# Patient Record
Sex: MALE | Race: WHITE | NOT HISPANIC OR LATINO | Employment: OTHER | URBAN - METROPOLITAN AREA
[De-identification: names, ages, dates, MRNs, and addresses within clinical notes are randomized per-mention and may not be internally consistent; named-entity substitution may affect disease eponyms.]

---

## 2017-01-09 DIAGNOSIS — I10 ESSENTIAL HYPERTENSION: ICD-10-CM

## 2017-01-09 RX ORDER — CARVEDILOL 25 MG/1
TABLET ORAL
Refills: 10 | Status: CANCELLED | OUTPATIENT
Start: 2017-01-09

## 2017-01-10 DIAGNOSIS — I10 ESSENTIAL HYPERTENSION: ICD-10-CM

## 2017-01-10 RX ORDER — CARVEDILOL 25 MG/1
25 TABLET ORAL 2 TIMES DAILY WITH MEALS
Qty: 60 TAB | Refills: 11 | Status: SHIPPED | OUTPATIENT
Start: 2017-01-10 | End: 2017-11-13 | Stop reason: SDUPTHER

## 2017-01-19 ENCOUNTER — OFFICE VISIT (OUTPATIENT)
Dept: INTERNAL MEDICINE | Facility: MEDICAL CENTER | Age: 72
End: 2017-01-19
Payer: MEDICARE

## 2017-01-19 VITALS
DIASTOLIC BLOOD PRESSURE: 81 MMHG | HEIGHT: 75 IN | BODY MASS INDEX: 32.65 KG/M2 | WEIGHT: 262.6 LBS | SYSTOLIC BLOOD PRESSURE: 146 MMHG | HEART RATE: 67 BPM | OXYGEN SATURATION: 91 % | TEMPERATURE: 97 F

## 2017-01-19 DIAGNOSIS — F33.8 SEASONAL AFFECTIVE DISORDER (HCC): ICD-10-CM

## 2017-01-19 DIAGNOSIS — Z23 NEED FOR PNEUMOCOCCAL VACCINATION: ICD-10-CM

## 2017-01-19 DIAGNOSIS — I26.99 OTHER PULMONARY EMBOLISM WITHOUT ACUTE COR PULMONALE, UNSPECIFIED CHRONICITY (HCC): ICD-10-CM

## 2017-01-19 DIAGNOSIS — I10 ESSENTIAL HYPERTENSION: ICD-10-CM

## 2017-01-19 DIAGNOSIS — D50.9 IRON DEFICIENCY ANEMIA, UNSPECIFIED IRON DEFICIENCY ANEMIA TYPE: ICD-10-CM

## 2017-01-19 DIAGNOSIS — G47.33 OSA (OBSTRUCTIVE SLEEP APNEA): ICD-10-CM

## 2017-01-19 DIAGNOSIS — E78.5 DYSLIPIDEMIA: ICD-10-CM

## 2017-01-19 PROCEDURE — 90670 PCV13 VACCINE IM: CPT | Performed by: INTERNAL MEDICINE

## 2017-01-19 PROCEDURE — G0009 ADMIN PNEUMOCOCCAL VACCINE: HCPCS | Performed by: INTERNAL MEDICINE

## 2017-01-19 PROCEDURE — 99214 OFFICE O/P EST MOD 30 MIN: CPT | Mod: 25 | Performed by: INTERNAL MEDICINE

## 2017-01-19 ASSESSMENT — PATIENT HEALTH QUESTIONNAIRE - PHQ9: CLINICAL INTERPRETATION OF PHQ2 SCORE: 0

## 2017-01-19 NOTE — PROGRESS NOTES
Established Patient    Anthony Cloud is a 71 y.o. male who presents today with the following:    CC: Follow-up for chronic medical problems.     HPI:  1. SHYAM (obstructive sleep apnea)  He's doing well with BiPAP. He did recently have oxygen added to his system however. He is very consistent about using this, and even takes it with him when his camping. He has a prescription for Provigil however he reports rare use of this.    2. Essential hypertension  Blood pressure is mildly elevated here in the office of 146/81 today. At home he reports blood pressures are more consistently lower approximately 140/80. He is currently on carvedilol 25 mg twice per day, losartan 50 mg daily, and hydrochlorothiazide 25 mg daily.    3. Seasonal affective disorder (CMS-HCC)  This is treated very effectively with Lexapro 10 mg daily. He stays on it year-round.    4. Dyslipidemia  Anthony is tolerating medication well.  No intolerable side-effects noted.  No new symptoms of muscle aches or weakness.  Patient reports good adherence to medication regimen.  No change in medication since the last visit. Anthony is trying to follow a low-cholesterol diet.  Exercise is adequate. He is currently on Lipitor 40 mg daily    5. Iron deficiency anemia, unspecified iron deficiency anemia type  He remains on iron 325 mg 3 times per day with meals. No symptoms of anemia at this time.    6. Other pulmonary embolism without acute cor pulmonale, unspecified chronicity (CMS-HCC)  Long-standing history of pulmonary embolism from many years ago. He was on warfarin however in the last few months was transitioned over to Xarelto. He is followed at the [facility] anticoagulation clinic.    7. Need for pneumococcal vaccination    ROS:   Denies any new chest pain or shortness of breath.  No changes to urinary or bowel function.     Past Medical History   Diagnosis Date   • Mixed hyperlipidemia    • Unspecified sleep apnea    • Depressive disorder, not  "elsewhere classified          • Old myocardial infarction January 2003         • Pulmonary embolism (CMS-HCC)    • Personal history of venous thrombosis and embolism          • CAD (coronary artery disease)    • Chronic anticoagulation    • Hypertension    • Sleep apnea      V-PAP   • HTN (hypertension)    • Obesity    • Sleep apnea    • Depression    • Dyslipidemia    • Hypothyroidism    • Central sleep apnea       ICD-10 transition   • Peptic ulcer disease 8/4/2016   • Restrictive lung disease 8/4/2016       Social History   Substance Use Topics   • Smoking status: Never Smoker    • Smokeless tobacco: Never Used   • Alcohol Use: Yes      Comment: 4-5 drinks a week        Current Outpatient Prescriptions   Medication Sig Dispense Refill   • omeprazole (PRILOSEC) 20 MG delayed-release capsule TAKE ONE CAPSULE BY MOUTH TWICE DAILY  60 Cap 6   • carvedilol (COREG) 25 MG Tab Take 1 Tab by mouth 2 times a day, with meals. 60 Tab 11   • atorvastatin (LIPITOR) 40 MG Tab Take 1 Tab by mouth every day. 90 Tab 1   • rivaroxaban (XARELTO) 20 MG Tab tablet Take 1 Tab by mouth with dinner. 30 Tab 6   • ferrous sulfate 325 (65 FE) MG EC tablet Take 325 mg by mouth 3 times a day, with meals.     • losartan (COZAAR) 50 MG Tab Take 1 Tab by mouth 2 Times a Day. 180 Tab 2   • modafinil (PROVIGIL) 200 MG Tab Take 1/2 to 1 tablet by mouth daily 30 Tab 3   • hydrochlorothiazide (HYDRODIURIL) 25 MG Tab TAKE ONE (1) TABLET BY MOUTH EVERY DAY 30 Tab 11   • aspirin (ASA) 81 MG Chew Tab chewable tablet Take 1 Tab by mouth every day. 100 Tab 11   • escitalopram (LEXAPRO) 10 MG TABS Take 10 mg by mouth every day.       No current facility-administered medications for this visit.       /81 mmHg  Pulse 67  Temp(Src) 36.1 °C (97 °F)  Ht 1.905 m (6' 3\")  Wt 119.115 kg (262 lb 9.6 oz)  BMI 32.82 kg/m2  SpO2 91%    Physical Exam  General: Well developed, well nourished male, in no distress.  Eyes: Conjuntiva without any obvious " injection or erythema.   Cardiovascular: Heart is regular with no murmurs  Lungs: Clear to auscultation bilaterally. No wheezes, rhonci or crackles heard. Respiratory effort is normal.  Abd: Soft, non-tender  Ext: No edema    Assessment and Plan    1. SHYAM (obstructive sleep apnea)  Overall stable. He will continue to use the BiPAP and continue to follow with his sleep specialist.    2. Essential hypertension  Currently this is stable and well controlled.  The patient should continue current therapy with no changes at this time.       3. Seasonal affective disorder (CMS-HCC)  No changes today. He will continue on Lexapro    4. Dyslipidemia  Currently this is stable and well controlled.  The patient should continue current therapy with no changes at this time.       5. Iron deficiency anemia, unspecified iron deficiency anemia type   plan for repeat CBC and iron panel. For now he will continue on his iron supplementation.    6. Other pulmonary embolism without acute cor pulmonale, unspecified chronicity (CMS-HCC)  Overall doing well on the Xarelto. No signs of bleeding or other problems at this point. He'll continue follow-up with the [facility] anticoagulation clinic    7. Need for pneumococcal vaccination  Prevnar 13 given in the office today.    Health Maintenance  Influenza vaccine is done for this flu season.  Prevnar today, Pneumovax in one year.  Colonoscopy is up-to-date however he will need a repeat colonoscopy in January 2018 at a 5 year interval      Return in about 6 months (around 7/19/2017).      Signed by: Angel Cameron M.D.    This note was created using voice recognition software.  While every attempt is made to ensure accuracy of transcription, occasionally errors occur.

## 2017-01-19 NOTE — PATIENT INSTRUCTIONS
Get labs done for me when you get the labs done that the cardiologist ordered.    Remember, you'll need your next colonoscopy in January 2018.

## 2017-01-19 NOTE — MR AVS SNAPSHOT
"        Anthony Cloud   2017 8:40 AM   Office Visit   MRN: [id number]    Department:  [facility] Med - Internal Med   Dept Phone:  [phone]    Description:  Male : 1945   Provider:  Angel Cameron M.D.           Reason for Visit     Follow-Up dyslipidemia       Allergies as of 2017     Allergen Noted Reactions    Lisinopril 2014       cough      You were diagnosed with     SHYAM (obstructive sleep apnea)   [835570]       Essential hypertension   [6669435]       Seasonal affective disorder (CMS-HCC)   [627163]       Dyslipidemia   [609586]       Iron deficiency anemia, unspecified iron deficiency anemia type   [0572110]       Other pulmonary embolism without acute cor pulmonale, unspecified chronicity (CMS-HCC)   [2516914]       Need for pneumococcal vaccination   [464594]         Vital Signs     Blood Pressure Pulse Temperature Height Weight Body Mass Index    146/81 mmHg 67 36.1 °C (97 °F) 1.905 m (6' 3\") 119.115 kg (262 lb 9.6 oz) 32.82 kg/m2    Oxygen Saturation Smoking Status                91% Never Smoker           Basic Information     Date Of Birth Sex Race Ethnicity Preferred Language    1945 Male White Non- English      Your appointments     2017  8:20 AM   PACER CHECK ONLY with LUCY Diaz   [facility] (--)    [address]  [facility] [address] [phone]            2017  8:40 AM   Establ[facility] with [facility] - [facility] (--)    [address] [place] [address]  [address]  [facility] [address] [phone]           You will be receiving a confirmation call a few days before your appointment from our automated call confirmation system.              Problem List              ICD-10-CM Priority Class Noted - Resolved    Dyslipidemia E78.5 High  Unknown - Present    Seasonal affective disorder (CMS-HCC) F39   Unknown - Present    Old " myocardial infarction I25.2 High  Unknown - Present    Personal history of venous thrombosis and embolism Z86.718   Unknown - Present    Malleolar fracture S82.899A   8/29/2014 - Present    Chronic anticoagulation Z79.01   9/16/2015 - Present    Essential hypertension I10   12/7/2015 - Present    Coronary artery disease due to lipid rich plaque I25.10, I25.83   12/7/2015 - Present    SHYAM (obstructive sleep apnea) G47.33   6/14/2016 - Present    Anemia, iron deficiency D50.9   8/4/2016 - Present    Helicobacter pylori infection A04.8   8/4/2016 - Present    Peptic ulcer disease K27.9   8/4/2016 - Present    H/O: GI bleed Z87.19   8/4/2016 - Present    Impaired fasting glucose R73.01   8/4/2016 - Present    Restrictive lung disease J98.4   8/4/2016 - Present    Pulmonary embolism (CMS-HCC) I26.99   11/9/2016 - Present      Health Maintenance        Date Due Completion Dates    IMM DTaP/Tdap/Td Vaccine (1 - Tdap) 7/11/1966 ---    IMM ZOSTER VACCINE 7/11/2007 ---    IMM PNEUMOCOCCAL 65+ (ADULT) LOW/MEDIUM RISK SERIES (1 of 2 - PCV13) 7/11/2012 ---    COLONOSCOPY 1/24/2018 1/24/2013            Current Immunizations     13-VALENT PCV PREVNAR  Incomplete    Influenza Vaccine Adult HD 12/8/2016      Below and/or attached are the medications your provider expects you to take. Review all of your home medications and newly ordered medications with your provider and/or pharmacist. Follow medication instructions as directed by your provider and/or pharmacist. Please keep your medication list with you and share with your provider. Update the information when medications are discontinued, doses are changed, or new medications (including over-the-counter products) are added; and carry medication information at all times in the event of emergency situations     Allergies:  LISINOPRIL - (reactions not documented)               Medications  Valid as of: January 19, 2017 -  9:14 AM    Generic Name Brand Name Tablet Size Instructions  for use    Aspirin (Chew Tab) ASA 81 MG Take 1 Tab by mouth every day.        Atorvastatin Calcium (Tab) LIPITOR 40 MG Take 1 Tab by mouth every day.        Carvedilol (Tab) COREG 25 MG Take 1 Tab by mouth 2 times a day, with meals.        Escitalopram Oxalate (Tab) LEXAPRO 10 MG Take 10 mg by mouth every day.        Ferrous Sulfate (Tablet Delayed Response) ferrous sulfate 325 (65 FE) MG Take 325 mg by mouth 3 times a day, with meals.        HydroCHLOROthiazide (Tab) HYDRODIURIL 25 MG TAKE ONE (1) TABLET BY MOUTH EVERY DAY        Losartan Potassium (Tab) COZAAR 50 MG Take 1 Tab by mouth 2 Times a Day.        Modafinil (Tab) PROVIGIL 200 MG Take 1/2 to 1 tablet by mouth daily        Omeprazole (CAPSULE DELAYED RELEASE) PRILOSEC 20 MG TAKE ONE CAPSULE BY MOUTH TWICE DAILY         Rivaroxaban (Tab) XARELTO 20 MG Take 1 Tab by mouth with dinner.        .                 Medicines prescribed today were sent to:     [facility] - [place], [place] - [address]    [address] [place] [address]    Phone: [phone] Fax: [phone]    Open 24 Hours?: No      Medication refill instructions:       If your prescription bottle indicates you have medication refills left, it is not necessary to call your provider’s office. Please contact your pharmacy and they will refill your medication.    If your prescription bottle indicates you do not have any refills left, you may request refills at any time through one of the following ways: The online [employer] system (except Urgent Care), by calling your provider’s office, or by asking your pharmacy to contact your provider’s office with a refill request. Medication refills are processed only during regular business hours and may not be available until the next business day. Your provider may request additional information or to have a follow-up visit with you prior to refilling your medication.   *Please Note: Medication refills are assigned a new Rx number when refilled  electronically. Your pharmacy may indicate that no refills were authorized even though a new prescription for the same medication is available at the pharmacy. Please request the medicine by name with the pharmacy before contacting your provider for a refill.        Your To Do List     Future Labs/Procedures Complete By Expires    CBC WITH DIFFERENTIAL  As directed 1/20/2018    FERRITIN  As directed 1/20/2018    IRON/TOTAL IRON BIND  As directed 1/20/2018      Instructions    Get labs done for me when you get the labs done that the cardiologist ordered.    Remember, you'll need your next colonoscopy in January 2018.       Other Notes About Your Plan     DME:  [facility] / ph [phone] / fax [phone]             MyChart Status: Patient Declined

## 2017-02-21 NOTE — TELEPHONE ENCOUNTER
Last seen: 1/19/17 by Dr. Cameron  Next appt: 7/20/17 with Dr. Cameron    Was the patient seen in the last year in this department? Yes   Does patient have an active prescription for medications requested? No   Received Request Via: Pharmacy

## 2017-02-23 NOTE — TELEPHONE ENCOUNTER
At his last visit, the dosing was 10mg 1 tab daily.  Rx request is for 1.5 tabs daily.  Please verify with patient.

## 2017-02-24 RX ORDER — ESCITALOPRAM OXALATE 10 MG/1
10 TABLET ORAL DAILY
Qty: 90 TAB | Refills: 1 | Status: SHIPPED | OUTPATIENT
Start: 2017-02-24 | End: 2017-08-31 | Stop reason: SDUPTHER

## 2017-03-09 ENCOUNTER — OFFICE VISIT (OUTPATIENT)
Dept: URGENT CARE | Facility: CLINIC | Age: 72
End: 2017-03-09
Payer: MEDICARE

## 2017-03-09 ENCOUNTER — APPOINTMENT (OUTPATIENT)
Dept: RADIOLOGY | Facility: IMAGING CENTER | Age: 72
End: 2017-03-09
Attending: FAMILY MEDICINE
Payer: MEDICARE

## 2017-03-09 VITALS
TEMPERATURE: 97.7 F | RESPIRATION RATE: 16 BRPM | OXYGEN SATURATION: 93 % | BODY MASS INDEX: 32.75 KG/M2 | SYSTOLIC BLOOD PRESSURE: 122 MMHG | DIASTOLIC BLOOD PRESSURE: 84 MMHG | HEART RATE: 66 BPM | WEIGHT: 262 LBS

## 2017-03-09 DIAGNOSIS — R07.9 RIGHT-SIDED CHEST PAIN: ICD-10-CM

## 2017-03-09 PROCEDURE — G8598 ASA/ANTIPLAT THER USED: HCPCS | Performed by: FAMILY MEDICINE

## 2017-03-09 PROCEDURE — 3017F COLORECTAL CA SCREEN DOC REV: CPT | Performed by: FAMILY MEDICINE

## 2017-03-09 PROCEDURE — 4040F PNEUMOC VAC/ADMIN/RCVD: CPT | Performed by: FAMILY MEDICINE

## 2017-03-09 PROCEDURE — G8432 DEP SCR NOT DOC, RNG: HCPCS | Performed by: FAMILY MEDICINE

## 2017-03-09 PROCEDURE — 71101 X-RAY EXAM UNILAT RIBS/CHEST: CPT | Mod: TC,RT | Performed by: FAMILY MEDICINE

## 2017-03-09 PROCEDURE — G8419 CALC BMI OUT NRM PARAM NOF/U: HCPCS | Performed by: FAMILY MEDICINE

## 2017-03-09 PROCEDURE — G8482 FLU IMMUNIZE ORDER/ADMIN: HCPCS | Performed by: FAMILY MEDICINE

## 2017-03-09 PROCEDURE — 1036F TOBACCO NON-USER: CPT | Performed by: FAMILY MEDICINE

## 2017-03-09 PROCEDURE — 1101F PT FALLS ASSESS-DOCD LE1/YR: CPT | Performed by: FAMILY MEDICINE

## 2017-03-09 PROCEDURE — 99214 OFFICE O/P EST MOD 30 MIN: CPT | Performed by: FAMILY MEDICINE

## 2017-03-09 RX ORDER — HYDROCODONE BITARTRATE AND ACETAMINOPHEN 5; 325 MG/1; MG/1
1-2 TABLET ORAL EVERY 4 HOURS PRN
Qty: 20 TAB | Refills: 0 | Status: SHIPPED | OUTPATIENT
Start: 2017-03-09 | End: 2017-07-13

## 2017-03-09 RX ORDER — IBUPROFEN 200 MG
200 TABLET ORAL EVERY 6 HOURS PRN
COMMUNITY
End: 2017-09-28

## 2017-03-09 ASSESSMENT — ENCOUNTER SYMPTOMS
VOMITING: 0
ANOREXIA: 0
ABDOMINAL PAIN: 1
NAUSEA: 0
COUGH: 0
FEVER: 0
CHILLS: 0
FATIGUE: 0

## 2017-03-09 NOTE — MR AVS SNAPSHOT
Anthony Cloud   3/9/2017 5:00 PM   Office Visit   MRN: [id number]    Department:  [facility]   Dept Phone:  [phone]    Description:  Male : 1945   Provider:  Julian Gutierrez M.D.           Reason for Visit     Rib Injury x yesterday, fell and Landed on Rt. ribs, hard to take a deep breath      Allergies as of 3/9/2017     Allergen Noted Reactions    Lisinopril 2014       cough      You were diagnosed with     Right-sided chest pain   [9760377]         Vital Signs     Blood Pressure Pulse Temperature Respirations Weight Oxygen Saturation    122/84 mmHg 66 36.5 °C (97.7 °F) 16 118.842 kg (262 lb) 93%    Smoking Status                   Never Smoker            Basic Information     Date Of Birth Sex Race Ethnicity Preferred Language    1945 Male White Non- English      Your appointments     2017  8:40 AM   Established Patient with Angel Cameron M.D.   [facility] Med - Internal Medicine (--)    [address] [place] [id number] [phone]           You will be receiving a confirmation call a few days before your appointment from our automated call confirmation system.              Problem List              ICD-10-CM Priority Class Noted - Resolved    Dyslipidemia E78.5 High  Unknown - Present    Seasonal affective disorder (CMS-HCC) F39   Unknown - Present    Old myocardial infarction I25.2 High  Unknown - Present    Personal history of venous thrombosis and embolism Z86.718   Unknown - Present    Malleolar fracture S82.899A   2014 - Present    Chronic anticoagulation Z79.01   2015 - Present    Essential hypertension I10   2015 - Present    Coronary artery disease due to lipid rich plaque I25.10, I25.83   2015 - Present    SHYAM (obstructive sleep apnea) G47.33   2016 - Present    Anemia, iron deficiency D50.9   2016 - Present    Helicobacter pylori infection A04.8   2016 - Present    Peptic ulcer disease K27.9   8/4/2016 - Present    H/O: GI bleed Z87.19   8/4/2016 - Present    Impaired fasting glucose R73.01   8/4/2016 - Present    Restrictive lung disease J98.4   8/4/2016 - Present    Pulmonary embolism (CMS-HCC) I26.99   11/9/2016 - Present      Health Maintenance        Date Due Completion Dates    IMM DTaP/Tdap/Td Vaccine (1 - Tdap) 7/11/1966 ---    IMM ZOSTER VACCINE 7/11/2007 ---    IMM PNEUMOCOCCAL 65+ (ADULT) LOW/MEDIUM RISK SERIES (2 of 2 - PPSV23) 1/19/2018 1/19/2017    COLONOSCOPY 1/24/2018 1/24/2013            Current Immunizations     13-VALENT PCV PREVNAR 1/19/2017    Influenza Vaccine Adult HD 12/8/2016      Below and/or attached are the medications your provider expects you to take. Review all of your home medications and newly ordered medications with your provider and/or pharmacist. Follow medication instructions as directed by your provider and/or pharmacist. Please keep your medication list with you and share with your provider. Update the information when medications are discontinued, doses are changed, or new medications (including over-the-counter products) are added; and carry medication information at all times in the event of emergency situations     Allergies:  LISINOPRIL - (reactions not documented)               Medications  Valid as of: March 09, 2017 -  6:16 PM    Generic Name Brand Name Tablet Size Instructions for use    Aspirin (Chew Tab) ASA 81 MG Take 1 Tab by mouth every day.        Atorvastatin Calcium (Tab) LIPITOR 40 MG Take 1 Tab by mouth every day.        Carvedilol (Tab) COREG 25 MG Take 1 Tab by mouth 2 times a day, with meals.        Escitalopram Oxalate (Tab) LEXAPRO 10 MG Take 1 Tab by mouth every day.        Ferrous Sulfate (Tablet Delayed Response) ferrous sulfate 325 (65 FE) MG Take 325 mg by mouth 3 times a day, with meals.        HydroCHLOROthiazide (Tab) HYDRODIURIL 25 MG TAKE ONE (1) TABLET BY MOUTH EVERY DAY        Hydrocodone-Acetaminophen  (Tab) NORCO 5-325 MG Take 1-2 Tabs by mouth every four hours as needed.        Ibuprofen (Tab) MOTRIN 200 MG Take 200 mg by mouth every 6 hours as needed.        Losartan Potassium (Tab) COZAAR 50 MG Take 1 Tab by mouth 2 Times a Day.        Modafinil (Tab) PROVIGIL 200 MG Take 1/2 to 1 tablet by mouth daily        Omeprazole (CAPSULE DELAYED RELEASE) PRILOSEC 20 MG TAKE ONE CAPSULE BY MOUTH TWICE DAILY         Rivaroxaban (Tab) XARELTO 20 MG Take 1 Tab by mouth with dinner.        .                 Medicines prescribed today were sent to:     [facility] [address] - [place], [place] - [address]    [address] [place] [address]    Phone: [phone] Fax: [phone]    Open 24 Hours?: No      Medication refill instructions:       If your prescription bottle indicates you have medication refills left, it is not necessary to call your provider’s office. Please contact your pharmacy and they will refill your medication.    If your prescription bottle indicates you do not have any refills left, you may request refills at any time through one of the following ways: The online [employer] system (except Urgent Care), by calling your provider’s office, or by asking your pharmacy to contact your provider’s office with a refill request. Medication refills are processed only during regular business hours and may not be available until the next business day. Your provider may request additional information or to have a follow-up visit with you prior to refilling your medication.   *Please Note: Medication refills are assigned a new Rx number when refilled electronically. Your pharmacy may indicate that no refills were authorized even though a new prescription for the same medication is available at the pharmacy. Please request the medicine by name with the pharmacy before contacting your provider for a refill.        Your To Do List     Future Labs/Procedures Complete By Expires    [id number] (WITH 1-VIEW CXR) RIGHT  As  directed 3/9/2018      Other Notes About Your Plan     DME:  [facility] / ph [phone] / fax [phone]             Zakada Access Code: [email or web address]  Expires: 4/6/2017  8:56 AM    Zakada  A secure, online tool to manage your health information     [employer] Zakada® is a secure, online tool that connects you to your personalized health information from the privacy of your home -- day or night - making it very easy for you to manage your healthcare. Once the activation process is completed, you can even access your medical information using the Zakada ramses, which is available for free in the Apple Ramses store or Google Play store.     Zakada provides the following levels of access (as shown below):   My Chart Features   [facility] Doctor [facility]  Specialists [facility]  Urgent  Care Non-[facility]  Primary Care  Doctor   Email your healthcare team securely and privately 24/7 X X X    Manage appointments: schedule your next appointment; view details of past/upcoming appointments X      Request prescription refills. X      View recent personal medical records, including lab and immunizations X X X X   View health record, including health history, allergies, medications X X X X   Read reports about your outpatient visits, procedures, consult and ER notes X X X X   See your discharge summary, which is a recap of your hospital and/or ER visit that includes your diagnosis, lab results, and care plan. X X       How to register for Zakada:  1. Go to  [email or web address].  2. Click on the Sign Up Now box, which takes you to the New Member Sign Up page. You will need to provide the following information:  a. Enter your Zakada Access Code exactly as it appears at the top of this page. (You will not need to use this code after you’ve completed the sign-up process. If you do not sign up before the expiration date, you must request a new code.)   b. Enter your date of birth.   c. Enter your home email  address.   d. Click Submit, and follow the next screen’s instructions.  3. Create a [employer] ID. This will be your [employer] login ID and cannot be changed, so think of one that is secure and easy to remember.  4. Create a [employer] password. You can change your password at any time.  5. Enter your Password Reset Question and Answer. This can be used at a later time if you forget your password.   6. Enter your e-mail address. This allows you to receive e-mail notifications when new information is available in [employer].  7. Click Sign Up. You can now view your health information.    For assistance activating your [employer] account, call [phone]

## 2017-03-10 NOTE — PROGRESS NOTES
"Subjective:      Anthony Cloud is a 71 y.o. male who presents with Rib Injury            Rib Injury  This is a new problem. The current episode started yesterday. The problem occurs constantly. The problem has been unchanged. Associated symptoms include abdominal pain and chest pain. Pertinent negatives include no anorexia, chills, congestion, coughing, fatigue, fever, nausea or vomiting. Exacerbated by: deep breathing. He has tried NSAIDs for the symptoms. The treatment provided moderate relief.   Fell on Right side with adducted arm and landed on ribs  Pain with deep inspiration, sharp  No pop or snap with fall  Had 3 ribs on right cut off about 15 years ago and has been \"a sensitive area\"    Review of Systems   Constitutional: Negative for fever, chills and fatigue.   HENT: Negative for congestion.    Respiratory: Negative for cough.    Cardiovascular: Positive for chest pain.   Gastrointestinal: Positive for abdominal pain. Negative for nausea, vomiting and anorexia.     PMH:  has a past medical history of Mixed hyperlipidemia; Unspecified sleep apnea; Depressive disorder, not elsewhere classified; Old myocardial infarction (January 2003); Pulmonary embolism (CMS-[facility]); Personal history of venous thrombosis and embolism; CAD (coronary artery disease); Chronic anticoagulation; Hypertension; Sleep apnea; HTN (hypertension); Obesity; Sleep apnea; Depression; Dyslipidemia; Hypothyroidism; Central sleep apnea; Peptic ulcer disease (8/4/2016); and Restrictive lung disease (8/4/2016). He also has no past medical history of GERD (gastroesophageal reflux disease).  MEDS:   Current outpatient prescriptions:   •  ibuprofen (MOTRIN) 200 MG Tab, Take 200 mg by mouth every 6 hours as needed., Disp: , Rfl:   •  escitalopram (LEXAPRO) 10 MG Tab, Take 1 Tab by mouth every day., Disp: 90 Tab, Rfl: 1  •  omeprazole (PRILOSEC) 20 MG delayed-release capsule, TAKE ONE CAPSULE BY MOUTH TWICE DAILY , Disp: 60 Cap, Rfl: 6  •  " carvedilol (COREG) 25 MG Tab, Take 1 Tab by mouth 2 times a day, with meals., Disp: 60 Tab, Rfl: 11  •  atorvastatin (LIPITOR) 40 MG Tab, Take 1 Tab by mouth every day., Disp: 90 Tab, Rfl: 1  •  rivaroxaban (XARELTO) 20 MG Tab tablet, Take 1 Tab by mouth with dinner., Disp: 30 Tab, Rfl: 6  •  losartan (COZAAR) 50 MG Tab, Take 1 Tab by mouth 2 Times a Day., Disp: 180 Tab, Rfl: 2  •  hydrochlorothiazide (HYDRODIURIL) 25 MG Tab, TAKE ONE (1) TABLET BY MOUTH EVERY DAY, Disp: 30 Tab, Rfl: 11  •  aspirin (ASA) 81 MG Chew Tab chewable tablet, Take 1 Tab by mouth every day., Disp: 100 Tab, Rfl: 11  •  ferrous sulfate 325 (65 FE) MG EC tablet, Take 325 mg by mouth 3 times a day, with meals., Disp: , Rfl:   •  modafinil (PROVIGIL) 200 MG Tab, Take 1/2 to 1 tablet by mouth daily, Disp: 30 Tab, Rfl: 3  ALLERGIES:   Allergies   Allergen Reactions   • Lisinopril      cough     SURGHX:   Past Surgical History   Procedure Laterality Date   • Bowel resection     • Incision and drainage orthopedic  8/29/2014     Performed by Wolfgang Merrill M.D. at SURGERY [facility]   • Cardiac cath, right/left heart  2003 01/2003 4.0x23mm Zeta stent to proximal RCA,3.5x22mm distal to first stent, 3.0x15mm at the point of original occlusion.   • Pr anesth,lower leg arteries surg       SOCHX:  reports that he has never smoked. He has never used smokeless tobacco. He reports that he drinks alcohol. He reports that he does not use illicit drugs.  FH: Family history was reviewed, no pertinent findings to report       Objective:     /84 mmHg  Pulse 66  Temp(Src) 36.5 °C (97.7 °F)  Resp 16  Wt 118.842 kg (262 lb)  SpO2 93%     Physical Exam   Constitutional: He appears well-developed.   HENT:   Head: Normocephalic.   Right Ear: External ear normal.   Left Ear: External ear normal.   Mouth/Throat: Oropharyngeal exudate present.   Nasal congestion    Eyes: Right eye exhibits no discharge. Left eye exhibits no discharge.   Neck:  Normal range of motion. No tracheal deviation present. No thyromegaly present.   Cardiovascular: Normal rate.  Exam reveals no friction rub.    No murmur heard.  Pulmonary/Chest: Effort normal. No stridor. No respiratory distress. He has no wheezes.   Abdominal: Soft. He exhibits no distension and no mass. There is tenderness. There is no rebound and no guarding.   Right rib cage tenderness   Lymphadenopathy:     He has no cervical adenopathy.   Neurological: He is alert.   Skin: Skin is warm and dry. He is not diaphoretic.   Psychiatric: He has a normal mood and affect. His behavior is normal.               Assessment/Plan:     1. Right-sided chest pain  [id number] (WITH 1-VIEW CXR) RIGHT    hydrocodone-acetaminophen (NORCO) 5-325 MG Tab per tablet     History of old trauma with rib resections  No pnumothorax  Likely contusion  Virals stable  meds for pain   Follow-up if symptoms worsen or fail to improve

## 2017-03-14 LAB
BASOPHILS # BLD AUTO: 0 X10E3/UL (ref 0–0.2)
BASOPHILS NFR BLD AUTO: 0 %
EOSINOPHIL # BLD AUTO: 0.1 X10E3/UL (ref 0–0.4)
EOSINOPHIL NFR BLD AUTO: 3 %
ERYTHROCYTE [DISTWIDTH] IN BLOOD BY AUTOMATED COUNT: 14.3 % (ref 12.3–15.4)
FERRITIN SERPL-MCNC: 169 NG/ML (ref 30–400)
HCT VFR BLD AUTO: 38.7 % (ref 37.5–51)
HGB BLD-MCNC: 13 G/DL (ref 12.6–17.7)
IMM GRANULOCYTES # BLD: 0 X10E3/UL (ref 0–0.1)
IMM GRANULOCYTES NFR BLD: 0 %
IMMATURE CELLS  115398: ABNORMAL
IRON SATN MFR SERPL: 34 % (ref 15–55)
IRON SERPL-MCNC: 93 UG/DL (ref 38–169)
LYMPHOCYTES # BLD AUTO: 1.4 X10E3/UL (ref 0.7–3.1)
LYMPHOCYTES NFR BLD AUTO: 32 %
MCH RBC QN AUTO: 34 PG (ref 26.6–33)
MCHC RBC AUTO-ENTMCNC: 33.6 G/DL (ref 31.5–35.7)
MCV RBC AUTO: 101 FL (ref 79–97)
MONOCYTES # BLD AUTO: 0.5 X10E3/UL (ref 0.1–0.9)
MONOCYTES NFR BLD AUTO: 11 %
MORPHOLOGY BLD-IMP: ABNORMAL
NEUTROPHILS # BLD AUTO: 2.4 X10E3/UL (ref 1.4–7)
NEUTROPHILS NFR BLD AUTO: 54 %
NRBC BLD AUTO-RTO: ABNORMAL %
PLATELET # BLD AUTO: 214 X10E3/UL (ref 150–379)
RBC # BLD AUTO: 3.82 X10E6/UL (ref 4.14–5.8)
TIBC SERPL-MCNC: 274 UG/DL (ref 250–450)
UIBC SERPL-MCNC: 181 UG/DL (ref 111–343)
WBC # BLD AUTO: 4.5 X10E3/UL (ref 3.4–10.8)

## 2017-04-25 DIAGNOSIS — I10 ESSENTIAL HYPERTENSION: ICD-10-CM

## 2017-04-25 RX ORDER — LOSARTAN POTASSIUM 50 MG/1
50 TABLET ORAL 2 TIMES DAILY
Qty: 180 TAB | Refills: 1 | OUTPATIENT
Start: 2017-04-25 | End: 2017-11-21 | Stop reason: SDUPTHER

## 2017-04-25 RX ORDER — HYDROCHLOROTHIAZIDE 25 MG/1
TABLET ORAL
Qty: 90 TAB | Refills: 1 | OUTPATIENT
Start: 2017-04-25 | End: 2017-07-13

## 2017-06-13 ENCOUNTER — SLEEP CENTER VISIT (OUTPATIENT)
Dept: SLEEP MEDICINE | Facility: MEDICAL CENTER | Age: 72
End: 2017-06-13
Payer: MEDICARE

## 2017-06-13 VITALS
RESPIRATION RATE: 18 BRPM | SYSTOLIC BLOOD PRESSURE: 100 MMHG | BODY MASS INDEX: 32.7 KG/M2 | OXYGEN SATURATION: 91 % | TEMPERATURE: 98.1 F | HEART RATE: 60 BPM | DIASTOLIC BLOOD PRESSURE: 58 MMHG | HEIGHT: 75 IN | WEIGHT: 263 LBS

## 2017-06-13 DIAGNOSIS — E78.5 DYSLIPIDEMIA: ICD-10-CM

## 2017-06-13 DIAGNOSIS — R09.02 HYPOXEMIA: ICD-10-CM

## 2017-06-13 DIAGNOSIS — Z86.718 PERSONAL HISTORY OF VENOUS THROMBOSIS AND EMBOLISM: ICD-10-CM

## 2017-06-13 DIAGNOSIS — I10 ESSENTIAL HYPERTENSION: ICD-10-CM

## 2017-06-13 DIAGNOSIS — J98.4 RESTRICTIVE LUNG DISEASE: ICD-10-CM

## 2017-06-13 DIAGNOSIS — G47.33 OSA (OBSTRUCTIVE SLEEP APNEA): ICD-10-CM

## 2017-06-13 PROCEDURE — 99214 OFFICE O/P EST MOD 30 MIN: CPT | Mod: 25 | Performed by: INTERNAL MEDICINE

## 2017-06-13 PROCEDURE — 94761 N-INVAS EAR/PLS OXIMETRY MLT: CPT | Performed by: INTERNAL MEDICINE

## 2017-06-13 RX ORDER — LEVALBUTEROL TARTRATE 45 UG/1
2 AEROSOL, METERED ORAL EVERY 4 HOURS PRN
Qty: 1 INHALER | Refills: 1 | Status: SHIPPED | OUTPATIENT
Start: 2017-06-13 | End: 2018-05-01 | Stop reason: SDUPTHER

## 2017-06-13 RX ORDER — AZITHROMYCIN 250 MG/1
TABLET, FILM COATED ORAL
COMMUNITY
Start: 2017-04-14 | End: 2017-05-01

## 2017-06-13 NOTE — PROGRESS NOTES
CC: Has questions about saturations and inhalers.    HPI:  71 year old M has noticed that his sats are better at sea [place] than [place]. We did a multi-ox today and he was mostly 91-92% but did dip to 88% after 2 laps around the  sleep center. Has hx prior PE. Uses ASV with 3 lpm for CSA.   2012 PFT showed mixed obstruction-restriction. 2015 CT chest showed no PE and post surgical changes of right hemithorax. Feels much better at sea level.Able to exercise much more there.  Doesn't want daytime O2 and doesn't want to move to sea level. Remaining very active with horses, sheep, riding,hunting, working in yard.      Echo 2014: CONCLUSIONS  Normal left ventricular systolic function.  Moderate concentric left ventricular hypertrophy.  Left ventricular ejection fraction is 60% to 65%.  Grade II diastolic dysfunction is present.  Normal left atrial size.  Mildly thickened mitral valve leaflets with normal leaflet excursion.  Trace to mild mitral regurgitation.  Mitral annular calcification.  Mild aortic sclerosis without stenosis.  Trace to mild tricuspid regurgitation.  Right ventricular systolic pressure is estimated to be 33-38 mmHg   consistent with mild pulmonary hypertension.        The patient is a 71-year-old man who was last seen in August 2016:  His complex apnea has resolved. His current sleep study shows severe obstructive sleep apnea hypopnea with an AHI of 32.6 a low saturation of 80% and saturations less than 90% for 93.1% of the recording. All of the events were obstructive hypopneas. He has a history of complex sleep apnea and has been treated with ASV for years however. He had only one central apnea during his CPAP and bilevel titration. He does not meet the criteria for ASV any longer.    We will place him on bilevel of 18/14 where his apnea hypopnea index was 0.0 with low saturation of 85% and a mean saturation of 87.7%. Will need bleed-in O2 as well. Low sats likely 2/2 to MSEW and prior right chest  trauma and surgery with restriction.        Had OPO recently on 18/14 which showed sats < 90% for 43.5% of the recording with a low of 81%. Was on 2 lpm, will increase to 3 lpm.    Doing well in general but forgot to bring the chip.      Later, he brought in his CHP and we downloaded it. The card covers the time frame 7/17/2016 to 8/15/2016. He used his machine 100% of the time for an average 8 hours and 45 seconds. He used his machine greater than 4 hours 100% of the time. The residual AHI was 2.0.                    Patient Active Problem List    Diagnosis Date Noted   • Dyslipidemia      Priority: High   • Old myocardial infarction      Priority: High   • Pulmonary embolism (CMS-[facility]) 11/09/2016   • Anemia, iron deficiency 08/04/2016   • Helicobacter pylori infection 08/04/2016   • Peptic ulcer disease 08/04/2016   • H/O: GI bleed 08/04/2016   • Impaired fasting glucose 08/04/2016   • Restrictive lung disease 08/04/2016   • SHYAM (obstructive sleep apnea) 06/14/2016   • Essential hypertension 12/07/2015   • Coronary artery disease due to lipid rich plaque 12/07/2015   • Chronic anticoagulation 09/16/2015   • Malleolar fracture 08/29/2014   • Seasonal affective disorder (CMS-HCC)    • Personal history of venous thrombosis and embolism        Past Medical History   Diagnosis Date   • Mixed hyperlipidemia    • Unspecified sleep apnea    • Depressive disorder, not elsewhere classified          • Old myocardial infarction January 2003         • Pulmonary embolism (CMS-HCC)    • Personal history of venous thrombosis and embolism          • CAD (coronary artery disease)    • Chronic anticoagulation    • Hypertension    • Sleep apnea      V-PAP   • HTN (hypertension)    • Obesity    • Sleep apnea    • Depression    • Dyslipidemia    • Hypothyroidism    • Central sleep apnea       ICD-10 transition   • Peptic ulcer disease 8/4/2016   • Restrictive lung disease 8/4/2016        Past Surgical History   Procedure Laterality Date    • Bowel resection     • Incision and drainage orthopedic  8/29/2014     Performed by Wolfgang Merrill M.D. at SURGERY [facility]   • Cardiac cath, right/left heart  2003 01/2003 4.0x23mm Zeta stent to proximal RCA,3.5x22mm distal to first stent, 3.0x15mm at the point of original occlusion.   • Pr anesth,lower leg arteries surg         Family History   Problem Relation Age of Onset   • Other Mother      Passed at 98   • Heart Disease Father 60     CABG       Social History     Social History   • Marital Status:      Spouse Name: N/A   • Number of Children: N/A   • Years of Education: N/A     Occupational History   • Not on file.     Social History Main Topics   • Smoking status: Never Smoker    • Smokeless tobacco: Never Used   • Alcohol Use: Yes      Comment: 4-5 drinks a week    • Drug Use: No   • Sexual Activity: Not on file     Other Topics Concern   • Not on file     Social History Narrative       Current Outpatient Prescriptions   Medication Sig Dispense Refill   • losartan (COZAAR) 50 MG Tab Take 1 Tab by mouth 2 Times a Day. 180 Tab 1   • hydrochlorothiazide (HYDRODIURIL) 25 MG Tab TAKE ONE (1) TABLET BY MOUTH EVERY DAY 90 Tab 1   • ibuprofen (MOTRIN) 200 MG Tab Take 200 mg by mouth every 6 hours as needed.     • escitalopram (LEXAPRO) 10 MG Tab Take 1 Tab by mouth every day. 90 Tab 1   • omeprazole (PRILOSEC) 20 MG delayed-release capsule TAKE ONE CAPSULE BY MOUTH TWICE DAILY  60 Cap 6   • carvedilol (COREG) 25 MG Tab Take 1 Tab by mouth 2 times a day, with meals. 60 Tab 11   • atorvastatin (LIPITOR) 40 MG Tab Take 1 Tab by mouth every day. 90 Tab 1   • rivaroxaban (XARELTO) 20 MG Tab tablet Take 1 Tab by mouth with dinner. 30 Tab 6   • ferrous sulfate 325 (65 FE) MG EC tablet Take 325 mg by mouth 3 times a day, with meals.     • aspirin (ASA) 81 MG Chew Tab chewable tablet Take 1 Tab by mouth every day. 100 Tab 11   • hydrocodone-acetaminophen (NORCO) 5-325 MG Tab per tablet Take 1-2  "Tabs by mouth every four hours as needed. 20 Tab 0   • modafinil (PROVIGIL) 200 MG Tab Take 1/2 to 1 tablet by mouth daily (Patient not taking: Reported on 6/13/2017) 30 Tab 3     No current facility-administered medications for this visit.    \"CURRENT RX\"    ALLERGIES: Lisinopril    ROS  Per history of present illness otherwise negative      PHYSICAL EXAM  msew  /58 mmHg  Pulse 60  Temp(Src) 36.7 °C (98.1 °F)  Resp 18  Ht 1.905 m (6' 3\")  Wt 119.296 kg (263 lb)  BMI 32.87 kg/m2  SpO2 91%  Appearance: Well-nourished, well-developed, no acute distress  Eyes:  PERRLA, EOMI  Hearing:  Grossly intact  Nose:  Normal, no lesions or deformities, turbinates moist  Oropharynx:  Tongue normal, posterior pharynx without erythema or exudate  Mallampati classification:    Neck: Supple, trachea midline, no masses  Respiratory effort:  No intercostal retractions or use of accessory muscles  Lung auscultation:  No wheezes rhonchi rubs or rales  Cardiac auscultation:  No murmurs, rubs, or gallops, no regular rhythm, normal rate  Abdomen: Protuberant abd  Extremities:  No cyanosis, clubbing; trace edema  Gait and Station:  Normal  Digits and nails: No clubbing, cyanosis, petechiae, or nodes  Musculoskeletal:  Grossly normal  Skin:  No rashes  Orientation:  Oriented time, place, and person  Mood and affect:  No depression, anxiety, agitation  Judgment:  Intact    PROBLEMS:    1. SHYAM (obstructive sleep apnea)  2. Mixed Obstructive Restrictive lung disease  3. Essential hypertension  4. Dyslipidemia  5. Personal history of venous thrombosis and embolism  6. Obesity        PLAN:   Will schedule him to have repeat PFTs and empiricaclly place him him on Breo and Xopenex rescue. Also need to review his chip.    Has been advised to continue the current PAP therapy, clean equipment frequently, and get new mask and supplies as allowed by insurance and DME. Advised about potential problems including nasal obstruction/stuffiness, " mask leak or discomfort , frequent awakenings, chill or dryness of the upper airway, noise, inconvenience, and lack of benefit. Recommend an earlier appointment, if significant treatment barriers develop.

## 2017-06-13 NOTE — MR AVS SNAPSHOT
"        Anthony Cloud   2017 11:40 AM   Sleep Center Visit   MRN: [id number]    Department:  [facility]   Dept Phone:  [phone]    Description:  Male : 1945   Provider:  Austin Schreiber M.D.           Reason for Visit     Medication Management Patient wants to discuss inhaler medication    Fatigue Daytime O2 levels      Allergies as of 2017     Allergen Noted Reactions    Lisinopril 2014       cough      You were diagnosed with     SHYAM (obstructive sleep apnea)   [212807]       Restrictive lung disease   [171885]       Essential hypertension   [9690437]       Dyslipidemia   [642433]       Personal history of venous thrombosis and embolism   [V12.51.ICD-9-CM]       Hypoxemia   [799.02.ICD-9-CM]         Vital Signs     Blood Pressure Pulse Temperature Respirations Height Weight    100/58 mmHg 60 36.7 °C (98.1 °F) 18 1.905 m (6' 3\") 119.296 kg (263 lb)    Body Mass Index Oxygen Saturation Smoking Status             32.87 kg/m2 91% Never Smoker          Basic Information     Date Of Birth Sex Race Ethnicity Preferred Language    1945 Male White Non- English      Your appointments     2017  8:40 AM   Established Patient with Angel Cameron M.D.   [facility] - Internal Medicine (--)    [address] [place] [id number] [phone]           You will be receiving a confirmation call a few days before your appointment from our automated call confirmation system.              Problem List              ICD-10-CM Priority Class Noted - Resolved    Dyslipidemia E78.5 High  Unknown - Present    Seasonal affective disorder (CMS-HCC) F39   Unknown - Present    Old myocardial infarction I25.2 High  Unknown - Present    Personal history of venous thrombosis and embolism Z86.718   Unknown - Present    Malleolar fracture S82.899A   2014 - Present    Chronic anticoagulation Z79.01   2015 - Present    Essential hypertension " I10   12/7/2015 - Present    Coronary artery disease due to lipid rich plaque I25.10, I25.83   12/7/2015 - Present    SHYAM (obstructive sleep apnea) G47.33   6/14/2016 - Present    Anemia, iron deficiency D50.9   8/4/2016 - Present    Helicobacter pylori infection A04.8   8/4/2016 - Present    Peptic ulcer disease K27.9   8/4/2016 - Present    H/O: GI bleed Z87.19   8/4/2016 - Present    Impaired fasting glucose R73.01   8/4/2016 - Present    Restrictive lung disease J98.4   8/4/2016 - Present    Pulmonary embolism (CMS-HCC) I26.99   11/9/2016 - Present      Health Maintenance        Date Due Completion Dates    IMM DTaP/Tdap/Td Vaccine (1 - Tdap) 7/11/1966 ---    IMM ZOSTER VACCINE 7/11/2007 ---    IMM PNEUMOCOCCAL 65+ (ADULT) LOW/MEDIUM RISK SERIES (2 of 2 - PPSV23) 1/19/2018 1/19/2017    COLONOSCOPY 1/24/2018 1/24/2013            Current Immunizations     13-VALENT PCV PREVNAR 1/19/2017    Influenza Vaccine Adult HD 12/8/2016      Below and/or attached are the medications your provider expects you to take. Review all of your home medications and newly ordered medications with your provider and/or pharmacist. Follow medication instructions as directed by your provider and/or pharmacist. Please keep your medication list with you and share with your provider. Update the information when medications are discontinued, doses are changed, or new medications (including over-the-counter products) are added; and carry medication information at all times in the event of emergency situations     Allergies:  LISINOPRIL - (reactions not documented)               Medications  Valid as of: June 13, 2017 - 11:56 AM    Generic Name Brand Name Tablet Size Instructions for use    Aspirin (Chew Tab) ASA 81 MG Take 1 Tab by mouth every day.        Atorvastatin Calcium (Tab) LIPITOR 40 MG Take 1 Tab by mouth every day.        Carvedilol (Tab) COREG 25 MG Take 1 Tab by mouth 2 times a day, with meals.        Escitalopram Oxalate (Tab)  LEXAPRO 10 MG Take 1 Tab by mouth every day.        Ferrous Sulfate (Tablet Delayed Response) ferrous sulfate 325 (65 FE) MG Take 325 mg by mouth 3 times a day, with meals.        Fluticasone Furoate-Vilanterol (AEROSOL POWDER, BREATH ACTIVATED) Fluticasone Furoate-Vilanterol 200-25 MCG/INH Take 1 Inhalation by mouth every day. Rinse mouth after use.        HydroCHLOROthiazide (Tab) HYDRODIURIL 25 MG TAKE ONE (1) TABLET BY MOUTH EVERY DAY        Hydrocodone-Acetaminophen (Tab) NORCO 5-325 MG Take 1-2 Tabs by mouth every four hours as needed.        Ibuprofen (Tab) MOTRIN 200 MG Take 200 mg by mouth every 6 hours as needed.        Levalbuterol Tartrate (Aerosol) XOPENEX HFA 45 MCG/ACT Inhale 2 Puffs by mouth every four hours as needed for Shortness of Breath.        Losartan Potassium (Tab) COZAAR 50 MG Take 1 Tab by mouth 2 Times a Day.        Modafinil (Tab) PROVIGIL 200 MG Take 1/2 to 1 tablet by mouth daily        Omeprazole (CAPSULE DELAYED RELEASE) PRILOSEC 20 MG TAKE ONE CAPSULE BY MOUTH TWICE DAILY         Rivaroxaban (Tab) XARELTO 20 MG Take 1 Tab by mouth with dinner.        .                 Medicines prescribed today were sent to:     [id number] - [place] - [address]    [address] [place] [address]    Phone: [phone] Fax: [phone]    Open 24 Hours?: No      Medication refill instructions:       If your prescription bottle indicates you have medication refills left, it is not necessary to call your provider’s office. Please contact your pharmacy and they will refill your medication.    If your prescription bottle indicates you do not have any refills left, you may request refills at any time through one of the following ways: The online [employer] system (except Urgent Care), by calling your provider’s office, or by asking your pharmacy to contact your provider’s office with a refill request. Medication refills are processed only during regular business hours and may not be available  until the next business day. Your provider may request additional information or to have a follow-up visit with you prior to refilling your medication.   *Please Note: Medication refills are assigned a new Rx number when refilled electronically. Your pharmacy may indicate that no refills were authorized even though a new prescription for the same medication is available at the pharmacy. Please request the medicine by name with the pharmacy before contacting your provider for a refill.        Other Notes About Your Plan     DME:  [facility] / ph [phone] / fax [phone]             Anvato Access Code: [email or web address]  Expires: 7/13/2017 11:04 AM    Anvato  A secure, online tool to manage your health information     [employer]’s Anvato® is a secure, online tool that connects you to your personalized health information from the privacy of your home -- day or night - making it very easy for you to manage your healthcare. Once the activation process is completed, you can even access your medical information using the Anvato ramses, which is available for free in the Apple Ramses store or Google Play store.     Anvato provides the following levels of access (as shown below):   My Chart Features   [facility] Doctor [facility]  Urgent  Care Non-[facility]  Primary Care  Doctor   Email your healthcare team securely and privately 24/7 X X X    Manage appointments: schedule your next appointment; view details of past/upcoming appointments X      Request prescription refills. X      View recent personal medical records, including lab and immunizations X X X X   View health record, including health history, allergies, medications X X X X   Read reports about your outpatient visits, procedures, consult and ER notes X X X X   See your discharge summary, which is a recap of your hospital and/or ER visit that includes your diagnosis, lab results, and care plan. X X       How to register for  MyChart:  1. Go to  [email or web address].  2. Click on the Sign Up Now box, which takes you to the New Member Sign Up page. You will need to provide the following information:  a. Enter your [employer] Access Code exactly as it appears at the top of this page. (You will not need to use this code after you’ve completed the sign-up process. If you do not sign up before the expiration date, you must request a new code.)   b. Enter your date of birth.   c. Enter your home email address.   d. Click Submit, and follow the next screen’s instructions.  3. Create a Click[employer]t ID. This will be your Captricity login ID and cannot be changed, so think of one that is secure and easy to remember.  4. Create a [employer] password. You can change your password at any time.  5. Enter your Password Reset Question and Answer. This can be used at a later time if you forget your password.   6. Enter your e-mail address. This allows you to receive e-mail notifications when new information is available in [employer]ity.  7. Click Sign Up. You can now view your health information.    For assistance activating your [employer] account, call [phone]

## 2017-06-15 RX ORDER — ATORVASTATIN CALCIUM 40 MG/1
TABLET, FILM COATED ORAL
Qty: 90 TAB | Refills: 3 | Status: ON HOLD | OUTPATIENT
Start: 2017-06-15 | End: 2018-02-23

## 2017-07-13 ENCOUNTER — OFFICE VISIT (OUTPATIENT)
Dept: INTERNAL MEDICINE | Facility: MEDICAL CENTER | Age: 72
End: 2017-07-13
Payer: MEDICARE

## 2017-07-13 VITALS
TEMPERATURE: 97.7 F | HEART RATE: 85 BPM | WEIGHT: 262 LBS | HEIGHT: 75 IN | OXYGEN SATURATION: 94 % | BODY MASS INDEX: 32.58 KG/M2 | DIASTOLIC BLOOD PRESSURE: 66 MMHG | SYSTOLIC BLOOD PRESSURE: 108 MMHG

## 2017-07-13 DIAGNOSIS — D50.9 IRON DEFICIENCY ANEMIA, UNSPECIFIED IRON DEFICIENCY ANEMIA TYPE: ICD-10-CM

## 2017-07-13 DIAGNOSIS — E78.5 DYSLIPIDEMIA: ICD-10-CM

## 2017-07-13 DIAGNOSIS — Z79.01 CHRONIC ANTICOAGULATION: ICD-10-CM

## 2017-07-13 DIAGNOSIS — F33.8 SEASONAL AFFECTIVE DISORDER (HCC): ICD-10-CM

## 2017-07-13 DIAGNOSIS — G47.33 OSA (OBSTRUCTIVE SLEEP APNEA): ICD-10-CM

## 2017-07-13 DIAGNOSIS — I10 ESSENTIAL HYPERTENSION: ICD-10-CM

## 2017-07-13 PROCEDURE — 99214 OFFICE O/P EST MOD 30 MIN: CPT | Performed by: INTERNAL MEDICINE

## 2017-07-13 RX ORDER — HYDROCHLOROTHIAZIDE 25 MG/1
TABLET ORAL
Qty: 90 TAB | Refills: 1 | OUTPATIENT
Start: 2017-07-13 | End: 2017-10-31 | Stop reason: SDUPTHER

## 2017-07-13 ASSESSMENT — PATIENT HEALTH QUESTIONNAIRE - PHQ9: CLINICAL INTERPRETATION OF PHQ2 SCORE: 0

## 2017-07-13 ASSESSMENT — PAIN SCALES - GENERAL: PAINLEVEL: NO PAIN

## 2017-07-13 NOTE — PATIENT INSTRUCTIONS
Get a flu shot this fall.  Schedule colonoscopy for Jan 2018.  We'll do your next pneumonia vaccine in Jan 2018.

## 2017-07-13 NOTE — PROGRESS NOTES
Established Patient    Anthony Cloud is a 72 y.o. male who presents today with the following:    CC: Follow-up for chronic medical problems.     HPI:  1. Essential hypertension  Well controlled in the office today.  Home Blood Pressure reading are unchanged from previous and consistent with in-office readings. 120s/80s at home.  Patient reports good adherence to regimen with no changes.  No dizziness reported.  No intolerable side effects noted.     2. Dyslipidemia  Anthony is tolerating medication well.  No intolerable side-effects noted.  No new symptoms of muscle aches or weakness.  Patient reports good adherence to medication regimen.  No change in medication since the last visit. Antohny is trying to follow a low-cholesterol diet.  Exercise is adequate.     3. Chronic anticoagulation  AC for history of PE.  He transitioned from coumadin to Xarelto and is doing very will on this.  No abnormal bleeding.    4. Iron deficiency anemia, unspecified iron deficiency anemia type  Stable.  He ran out of Fe 2 months ago and did not get more, so he has been off of this.  Ferritin is 169.    5. SHYAM (obstructive sleep apnea)  He continues on BiPAP, and recently had some oxygen added at night.  Overall he is doing well with this.    6. Seasonal Affective Disorder  Initially started for seasonal issues, but he finds benefit year round, so he continues this.    ROS:     Denies any new chest pain or shortness of breath.  No changes to urinary or bowel function.  See HPI.    Past Medical History   Diagnosis Date   • Mixed hyperlipidemia    • Unspecified sleep apnea    • Depressive disorder, not elsewhere classified          • Old myocardial infarction January 2003         • Pulmonary embolism (CMS-HCC)    • Personal history of venous thrombosis and embolism          • CAD (coronary artery disease)    • Chronic anticoagulation    • Hypertension    • Sleep apnea      V-PAP   • HTN (hypertension)    • Obesity    • Sleep  "apnea    • Depression    • Dyslipidemia    • Hypothyroidism    • Central sleep apnea       ICD-10 transition   • Peptic ulcer disease 8/4/2016   • Restrictive lung disease 8/4/2016       Social History   Substance Use Topics   • Smoking status: Never Smoker    • Smokeless tobacco: Never Used   • Alcohol Use: Yes      Comment: 6 drinks a week      Current Outpatient Prescriptions on File Prior to Visit   Medication Sig Dispense Refill   • atorvastatin (LIPITOR) 40 MG Tab TAKE ONE (1) TABLET BY MOUTH EVERY DAY 90 Tab 3   • Fluticasone Furoate-Vilanterol (BREO ELLIPTA) 200-25 MCG/INH AEROSOL POWDER, BREATH ACTIVATED Take 1 Inhalation by mouth every day. Rinse mouth after use. 1 Each 3   • levalbuterol (XOPENEX HFA) 45 MCG/ACT inhaler Inhale 2 Puffs by mouth every four hours as needed for Shortness of Breath. 1 Inhaler 1   • losartan (COZAAR) 50 MG Tab Take 1 Tab by mouth 2 Times a Day. 180 Tab 1   • ibuprofen (MOTRIN) 200 MG Tab Take 200 mg by mouth every 6 hours as needed.     • escitalopram (LEXAPRO) 10 MG Tab Take 1 Tab by mouth every day. 90 Tab 1   • omeprazole (PRILOSEC) 20 MG delayed-release capsule TAKE ONE CAPSULE BY MOUTH TWICE DAILY  60 Cap 6   • carvedilol (COREG) 25 MG Tab Take 1 Tab by mouth 2 times a day, with meals. 60 Tab 11   • rivaroxaban (XARELTO) 20 MG Tab tablet Take 1 Tab by mouth with dinner. 30 Tab 6   • modafinil (PROVIGIL) 200 MG Tab Take 1/2 to 1 tablet by mouth daily 30 Tab 3   • aspirin (ASA) 81 MG Chew Tab chewable tablet Take 1 Tab by mouth every day. 100 Tab 11     No current facility-administered medications on file prior to visit.       Physical Exam:  /66 mmHg  Pulse 85  Temp(Src) 36.5 °C (97.7 °F)  Ht 1.905 m (6' 3\")  Wt 118.842 kg (262 lb)  BMI 32.75 kg/m2  SpO2 94%  General: Well developed, well nourished male, in no distress.  Eyes: Conjuntiva without any obvious injection or erythema.   Cardiovascular: Heart is regular with no murmurs  Lungs: Clear to auscultation " bilaterally. No wheezes, rhonci or crackles heard. Respiratory effort is normal.  Abd: Soft, non-tender  Ext: No edema    Assessment and Plan:  1. Essential hypertension  Currently this is stable and well controlled.  The patient should continue current therapy with no changes at this time.     - COMP METABOLIC PANEL; Future    2. Dyslipidemia  Currently this is stable and well controlled.  The patient should continue current therapy with no changes at this time.     - COMP METABOLIC PANEL; Future  - LIPID PROFILE; Future    3. Chronic anticoagulation  He is doing very well on Xarelto. No changes today. He should continue to monitor for any signs of bleeding. He has had history of significant GI bleed in the past, but that was many years ago.    4. Iron deficiency anemia, unspecified iron deficiency anemia type  At this point I think he can stay off of the iron supplementation. His most recent CBC is within normal limits, and his ferritin level at 169 shows adequate iron stores. This ferritin level also was following 2 months of being off of iron. I will plan to recheck his iron panel and CBC in 6 months to check the status of this.  - CBC WITH DIFFERENTIAL; Future  - FERRITIN; Future  - IRON/TOTAL IRON BIND; Future    5. SHYAM (obstructive sleep apnea)  Currently this is stable and well controlled.  The patient should continue current therapy with no changes at this time.  Continue to follow up with pulmonology as needed     6. Seasonal affective disorder (CMS-HCC)  He will continue to take Lexapro 10 mg daily. He finds benefit from this throughout the year, so he is comfortable staying on it.      Health Maintenance  Patient has not been screened with a PSA in the past.  We had a long discussion today regarding the risks and benefits of screening.  After this discussion, he is not interested in proceeding with testing at this time.  He can let me know any time if he changes is mind.  Due for colonoscopy in Jan  2018.  Due for PPSV23 in Jan 2018  Get a flu shot this fall.    Return in about 6 months (around 1/13/2018).     Patient Instructions   Get a flu shot this fall.  Schedule colonoscopy for Jan 2018.  We'll do your next pneumonia vaccine in Jan 2018.    Signed by: Angel Cameron M.D.    This note was created using voice recognition software.  While every attempt is made to ensure accuracy of transcription, occasionally errors occur.

## 2017-07-13 NOTE — MR AVS SNAPSHOT
"        Anthony Cloud   2017 8:40 AM   Office Visit   MRN: [id number]    Department:  [facility] - Internal Med   Dept Phone:  [phone]    Description:  Male : 1945   Provider:  Angel Cameron M.D.           Reason for Visit     Follow-Up 6 months     Follow-Up labs       Allergies as of 2017     Allergen Noted Reactions    Lisinopril 2014       cough      You were diagnosed with     Essential hypertension   [3895490]       Dyslipidemia   [726952]       Chronic anticoagulation   [074127]       Iron deficiency anemia, unspecified iron deficiency anemia type   [6613866]       SHYAM (obstructive sleep apnea)   [114913]       Seasonal affective disorder (CMS-HCC)   [177969]         Vital Signs     Blood Pressure Pulse Temperature Height Weight Body Mass Index    108/66 mmHg 85 36.5 °C (97.7 °F) 1.905 m (6' 3\") 118.842 kg (262 lb) 32.75 kg/m2    Oxygen Saturation Smoking Status                94% Never Smoker           Basic Information     Date Of Birth Sex Race Ethnicity Preferred Language    1945 Male White Non- English      Your appointments     2018 10:00 AM   Established Patient with Angel Cameron M.D.   [facility] - Internal Medicine (--)    [address] [place] [id number] [phone]           You will be receiving a confirmation call a few days before your appointment from our automated call confirmation system.              Problem List              ICD-10-CM Priority Class Noted - Resolved    Dyslipidemia E78.5 High  Unknown - Present    Seasonal affective disorder (CMS-HCC) F39   Unknown - Present    Old myocardial infarction I25.2 High  Unknown - Present    Personal history of venous thrombosis and embolism Z86.718   Unknown - Present    Malleolar fracture S82.899A   2014 - Present    Chronic anticoagulation Z79.01   2015 - Present    Essential hypertension I10   2015 - Present    Coronary artery " disease due to lipid rich plaque I25.10, I25.83   12/7/2015 - Present    SHYAM (obstructive sleep apnea) G47.33   6/14/2016 - Present    Anemia, iron deficiency D50.9   8/4/2016 - Present    Helicobacter pylori infection A04.8   8/4/2016 - Present    Peptic ulcer disease K27.9   8/4/2016 - Present    H/O: GI bleed Z87.19   8/4/2016 - Present    Impaired fasting glucose R73.01   8/4/2016 - Present    Restrictive lung disease J98.4   8/4/2016 - Present    Pulmonary embolism (CMS-HCC) I26.99   11/9/2016 - Present      Health Maintenance        Date Due Completion Dates    IMM DTaP/Tdap/Td Vaccine (1 - Tdap) 7/11/1966 ---    IMM ZOSTER VACCINE 7/11/2007 ---    IMM INFLUENZA (1) 9/1/2017 12/8/2016    IMM PNEUMOCOCCAL 65+ (ADULT) LOW/MEDIUM RISK SERIES (2 of 2 - PPSV23) 1/19/2018 1/19/2017    COLONOSCOPY 1/24/2018 1/24/2013            Current Immunizations     13-VALENT PCV PREVNAR 1/19/2017    Influenza Vaccine Adult HD 12/8/2016      Below and/or attached are the medications your provider expects you to take. Review all of your home medications and newly ordered medications with your provider and/or pharmacist. Follow medication instructions as directed by your provider and/or pharmacist. Please keep your medication list with you and share with your provider. Update the information when medications are discontinued, doses are changed, or new medications (including over-the-counter products) are added; and carry medication information at all times in the event of emergency situations     Allergies:  LISINOPRIL - (reactions not documented)               Medications  Valid as of: July 13, 2017 -  9:18 AM    Generic Name Brand Name Tablet Size Instructions for use    Aspirin (Chew Tab) ASA 81 MG Take 1 Tab by mouth every day.        Atorvastatin Calcium (Tab) LIPITOR 40 MG TAKE ONE (1) TABLET BY MOUTH EVERY DAY        Carvedilol (Tab) COREG 25 MG Take 1 Tab by mouth 2 times a day, with meals.        Escitalopram Oxalate (Tab)  LEXAPRO 10 MG Take 1 Tab by mouth every day.        Fluticasone Furoate-Vilanterol (AEROSOL POWDER, BREATH ACTIVATED) Fluticasone Furoate-Vilanterol 200-25 MCG/INH Take 1 Inhalation by mouth every day. Rinse mouth after use.        HydroCHLOROthiazide (Tab) HYDRODIURIL 25 MG TAKE ONE (1) TABLET BY MOUTH EVERY DAY        Ibuprofen (Tab) MOTRIN 200 MG Take 200 mg by mouth every 6 hours as needed.        Levalbuterol Tartrate (Aerosol) XOPENEX HFA 45 MCG/ACT Inhale 2 Puffs by mouth every four hours as needed for Shortness of Breath.        Losartan Potassium (Tab) COZAAR 50 MG Take 1 Tab by mouth 2 Times a Day.        Modafinil (Tab) PROVIGIL 200 MG Take 1/2 to 1 tablet by mouth daily        Omeprazole (CAPSULE DELAYED RELEASE) PRILOSEC 20 MG TAKE ONE CAPSULE BY MOUTH TWICE DAILY         Rivaroxaban (Tab) XARELTO 20 MG Take 1 Tab by mouth with dinner.        .                 Medicines prescribed today were sent to:     [id number] - [place], [place] - [address]    [address] [place] [address]    Phone: [phone] Fax: [phone]    Open 24 Hours?: No      Medication refill instructions:       If your prescription bottle indicates you have medication refills left, it is not necessary to call your provider’s office. Please contact your pharmacy and they will refill your medication.    If your prescription bottle indicates you do not have any refills left, you may request refills at any time through one of the following ways: The online [employer] system (except Urgent Care), by calling your provider’s office, or by asking your pharmacy to contact your provider’s office with a refill request. Medication refills are processed only during regular business hours and may not be available until the next business day. Your provider may request additional information or to have a follow-up visit with you prior to refilling your medication.   *Please Note: Medication refills are assigned a new Rx number when refilled  electronically. Your pharmacy may indicate that no refills were authorized even though a new prescription for the same medication is available at the pharmacy. Please request the medicine by name with the pharmacy before contacting your provider for a refill.        Your To Do List     Future Labs/Procedures Complete By Expires    CBC WITH DIFFERENTIAL  As directed 7/14/2018    COMP METABOLIC PANEL  As directed 7/14/2018    FERRITIN  As directed 7/14/2018    IRON/TOTAL IRON BIND  As directed 7/14/2018    LIPID PROFILE  As directed 7/14/2018      Instructions    Get a flu shot this fall.  Schedule colonoscopy for Jan 2018.  We'll do your next pneumonia vaccine in Jan 2018.       Other Notes About Your Plan     DME:  [facility] / ph [phone] / fax [phone]             Passado Access Code: [email or web address]  Expires: 7/13/2017 11:04 AM    Passado  A secure, online tool to manage your health information     [employer]’s Passado® is a secure, online tool that connects you to your personalized health information from the privacy of your home -- day or night - making it very easy for you to manage your healthcare. Once the activation process is completed, you can even access your medical information using the Passado ramses, which is available for free in the Apple Ramses store or Google Play store.     Passado provides the following levels of access (as shown below):   My Chart Features   [facility]  Specialists [facility]  Urgent  Care Non-[facility]  Primary Care  Doctor   Email your healthcare team securely and privately 24/7 X X X    Manage appointments: schedule your next appointment; view details of past/upcoming appointments X      Request prescription refills. X      View recent personal medical records, including lab and immunizations X X X X   View health record, including health history, allergies, medications X X X X   Read reports about your outpatient visits, procedures, consult and ER  notes X X X X   See your discharge summary, which is a recap of your hospital and/or ER visit that includes your diagnosis, lab results, and care plan. X X       How to register for [employer]:  1. Go to  [email or web address].  2. Click on the Sign Up Now box, which takes you to the New Member Sign Up page. You will need to provide the following information:  a. Enter your [employer] Access Code exactly as it appears at the top of this page. (You will not need to use this code after you’ve completed the sign-up process. If you do not sign up before the expiration date, you must request a new code.)   b. Enter your date of birth.   c. Enter your home email address.   d. Click Submit, and follow the next screen’s instructions.  3. Create a Directworkst ID. This will be your Directwork[employer] login ID and cannot be changed, so think of one that is secure and easy to remember.  4. Create a [employer] password. You can change your password at any time.  5. Enter your Password Reset Question and Answer. This can be used at a later time if you forget your password.   6. Enter your e-mail address. This allows you to receive e-mail notifications when new information is available in [employer].  7. Click Sign Up. You can now view your health information.    For assistance activating your [employer] account, call [phone]

## 2017-08-09 DIAGNOSIS — Z86.718 PERSONAL HISTORY OF VENOUS THROMBOSIS AND EMBOLISM: ICD-10-CM

## 2017-08-15 DIAGNOSIS — I26.99 OTHER PULMONARY EMBOLISM WITHOUT ACUTE COR PULMONALE, UNSPECIFIED CHRONICITY (HCC): ICD-10-CM

## 2017-08-31 RX ORDER — ESCITALOPRAM OXALATE 10 MG/1
TABLET ORAL
Qty: 90 TAB | Refills: 3 | Status: SHIPPED | OUTPATIENT
Start: 2017-08-31 | End: 2018-08-17 | Stop reason: SDUPTHER

## 2017-08-31 NOTE — TELEPHONE ENCOUNTER
Was the patient seen in the last year in this department? Yes  Last seen on 7/13/17 by Dr. Cameron Next Appt 1/16/18      Does patient have an active prescription for medications requested? No     Received Request Via: Pharmacy

## 2017-09-28 ENCOUNTER — ANTICOAGULATION VISIT (OUTPATIENT)
Dept: VASCULAR LAB | Facility: MEDICAL CENTER | Age: 72
End: 2017-09-28
Attending: INTERNAL MEDICINE
Payer: MEDICARE

## 2017-09-28 VITALS — DIASTOLIC BLOOD PRESSURE: 72 MMHG | HEART RATE: 60 BPM | SYSTOLIC BLOOD PRESSURE: 130 MMHG

## 2017-09-28 DIAGNOSIS — I48.91 ATRIAL FIBRILLATION, UNSPECIFIED TYPE (HCC): ICD-10-CM

## 2017-09-28 DIAGNOSIS — Z86.718 PERSONAL HISTORY OF VENOUS THROMBOSIS AND EMBOLISM: ICD-10-CM

## 2017-09-28 DIAGNOSIS — I26.01 CHRONIC SEPTIC PULMONARY EMBOLISM WITH ACUTE COR PULMONALE (HCC): ICD-10-CM

## 2017-09-28 DIAGNOSIS — I27.82 CHRONIC SEPTIC PULMONARY EMBOLISM WITH ACUTE COR PULMONALE (HCC): ICD-10-CM

## 2017-09-28 LAB
INR BLD: 1.2 (ref 0.9–1.2)
INR PPP: 1.2 (ref 2–3.5)

## 2017-09-28 PROCEDURE — 99211 OFF/OP EST MAY X REQ PHY/QHP: CPT

## 2017-09-28 PROCEDURE — 85610 PROTHROMBIN TIME: CPT

## 2017-09-28 NOTE — PROGRESS NOTES
Target end date:indefinite     Indication: PE/DVT     Drug: Xarelto         Health Status Since Last Assessment   Patient denies any new relevant medical problems, ED visits or hospitalizations   Patient denies any embolic events (stroke/tia/systemic embolism)    Adherence with DOAC Therapy   Pt has zero missed any doses in the average week    Bleeding Risk Assessment     none Epistaxis   Pt denies any excessive or unusual bleeding/hematomas.  Pt denies any GI bleeds or hematemesis.  Pt denies any concerning daily headache or sub dural hematoma symptoms.     Pt denies any hematuria or abnormal vaginal bleeding.   Latest Hemoglobin 13.0   ETOH overuse one drink twice weekly     Creatinine Clearance/Renal Function     Latest ClCr >60     Drug Interactions   ASA/other antiplatelets asa 81mg   NSAID none   Other drug interactions none     Examination   Blood Pressure 130/72   Symptomatic hypotension none   Significant gait impairment/imbalance/high risk for falls? none    Final Assessment and Recommendations:   Patient appears stable from the anticoagulation staindpoint.     Benefits of continued DOAC therapy outweigh risks for this patient   Recommend pt continue with current DOAC therapy      Other Actions:    Follow up:   Will follow up with patient in clinic in 6 months.      Alycia Fernández, PharmD

## 2017-09-28 NOTE — PROGRESS NOTES
Target end date:indefinite      Indication: ***     Drug: ***     CHADsVASC = ***    Health Status Since Last Assessment   Patient denies any new relevant medical problems, ED visits or hospitalizations   Patient denies any embolic events (stroke/tia/systemic embolism)    Adherence with DOAC Therapy   Pt has *** missed any doses in the average week    Bleeding Risk Assessment     *** Epistaxis   Pt denies any excessive or unusual bleeding/hematomas.  Pt denies any GI bleeds or hematemesis.  Pt denies any concerning daily headache or sub dural hematoma symptoms.     Pt denies any hematuria or abnormal vaginal bleeding.   Latest Hemoglobin ***   ETOH overuse ***     Creatinine Clearance/Renal Function     Latest ClCr ***     Drug Interactions   ASA/other antiplatelets ***   NSAID ***   Other drug interactions ***    Examination   Blood Pressure ***   Symptomatic hypotension ***   Significant gait impairment/imbalance/high risk for falls? ***    Final Assessment and Recommendations:   Patient appears stable from the anticoagulation staindpoint.     Benefits of continued DOAC therapy outweigh risks for this patient   Recommend pt continue with current DOAC therapy *** (with dose adjustment)     Other Actions:    Follow up:   Will follow up with patient via telephone in 6 months.  I have added this patient to my list for follow up.

## 2017-09-28 NOTE — PROGRESS NOTES
"VASCULAR ANTI-COAGULATION VISIT  Subjective:   Anthony Cloud is a 72 y.o. male who presents today 9/28/2017 for       HPI:***      DIET AND EXERCISE:  Weight Change:***  Diet: {DIET TYPES:72802}  Exercise: {EXERCISE PATTERN:21}     ROS   Objective:   There were no vitals filed for this visit.  There is no height or weight on file to calculate BMI.  Physical Exam      Lab Results   Component Value Date    INR 1.2 09/28/2017               Lab Results   Component Value Date    WBC 4.5 03/13/2017    RBC 3.82 (L) 03/13/2017    HEMOGLOBIN 13.0 03/13/2017    HEMATOCRIT 38.7 03/13/2017     (H) 03/13/2017    MCH 34.0 (H) 03/13/2017    MCHC 33.6 03/13/2017      Medical Decision Making:  Today's Assessment / Status / Plan:     1. 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 Personal history of venous thrombosis and embolism  POCT Protime     Indication for anticoagulation: ***    Anti-Platelet/Anti-Coagulant Tx: {YES/NO:63}    Anti-Coagulation Plan:***    Smoking:{TOBACCO QUIT METHODS:31974}     Physical Activity: {EXERCISE INTERVENTION:43596}    Weight Management and Nutrition:{BMI PLAN DOCUMENTED:38155915::\"exercise counseling\",\"nutrition counseling\"}    Instructed to follow-up with PCP for remainder of adult medical needs: {YES/NO:63}  We will partner with other provider in the management of established vascular disease and cardiometabolic risk factors    Studies to Be Obtained:   Labs to Be Obtained:     Follow up in: {FOLLOWUP:68410}    Alycia Fernández, PharmD    CC:   Angel Cameron M.D.  No ref. provider found      "

## 2017-10-31 DIAGNOSIS — I10 ESSENTIAL HYPERTENSION: ICD-10-CM

## 2017-10-31 RX ORDER — HYDROCHLOROTHIAZIDE 25 MG/1
25 TABLET ORAL DAILY
Qty: 90 TAB | Refills: 0 | Status: SHIPPED | OUTPATIENT
Start: 2017-10-31 | End: 2017-11-13 | Stop reason: SDUPTHER

## 2017-11-09 DIAGNOSIS — Z86.718 PERSONAL HISTORY OF VENOUS THROMBOSIS AND EMBOLISM: ICD-10-CM

## 2017-11-13 ENCOUNTER — OFFICE VISIT (OUTPATIENT)
Dept: CARDIOLOGY | Facility: MEDICAL CENTER | Age: 72
End: 2017-11-13
Payer: MEDICARE

## 2017-11-13 VITALS
SYSTOLIC BLOOD PRESSURE: 130 MMHG | WEIGHT: 260 LBS | BODY MASS INDEX: 31.66 KG/M2 | OXYGEN SATURATION: 96 % | HEART RATE: 64 BPM | DIASTOLIC BLOOD PRESSURE: 80 MMHG | HEIGHT: 76 IN

## 2017-11-13 DIAGNOSIS — I25.83 CORONARY ARTERY DISEASE DUE TO LIPID RICH PLAQUE: ICD-10-CM

## 2017-11-13 DIAGNOSIS — I26.01 CHRONIC SEPTIC PULMONARY EMBOLISM WITH ACUTE COR PULMONALE (HCC): ICD-10-CM

## 2017-11-13 DIAGNOSIS — I27.82 CHRONIC SEPTIC PULMONARY EMBOLISM WITH ACUTE COR PULMONALE (HCC): ICD-10-CM

## 2017-11-13 DIAGNOSIS — I25.10 CORONARY ARTERY DISEASE DUE TO LIPID RICH PLAQUE: ICD-10-CM

## 2017-11-13 DIAGNOSIS — I10 ESSENTIAL HYPERTENSION: ICD-10-CM

## 2017-11-13 DIAGNOSIS — Z79.01 CHRONIC ANTICOAGULATION: ICD-10-CM

## 2017-11-13 DIAGNOSIS — G47.33 OSA (OBSTRUCTIVE SLEEP APNEA): ICD-10-CM

## 2017-11-13 DIAGNOSIS — E78.5 DYSLIPIDEMIA: ICD-10-CM

## 2017-11-13 PROCEDURE — 99214 OFFICE O/P EST MOD 30 MIN: CPT | Performed by: NURSE PRACTITIONER

## 2017-11-13 RX ORDER — CARVEDILOL 25 MG/1
25 TABLET ORAL 2 TIMES DAILY WITH MEALS
Qty: 180 TAB | Refills: 3 | Status: SHIPPED | OUTPATIENT
Start: 2017-11-13 | End: 2018-12-03 | Stop reason: SDUPTHER

## 2017-11-13 RX ORDER — HYDROCHLOROTHIAZIDE 25 MG/1
25 TABLET ORAL DAILY
Qty: 90 TAB | Refills: 3 | Status: SHIPPED | OUTPATIENT
Start: 2017-11-13 | End: 2018-07-17

## 2017-11-13 ASSESSMENT — ENCOUNTER SYMPTOMS
LOSS OF CONSCIOUSNESS: 0
SHORTNESS OF BREATH: 0
ORTHOPNEA: 0
PALPITATIONS: 0
FEVER: 0
DIZZINESS: 0
MYALGIAS: 0
HEADACHES: 0
CHILLS: 0
BRUISES/BLEEDS EASILY: 0
PND: 0
ABDOMINAL PAIN: 0

## 2017-11-13 NOTE — PROGRESS NOTES
Subjective:   Anthony Cloud is a 72 y.o. male who presents today for annual follow-up of CAD, hypertension, hyperlipidemia, history of PE on Xarelto and sleep apnea.    Anthony is a 72 year old male with history of CAD, status post remote PCI/BMS x 3 to the RCA (2003), hypertension, hyperlipidemia, history of PE on Xarelto and sleep apnea on BiPAP and oxygen, normally followed by Dr. Calhoun.    Since the last visit a year ago, he has been doing well. He denies any cardiac symptoms at all. No chest pain, pressure or discomfort; no palpitations; no shortness of breath, orthopnea or PND; no dizziness, lightheadedness or syncope; no edema. BP has been stable. He will be getting labwork done soon.    Past Medical History:   Diagnosis Date   • CAD (coronary artery disease) 2003    PCI/BMS x 3 to the RCA (Zeta 4.0 x 23mm, 3.5 x 23mm, 3.0 x 18mm).   • Central sleep apnea      ICD-10 transition   • Chronic anticoagulation    • Depression    • Depressive disorder, not elsewhere classified         • Dyslipidemia    • Hypertension    • Hypothyroidism    • Mixed hyperlipidemia    • Obesity    • Old myocardial infarction January 2003        • Peptic ulcer disease 8/4/2016   • Personal history of venous thrombosis and embolism     On Xarelto   • Pulmonary embolism (CMS-HCC)    • Sleep apnea     BiPAP with oxygen     Past Surgical History:   Procedure Laterality Date   • INCISION AND DRAINAGE ORTHOPEDIC  8/29/2014    Performed by Wolfgang Merrill M.D. at SURGERY [facility]S   • CARDIAC CATH, RIGHT/LEFT HEART  2003 01/2003 4.0x23mm Zeta stent to proximal RCA,3.5x22mm distal to first stent, 3.0x15mm at the point of original occlusion.   • BOWEL RESECTION     • PB ANESTH,LOWER LEG ARTERIES SURG       Family History   Problem Relation Age of Onset   • Other Mother      Passed at 98   • Heart Disease Father 60     CABG     History   Smoking Status   • Never Smoker   Smokeless Tobacco   • Never Used     Allergies    Allergen Reactions   • Lisinopril      cough     Outpatient Encounter Prescriptions as of 11/13/2017   Medication Sig Dispense Refill   • carvedilol (COREG) 25 MG Tab Take 1 Tab by mouth 2 times a day, with meals. 180 Tab 3   • hydrochlorothiazide (HYDRODIURIL) 25 MG Tab Take 1 Tab by mouth every day. 90 Tab 3   • XARELTO 20 MG Tab tablet TAKE 1 TABLET BY MOUTH EVERY DAY WITH DINNER 30 Tab 6   • escitalopram (LEXAPRO) 10 MG Tab TAKE ONE (1) TABLET BY MOUTH EVERY DAY 90 Tab 3   • atorvastatin (LIPITOR) 40 MG Tab TAKE ONE (1) TABLET BY MOUTH EVERY DAY 90 Tab 3   • Fluticasone Furoate-Vilanterol (BREO ELLIPTA) 200-25 MCG/INH AEROSOL POWDER, BREATH ACTIVATED Take 1 Inhalation by mouth every day. Rinse mouth after use. 1 Each 3   • levalbuterol (XOPENEX HFA) 45 MCG/ACT inhaler Inhale 2 Puffs by mouth every four hours as needed for Shortness of Breath. 1 Inhaler 1   • losartan (COZAAR) 50 MG Tab Take 1 Tab by mouth 2 Times a Day. 180 Tab 1   • aspirin (ASA) 81 MG Chew Tab chewable tablet Take 1 Tab by mouth every day. 100 Tab 11   • [DISCONTINUED] hydrochlorothiazide (HYDRODIURIL) 25 MG Tab Take 1 Tab by mouth every day. Needs to be seen for further refills. Thank you 90 Tab 0   • [DISCONTINUED] carvedilol (COREG) 25 MG Tab Take 1 Tab by mouth 2 times a day, with meals. 60 Tab 11   • [DISCONTINUED] modafinil (PROVIGIL) 200 MG Tab Take 1/2 to 1 tablet by mouth daily 30 Tab 3     No facility-administered encounter medications on file as of 11/13/2017.      Review of Systems   Constitutional: Negative for chills and fever.   Respiratory: Negative for shortness of breath.    Cardiovascular: Negative for chest pain, palpitations, orthopnea, leg swelling and PND.   Gastrointestinal: Negative for abdominal pain.   Musculoskeletal: Negative for myalgias.   Neurological: Negative for dizziness, loss of consciousness and headaches.   Endo/Heme/Allergies: Does not bruise/bleed easily.        Objective:   /80   Pulse 64    "Ht 1.93 m (6' 4\")   Wt 117.9 kg (260 lb)   SpO2 96%   BMI 31.65 kg/m²     Physical Exam   Constitutional: He is oriented to person, place, and time. He appears well-developed and well-nourished. No distress.   Overweight (BMI 31.65)   HENT:   Head: Normocephalic.   Eyes: Conjunctivae and EOM are normal.   Neck: Normal range of motion. Neck supple. No JVD present.   Cardiovascular: Normal rate, regular rhythm, normal heart sounds and intact distal pulses.  Exam reveals no gallop and no friction rub.    No murmur heard.  Pulmonary/Chest: Effort normal and breath sounds normal.   Abdominal: Soft. Bowel sounds are normal.   Musculoskeletal: Normal range of motion. He exhibits no edema.   Neurological: He is alert and oriented to person, place, and time.   Skin: Skin is warm and dry.   Psychiatric: He has a normal mood and affect. His behavior is normal.       Assessment:     1. Coronary artery disease due to lipid rich plaque     2. Essential hypertension  carvedilol (COREG) 25 MG Tab    hydrochlorothiazide (HYDRODIURIL) 25 MG Tab   3. Dyslipidemia     4. 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 Chronic anticoagulation     6. SHYAM (obstructive sleep apnea)         Medical Decision Making:  Today's Assessment / Status / Plan:     1. CAD, status post remote PCI/BMS x 3 to the RCA in 2003, stable. He has not had any angina. Continue ASA, BB, statin and diuretic.    2. Hypertension, treated with Coreg and HCTZ, stable. BP is good today.    3. Hyperlipidemia, treated with statin. To repeat CMP and lipid panel soon.    4. History of PE, anticoagulated with Xarelto.    5. Sleep apnea, on BiPAP with oxygen, stable.    He is doing well. Same medications for now FU in 12 months with Dr. Calhoun; FU sooner if clinical condition changes.    Collaborating MD: Lj  "

## 2017-11-13 NOTE — LETTER
[facility]   [address]   [address]  [place] [address]  Phone: [phone]  Fax: [phone]              Anthony Cloud  1945    Encounter Date: 11/13/2017    LUCY Carr          PROGRESS NOTE:  No notes on file      No Recipients

## 2017-11-19 DIAGNOSIS — J98.4 RESTRICTIVE LUNG DISEASE: ICD-10-CM

## 2017-11-20 NOTE — TELEPHONE ENCOUNTER
Have we ever prescribed this med? Yes.  If yes, what date? 6/13/17    Last OV: 6/13/17    Next OV: No pending apt scheduled    DX: restless leg    Medications: Fluticasone Furoate-Vilanterol (BREO ELLIPTA) 200-25 MCG/INH AEROSOL POWDER, BREATH ACTIVATED    PLAN:   Will schedule him to have repeat PFTs and empiricaclly place him him on Breo and Xopenex rescue. Also need to review his chip.     Has been advised to continue the current PAP therapy, clean equipment frequently, and get new mask and supplies as allowed by insurance and DME. Advised about potential problems including nasal obstruction/stuffiness, mask leak or discomfort , frequent awakenings, chill or dryness of the upper airway, noise, inconvenience, and lack of benefit. Recommend an earlier appointment, if significant treatment barriers develop.

## 2017-11-21 DIAGNOSIS — I10 ESSENTIAL HYPERTENSION: ICD-10-CM

## 2017-11-21 RX ORDER — LOSARTAN POTASSIUM 50 MG/1
50 TABLET ORAL 2 TIMES DAILY
Qty: 180 TAB | Refills: 3 | Status: SHIPPED | OUTPATIENT
Start: 2017-11-21 | End: 2018-12-03 | Stop reason: SDUPTHER

## 2017-11-27 NOTE — TELEPHONE ENCOUNTER
I spoke to the patient advised is due for OV. Patient unavailable to make appt at this time informed him to call [phone]

## 2018-01-16 ENCOUNTER — OFFICE VISIT (OUTPATIENT)
Dept: INTERNAL MEDICINE | Facility: MEDICAL CENTER | Age: 73
End: 2018-01-16
Payer: MEDICARE

## 2018-01-16 VITALS
SYSTOLIC BLOOD PRESSURE: 125 MMHG | OXYGEN SATURATION: 94 % | DIASTOLIC BLOOD PRESSURE: 74 MMHG | TEMPERATURE: 96.9 F | BODY MASS INDEX: 32.73 KG/M2 | HEIGHT: 76 IN | HEART RATE: 58 BPM | WEIGHT: 268.8 LBS

## 2018-01-16 DIAGNOSIS — R73.01 IMPAIRED FASTING GLUCOSE: ICD-10-CM

## 2018-01-16 DIAGNOSIS — Z12.11 SCREENING FOR COLON CANCER: ICD-10-CM

## 2018-01-16 DIAGNOSIS — I10 ESSENTIAL HYPERTENSION: ICD-10-CM

## 2018-01-16 DIAGNOSIS — K27.9 PEPTIC ULCER DISEASE: ICD-10-CM

## 2018-01-16 DIAGNOSIS — Z23 NEED FOR TDAP VACCINATION: ICD-10-CM

## 2018-01-16 DIAGNOSIS — Z79.01 CHRONIC ANTICOAGULATION: ICD-10-CM

## 2018-01-16 DIAGNOSIS — F33.8 SEASONAL AFFECTIVE DISORDER (HCC): ICD-10-CM

## 2018-01-16 DIAGNOSIS — Z23 NEED FOR INFLUENZA VACCINATION: ICD-10-CM

## 2018-01-16 DIAGNOSIS — E66.9 OBESITY (BMI 30-39.9): ICD-10-CM

## 2018-01-16 DIAGNOSIS — E78.5 DYSLIPIDEMIA: ICD-10-CM

## 2018-01-16 PROCEDURE — G0008 ADMIN INFLUENZA VIRUS VAC: HCPCS | Performed by: INTERNAL MEDICINE

## 2018-01-16 PROCEDURE — 90662 IIV NO PRSV INCREASED AG IM: CPT | Performed by: INTERNAL MEDICINE

## 2018-01-16 PROCEDURE — 99214 OFFICE O/P EST MOD 30 MIN: CPT | Mod: 25 | Performed by: INTERNAL MEDICINE

## 2018-01-16 RX ORDER — OMEPRAZOLE 20 MG/1
1 CAPSULE, DELAYED RELEASE ORAL 2 TIMES DAILY
COMMUNITY
Start: 2018-01-14 | End: 2018-01-16

## 2018-01-16 RX ORDER — OMEPRAZOLE 20 MG/1
20 CAPSULE, DELAYED RELEASE ORAL DAILY
COMMUNITY
Start: 2018-01-16 | End: 2018-09-24 | Stop reason: SDUPTHER

## 2018-01-16 ASSESSMENT — PAIN SCALES - GENERAL: PAINLEVEL: NO PAIN

## 2018-01-16 NOTE — PROGRESS NOTES
"Anthony Cloud is a 72 y.o. male here for a non-provider visit for:   FLU    Reason for immunization: Annual Flu Vaccine  Immunization records indicate need for vaccine: Yes, confirmed with Epic  Minimum interval has been met for this vaccine: Yes  ABN completed: Not Indicated    Order and dose verified by: Prerna ALDANA Dated  8/7/15 was given to patient: Yes  All IAC Questionnaire questions were answered \"No.\"    Patient tolerated injection and no adverse effects were observed or reported: Yes    Pt scheduled for next dose in series: Not Indicated  "

## 2018-01-16 NOTE — PROGRESS NOTES
Established Patient    Patient Care Team:  Angel Cameron M.D. as PCP - General  LUCY Carr as Mid Level Provider (Cardiology)  Padmini Calhoun M.D. as Consulting Physician (Cardiology)  Austin Schreiber M.D. as Consulting Physician (Pulmonary Medicine)    Anthony Cloud is a 72 y.o. male who presents today with the following:    CC: Follow-up for chronic medical problems including dyslipidemia, hypertension, chronic anticoagulation.    HPI:  1. Dyslipidemia  Anthony is tolerating medication well.  No intolerable side-effects noted.  No new symptoms of muscle aches or weakness.  Patient reports good adherence to medication regimen.  No change in medication since the last visit. Anthony is trying to follow a low-cholesterol diet.  Exercise is adequate. Is doing well on atorvastatin 40 mg daily.    2. Essential hypertension  Well controlled in the office today.  Home Blood Pressure reading are unchanged from previous and consistent with in-office readings.  Patient reports good adherence to regimen with no changes.  No dizziness reported.  No intolerable side effects noted. He's doing well and described as a 25 mg daily, and losartan 50 mg daily, as well as carvedilol 25 mg.    3. Chronic anticoagulation  He remains on Xarelto and is tolerating this well. He's had no recent abnormal bleeding episodes. He does have history of a GI bleed about 2 years ago.    4. Seasonal affective disorder (CMS-HCC)  Stable on Lexapro 10 mg daily. No recent changes in his symptoms.    5. Obesity (BMI 30-39.9)  Weight has increased a pound since his last visit in November 2017, and since his last visit with me in July 2017 he has increased 4 pounds. He recognizes this as a problem and is actively working on solutions for this.    6. Peptic ulcer disease  History of GI bleed approximately 2 years ago, with significant anemia associated with this requiring blood transfusions. Since then, he has been on omeprazole 20 mg  twice daily during he has no symptoms at this time, and is interested in possibly reducing these omeprazole dose of possible.    7. Impaired fasting glucose  Weight issues as above. He is trying to increase his exercise overall.    8. Screening for colon cancer  Is due for a screening colonoscopy this month. His last was 5 years ago, and five-year follow-up was recommended.    9. Need for influenza vaccination  10. Need for Tdap vaccination      ROS:     Denies any new chest pain or shortness of breath.  No changes to urinary or bowel function.  See HPI.    Past Medical History:   Diagnosis Date   • CAD (coronary artery disease) 2003    PCI/BMS x 3 to the RCA (Zeta 4.0 x 23mm, 3.5 x 23mm, 3.0 x 18mm).   • Central sleep apnea      ICD-10 transition   • Chronic anticoagulation    • Depression    • Depressive disorder, not elsewhere classified         • Dyslipidemia    • Hypertension    • Hypothyroidism    • Mixed hyperlipidemia    • Obesity    • Old myocardial infarction January 2003        • Peptic ulcer disease 8/4/2016   • Personal history of venous thrombosis and embolism     On Xarelto   • Pulmonary embolism (CMS-HCC)    • Sleep apnea     BiPAP with oxygen       Social History   Substance Use Topics   • Smoking status: Never Smoker   • Smokeless tobacco: Never Used   • Alcohol use Yes      Comment: 6 drinks a week        Current Outpatient Prescriptions   Medication Sig Dispense Refill   • omeprazole (PRILOSEC) 20 MG delayed-release capsule Take 1 Cap by mouth every day.     • tetanus-dipth-acell pertussis (ADACEL) 5-2-15.5 LF-MCG/0.5 Suspension 0.5 mL by Intramuscular route Once PRN for up to 1 dose. 0.5 mL 0   • losartan (COZAAR) 50 MG Tab Take 1 Tab by mouth 2 Times a Day. 180 Tab 3   • BREO ELLIPTA 200-25 MCG/INH AEROSOL POWDER, BREATH ACTIVATED INHALE ONE (1) PUFF BY MOUTH EVERY DAY. RINSE MOUTH AFTER USE 1 Each 1   • carvedilol (COREG) 25 MG Tab Take 1 Tab by mouth 2 times a day, with meals. 180 Tab 3   •  "hydrochlorothiazide (HYDRODIURIL) 25 MG Tab Take 1 Tab by mouth every day. 90 Tab 3   • XARELTO 20 MG Tab tablet TAKE 1 TABLET BY MOUTH EVERY DAY WITH DINNER 30 Tab 6   • escitalopram (LEXAPRO) 10 MG Tab TAKE ONE (1) TABLET BY MOUTH EVERY DAY 90 Tab 3   • atorvastatin (LIPITOR) 40 MG Tab TAKE ONE (1) TABLET BY MOUTH EVERY DAY 90 Tab 3   • levalbuterol (XOPENEX HFA) 45 MCG/ACT inhaler Inhale 2 Puffs by mouth every four hours as needed for Shortness of Breath. 1 Inhaler 1   • aspirin (ASA) 81 MG Chew Tab chewable tablet Take 1 Tab by mouth every day. 100 Tab 11     No current facility-administered medications for this visit.        Physical Exam:  /74   Pulse (!) 58   Temp 36.1 °C (96.9 °F)   Ht 1.93 m (6' 4\")   Wt 121.9 kg (268 lb 12.8 oz)   SpO2 94%   BMI 32.72 kg/m²   General: Well developed, well nourished male, in no distress.  Eyes: Conjuntiva without any obvious injection or erythema.   Cardiovascular: Heart is regular with no murmurs  Lungs: Clear to auscultation bilaterally. No wheezes, rhonci or crackles heard. Respiratory effort is normal.  Abd: Soft, non-tender  Ext: No edema    Assessment and Plan:     1. Dyslipidemia  Overall doing well on current medications. No changes today.  - COMP METABOLIC PANEL; Future  - LIPID PROFILE; Future    2. Essential hypertension  Currently this is stable and well controlled.  The patient should continue current therapy with no changes at this time.     - COMP METABOLIC PANEL; Future    3. Chronic anticoagulation  Doing well on Xarelto. No changes today. He should continue to monitor for abnormal bleeding.    4. Seasonal affective disorder (CMS-HCC)  Stable on Lexapro 10 mg daily. Continue this.    5. Obesity (BMI 30-39.9)  When a long discussion today regarding diet and exercise and he has a plan in place to increase his exercise and control his diet and significant weight reduction.  - Patient identified as having weight management issue.  Appropriate orders " and counseling given.    6. Peptic ulcer disease  While he has had his increased bleeding risk because of the Xarelto, his complete lack of symptoms when indicated that he likely could decrease his dose of omeprazole down to once daily. I recommend he try this, and continue to monitor his symptoms for any changes.    7. Impaired fasting glucose  Will plan for a repeat hemoglobin A1c in the future. I'm hopeful that continuing to work on his diet and exercise regimen will help with his impaired fasting glucose.  - HEMOGLOBIN A1C; Future    8. Screening for colon cancer  - REFERRAL TO GI FOR COLONOSCOPY    9. Need for influenza vaccination  Done in the office today.  - INFLUENZA VACCINE, HIGH DOSE (65+ ONLY)    10. Need for Tdap vaccination  Prescription given today to have this done at the pharmacy.      Return in about 6 months (around 7/16/2018).    Patient Instructions   Get going on an exercise regimen, and watching your food intake.  We need to get your weight down.    You are due for a colonoscopy, so I have ordered this.    Decrease the omeprazole to once per day.  Monitor for any new symptoms of acid reflux or upset stomach.        Angel Cameron M.D.   of Internal Medicine  [facility]    This note was created using voice recognition software.  While every attempt is made to ensure accuracy of transcription, occasionally errors occur.

## 2018-01-16 NOTE — PATIENT INSTRUCTIONS
Get going on an exercise regimen, and watching your food intake.  We need to get your weight down.    You are due for a colonoscopy, so I have ordered this.    Decrease the omeprazole to once per day.  Monitor for any new symptoms of acid reflux or upset stomach.

## 2018-02-05 DIAGNOSIS — J98.4 RESTRICTIVE LUNG DISEASE: ICD-10-CM

## 2018-02-05 NOTE — TELEPHONE ENCOUNTER
Have we ever prescribed this med? Yes.  If yes, what date? 11/20/17    Last OV: 6/13/17    Next OV: 3/15/18    DX: : Restrictive lung disease (J98.4)    Medications: BREO ELLIPTA 200-25 MCG/INH AEROSOL POWDER, BREATH ACTIVATED

## 2018-02-23 ENCOUNTER — APPOINTMENT (OUTPATIENT)
Dept: RADIOLOGY | Facility: MEDICAL CENTER | Age: 73
End: 2018-02-23
Attending: ORTHOPAEDIC SURGERY
Payer: MEDICARE

## 2018-02-23 ENCOUNTER — HOSPITAL ENCOUNTER (OUTPATIENT)
Facility: MEDICAL CENTER | Age: 73
End: 2018-02-23
Attending: ORTHOPAEDIC SURGERY | Admitting: ORTHOPAEDIC SURGERY
Payer: MEDICARE

## 2018-02-23 VITALS
RESPIRATION RATE: 16 BRPM | HEIGHT: 76 IN | DIASTOLIC BLOOD PRESSURE: 83 MMHG | SYSTOLIC BLOOD PRESSURE: 152 MMHG | TEMPERATURE: 96.9 F | OXYGEN SATURATION: 90 % | HEART RATE: 75 BPM | WEIGHT: 267.86 LBS | BODY MASS INDEX: 32.62 KG/M2

## 2018-02-23 DIAGNOSIS — J98.4 RESTRICTIVE LUNG DISEASE: ICD-10-CM

## 2018-02-23 DIAGNOSIS — R09.02 HYPOXIA: ICD-10-CM

## 2018-02-23 DIAGNOSIS — G89.18 POSTOPERATIVE PAIN: ICD-10-CM

## 2018-02-23 LAB
ANION GAP SERPL CALC-SCNC: 8 MMOL/L (ref 0–11.9)
BUN SERPL-MCNC: 27 MG/DL (ref 8–22)
CALCIUM SERPL-MCNC: 9.1 MG/DL (ref 8.5–10.5)
CHLORIDE SERPL-SCNC: 101 MMOL/L (ref 96–112)
CO2 SERPL-SCNC: 28 MMOL/L (ref 20–33)
CREAT SERPL-MCNC: 1.32 MG/DL (ref 0.5–1.4)
EKG IMPRESSION: NORMAL
GLUCOSE SERPL-MCNC: 95 MG/DL (ref 65–99)
POTASSIUM SERPL-SCNC: 4.3 MMOL/L (ref 3.6–5.5)
SODIUM SERPL-SCNC: 137 MMOL/L (ref 135–145)

## 2018-02-23 PROCEDURE — 160046 HCHG PACU - 1ST 60 MINS PHASE II: Performed by: ORTHOPAEDIC SURGERY

## 2018-02-23 PROCEDURE — 93010 ELECTROCARDIOGRAM REPORT: CPT | Performed by: INTERNAL MEDICINE

## 2018-02-23 PROCEDURE — A9270 NON-COVERED ITEM OR SERVICE: HCPCS

## 2018-02-23 PROCEDURE — 160036 HCHG PACU - EA ADDL 30 MINS PHASE I: Performed by: ORTHOPAEDIC SURGERY

## 2018-02-23 PROCEDURE — 160028 HCHG SURGERY MINUTES - 1ST 30 MINS LEVEL 3: Performed by: ORTHOPAEDIC SURGERY

## 2018-02-23 PROCEDURE — 160009 HCHG ANES TIME/MIN: Performed by: ORTHOPAEDIC SURGERY

## 2018-02-23 PROCEDURE — 700111 HCHG RX REV CODE 636 W/ 250 OVERRIDE (IP)

## 2018-02-23 PROCEDURE — 160039 HCHG SURGERY MINUTES - EA ADDL 1 MIN LEVEL 3: Performed by: ORTHOPAEDIC SURGERY

## 2018-02-23 PROCEDURE — 160025 RECOVERY II MINUTES (STATS): Performed by: ORTHOPAEDIC SURGERY

## 2018-02-23 PROCEDURE — 500881 HCHG PACK, EXTREMITY: Performed by: ORTHOPAEDIC SURGERY

## 2018-02-23 PROCEDURE — C1713 ANCHOR/SCREW BN/BN,TIS/BN: HCPCS | Performed by: ORTHOPAEDIC SURGERY

## 2018-02-23 PROCEDURE — 73100 X-RAY EXAM OF WRIST: CPT | Mod: LT

## 2018-02-23 PROCEDURE — 160048 HCHG OR STATISTICAL LEVEL 1-5: Performed by: ORTHOPAEDIC SURGERY

## 2018-02-23 PROCEDURE — 80048 BASIC METABOLIC PNL TOTAL CA: CPT

## 2018-02-23 PROCEDURE — 93005 ELECTROCARDIOGRAM TRACING: CPT | Performed by: ORTHOPAEDIC SURGERY

## 2018-02-23 PROCEDURE — 501838 HCHG SUTURE GENERAL: Performed by: ORTHOPAEDIC SURGERY

## 2018-02-23 PROCEDURE — 700102 HCHG RX REV CODE 250 W/ 637 OVERRIDE(OP)

## 2018-02-23 PROCEDURE — 160002 HCHG RECOVERY MINUTES (STAT): Performed by: ORTHOPAEDIC SURGERY

## 2018-02-23 PROCEDURE — 160047 HCHG PACU  - EA ADDL 30 MINS PHASE II: Performed by: ORTHOPAEDIC SURGERY

## 2018-02-23 PROCEDURE — 160035 HCHG PACU - 1ST 60 MINS PHASE I: Performed by: ORTHOPAEDIC SURGERY

## 2018-02-23 DEVICE — SCREW 3.5 MM LOCKING TI X 18MM LONG (6TX8+2TX5=58): Type: IMPLANTABLE DEVICE | Status: FUNCTIONAL

## 2018-02-23 DEVICE — SCREW 2.5 MM LOCKING TI X 22MM LONG (6TX8=48): Type: IMPLANTABLE DEVICE | Status: FUNCTIONAL

## 2018-02-23 DEVICE — SCREW 2.5 MM LOCKING TI X 16MM LONG (6TX8=48): Type: IMPLANTABLE DEVICE | Status: FUNCTIONAL

## 2018-02-23 DEVICE — SCREW 2.5 MM NON-LOCKING TI X 22MM LONG (6TX8=48): Type: IMPLANTABLE DEVICE | Status: FUNCTIONAL

## 2018-02-23 DEVICE — SCREW 2.5 MM LOCKING TI X 18MM LONG (6TX8=48): Type: IMPLANTABLE DEVICE | Status: FUNCTIONAL

## 2018-02-23 DEVICE — SCREW 3.5 MM NON-LOCKING TI X 18MM LONG (6TX8+2TX5=58): Type: IMPLANTABLE DEVICE | Status: FUNCTIONAL

## 2018-02-23 RX ORDER — KETOROLAC TROMETHAMINE 30 MG/ML
INJECTION, SOLUTION INTRAMUSCULAR; INTRAVENOUS
Status: COMPLETED
Start: 2018-02-23 | End: 2018-02-23

## 2018-02-23 RX ORDER — OXYCODONE HYDROCHLORIDE 5 MG/1
5 TABLET ORAL EVERY 4 HOURS PRN
Qty: 30 TAB | Refills: 0 | Status: SHIPPED | OUTPATIENT
Start: 2018-02-23 | End: 2018-02-28

## 2018-02-23 RX ORDER — SODIUM CHLORIDE, SODIUM LACTATE, POTASSIUM CHLORIDE, CALCIUM CHLORIDE 600; 310; 30; 20 MG/100ML; MG/100ML; MG/100ML; MG/100ML
1000 INJECTION, SOLUTION INTRAVENOUS
Status: COMPLETED | OUTPATIENT
Start: 2018-02-23 | End: 2018-02-23

## 2018-02-23 RX ORDER — KETOROLAC TROMETHAMINE 30 MG/ML
30 INJECTION, SOLUTION INTRAMUSCULAR; INTRAVENOUS ONCE
Status: COMPLETED | OUTPATIENT
Start: 2018-02-23 | End: 2018-02-23

## 2018-02-23 RX ORDER — OXYCODONE HCL 5 MG/5 ML
SOLUTION, ORAL ORAL
Status: COMPLETED
Start: 2018-02-23 | End: 2018-02-23

## 2018-02-23 RX ORDER — ATORVASTATIN CALCIUM 40 MG/1
40 TABLET, FILM COATED ORAL NIGHTLY
COMMUNITY
End: 2018-06-27 | Stop reason: SDUPTHER

## 2018-02-23 RX ADMIN — HYDROMORPHONE HYDROCHLORIDE 0.2 MG: 10 INJECTION, SOLUTION INTRAMUSCULAR; INTRAVENOUS; SUBCUTANEOUS at 11:35

## 2018-02-23 RX ADMIN — HYDROMORPHONE HYDROCHLORIDE 0.5 MG: 10 INJECTION, SOLUTION INTRAMUSCULAR; INTRAVENOUS; SUBCUTANEOUS at 12:52

## 2018-02-23 RX ADMIN — FENTANYL CITRATE 50 MCG: 50 INJECTION, SOLUTION INTRAMUSCULAR; INTRAVENOUS at 12:21

## 2018-02-23 RX ADMIN — FENTANYL CITRATE 75 MCG: 50 INJECTION, SOLUTION INTRAMUSCULAR; INTRAVENOUS at 10:52

## 2018-02-23 RX ADMIN — HYDROMORPHONE HYDROCHLORIDE 0.5 MG: 10 INJECTION, SOLUTION INTRAMUSCULAR; INTRAVENOUS; SUBCUTANEOUS at 12:10

## 2018-02-23 RX ADMIN — SODIUM CHLORIDE, SODIUM LACTATE, POTASSIUM CHLORIDE, CALCIUM CHLORIDE 1000 ML: 600; 310; 30; 20 INJECTION, SOLUTION INTRAVENOUS at 09:21

## 2018-02-23 RX ADMIN — HYDROMORPHONE HYDROCHLORIDE 0.3 MG: 10 INJECTION, SOLUTION INTRAMUSCULAR; INTRAVENOUS; SUBCUTANEOUS at 11:51

## 2018-02-23 RX ADMIN — HYDROMORPHONE HYDROCHLORIDE 0.5 MG: 10 INJECTION, SOLUTION INTRAMUSCULAR; INTRAVENOUS; SUBCUTANEOUS at 12:22

## 2018-02-23 RX ADMIN — KETOROLAC TROMETHAMINE 30 MG: 30 INJECTION, SOLUTION INTRAMUSCULAR at 11:45

## 2018-02-23 RX ADMIN — OXYCODONE HYDROCHLORIDE 10 MG: 5 SOLUTION ORAL at 11:04

## 2018-02-23 RX ADMIN — KETOROLAC TROMETHAMINE 30 MG: 30 INJECTION, SOLUTION INTRAMUSCULAR; INTRAVENOUS at 11:45

## 2018-02-23 RX ADMIN — HYDROMORPHONE HYDROCHLORIDE 0.5 MG: 10 INJECTION, SOLUTION INTRAMUSCULAR; INTRAVENOUS; SUBCUTANEOUS at 11:15

## 2018-02-23 RX ADMIN — FENTANYL CITRATE 25 MCG: 50 INJECTION, SOLUTION INTRAMUSCULAR; INTRAVENOUS at 11:28

## 2018-02-23 RX ADMIN — FENTANYL CITRATE 50 MCG: 50 INJECTION, SOLUTION INTRAMUSCULAR; INTRAVENOUS at 12:10

## 2018-02-23 RX ADMIN — FENTANYL CITRATE 25 MCG: 50 INJECTION, SOLUTION INTRAMUSCULAR; INTRAVENOUS at 11:47

## 2018-02-23 RX ADMIN — FENTANYL CITRATE 25 MCG: 50 INJECTION, SOLUTION INTRAMUSCULAR; INTRAVENOUS at 11:10

## 2018-02-23 RX ADMIN — FENTANYL CITRATE 50 MCG: 50 INJECTION, SOLUTION INTRAMUSCULAR; INTRAVENOUS at 11:26

## 2018-02-23 RX ADMIN — FENTANYL CITRATE 50 MCG: 50 INJECTION, SOLUTION INTRAMUSCULAR; INTRAVENOUS at 12:53

## 2018-02-23 ASSESSMENT — PAIN SCALES - GENERAL
PAINLEVEL_OUTOF10: 7
PAINLEVEL_OUTOF10: 3
PAINLEVEL_OUTOF10: 4
PAINLEVEL_OUTOF10: 3
PAINLEVEL_OUTOF10: 7
PAINLEVEL_OUTOF10: 7
PAINLEVEL_OUTOF10: 3
PAINLEVEL_OUTOF10: 2
PAINLEVEL_OUTOF10: 3
PAINLEVEL_OUTOF10: 3
PAINLEVEL_OUTOF10: 9
PAINLEVEL_OUTOF10: 7
PAINLEVEL_OUTOF10: 4
PAINLEVEL_OUTOF10: 6
PAINLEVEL_OUTOF10: 9
PAINLEVEL_OUTOF10: 8
PAINLEVEL_OUTOF10: 5
PAINLEVEL_OUTOF10: 4
PAINLEVEL_OUTOF10: 3

## 2018-02-23 NOTE — DISCHARGE INSTRUCTIONS
ACTIVITY: Rest and take it easy for the first 24 hours.  A responsible adult is recommended to remain with you during that time.  It is normal to feel sleepy.  We encourage you to not do anything that requires balance, judgment or coordination.    MILD FLU-LIKE SYMPTOMS ARE NORMAL. YOU MAY EXPERIENCE GENERALIZED MUSCLE ACHES, THROAT IRRITATION, HEADACHE AND/OR SOME NAUSEA.    FOR 24 HOURS DO NOT:  Drive, operate machinery or run household appliances.  Drink beer or alcoholic beverages.   Make important decisions or sign legal documents.    SPECIAL INSTRUCTIONS: DISCHARGE INSTRUCTIONS:    ACTIVITY:  The patient should remain non weightbearing with the use of gait aids (crutches, walker, cane, etc).    PAIN CONTROL:  The patient has been prescribed a narcotic pain medication.  Side effects of narcotics pain medications include sedation and constipation.  To prevent constipation, it is recommended that the patient take an over the counter stool softener (such as Colace or Senna) and use laxatives as needed to prevent constipation.  Take these over the counter medications as directed by the packaging.  The patient may not drive while taking narcotic pain medication.  The patient should wean off their prescription pain medication by taking over the counter analgesics (such as Tylenol, Advil, Ibuprofen, etc).  Use caution when taking acetaminophen (Tylenol), as some narcotic medications contain acetaminophen.  The total dose of acetaminophen should not exceed 3000 mg daily.    WOUND CARE:  The patient has a surgical wound which was closed with staples or sutures.  These need to be removed 10-14 days after surgery.  If the patient is not in a splint or cast, the operative dressing should be removed 2 days after surgery, and dry gauze dressing changes should be performed daily after that.  You may shower when the operative dressing is removed.    SPLINT/CAST CARE:  If present, you should keep your splint or cast clean,  dry, and intact.  You should elevate your operative extremity to minimize swelling in your fingers or toes.  If the sensation in your fingers or toes of your operative extremity changes, or the fingers/toes change colors, or should your pain become too difficult to control, you should immediately elevate your operative extremity.  If these symptoms do not resolve after 30 minutes to 1 hour, you should seek medical care immediately.    CALL YOUR DOCTOR IF:  You have fever over 101.5 degrees F, you have increased or concerning wound drainage, you notice a great deal of redness around your incision, your pain can no longer be controlled, the sensation in your fingers or toes changes and does not respond to elevation, your cast or splint becomes saturated (wet) or other concerns.  If you suffer a medical emergency, seek immediate medical care at your nearest Emergency Room.    Oxygen Use at Home  Oxygen can be prescribed for home use. The prescription will show the flow rate. This is how much oxygen is to be used per minute. This will be listed in liters per minute (LPM or L/M). A liter is a metric measurement of volume.  You will use oxygen therapy as directed. It can be used while exercising, sleeping, or at rest. You may need oxygen continuously. Your health care provider may order a blood oxygen test (arterial blood gas or pulse oximetry test) that will show what your oxygen level is. Your health care provider will use these measurements to learn about your needs and follow your progress.  Home oxygen therapy is commonly used on patients with various lung (pulmonary) related conditions. Some of these conditions include:  · Asthma.  · Lung cancer.  · Pneumonia.  · Emphysema.  · Chronic bronchitis.  · Cystic fibrosis.  · Other lung diseases.  · Pulmonary fibrosis.  · Occupational lung disease.  · Heart failure.  · Chronic obstructive pulmonary disease (COPD).  3 COMMON WAYS OF PROVIDING OXYGEN THERAPY  · Gas: The gas  form of oxygen is put into variously sized cylinders or tanks. The cylinders or oxygen tanks contain compressed oxygen. The cylinder is equipped with a regulator that controls the flow rate. Because the flow of oxygen out of the cylinder is constant, an oxygen conserving device may be attached to the system to avoid waste. This device releases the gas only when you inhale and cuts it off when you exhale. Oxygen can be provided in a small cylinder that can be carried with you. Large tanks are heavy and are only for stationary use. After use, empty tanks must be exchanged for full tanks.  · Liquid: The liquid form of oxygen is put into a container similar to a thermos. When released, the liquid converts to a gas and you breathe it in just like the compressed gas. This storage method takes up less space than the compressed gas cylinder, and you can transfer the liquid to a small, portable vessel at home. Liquid oxygen is more expensive than the compressed gas, and the vessel vents when not in use. An oxygen conserving device may be built into the vessel to conserve the oxygen. Liquid oxygen is very cold, around 297° below zero.  · Oxygen concentrator: This medical device filters oxygen from room air and gives almost 100% oxygen to the patient. Oxygen concentrators are powered by electricity. Benefits of this system are:  ¨ It does not need to be resupplied.  ¨ It is not as costly as liquid oxygen.  ¨ Extra tubing permits the user to move around easier.  There are several types of small, portable oxygen systems available which can help you remain active and mobile. You must have a cylinder of oxygen as a backup in the event of a power failure. Advise your electric power company that you are on oxygen therapy in order to get priority service when there is a power failure.  OXYGEN DELIVERY DEVICES  There are 3 common ways to deliver oxygen to your body.  · Nasal cannula. This is a 2-pronged device inserted in the nostrils  "that is connected to tubing carrying the oxygen. The tubing can rest on the ears or be attached to the frame of eyeglasses.  · Mask. People who need a high flow of oxygen generally use a mask.  · Transtracheal catheter. Transtracheal oxygen therapy requires the insertion of a small, flexible tube (catheter) in the windpipe (trachea). This catheter is held in place by a necklace. Since transtracheal oxygen bypasses the mouth, nose, and throat, a humidifier is absolutely required at flow rates of 1 LPM or greater.  OXYGEN USE SAFETY TIPS  · Never smoke while using oxygen. Oxygen does not burn or explode, but flammable materials will burn faster in the presence of oxygen.  · Keep a fire extinguisher close by. Let your fire department know that you have oxygen in your home.  · Warn visitors not to smoke near you when you are using oxygen. Put up \"no smoking\" signs in your home where you most often use the oxygen.  · When you go to a restaurant with your portable oxygen source, ask to be seated in the nonsmoking section.  · Stay at least 5 feet away from gas stoves, candles, lighted fireplaces, or other heat sources.  · Do not use materials that burn easily (flammable) while using your oxygen.  · If you use an oxygen cylinder, make sure it is secured to some fixed object or in a stand. If you use liquid oxygen, make sure the vessel is kept upright to keep the oxygen from pouring out. Liquid oxygen is so cold it can hurt your skin.  · If you use an oxygen concentrator, call your electric company so you will be given priority service if your power goes out. Avoid using extension cords, if possible.  · Regularly test your smoke detectors at home to make sure they work. If you receive care in your home from a nurse or other health care provider, he or she may also check to make sure your smoke detectors work.  GUIDELINES FOR CLEANING YOUR EQUIPMENT  · Wash the nasal prongs with a liquid soap. Thoroughly rinse them once or " twice a week.  · Replace the prongs every 2 to 4 weeks. If you have an infection (cold, pneumonia) change them when you are well.  · Your health care provider will give you instructions on how to clean your transtracheal catheter.  · The humidifier bottle should be washed with soap and warm water and rinsed thoroughly between each refill. Air-dry the bottle before filling it with sterile or distilled water. The bottle and its top should be disinfected after they are cleaned.  · If you use an oxygen concentrator, unplug the unit. Then wipe down the cabinet with a damp cloth and dry it daily. The air filter should be cleaned at least twice a week.  · Follow your home medical equipment and service company's directions for cleaning the compressor filter.  HOME CARE INSTRUCTIONS   · Do not change the flow of oxygen unless directed by your health care provider.  · Do not use alcohol or other sedating drugs unless instructed. They slow your breathing rate.  · Do not use materials that burn easily (flammable) while using your oxygen.  · Always keep a spare tank of oxygen. Plan ahead for holidays when you may not be able to get a prescription filled.  · Use water-based lubricants on your lips or nostrils. Do not use an oil-based product like petroleum jelly.  · To prevent your cheeks or the skin behind your ears from becoming irritated, tuck some gauze under the tubing.  · If you have persistent redness under your nose, call your health care provider.  · When you no longer need oxygen, your doctor will have the oxygen discontinued. Oxygen is not addicting or habit forming.  · Use the oxygen as instructed. Too much oxygen can be harmful and too little will not give you the benefit you need.  · Shortness of breath is not always from a lack of oxygen. If your oxygen level is not the cause of your shortness of breath, taking oxygen will not help.  SEEK MEDICAL CARE IF:   · You have frequent headaches.  · You have shortness of  breath or a lasting cough.  · You have anxiety.  · You are confused.  · You are drowsy or sleepy all the time.  · You develop an illness which aggravates your breathing.  · You cannot exercise.  · You are restless.  · You have blue lips or fingernails.  · You have difficult or irregular breathing and it is getting worse.  · You have a fever.     This information is not intended to replace advice given to you by your health care provider. Make sure you discuss any questions you have with your health care provider.     DIET: To avoid nausea, slowly advance diet as tolerated, avoiding spicy or greasy foods for the first day.  Add more substantial food to your diet according to your physician's instructions.  INCREASE FLUIDS AND FIBER TO AVOID CONSTIPATION.    SURGICAL DRESSING/BATHING: see above    FOLLOW-UP APPOINTMENT:  A follow-up appointment should be arranged with your doctor; call to schedule.    You should CALL YOUR PHYSICIAN if you develop:  Fever greater than 101 degrees F.  Pain not relieved by medication, or persistent nausea or vomiting.  Excessive bleeding (blood soaking through dressing) or unexpected drainage from the wound.  Extreme redness or swelling around the incision site, drainage of pus or foul smelling drainage.  Inability to urinate or empty your bladder within 8 hours.  Problems with breathing or chest pain.    You should call 911 if you develop problems with breathing or chest pain.  If you are unable to contact your doctor or surgical center, you should go to the nearest emergency room or urgent care center.  Physician's telephone #: Dr. Hammond [phone]    If any questions arise, call your doctor.  If your doctor is not available, please feel free to call the Surgical Center at [phone].  The Center is open Monday through Friday from 7AM to 7PM.  You can also call the [facility] HOTLINE open 24 hours/day, 7 days/week and speak to a nurse at [phone], or toll free at [phone]  [phone].    A registered nurse may call you a few days after your surgery to see how you are doing after your procedure.    MEDICATIONS: Resume taking daily medication.  Take prescribed pain medication with food.  If no medication is prescribed, you may take non-aspirin pain medication if needed.  PAIN MEDICATION CAN BE VERY CONSTIPATING.  Take a stool softener or laxative such as senokot, pericolace, or milk of magnesia if needed.    Prescription given for Roxicodone.  Last pain medication given at 11:04 AM. ( Oxycodone ).    If your physician has prescribed pain medication that includes Acetaminophen (Tylenol), do not take additional Acetaminophen (Tylenol) while taking the prescribed medication.    Depression / Suicide Risk    As you are discharged from this [facility] facility, it is important to learn how to keep safe from harming yourself.    Recognize the warning signs:  · Abrupt changes in personality, positive or negative- including increase in energy   · Giving away possessions  · Change in eating patterns- significant weight changes-  positive or negative  · Change in sleeping patterns- unable to sleep or sleeping all the time   · Unwillingness or inability to communicate  · Depression  · Unusual sadness, discouragement and loneliness  · Talk of wanting to die  · Neglect of personal appearance   · Rebelliousness- reckless behavior  · Withdrawal from people/activities they love  · Confusion- inability to concentrate     If you or a loved one observes any of these behaviors or has concerns about self-harm, here's what you can do:  · Talk about it- your feelings and reasons for harming yourself  · Remove any means that you might use to hurt yourself (examples: pills, rope, extension cords, firearm)  · Get professional help from the community (Mental Health, Substance Abuse, psychological counseling)  · Do not be alone:Call your Safe Contact- someone whom you trust who will be there for you.  · Call your  local CRISIS HOTLINE 742-9324 or 489-000-0783  · Call your local Children's Mobile Crisis Response Team [place] (413) 143-7257 or www.[employer]  · Call the toll free National Suicide Prevention Hotlines   · National Suicide Prevention Lifeline 525-322-HRGB (1026)  · National [employer] Line Network 800-SUICIDE (876-6386)

## 2018-02-23 NOTE — OR NURSING
Pt has uncontrolled pain. Spoke with Manish regarding pt pain. He suggested a potential block from Anesthesia. Awaiting bedside assessment from Anesthesia to determine plan. Continued update to patient regarding plan.

## 2018-02-23 NOTE — OP REPORT
DATE OF SERVICE:  02/23/2018    PREOPERATIVE DIAGNOSIS:  Comminuted intra-articular left distal radius   fracture.    POSTOPERATIVE DIAGNOSIS:  Comminuted intra-articular left distal radius   fracture.    PROCEDURE:  Open reduction and internal fixation of left comminuted   intra-articular distal radius fracture.    SURGEON:  Jasper Quiroz MD    ASSISTANT:  None.    ESTIMATED BLOOD LOSS:  Minimal.    TOURNIQUET TIME:  23 minutes.    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  After closed reduction, risks and   benefits of open reduction and internal fixation were discussed, which include   but not limited to bleeding, infection, neurovascular damage, pain,   stiffness, malunion, nonunion, DVT, PE, MI, stroke, and death.  He understands   all these risks and wished to proceed.    DESCRIPTION OF PROCEDURE:  Patient was sedated with LMA anesthesia and   administered preoperative antibiotics.  Left upper extremity was prepped and   draped in the usual fashion.  Standard FCR approach to the distal radius was   performed with care taken to avoid all neurovascular structures.  The fracture   was reduced and held with a K-wire and then [facility] volar distal radius locking   plate was applied and held with a combination of locking and nonlocking   fixation.  All screws were checked and found of appropriate length and joint.    Wounds were irrigated, closed with 3-0 Vicryl suture and 3-0 nylon suture.    Sterile dressing was applied.  Volar splint was applied.  Patient tolerated   the procedure well.    POSTOPERATIVE PLAN:  The patient will be discharged home, sling for comfort   and follow up in 2 weeks' time.       ____________________________________     JASPER QUIROZ MD    AN[facility] / [facility]    DD:  02/23/2018 10:50:02  DT:  02/23/2018 14:58:22    D#:  [id number]  Job#:   [id number]

## 2018-02-23 NOTE — H&P
2/23/2018    Anthony Cloud is a 72 y.o. male who presents with a left distal radius fracture and is here for operative management. Patient denies numbness, parasthesias, loss of concousness or other trauma    Past Medical History:   Diagnosis Date   • CAD (coronary artery disease) 2003    PCI/BMS x 3 to the RCA (Zeta 4.0 x 23mm, 3.5 x 23mm, 3.0 x 18mm).   • Central sleep apnea      ICD-10 transition   • Chronic anticoagulation    • Depression    • Depressive disorder, not elsewhere classified         • Dyslipidemia    • Hypertension    • Hypothyroidism    • Mixed hyperlipidemia    • Obesity    • Old myocardial infarction January 2003        • Peptic ulcer disease 8/4/2016   • Personal history of venous thrombosis and embolism     On Xarelto   • Pulmonary embolism (CMS-HCC)    • Sleep apnea     BiPAP with oxygen       Past Surgical History:   Procedure Laterality Date   • INCISION AND DRAINAGE ORTHOPEDIC  8/29/2014    Performed by Wolfgang Merrill M.D. at SURGERY [facility]   • CARDIAC CATH, RIGHT/LEFT HEART  2003 01/2003 4.0x23mm Zeta stent to proximal RCA,3.5x22mm distal to first stent, 3.0x15mm at the point of original occlusion.   • BOWEL RESECTION     • PB ANESTH,LOWER LEG ARTERIES SURG         Medications      Allergies  Lisinopril    ROS  Left distal radius pain. All other systems were reviewed and found to be negative    Family History   Problem Relation Age of Onset   • Other Mother      Passed at 98   • Heart Disease Father 60     CABG       Social History     Social History   • Marital status:      Spouse name: N/A   • Number of children: N/A   • Years of education: N/A     Social History Main Topics   • Smoking status: Never Smoker   • Smokeless tobacco: Never Used   • Alcohol use Yes      Comment: 6 drinks a week    • Drug use: No   • Sexual activity: Not on file     Other Topics Concern   • Not on file     Social History Narrative   • No narrative on file       Physical  "Exam  Vitals  Blood pressure 152/83, pulse 62, temperature 36.6 °C (97.8 °F), resp. rate 16, height 1.93 m (6' 4\"), weight 121.5 kg (267 lb 13.7 oz), SpO2 91 %.  General: Well Developed, Well Nourished, no acute distress  HEENT: Normocephalic, atraumatic  Eyes: Anicteric, PERRLA, EOMI  Neck: Supple, nontender, no masses  Lungs: CTA, no wheezes or crackles  Heart: RRR, no murmurs, rubs or gallops  Abdomen: Soft, NT, ND  Pelvis: Stable to AP and Lateral Compression  Skin: Intact, no open wounds  Extremities: Left wrist pain and deformity  Neuro: NVI  Vascular: 2+DP/PT, Capillary refill <2 seconds    Radiographs:  DX-PORTABLE FLUOROSCOPY < 1 HOUR    (Results Pending)   DX-WRIST-LIMITED 2- LEFT    (Results Pending)       Laboratory Values      No results for input(s): SODIUM, POTASSIUM, CHLORIDE, CO2, GLUCOSE, BUN, CPKTOTAL in the last 72 hours.          Impression: Left distal radius fracture    Plan:Operative intervention. Risks and benefits of surgery were discussed which include but are not limited to bleeding, infection, neurovascular damage, malunion, nonunion, instability, limb length discrepancy, DVT, PE, MI, Stroke and death. They understand these risks and wish to proceed.    "

## 2018-02-23 NOTE — OR NURSING
Anesthesia at bedside new orders for increased pain medications received. Pt medicated per orders.

## 2018-02-23 NOTE — OR NURSING
Discussed with pt plan to allow him time to sleep and let the medicine kick in. Pt agreeable and will attempt to sleep.

## 2018-02-24 NOTE — OR NURSING
RN contacted [facility], and they stated they cannot accommodate patient since he is transferring from another oxygen company.     Patient again trialed on room air.  The lowest the oxygen saturation dropped during trial was 88%, and he bounced between 88% and 92% on room air for 15 minutes.  Patient does have oxygen at home, he just does not have portable oxygen at his home in [place]. Patient and daughter at bedside state it will take them 10-15 minutes to get home, and when he gets home he will be able to be on his oxygen again.  They do have a pulse ox with which to monitor his oxygen.    Patient educated on incentive spirometer use again, and this time demonstrated effective use, up to 2000.  Oxygen saturations did increase to 95% after IS use.    Situation discussed with Dr. Hammond. He states that it is okay for the patient to be driven home without oxygen and then use the home oxygen he already has once he arrives at home.    Patient discharged to home with all questions answered, accompanied by his daughter.

## 2018-02-24 NOTE — OR NURSING
Home oxygen order received for patient due to oxygen saturation 77% on room air.  Face to face and oxygen order filled out by Dr. Hammond.   working on order, referral sent to [facility].  Patient updated, and notified that it could take some time for the oxygen to be delivered. Patient and family verbalize understanding.

## 2018-02-24 NOTE — DISCHARGE PLANNING
NAVYA Cee regarding denial of O2. Medicare will not pay for O2 with a post op surgery diagnosis. The diagnosis needs to be chronic. BATOOL Ramirez notified.

## 2018-02-24 NOTE — FACE TO FACE
Face to Face Supporting Documentation - Home Health    The encounter with this patient was in whole or in part the primary reason for home health admission.    Date of encounter:   Patient:                    MRN:                       YOB: 2016  Anthony Cloud  [id number]  1945     Home health to see patient for:  Skilled Nursing care for assessment, interventions & education and Comment: home O2    Skilled need for:  Surgical Aftercare surgical wrist fracture repair    Skilled nursing interventions to include:  Comment: home O2    Homebound status evidenced by:  Need the aid of supportive devices such as crutches, canes, wheelchairs or walkers. Leaving home requires a considerable and taxing effort. There is a normal inability to leave the home.    Community Physician to provide follow up care: Angel Cameron M.D.     Optional Interventions? No      I certify the face to face encounter for this home health care referral meets the CMS requirements and the encounter/clinical assessment with the patient was, in whole, or in part, for the medical condition(s) listed above, which is the primary reason for home health care. Based on my clinical findings: the service(s) are medically necessary, support the need for home health care, and the homebound criteria are met.  I certify that this patient has had a face to face encounter by myself.  Zheng Gill P.A.-C. - NPI: [id number]

## 2018-02-24 NOTE — DISCHARGE PLANNING
"Received a call from Rafael  PACU II RN that pt needs oxygen. Dr. Hammond has written paper oxygen orders and face to face. PA has oxygen orders in computer with a home health F2F. Rafael CRISTINA stated home oxygen is needed and not home health.  Met with pt at bedside and he signed choice form. He has currently CPAP and more. He is on nocturnal oxygen with bipap per pt. He has a portable battery operated concentrator [place].  Bethany at [facility] stated they no longer fill oxygens and couldn't provide a portable tank to go home and recommended [facility]. Pt is \"2yrs into 5yr cap.\"  I spoke to pt about this and he is ok with going through [facility] and choice signed again. Spoke to Jayesh at [facility] and faxed oxygen orders, F2F, facesheet to him at [phone] and he will review. He did state it will require an override from management. Rafael CRISTINA is updated.   "

## 2018-03-02 ENCOUNTER — OFFICE VISIT (OUTPATIENT)
Dept: URGENT CARE | Facility: CLINIC | Age: 73
End: 2018-03-02
Payer: MEDICARE

## 2018-03-02 VITALS
OXYGEN SATURATION: 95 % | HEART RATE: 84 BPM | HEIGHT: 76 IN | WEIGHT: 259.26 LBS | TEMPERATURE: 98.1 F | BODY MASS INDEX: 31.57 KG/M2 | RESPIRATION RATE: 18 BRPM | DIASTOLIC BLOOD PRESSURE: 78 MMHG | SYSTOLIC BLOOD PRESSURE: 118 MMHG

## 2018-03-02 DIAGNOSIS — J01.10 ACUTE FRONTAL SINUSITIS, RECURRENCE NOT SPECIFIED: ICD-10-CM

## 2018-03-02 PROCEDURE — 99214 OFFICE O/P EST MOD 30 MIN: CPT | Performed by: FAMILY MEDICINE

## 2018-03-02 RX ORDER — AMOXICILLIN AND CLAVULANATE POTASSIUM 875; 125 MG/1; MG/1
1 TABLET, FILM COATED ORAL 2 TIMES DAILY
Qty: 20 TAB | Refills: 0 | Status: SHIPPED | OUTPATIENT
Start: 2018-03-02 | End: 2018-03-12

## 2018-03-02 RX ORDER — FLUTICASONE PROPIONATE 50 MCG
1 SPRAY, SUSPENSION (ML) NASAL 2 TIMES DAILY
Qty: 1 BOTTLE | Refills: 0 | Status: SHIPPED
Start: 2018-03-02 | End: 2020-02-11

## 2018-03-03 NOTE — PROGRESS NOTES
CC:  Sinus congestion, sob, nasal congestion      Sinusitis  This is a new problem. The current episode started in the past 7 days. The problem has been gradually worsening since onset. There has been no fever. Associated symptoms include congestion, mild dyspnea,  headaches and sinus pressure, but denies cough. Pertinent negatives include no sneezing or sore throat. Past treatments include nothing.     Social History   Substance Use Topics   • Smoking status: Never Smoker   • Smokeless tobacco: Never Used   • Alcohol use Yes      Comment: 6 drinks a week      Current Outpatient Prescriptions on File Prior to Visit   Medication Sig Dispense Refill   • atorvastatin (LIPITOR) 40 MG Tab Take 40 mg by mouth every evening.     • BREO ELLIPTA 200-25 MCG/INH AEROSOL POWDER, BREATH ACTIVATED INHALE ONE (1) PUFF BY MOUTH EVERY DAY. RINSE MOUTH AFTER USE.  NEEDS OFFICE VISIT FOR FURTHER REFILLS   1 Each 5   • omeprazole (PRILOSEC) 20 MG delayed-release capsule Take 1 Cap by mouth every day.     • losartan (COZAAR) 50 MG Tab Take 1 Tab by mouth 2 Times a Day. 180 Tab 3   • carvedilol (COREG) 25 MG Tab Take 1 Tab by mouth 2 times a day, with meals. 180 Tab 3   • hydrochlorothiazide (HYDRODIURIL) 25 MG Tab Take 1 Tab by mouth every day. 90 Tab 3   • XARELTO 20 MG Tab tablet TAKE 1 TABLET BY MOUTH EVERY DAY WITH DINNER 30 Tab 6   • escitalopram (LEXAPRO) 10 MG Tab TAKE ONE (1) TABLET BY MOUTH EVERY DAY 90 Tab 3   • levalbuterol (XOPENEX HFA) 45 MCG/ACT inhaler Inhale 2 Puffs by mouth every four hours as needed for Shortness of Breath. 1 Inhaler 1   • aspirin (ASA) 81 MG Chew Tab chewable tablet Take 81 mg by mouth every evening. 100 Tab 11   • tetanus-dipth-acell pertussis (ADACEL) 5-2-15.5 LF-MCG/0.5 Suspension 0.5 mL by Intramuscular route Once PRN for up to 1 dose. 0.5 mL 0     No current facility-administered medications on file prior to visit.      Family history was reviewed and not pertinent       Past Surgical History:  "  Procedure Laterality Date   • WRIST ORIF Left 2/23/2018    Procedure: WRIST ORIF;  Surgeon: Jasper Hammond M.D.;  Location: SURGERY [facility];  Service: Orthopedics   • INCISION AND DRAINAGE ORTHOPEDIC  8/29/2014    Performed by Wolfgang Merrill M.D. at SURGERY [facility]   • CARDIAC CATH, RIGHT/LEFT HEART  2003 01/2003 4.0x23mm Zeta stent to proximal RCA,3.5x22mm distal to first stent, 3.0x15mm at the point of original occlusion.   • BOWEL RESECTION     • PB ANESTH,LOWER LEG ARTERIES SURG           Review of Systems:  Review of Systems   Constitutional: Negative for fever.   HENT: no neck pain,  or dizziness  Eyes: denies vision changes, d/c  Respiratory: no  Cough, SOB  Cardiovascular: denies palpations, chest pain   Gastrointestinal: denies diarrhea, abdominal pain or constipation.  No blood in stool.  Musculoskeletal: denies back pain or joint pain    Skin: no itching or rash  Neurological: No numbness or tingling.   10 point ROS otherwise negative, except per HPI           Objective:     Blood pressure 118/78, pulse 84, temperature 36.7 °C (98.1 °F), resp. rate 18, height 1.93 m (6' 4\"), weight 117.6 kg (259 lb 4.2 oz), SpO2 96 %.    Physical Exam   Constitutional: patient is oriented to person, place, and time. Patient appears well-developed and well-nourished.   HENT:   Head: Normocephalic and atraumatic.   Right Ear: Hearing, tympanic membrane and external ear normal.   Left Ear: Hearing, tympanic membrane and external ear normal.   Nose: Mucosal edema and rhinorrhea present. No sinus tenderness. No epistaxis. Right sinus exhibits fontal sinus tenderness. Right sinus exhibits no maxillary sinus tenderness. Left sinus exhibits frontal sinus tenderness. Left sinus exhibits no maxillary sinus tenderness.   Mouth/Throat: Uvula is midline and mucous membranes are normal. Oropharyngeal exudate present. No posterior oropharyngeal edema or posterior oropharyngeal erythema.   Eyes: " Conjunctivae and EOM are normal. Pupils are equal, round, and reactive to light. Left eye exhibits no discharge. No scleral icterus.   Neck: Normal range of motion. Neck supple. No JVD present. No tracheal deviation present. No thyromegaly present.   Cardiovascular: Normal rate, regular rhythm, normal heart sounds and intact distal pulses.    No murmur heard.  Pulmonary/Chest: Breath sounds normal. No respiratory distress.   no wheezes, rales.      Musculoskeletal: Normal range of motion.   no edema.   Lymphadenopathy:     There is positive  cervical adenopathy.   Neurological:   alert and oriented to person, place, and time.   Skin: Skin is warm and dry. No erythema.   Psychiatric:   normal mood and affect.  behavior is normal.   Nursing note and vitals reviewed.          Assessment/Plan:     1. Acute frontal sinusitis, recurrence not specified     - amoxicillin-clavulanate (AUGMENTIN) 875-125 MG Tab; Take 1 Tab by mouth 2 times a day for 10 days.  Dispense: 20 Tab; Refill: 0  - fluticasone (FLONASE) 50 MCG/ACT nasal spray; Spray 1 Spray in nose 2 times a day.  Dispense: 1 Bottle; Refill: 0    Follow up in one week if no improvement, sooner if symptoms worsen.

## 2018-03-15 ENCOUNTER — SLEEP CENTER VISIT (OUTPATIENT)
Dept: SLEEP MEDICINE | Facility: MEDICAL CENTER | Age: 73
End: 2018-03-15
Payer: MEDICARE

## 2018-03-15 VITALS
HEIGHT: 76 IN | WEIGHT: 260 LBS | HEART RATE: 66 BPM | OXYGEN SATURATION: 93 % | BODY MASS INDEX: 31.66 KG/M2 | DIASTOLIC BLOOD PRESSURE: 84 MMHG | RESPIRATION RATE: 16 BRPM | SYSTOLIC BLOOD PRESSURE: 120 MMHG

## 2018-03-15 DIAGNOSIS — I51.89 DIASTOLIC DYSFUNCTION: ICD-10-CM

## 2018-03-15 DIAGNOSIS — I26.01 CHRONIC SEPTIC PULMONARY EMBOLISM WITH ACUTE COR PULMONALE (HCC): ICD-10-CM

## 2018-03-15 DIAGNOSIS — I27.20 PULMONARY HYPERTENSION (HCC): ICD-10-CM

## 2018-03-15 DIAGNOSIS — J98.4 RESTRICTIVE LUNG DISEASE: ICD-10-CM

## 2018-03-15 DIAGNOSIS — I10 ESSENTIAL HYPERTENSION: ICD-10-CM

## 2018-03-15 DIAGNOSIS — E78.5 DYSLIPIDEMIA: ICD-10-CM

## 2018-03-15 DIAGNOSIS — E66.9 OBESITY (BMI 30-39.9): ICD-10-CM

## 2018-03-15 DIAGNOSIS — G47.33 OSA (OBSTRUCTIVE SLEEP APNEA): ICD-10-CM

## 2018-03-15 DIAGNOSIS — Z86.718 PERSONAL HISTORY OF VENOUS THROMBOSIS AND EMBOLISM: ICD-10-CM

## 2018-03-15 DIAGNOSIS — I27.82 CHRONIC SEPTIC PULMONARY EMBOLISM WITH ACUTE COR PULMONALE (HCC): ICD-10-CM

## 2018-03-15 PROCEDURE — 99214 OFFICE O/P EST MOD 30 MIN: CPT | Performed by: INTERNAL MEDICINE

## 2018-03-15 NOTE — PROGRESS NOTES
CC: 6 month f/u SHYAM    HPI:  Forgot to bring his card in for download. Recently, broke his left wrist and had surgery by Dr. John RAMIREZ..    Last seen 6/13/17:   71 year old M has noticed that his sats are better at [place] than [place]. We did a multi-ox today and he was mostly 91-92% but did dip to 88% after 2 laps around the  sleep center. Has hx prior PE. Uses bilevel 18/14 with 3 lpm bleed-in.  2012 PFTs showed mixed obstruction-restriction; however, have only the interpretation not the tracings to review. 2015 CT chest showed no PE and post surgical changes of right hemithorax. Feels much better at [place] level. Able to exercise much more there.  Doesn't want daytime O2 and doesn't want to move to sea level. Remaining very active with horses, sheep, riding,hunting, working in yard.        Patient Active Problem List    Diagnosis Date Noted   • Pulmonary embolism (CMS-HCC) 11/09/2016     Priority: High   • Essential hypertension 12/07/2015     Priority: High   • Coronary artery disease due to lipid rich plaque 12/07/2015     Priority: High   • Chronic anticoagulation 09/16/2015     Priority: High   • Dyslipidemia      Priority: High   • Old myocardial infarction      Priority: High   • Personal history of venous thrombosis and embolism      Priority: High   • Impaired fasting glucose 08/04/2016     Priority: Medium   • Restrictive lung disease 08/04/2016     Priority: Medium   • SHYAM (obstructive sleep apnea) 06/14/2016     Priority: Medium   • Peptic ulcer disease 08/04/2016     Priority: Low   • H/O: GI bleed 08/04/2016     Priority: Low   • Seasonal affective disorder (CMS-HCC)      Priority: Low   • Diastolic dysfunction 03/15/2018   • Pulmonary hypertension 03/15/2018   • Hypoxia 02/23/2018   • Obesity (BMI 30-39.9) 01/16/2018     Past Medical History:   Diagnosis Date   • CAD (coronary artery disease) 2003    PCI/BMS x 3 to the RCA (Zeta 4.0 x 23mm, 3.5 x 23mm, 3.0 x 18mm).   • Central sleep apnea      ICD-10  "transition   • Chronic anticoagulation    • Depression    • Depressive disorder, not elsewhere classified         • Dyslipidemia    • Hypertension    • Hypothyroidism    • Mixed hyperlipidemia    • Obesity    • Old myocardial infarction January 2003        • Peptic ulcer disease 8/4/2016   • Personal history of venous thrombosis and embolism     On Xarelto   • Pulmonary embolism (CMS-HCC)    • Sleep apnea     BiPAP with oxygen        Past Surgical History:   Procedure Laterality Date   • WRIST ORIF Left 2/23/2018    Procedure: WRIST ORIF;  Surgeon: Jasper Hammond M.D.;  Location: SURGERY [facility];  Service: Orthopedics   • INCISION AND DRAINAGE ORTHOPEDIC  8/29/2014    Performed by Wolfgang Merrill M.D. at SURGERY [facility]   • CARDIAC CATH, RIGHT/LEFT HEART  2003 01/2003 4.0x23mm Zeta stent to proximal RCA,3.5x22mm distal to first stent, 3.0x15mm at the point of original occlusion.   • BOWEL RESECTION     • PB ANESTH,LOWER LEG ARTERIES SURG         Family History   Problem Relation Age of Onset   • Other Mother      Passed at 98   • Heart Disease Father 60     CABG       Social History     Social History   • Marital status:      Spouse name: N/A   • Number of children: N/A   • Years of education: N/A     Occupational History   • Not on file.     Social History Main Topics   • Smoking status: Never Smoker   • Smokeless tobacco: Never Used   • Alcohol use Yes      Comment: 6 drinks a week    • Drug use: No   • Sexual activity: Not on file     Other Topics Concern   • Not on file     Social History Narrative   • No narrative on file        \"CURRENT RX\"    ALLERGIES: Lisinopril    ROS  Constitutional: denies fever chills sweats fatigue and weight loss  Eyes: denies vision loss, pain, drainage, double vision, glasses  Ears, eyes, nose, mouth, throat: denies earache, difficulty hearing, ringing, buzzing in the ear, rhinitis and nasal congestion, injury, recurrence or throat, persistent " "hoarseness, decay teeth, toothaches  Cardiovascular: denies chest pain, tightness, palpitations, swelling of legs and feet, fainting, difficulty breathing  Respiratory: sob with exertion  Sleep:see HPI  GI: denies heartburn, difficulty swallowing, nausea, abdominal pain, diarrhea, constipation  : denies issues  Musculoskeletal: denies painful joints, sore muscles, back pain  Integumentary: denies rashes, lumps, color change  Neuro: denies frequent headaches, dizziness, weakness    PHYSICAL EXAM  A bit heavy    /84   Pulse 66   Resp 16   Ht 1.93 m (6' 4\")   Wt 117.9 kg (260 lb)   SpO2 93%   BMI 31.65 kg/m²   Appearance: Well-nourished, well-developed, no acute distress  Eyes:  PERRLA, EOMI  Hearing:  Grossly intact  Nose:  Normal, no lesions or deformities, turbinates moist  Oropharynx:  Tongue normal, posterior pharynx without erythema or exudate  Mallampati classification:  4  Neck: Supple, trachea midline, no masses  Respiratory effort:  No intercostal retractions or use of accessory muscles  Lung auscultation:  No wheezes rhonchi rubs or rales  Cardiac auscultation:  No murmurs, rubs, or gallops, no regular rhythm, normal rate  Abdomen:  No tenderness, no organomegaly; protuberant  Extremities: ; Left wrist in camo cast for wrist fracture.  Gait and Station:  Normal  Digits and nails: No clubbing, cyanosis, petechiae, or nodes  Musculoskeletal:  Grossly normal  Skin:  No rashes  Orientation:  Oriented time, place, and person  Mood and affect:  No depression, anxiety, agitation  Judgment:  Intact    PROBLEMS:  1. SHYAM (obstructive sleep apnea)    - DME O2 NEW SET UP    2. Obesity (BMI 30-39.9)    - OBESITY COUNSELING (No Charge): Patient identified as having weight management issue.  Appropriate orders and counseling given.    3. Dyslipidemia      4. Essential hypertension      5. Diastolic dysfunction      6. Pulmonary hypertension      7. Personal history of venous thrombosis and embolism      8. 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 Restrictive lung disease            PLAN:   Has been advised to continue the current BiPAP 18/14; water pressure, clean equipment frequently, and get new mask and supplies as allowed by insurance and DME. Advised about potential problems including nasal obstruction/stuffiness, mask leak or discomfort , frequent awakenings, chill or dryness of the upper airway, noise, inconvenience, and lack of benefit. Recommend an earlier appointment, if significant treatment barriers develop.      Return in about 6 months (around 9/15/2018).      Orders written for a new oxygen concentrator to use 3 L a minute with his bilevel. His current provider will be no longer be furnishing him oxygen.    Continue Breo and rescue.

## 2018-03-19 ENCOUNTER — TELEPHONE (OUTPATIENT)
Dept: PULMONOLOGY | Facility: HOSPICE | Age: 73
End: 2018-03-19

## 2018-03-29 NOTE — TELEPHONE ENCOUNTER
Order for o2, current notes, Split night from 2016 and demos faxed to [facility] [place] for o2   Patient will continue to use CPAP & MORE for supplies

## 2018-04-05 ENCOUNTER — ANTICOAGULATION VISIT (OUTPATIENT)
Dept: VASCULAR LAB | Facility: MEDICAL CENTER | Age: 73
End: 2018-04-05
Attending: INTERNAL MEDICINE
Payer: MEDICARE

## 2018-04-05 VITALS — SYSTOLIC BLOOD PRESSURE: 108 MMHG | HEART RATE: 65 BPM | DIASTOLIC BLOOD PRESSURE: 59 MMHG

## 2018-04-05 DIAGNOSIS — Z86.718 PERSONAL HISTORY OF VENOUS THROMBOSIS AND EMBOLISM: ICD-10-CM

## 2018-04-05 DIAGNOSIS — I26.99 OTHER PULMONARY EMBOLISM WITHOUT ACUTE COR PULMONALE, UNSPECIFIED CHRONICITY (HCC): ICD-10-CM

## 2018-04-05 PROCEDURE — 99212 OFFICE O/P EST SF 10 MIN: CPT

## 2018-04-05 NOTE — PROGRESS NOTES
Target end date:Indefinite     Indication: PE/DVT     Drug: Xarelto 20 mg daily.      CHADsVASC = n/a    Health Status Since Last Assessment   Patient denies any new relevant medical problems, ED visits or hospitalizations   Patient denies any embolic events (stroke/tia/systemic embolism)    Adherence with DOAC Therapy   Pt has Not  missed any doses in the average week    Bleeding Risk Assessment     Denies Epistaxis   Pt denies any excessive or unusual bleeding/hematomas.  Pt denies any GI bleeds or hematemesis.  Pt denies any concerning daily headache or sub dural hematoma symptoms.     Pt denies any hematuria or abnormal vaginal bleeding.   Latest Hemoglobin 13   ETOH overuse Denies, but occasional use     Creatinine Clearance/Renal Function     Latest ClCr >50 mL/min.      Drug Interactions   Platelets: none   ASA/other antiplatelets 81 mg ASA for hx of MI.   NSAID none   Other drug interactions none   Verified no anticonvulsant or azole therapy, education provided for future use.     Examination   Blood Pressure See vitals.    Symptomatic hypotension none   Significant gait impairment/imbalance/high risk for falls? He did fall once, but doesn't seem to be an ongoing issue.     Final Assessment and Recommendations:   Patient appears stable from the anticoagulation staindpoint.     Benefits of continued DOAC therapy outweigh risks for this patient   Recommend pt continue with current DOAC therapy.     Other Actions: cmp/ cbc hemogram ordered prior to next visit    Follow up:   Will follow up with patient via clinic in 6 months.      Ramakrishna Kilgore, EloinaD

## 2018-05-01 DIAGNOSIS — J98.4 RESTRICTIVE LUNG DISEASE: ICD-10-CM

## 2018-05-01 RX ORDER — LEVALBUTEROL TARTRATE 45 UG/1
AEROSOL, METERED ORAL
Qty: 1 INHALER | Refills: 1 | Status: SHIPPED | OUTPATIENT
Start: 2018-05-01 | End: 2018-09-24 | Stop reason: SDUPTHER

## 2018-05-01 NOTE — TELEPHONE ENCOUNTER
Have we ever prescribed this med? Yes.  If yes, what date? 6/13/17    Last OV: 3/15/18- Dr. Schreiber    Next OV: 9/17/18- C Rotation     DX: Restrictive lung disease     Medications: levalbuterol (XOPENEX HFA) 45 MCG/ACT

## 2018-06-11 ENCOUNTER — TELEPHONE (OUTPATIENT)
Dept: VASCULAR LAB | Facility: MEDICAL CENTER | Age: 73
End: 2018-06-11

## 2018-06-11 ENCOUNTER — ANTICOAGULATION MONITORING (OUTPATIENT)
Dept: VASCULAR LAB | Facility: MEDICAL CENTER | Age: 73
End: 2018-06-11

## 2018-06-11 ENCOUNTER — TELEPHONE (OUTPATIENT)
Dept: MEDICAL GROUP | Facility: MEDICAL CENTER | Age: 73
End: 2018-06-11

## 2018-06-11 DIAGNOSIS — Z86.718 PERSONAL HISTORY OF VENOUS THROMBOSIS AND EMBOLISM: ICD-10-CM

## 2018-06-11 NOTE — PROGRESS NOTES
6/11/2018  Attempted to call patient regarding [facility] request for him to be off his Xarelto for a procedure. Patient did not answer, was unable to leave a message.     Kristen Del Valle, Pharmacy Intern        6/12/2018  Attempted to call patient regarding [facility] request for him to be off his Xarelto for a procedure. Patient did not answer, was unable to leave a message.     Per Yesica, was going to ask patient about the last time he had VTE.    Per Yesica, was going to tell patient that he will be off Xarelto for 2 days prior, 1 day prior, and day of procedure. If his provider doing the procedure is alright, patient is ok to restart therapy 1 day after procedure.    Kristen Del Valle, Pharmacy Intern

## 2018-06-11 NOTE — PROGRESS NOTES
Patient will be requiring temporary interruption in anticoagulation therapy for colonoscopy. [facility] is requesting pt off Xarelto x 2 days prior    Pt is on anticoagulation for hx of PE and DVT   CrCl: 64 ml/min  DOAC t1/2 :9 hr  Bleed risk of procedure: low     Pt is to stop therapy 2 days before  Ok to restart therapy one days after if ok with provider doing procedure    Yesica Quick, Pharm D

## 2018-06-11 NOTE — TELEPHONE ENCOUNTER
Clearance form faxed to [facility].  Ok to hold Xarelto 2 days prior to procedure.    Hannah SLAUGHTER  [facility]

## 2018-06-26 NOTE — TELEPHONE ENCOUNTER
Last seen: 01/16/18 by Dr. Cameron  Next appt: 07/19/18 with Dr. Cameron    Was the patient seen in the last year in this department? Yes   Does patient have an active prescription for medications requested? No   Received Request Via: Pharmacy

## 2018-06-27 RX ORDER — ATORVASTATIN CALCIUM 40 MG/1
40 TABLET, FILM COATED ORAL DAILY
Qty: 90 TAB | Refills: 3 | Status: SHIPPED | OUTPATIENT
Start: 2018-06-27 | End: 2018-12-03 | Stop reason: SDUPTHER

## 2018-07-17 ENCOUNTER — OFFICE VISIT (OUTPATIENT)
Dept: INTERNAL MEDICINE | Facility: MEDICAL CENTER | Age: 73
End: 2018-07-17
Payer: MEDICARE

## 2018-07-17 VITALS
HEART RATE: 64 BPM | SYSTOLIC BLOOD PRESSURE: 89 MMHG | WEIGHT: 264.2 LBS | TEMPERATURE: 96.9 F | BODY MASS INDEX: 32.17 KG/M2 | HEIGHT: 76 IN | DIASTOLIC BLOOD PRESSURE: 64 MMHG | OXYGEN SATURATION: 95 %

## 2018-07-17 DIAGNOSIS — Z23 NEED FOR STREPTOCOCCUS PNEUMONIAE VACCINATION: ICD-10-CM

## 2018-07-17 DIAGNOSIS — I10 ESSENTIAL HYPERTENSION: ICD-10-CM

## 2018-07-17 DIAGNOSIS — R73.01 IMPAIRED FASTING GLUCOSE: ICD-10-CM

## 2018-07-17 DIAGNOSIS — Z79.01 CHRONIC ANTICOAGULATION: ICD-10-CM

## 2018-07-17 DIAGNOSIS — E78.5 DYSLIPIDEMIA: ICD-10-CM

## 2018-07-17 DIAGNOSIS — F33.8 SEASONAL AFFECTIVE DISORDER (HCC): ICD-10-CM

## 2018-07-17 PROBLEM — R09.02 HYPOXIA: Status: RESOLVED | Noted: 2018-02-23 | Resolved: 2018-07-17

## 2018-07-17 PROCEDURE — 99214 OFFICE O/P EST MOD 30 MIN: CPT | Mod: 25 | Performed by: INTERNAL MEDICINE

## 2018-07-17 PROCEDURE — G0009 ADMIN PNEUMOCOCCAL VACCINE: HCPCS | Performed by: INTERNAL MEDICINE

## 2018-07-17 PROCEDURE — 90732 PPSV23 VACC 2 YRS+ SUBQ/IM: CPT | Performed by: INTERNAL MEDICINE

## 2018-07-17 ASSESSMENT — PATIENT HEALTH QUESTIONNAIRE - PHQ9: CLINICAL INTERPRETATION OF PHQ2 SCORE: 0

## 2018-07-17 ASSESSMENT — PAIN SCALES - GENERAL: PAINLEVEL: NO PAIN

## 2018-07-17 NOTE — PATIENT INSTRUCTIONS
· Make an appointment with your cardiologist for routine follow-up  · Check your blood pressure every day and call me in early August with the results. I may need to adjust your blood pressure medications.  · Continue to monitor for any new bleeding that can occur with the Xarelto.

## 2018-07-17 NOTE — PROGRESS NOTES
"Anthony Cloud is a 73 y.o. male here for a non-provider visit for:   PNEUMOVAX (PPSV23)    Reason for immunization: Overdue/Provider Recommended  Immunization records indicate need for vaccine: Yes, confirmed with Epic  Minimum interval has been met for this vaccine: Yes  ABN completed: Not Indicated    Order and dose verified by: leisa ALDANA Dated  2/24/15 was given to patient: Yes  All IAC Questionnaire questions were answered \"No.\"    Patient tolerated injection and no adverse effects were observed or reported: Yes    Pt scheduled for next dose in series: Not Indicated  "

## 2018-07-17 NOTE — PROGRESS NOTES
"    Established Patient    Patient Care Team:  Angel Cameron M.D. as PCP - General  FREDO Carr. as Mid Level Provider (Cardiology)  Padmini Calhoun M.D. as Consulting Physician (Cardiology)  cpap and more as Respiratory  Austin Schreiber M.D. as Consulting Physician (Pulmonary Medicine)    Anthony Cloud is a 73 y.o. male who presents today with the following:    CC: Follow-up for chronic medical problems including seasonal affective disorder, hypertension, dyslipidemia.    HPI:  1. Seasonal affective disorder (CMS-HCC)  Of late, he has noticed that his seasonal affective disorder, which is typically most active in the winter for him, and has been a bit worse.  It is unusual for him to have symptoms in the summer.  He is noting that he is sleeping well however when he wakes up in the morning he describes a \"black cloud\" coming over him.  Typically over the first couple of hours this improves, he is back to normal.  He denies any significant increase stress in his life of late.  Denies any suicidal or homicidal ideation.  He is currently on Lexapro 10 mg daily, and has been tolerating this well for many years.    2. Essential hypertension  Blood pressures  is low in the office today 89/64, which was confirmed when repeated manually by me.  He has actually discontinued use of home blood pressure monitoring because he has been feeling well and his blood pressure been well controlled.  The last time I saw him his blood pressure was 125/74, and review of previous records here in the electronic medical records show blood pressure that has been 118/78 and 108/59.  He denies any lightheadedness or dizziness, or orthostatic symptoms associated with this mildly reduced blood pressure.  He is currently taking carvedilol 25 mg twice daily and losartan 50 mg twice daily.  My medication list included hydrochlorothiazide 25 mg daily however his medication list does not include that so he is not sure that he is " "actually taking that now.    3. Dyslipidemia  Anthony is tolerating medication well.  No intolerable side-effects noted.  No new symptoms of muscle aches or weakness.  Patient reports good adherence to medication regimen.  No change in medication since the last visit. Anthony is trying to follow a low-cholesterol diet.  Exercise is adequate.  He continues on atorvastatin 40 mg daily.    4. Chronic anticoagulation  He seems to be doing reasonably well on Xarelto 20 mg daily.  He does note lately he has been bruising a little bit more, and anticipates discussing this with his vascular team in the not too distant future.  No other significant bleeding noted.    5. Impaired fasting glucose  No major changes in his weight, he reports that he eats a good diet however the quantity of food that he eats is too much which leads him to not really losing weight.    6. Need for Streptococcus pneumoniae vaccination    ROS:     Denies any new chest pain or shortness of breath.  No changes to urinary or bowel function.  See HPI.    Past Medical History:   Diagnosis Date   • CAD (coronary artery disease) 2003    PCI/BMS x 3 to the RCA (Zeta 4.0 x 23mm, 3.5 x 23mm, 3.0 x 18mm).   • Central sleep apnea      ICD-10 transition   • Chronic anticoagulation    • Depression    • Depressive disorder, not elsewhere classified         • Dyslipidemia    • Hypertension    • Hypothyroidism    • Mixed hyperlipidemia    • Obesity    • Old myocardial infarction January 2003        • Peptic ulcer disease 8/4/2016   • Personal history of venous thrombosis and embolism     On Xarelto   • Pulmonary embolism (HCC)    • Sleep apnea     BiPAP with oxygen       Social History   Substance Use Topics   • Smoking status: Never Smoker   • Smokeless tobacco: Never Used   • Alcohol use Yes      Comment: 6 drinks a week        Physical Exam:  BP (!) 89/64   Pulse 64   Temp 36.1 °C (96.9 °F)   Ht 1.93 m (6' 4\")   Wt 119.8 kg (264 lb 3.2 oz)   SpO2 95%   BMI " 32.16 kg/m²   General: Well developed, well nourished male, in no distress.  Eyes: Conjuntiva without any obvious injection or erythema.   Cardiovascular: Heart is regular with no murmur  Lungs: Clear to auscultation bilaterally. No wheezes, rhonci or crackles heard. Respiratory effort is normal.  Abd: Soft, non-tender  Ext: No edema    Assessment and Plan:     1. Seasonal affective disorder (CMS-HCC)  At this point do not want to make a medication change just yet.  I am concerned that the increase in his depressive symptoms could possibly be related to his blood pressure being low.  I had like him to have his blood pressure managed prior to increasing the Lexapro.  If his blood pressure is well controlled at home, and there is no indication that persistent hypotension is leading to generalized anhedonia, then I might consider increasing the Lexapro to 20 mg daily.    2. Essential hypertension  As above, blood pressure has been low.  Is unclear if this is a variant for her today, or represents more consistent treatment for him.  I asked him to check his blood pressure consistently at home for the next couple of weeks, and be in touch with me with the results in early August or sooner if blood pressures are consistently low.    3. Dyslipidemia  Currently this is stable and well controlled.  The patient should continue current therapy with no changes at this time.       4. Chronic anticoagulation  He is going to discuss his current anticoagulation needs with the anticoagulation clinic in the near future.  He is concerned that he could be taking too much anticoagulation      5. Impaired fasting glucose  No recent blood work for comparison.  I continue to encourage him to eat healthy diet, and I repeated his hemoglobin A1c.    6. Need for Streptococcus pneumoniae vaccination  Second and final pneumonia vaccine given in the office today.  - PNEUMOCOCCAL POLYSACCHARIDE VACCINE 23-VALENT =>3YO SQ/IM      Return in about 6  months (around 1/17/2019).    Patient Instructions   · Make an appointment with your cardiologist for routine follow-up  · Check your blood pressure every day and call me in early August with the results. I may need to adjust your blood pressure medications.  · Continue to monitor for any new bleeding that can occur with the Xarelto.      Angel Cameron M.D.   of Internal Medicine  [facility]    This note was created using voice recognition software.  While every attempt is made to ensure accuracy of transcription, occasionally errors occur.

## 2018-08-06 ENCOUNTER — TELEPHONE (OUTPATIENT)
Dept: INTERNAL MEDICINE | Facility: MEDICAL CENTER | Age: 73
End: 2018-08-06

## 2018-08-06 NOTE — TELEPHONE ENCOUNTER
1. Caller Name: patient                      Call Back Number: [phone] (home)       2. Message: patient called with blood pressure readings:  140/80, 114/68, 118/67, 112/65. Generally runs about 130's/70's.    3. Patient approves office to leave a detailed voicemail/MyChart message: N\A

## 2018-08-17 NOTE — TELEPHONE ENCOUNTER
Last seen: 07/17/18 by Dr. Cameron  Next appt: 01/17/18 with Dr. Cameorn    Was the patient seen in the last year in this department? Yes   Does patient have an active prescription for medications requested? No   Received Request Via: Pharmacy

## 2018-08-20 RX ORDER — ESCITALOPRAM OXALATE 10 MG/1
TABLET ORAL
Qty: 90 TAB | Refills: 3 | Status: SHIPPED | OUTPATIENT
Start: 2018-08-20 | End: 2021-09-01

## 2018-08-22 DIAGNOSIS — J98.4 RESTRICTIVE LUNG DISEASE: ICD-10-CM

## 2018-08-22 NOTE — TELEPHONE ENCOUNTER
Have we ever prescribed this med? Yes.  If yes, what date? 2/5/18    Last OV: 3/15/18- Dr. Schreiber    Next OV: 9/17/18    DX: Restrictive lung disease     Medications: Breo Ellipta 200-25 MCG

## 2018-09-24 DIAGNOSIS — J98.4 RESTRICTIVE LUNG DISEASE: ICD-10-CM

## 2018-09-24 RX ORDER — OMEPRAZOLE 20 MG/1
20 CAPSULE, DELAYED RELEASE ORAL DAILY
Qty: 90 CAP | Refills: 3 | Status: ON HOLD
Start: 2018-09-24 | End: 2020-07-17

## 2018-09-24 RX ORDER — LEVALBUTEROL TARTRATE 45 UG/1
AEROSOL, METERED ORAL
Qty: 1 INHALER | Refills: 6 | Status: SHIPPED | OUTPATIENT
Start: 2018-09-24 | End: 2019-04-18 | Stop reason: SDUPTHER

## 2018-09-24 NOTE — TELEPHONE ENCOUNTER
Have we ever prescribed this med? Yes.  If yes, what date? 08/24/2018    Last OV: 03/15/2018 - Dr. Hoyos    Next OV: 01/03/2019 - C rotation     DX: JUSTINA    Medications: Breo

## 2018-09-24 NOTE — TELEPHONE ENCOUNTER
Last seen: 07/17/18 by Dr. Cameron  Next appt: 01/17/19 with Dr. Cameron    Was the patient seen in the last year in this department? Yes   Does patient have an active prescription for medications requested? No   Received Request Via: Pharmacy

## 2018-10-02 ENCOUNTER — TELEPHONE (OUTPATIENT)
Dept: PULMONOLOGY | Facility: HOSPICE | Age: 73
End: 2018-10-02

## 2018-10-02 NOTE — TELEPHONE ENCOUNTER
MEDICATION PRIOR AUTHORIZATION NEEDED:    1. Name of Medication: Levalbuterol HFA 45 mcg    2. Requested By (Name of Pharmacy): [facility]     3. Is insurance on file current? yes    4. What is the name & phone number of the 3rd party payor? [employer] [phone]

## 2018-10-03 NOTE — TELEPHONE ENCOUNTER
[employer] called asking for clinical wquestions to be answered. I answered all questions; provided information from his chart and they stated they will let us know by fax the outcome

## 2018-10-09 NOTE — TELEPHONE ENCOUNTER
DOCUMENTATION OF PRIOR AUTH STATUS    1. Medication name and dose: Levalbuterol HFA 45 mcg    2. Name and Phone # of Prescription coverage company: [employer] [phone]    3. Date Prior Auth was submitted: 10/1/2018    4. What information was given to obtain insurance decision: Clinical notes    5. Prior Auth letter Approved or Denied: Denied    6. Pharmacy notified: No    7. Patient notified: No        Levalbuterol HFA 45 mcg was denied due to pt's dx of Restrictive Lung Disease.  I will start an appeal.

## 2018-10-17 ENCOUNTER — APPOINTMENT (OUTPATIENT)
Dept: VASCULAR LAB | Facility: MEDICAL CENTER | Age: 73
End: 2018-10-17
Payer: MEDICARE

## 2018-10-22 ENCOUNTER — ANTICOAGULATION VISIT (OUTPATIENT)
Dept: VASCULAR LAB | Facility: MEDICAL CENTER | Age: 73
End: 2018-10-22
Attending: INTERNAL MEDICINE
Payer: MEDICARE

## 2018-10-22 VITALS — HEART RATE: 60 BPM | SYSTOLIC BLOOD PRESSURE: 105 MMHG | DIASTOLIC BLOOD PRESSURE: 58 MMHG

## 2018-10-22 DIAGNOSIS — Z86.718 PERSONAL HISTORY OF VENOUS THROMBOSIS AND EMBOLISM: ICD-10-CM

## 2018-10-22 DIAGNOSIS — I26.99 OTHER PULMONARY EMBOLISM WITHOUT ACUTE COR PULMONALE, UNSPECIFIED CHRONICITY (HCC): ICD-10-CM

## 2018-10-22 LAB
INR BLD: 1.2 (ref 0.9–1.2)
INR PPP: 1.2 (ref 2–3.5)

## 2018-10-22 PROCEDURE — 99212 OFFICE O/P EST SF 10 MIN: CPT | Performed by: NURSE PRACTITIONER

## 2018-10-22 PROCEDURE — 85610 PROTHROMBIN TIME: CPT

## 2018-10-22 NOTE — PROGRESS NOTES
Indication: PE/DVT, Indefinite                                      Drug: Xarelto 20 mg daily.                                       CHADsVASC = n/a      Health Status Since Last Assessment  No changes    Adherence with DOAC Therapy  None  BLEEDING RISK ASSESSMENT NB:    Bleeding Risk Assessment  None of the following:  Severe epistaxis   Hemoptysis    Excessive or unusual bruising / hematomas   GIB / melena / BRBPR / hematemesis    Hematuria   Concerning daily headache or subdural hematoma symptoms    Decreasing hemoglobin or new anemia    Latest hemoglobin:     Lab Results   Component Value Date/Time    WBC 4.5 03/13/2017 01:46 PM    WBC 3.9 (L) 08/29/2014 01:00 PM    RBC 3.82 (L) 03/13/2017 01:46 PM    RBC 3.73 (L) 08/29/2014 01:00 PM    HEMOGLOBIN 13.0 03/13/2017 01:46 PM    HEMOGLOBIN 12.4 (L) 08/29/2014 01:00 PM    HEMATOCRIT 38.7 03/13/2017 01:46 PM    HEMATOCRIT 36.1 (L) 08/29/2014 01:00 PM     (H) 03/13/2017 01:46 PM    MCV 96.8 08/29/2014 01:00 PM    MCH 34.0 (H) 03/13/2017 01:46 PM    MCH 33.2 (H) 08/29/2014 01:00 PM    MCHC 33.6 03/13/2017 01:46 PM    MCHC 34.3 08/29/2014 01:00 PM    MPV 8.8 (L) 08/29/2014 01:00 PM    NEUTSPOLYS 54 03/13/2017 01:46 PM    NEUTSPOLYS 48.8 08/29/2014 01:00 PM    LYMPHOCYTES 32 03/13/2017 01:46 PM    LYMPHOCYTES 33.3 08/29/2014 01:00 PM    MONOCYTES 11 03/13/2017 01:46 PM    MONOCYTES 12.4 08/29/2014 01:00 PM    EOSINOPHILS 3 03/13/2017 01:46 PM    EOSINOPHILS 3.9 08/29/2014 01:00 PM    BASOPHILS 0 03/13/2017 01:46 PM    BASOPHILS 0.8 08/29/2014 01:00 PM        EtOH overuse  - denies  Falls, presyncope, syncope, or seizures  - denies  Uncontrolled hypertension - no  CREATININE CLEARANCE /    Creatinine Clearance/Renal Function  Latest eGFR / creatinine:  Lab Results   Component Value Date/Time    SODIUM 137 02/23/2018 09:15 AM    POTASSIUM 4.3 02/23/2018 09:15 AM    CHLORIDE 101 02/23/2018 09:15 AM    CO2 28 02/23/2018 09:15 AM    GLUCOSE 95 02/23/2018 09:15 AM    BUN  27 (H) 02/23/2018 09:15 AM    CREATININE 1.32 02/23/2018 09:15 AM    CREATININE 1.06 01/04/2013 08:01 AM    BUNCREATRAT 21 04/13/2015 08:17 AM    BUNCREATRAT 25 (H) 01/04/2013 08:01 AM      • Is eGFR less than 50ml/min  - NEEDS labs  If YES, calculate CrCl (see back)  Any recent dehydrating illness or medications added/changed? i.e. diuretics    Drug Interactions  ASA / other antiplatelets - baby ASA for MI hx  NSAID - avoids  Other drug interactions   (Review med list / OTCs;)  Current Outpatient Prescriptions on File Prior to Visit   Medication Sig Dispense Refill   • omeprazole (PRILOSEC) 20 MG delayed-release capsule Take 1 Cap by mouth every day. 90 Cap 3   • Fluticasone Furoate-Vilanterol (BREO ELLIPTA) 200-25 MCG/INH AEROSOL POWDER, BREATH ACTIVATED Inhale 1 Puff by mouth every day. 1 Each 6   • levalbuterol (XOPENEX HFA) 45 MCG/ACT inhaler INHALE TWO PUFFS BY MOUTH EVERY 4 HOURS AS NEEDED FOR SHORTNESS OF BREATH 1 Inhaler 6   • escitalopram (LEXAPRO) 10 MG Tab TAKE ONE (1) TABLET BY MOUTH EVERY DAY 90 Tab 3   • atorvastatin (LIPITOR) 40 MG Tab Take 1 Tab by mouth every day. 90 Tab 3   • rivaroxaban (XARELTO) 20 MG Tab tablet Take 1 Tab by mouth with dinner. 90 Tab 1   • fluticasone (FLONASE) 50 MCG/ACT nasal spray Spray 1 Spray in nose 2 times a day. 1 Bottle 0   • losartan (COZAAR) 50 MG Tab Take 1 Tab by mouth 2 Times a Day. 180 Tab 3   • carvedilol (COREG) 25 MG Tab Take 1 Tab by mouth 2 times a day, with meals. 180 Tab 3   • aspirin (ASA) 81 MG Chew Tab chewable tablet Take 81 mg by mouth every evening. 100 Tab 11     No current facility-administered medications on file prior to visit.        Examination  Blood pressure:  105/58  • Elevated BP  - no (sBP greater than 160 mmHg)  • Symptomatic hypotension  - no  Significant gait impairment / imbalance / high risk for falls  - no obvious risks    Final Assessment and Recommendations:  Patient appears stable from the anticoagulation standpoint  Benefits of  continued DOAC therapy outweigh risks for this patient  Recommend continue current DOAC at same dose     Continue Xarelto 20 mg daily with food - get labs done this week and again in 6 months.    Other Actions:   The rationale for continued DOAC therapy  The potential for minor, major or life-threatening bleeding  Dosing instructions, adherence, risks of non-adherence, handling missed doses  Avoiding OTC ASA & NSAIDs & minimizing EtOH to reduce bleeding risks  Dosing around upcoming procedure / surgery if applicable (see back)    Follow up:  Will follow up with patient in 6 months    Next Appointment: Monday, April 15, 2018 at 10:00 am.     Shannan SLAUGHTER

## 2018-10-23 DIAGNOSIS — I26.99 OTHER PULMONARY EMBOLISM WITHOUT ACUTE COR PULMONALE, UNSPECIFIED CHRONICITY (HCC): ICD-10-CM

## 2018-11-07 ENCOUNTER — TELEPHONE (OUTPATIENT)
Dept: VASCULAR LAB | Facility: MEDICAL CENTER | Age: 73
End: 2018-11-07

## 2018-11-16 DIAGNOSIS — I10 ESSENTIAL HYPERTENSION: ICD-10-CM

## 2018-11-16 DIAGNOSIS — I25.10 CORONARY ARTERY DISEASE DUE TO LIPID RICH PLAQUE: ICD-10-CM

## 2018-11-16 DIAGNOSIS — Z79.01 CHRONIC ANTICOAGULATION: ICD-10-CM

## 2018-11-16 DIAGNOSIS — E03.9 ACQUIRED HYPOTHYROIDISM: ICD-10-CM

## 2018-11-16 DIAGNOSIS — E78.5 DYSLIPIDEMIA: ICD-10-CM

## 2018-11-16 DIAGNOSIS — Z79.899 HIGH RISK MEDICATION USE: ICD-10-CM

## 2018-11-16 DIAGNOSIS — E66.9 OBESITY (BMI 30-39.9): ICD-10-CM

## 2018-11-16 DIAGNOSIS — I25.83 CORONARY ARTERY DISEASE DUE TO LIPID RICH PLAQUE: ICD-10-CM

## 2018-11-16 RX ORDER — HYDROCHLOROTHIAZIDE 25 MG/1
25 TABLET ORAL DAILY
Qty: 90 TAB | Refills: 0 | OUTPATIENT
Start: 2018-11-16

## 2018-11-21 DIAGNOSIS — Z86.718 HISTORY OF DVT (DEEP VEIN THROMBOSIS): ICD-10-CM

## 2018-12-03 ENCOUNTER — OFFICE VISIT (OUTPATIENT)
Dept: CARDIOLOGY | Facility: MEDICAL CENTER | Age: 73
End: 2018-12-03
Payer: MEDICARE

## 2018-12-03 VITALS
HEIGHT: 76 IN | OXYGEN SATURATION: 91 % | SYSTOLIC BLOOD PRESSURE: 130 MMHG | BODY MASS INDEX: 32.27 KG/M2 | HEART RATE: 60 BPM | WEIGHT: 265 LBS | DIASTOLIC BLOOD PRESSURE: 70 MMHG

## 2018-12-03 DIAGNOSIS — E78.2 MIXED HYPERLIPIDEMIA: ICD-10-CM

## 2018-12-03 DIAGNOSIS — I25.10 CORONARY ARTERY DISEASE DUE TO LIPID RICH PLAQUE: ICD-10-CM

## 2018-12-03 DIAGNOSIS — I27.82 CHRONIC SEPTIC PULMONARY EMBOLISM WITH ACUTE COR PULMONALE (HCC): ICD-10-CM

## 2018-12-03 DIAGNOSIS — Z79.01 CHRONIC ANTICOAGULATION: ICD-10-CM

## 2018-12-03 DIAGNOSIS — I10 ESSENTIAL HYPERTENSION: ICD-10-CM

## 2018-12-03 DIAGNOSIS — I10 HTN (HYPERTENSION), MALIGNANT: ICD-10-CM

## 2018-12-03 DIAGNOSIS — I25.83 CORONARY ARTERY DISEASE DUE TO LIPID RICH PLAQUE: ICD-10-CM

## 2018-12-03 DIAGNOSIS — Z95.5 STENTED CORONARY ARTERY: ICD-10-CM

## 2018-12-03 DIAGNOSIS — I26.99 OTHER PULMONARY EMBOLISM WITHOUT ACUTE COR PULMONALE, UNSPECIFIED CHRONICITY (HCC): ICD-10-CM

## 2018-12-03 DIAGNOSIS — I26.01 CHRONIC SEPTIC PULMONARY EMBOLISM WITH ACUTE COR PULMONALE (HCC): ICD-10-CM

## 2018-12-03 DIAGNOSIS — Z79.899 HIGH RISK MEDICATION USE: ICD-10-CM

## 2018-12-03 PROCEDURE — 99214 OFFICE O/P EST MOD 30 MIN: CPT | Performed by: INTERNAL MEDICINE

## 2018-12-03 RX ORDER — ATORVASTATIN CALCIUM 40 MG/1
40 TABLET, FILM COATED ORAL DAILY
Qty: 90 TAB | Refills: 3 | Status: SHIPPED | OUTPATIENT
Start: 2018-12-03 | End: 2019-07-02 | Stop reason: SDUPTHER

## 2018-12-03 RX ORDER — LOSARTAN POTASSIUM 50 MG/1
50 TABLET ORAL 2 TIMES DAILY
Qty: 180 TAB | Refills: 3 | Status: SHIPPED | OUTPATIENT
Start: 2018-12-03 | End: 2019-01-14 | Stop reason: SDUPTHER

## 2018-12-03 RX ORDER — CARVEDILOL 25 MG/1
25 TABLET ORAL 2 TIMES DAILY WITH MEALS
Qty: 180 TAB | Refills: 3 | Status: SHIPPED | OUTPATIENT
Start: 2018-12-03 | End: 2019-01-08 | Stop reason: SDUPTHER

## 2018-12-03 ASSESSMENT — ENCOUNTER SYMPTOMS
BRUISES/BLEEDS EASILY: 0
HALLUCINATIONS: 0
SENSORY CHANGE: 0
FEVER: 0
ABDOMINAL PAIN: 0
MYALGIAS: 0
DIZZINESS: 0
NAUSEA: 0
LOSS OF CONSCIOUSNESS: 0
WEIGHT LOSS: 0
ORTHOPNEA: 0
DOUBLE VISION: 0
COUGH: 0
VOMITING: 0
CLAUDICATION: 0
FALLS: 0
EYE DISCHARGE: 0
BLURRED VISION: 0
EYE PAIN: 0
SHORTNESS OF BREATH: 0
SPEECH CHANGE: 0
BLOOD IN STOOL: 0
DEPRESSION: 0
CHILLS: 0
HEADACHES: 0
PALPITATIONS: 0
PND: 0

## 2018-12-03 NOTE — PROGRESS NOTES
Chief Complaint   Patient presents with   • HTN (Controlled)       Subjective:   Anthony Cloud is a 73 y.o. male who presents today for cardiac care and management due to CAD s/o PCI to RCA with 3 stents in 2003, HTN, HLP along with prior PE.    In the interim, patient has been doing well without having any symptoms. Patient denies having chest pain, dyspnea, palpitation, presyncope, syncope episodes. Able to climb up at least 2 flights of stairs.      Past Medical History:   Diagnosis Date   • CAD (coronary artery disease) 2003    PCI/BMS x 3 to the RCA (Zeta 4.0 x 23mm, 3.5 x 23mm, 3.0 x 18mm).   • Central sleep apnea      ICD-10 transition   • Chronic anticoagulation    • Depression    • Depressive disorder, not elsewhere classified         • Dyslipidemia    • Hypertension    • Hypothyroidism    • Mixed hyperlipidemia    • Obesity    • Old myocardial infarction January 2003        • Peptic ulcer disease 8/4/2016   • Personal history of venous thrombosis and embolism     On Xarelto   • Pulmonary embolism (HCC)    • Sleep apnea     BiPAP with oxygen     Past Surgical History:   Procedure Laterality Date   • WRIST ORIF Left 2/23/2018    Procedure: WRIST ORIF;  Surgeon: Jasper Hammond M.D.;  Location: SURGERY [facility];  Service: Orthopedics   • INCISION AND DRAINAGE ORTHOPEDIC  8/29/2014    Performed by Wolfgang Merrill M.D. at SURGERY [facility]   • CARDIAC CATH, RIGHT/LEFT HEART  2003 01/2003 4.0x23mm Zeta stent to proximal RCA,3.5x22mm distal to first stent, 3.0x15mm at the point of original occlusion.   • BOWEL RESECTION     • PB ANESTH,LOWER LEG ARTERIES SURG       Family History   Problem Relation Age of Onset   • Other Mother         Passed at 98   • Heart Disease Father 60        CABG     Social History     Social History   • Marital status:      Spouse name: N/A   • Number of children: N/A   • Years of education: N/A     Occupational History   • Not on file.      Social History Main Topics   • Smoking status: Never Smoker   • Smokeless tobacco: Never Used   • Alcohol use Yes      Comment: 6 drinks a week    • Drug use: No   • Sexual activity: Not on file     Other Topics Concern   • Not on file     Social History Narrative   • No narrative on file     Allergies   Allergen Reactions   • Lisinopril      cough     Outpatient Encounter Prescriptions as of 12/3/2018   Medication Sig Dispense Refill   • carvedilol (COREG) 25 MG Tab Take 1 Tab by mouth 2 times a day, with meals. 180 Tab 3   • atorvastatin (LIPITOR) 40 MG Tab Take 1 Tab by mouth every day. 90 Tab 3   • losartan (COZAAR) 50 MG Tab Take 1 Tab by mouth 2 Times a Day. 180 Tab 3   • rivaroxaban (XARELTO) 20 MG Tab tablet Take 1 Tab by mouth with dinner. 90 Tab 1   • omeprazole (PRILOSEC) 20 MG delayed-release capsule Take 1 Cap by mouth every day. 90 Cap 3   • Fluticasone Furoate-Vilanterol (BREO ELLIPTA) 200-25 MCG/INH AEROSOL POWDER, BREATH ACTIVATED Inhale 1 Puff by mouth every day. 1 Each 6   • levalbuterol (XOPENEX HFA) 45 MCG/ACT inhaler INHALE TWO PUFFS BY MOUTH EVERY 4 HOURS AS NEEDED FOR SHORTNESS OF BREATH 1 Inhaler 6   • escitalopram (LEXAPRO) 10 MG Tab TAKE ONE (1) TABLET BY MOUTH EVERY DAY 90 Tab 3   • fluticasone (FLONASE) 50 MCG/ACT nasal spray Spray 1 Spray in nose 2 times a day. 1 Bottle 0   • aspirin (ASA) 81 MG Chew Tab chewable tablet Take 81 mg by mouth every evening. 100 Tab 11   • [DISCONTINUED] rivaroxaban (XARELTO) 20 MG Tab tablet Take 1 Tab by mouth with dinner. 90 Tab 1   • [DISCONTINUED] atorvastatin (LIPITOR) 40 MG Tab Take 1 Tab by mouth every day. 90 Tab 3   • [DISCONTINUED] losartan (COZAAR) 50 MG Tab Take 1 Tab by mouth 2 Times a Day. 180 Tab 3   • [DISCONTINUED] carvedilol (COREG) 25 MG Tab Take 1 Tab by mouth 2 times a day, with meals. 180 Tab 3     No facility-administered encounter medications on file as of 12/3/2018.      Review of Systems   Constitutional: Negative for chills,  "fever, malaise/fatigue and weight loss.   HENT: Negative for ear discharge, ear pain, hearing loss and nosebleeds.    Eyes: Negative for blurred vision, double vision, pain and discharge.   Respiratory: Negative for cough and shortness of breath.    Cardiovascular: Negative for chest pain, palpitations, orthopnea, claudication, leg swelling and PND.   Gastrointestinal: Negative for abdominal pain, blood in stool, melena, nausea and vomiting.   Genitourinary: Negative for dysuria and hematuria.   Musculoskeletal: Negative for falls, joint pain and myalgias.   Skin: Negative for itching and rash.   Neurological: Negative for dizziness, sensory change, speech change, loss of consciousness and headaches.   Endo/Heme/Allergies: Negative for environmental allergies. Does not bruise/bleed easily.   Psychiatric/Behavioral: Negative for depression, hallucinations and suicidal ideas.        Objective:   /70 (BP Location: Right arm, Patient Position: Sitting)   Pulse 60   Ht 1.93 m (6' 4\")   Wt 120.2 kg (265 lb)   SpO2 91%   BMI 32.26 kg/m²     Physical Exam   Constitutional: He is oriented to person, place, and time. No distress.   HENT:   Head: Normocephalic and atraumatic.   Right Ear: External ear normal.   Left Ear: External ear normal.   Eyes: Right eye exhibits no discharge. Left eye exhibits no discharge.   Neck: No JVD present. No thyromegaly present.   Cardiovascular: Normal rate, regular rhythm, normal heart sounds and intact distal pulses.  Exam reveals no gallop and no friction rub.    No murmur heard.  Pulmonary/Chest: Breath sounds normal. No respiratory distress.   Abdominal: Bowel sounds are normal. He exhibits no distension. There is no tenderness.   Musculoskeletal: He exhibits no edema or tenderness.   Neurological: He is alert and oriented to person, place, and time. No cranial nerve deficit.   Skin: Skin is warm and dry. He is not diaphoretic.   Psychiatric: He has a normal mood and affect. His " behavior is normal.   Nursing note and vitals reviewed.      Assessment:     1. Coronary artery disease due to lipid rich plaque     2. Chronic anticoagulation     3. High risk medication use     4. 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 Stented coronary artery     6. HTN (hypertension), malignant     7. Mixed hyperlipidemia     8. Essential hypertension  carvedilol (COREG) 25 MG Tab    losartan (COZAAR) 50 MG Tab   9. Other pulmonary embolism without acute cor pulmonale, unspecified chronicity (HCC)  rivaroxaban (XARELTO) 20 MG Tab tablet       Medical Decision Making:  Today's Assessment / Status / Plan:   Will get blood test. No recent results.  Cont current medications at current dose.     I will see patient back in clinic with lab tests and studies results in 6 months.    I thank you Dr. Cameron for referring patient to our Cardiology Clinic today.

## 2018-12-03 NOTE — LETTER
[facility]   [address]   [address]   [place] [address]  Phone: [phone]  Fax: [phone]              Anthony Cloud  1945    Encounter Date: 12/3/2018    Epifanio Felix M.D.          PROGRESS NOTE:  Chief Complaint   Patient presents with   • HTN (Controlled)       Subjective:   Anthony Cloud is a 73 y.o. male who presents today for cardiac care and management due to CAD s/o PCI to RCA with 3 stents in 2003, HTN, HLP along with prior PE.    In the interim, patient has been doing well without having any symptoms. Patient denies having chest pain, dyspnea, palpitation, presyncope, syncope episodes. Able to climb up at least 2 flights of stairs.      Past Medical History:   Diagnosis Date   • CAD (coronary artery disease) 2003    PCI/BMS x 3 to the RCA (Zeta 4.0 x 23mm, 3.5 x 23mm, 3.0 x 18mm).   • Central sleep apnea      ICD-10 transition   • Chronic anticoagulation    • Depression    • Depressive disorder, not elsewhere classified         • Dyslipidemia    • Hypertension    • Hypothyroidism    • Mixed hyperlipidemia    • Obesity    • Old myocardial infarction January 2003        • Peptic ulcer disease 8/4/2016   • Personal history of venous thrombosis and embolism     On Xarelto   • Pulmonary embolism (HCC)    • Sleep apnea     BiPAP with oxygen     Past Surgical History:   Procedure Laterality Date   • WRIST ORIF Left 2/23/2018    Procedure: WRIST ORIF;  Surgeon: Jasper Hammond M.D.;  Location: SURGERY [facility];  Service: Orthopedics   • INCISION AND DRAINAGE ORTHOPEDIC  8/29/2014    Performed by Wolfgang Merrill M.D. at SURGERY [facility]   • CARDIAC CATH, RIGHT/LEFT HEART  2003 01/2003 4.0x23mm Zeta stent to proximal RCA,3.5x22mm distal to first stent, 3.0x15mm at the point of original occlusion.   • BOWEL RESECTION     • PB ANESTH,LOWER LEG ARTERIES SURG       Family History      Problem Relation Age of Onset   • Other Mother         Passed at 98   • Heart Disease Father 60        CABG     Social History     Social History   • Marital status:      Spouse name: N/A   • Number of children: N/A   • Years of education: N/A     Occupational History   • Not on file.     Social History Main Topics   • Smoking status: Never Smoker   • Smokeless tobacco: Never Used   • Alcohol use Yes      Comment: 6 drinks a week    • Drug use: No   • Sexual activity: Not on file     Other Topics Concern   • Not on file     Social History Narrative   • No narrative on file     Allergies   Allergen Reactions   • Lisinopril      cough     Outpatient Encounter Prescriptions as of 12/3/2018   Medication Sig Dispense Refill   • carvedilol (COREG) 25 MG Tab Take 1 Tab by mouth 2 times a day, with meals. 180 Tab 3   • atorvastatin (LIPITOR) 40 MG Tab Take 1 Tab by mouth every day. 90 Tab 3   • losartan (COZAAR) 50 MG Tab Take 1 Tab by mouth 2 Times a Day. 180 Tab 3   • rivaroxaban (XARELTO) 20 MG Tab tablet Take 1 Tab by mouth with dinner. 90 Tab 1   • omeprazole (PRILOSEC) 20 MG delayed-release capsule Take 1 Cap by mouth every day. 90 Cap 3   • Fluticasone Furoate-Vilanterol (BREO ELLIPTA) 200-25 MCG/INH AEROSOL POWDER, BREATH ACTIVATED Inhale 1 Puff by mouth every day. 1 Each 6   • levalbuterol (XOPENEX HFA) 45 MCG/ACT inhaler INHALE TWO PUFFS BY MOUTH EVERY 4 HOURS AS NEEDED FOR SHORTNESS OF BREATH 1 Inhaler 6   • escitalopram (LEXAPRO) 10 MG Tab TAKE ONE (1) TABLET BY MOUTH EVERY DAY 90 Tab 3   • fluticasone (FLONASE) 50 MCG/ACT nasal spray Spray 1 Spray in nose 2 times a day. 1 Bottle 0   • aspirin (ASA) 81 MG Chew Tab chewable tablet Take 81 mg by mouth every evening. 100 Tab 11   • [DISCONTINUED] rivaroxaban (XARELTO) 20 MG Tab tablet Take 1 Tab by mouth with dinner. 90 Tab 1   • [DISCONTINUED] atorvastatin (LIPITOR) 40 MG Tab Take 1 Tab by mouth every day. 90 Tab 3   • [DISCONTINUED] losartan (COZAAR) 50  "MG Tab Take 1 Tab by mouth 2 Times a Day. 180 Tab 3   • [DISCONTINUED] carvedilol (COREG) 25 MG Tab Take 1 Tab by mouth 2 times a day, with meals. 180 Tab 3     No facility-administered encounter medications on file as of 12/3/2018.      Review of Systems   Constitutional: Negative for chills, fever, malaise/fatigue and weight loss.   HENT: Negative for ear discharge, ear pain, hearing loss and nosebleeds.    Eyes: Negative for blurred vision, double vision, pain and discharge.   Respiratory: Negative for cough and shortness of breath.    Cardiovascular: Negative for chest pain, palpitations, orthopnea, claudication, leg swelling and PND.   Gastrointestinal: Negative for abdominal pain, blood in stool, melena, nausea and vomiting.   Genitourinary: Negative for dysuria and hematuria.   Musculoskeletal: Negative for falls, joint pain and myalgias.   Skin: Negative for itching and rash.   Neurological: Negative for dizziness, sensory change, speech change, loss of consciousness and headaches.   Endo/Heme/Allergies: Negative for environmental allergies. Does not bruise/bleed easily.   Psychiatric/Behavioral: Negative for depression, hallucinations and suicidal ideas.        Objective:   /70 (BP Location: Right arm, Patient Position: Sitting)   Pulse 60   Ht 1.93 m (6' 4\")   Wt 120.2 kg (265 lb)   SpO2 91%   BMI 32.26 kg/m²      Physical Exam   Constitutional: He is oriented to person, place, and time. No distress.   HENT:   Head: Normocephalic and atraumatic.   Right Ear: External ear normal.   Left Ear: External ear normal.   Eyes: Right eye exhibits no discharge. Left eye exhibits no discharge.   Neck: No JVD present. No thyromegaly present.   Cardiovascular: Normal rate, regular rhythm, normal heart sounds and intact distal pulses.  Exam reveals no gallop and no friction rub.    No murmur heard.  Pulmonary/Chest: Breath sounds normal. No respiratory distress.   Abdominal: Bowel sounds are normal. He " exhibits no distension. There is no tenderness.   Musculoskeletal: He exhibits no edema or tenderness.   Neurological: He is alert and oriented to person, place, and time. No cranial nerve deficit.   Skin: Skin is warm and dry. He is not diaphoretic.   Psychiatric: He has a normal mood and affect. His behavior is normal.   Nursing note and vitals reviewed.      Assessment:     1. Coronary artery disease due to lipid rich plaque     2. Chronic anticoagulation     3. High risk medication use     4. 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 Stented coronary artery     6. HTN (hypertension), malignant     7. Mixed hyperlipidemia     8. Essential hypertension  carvedilol (COREG) 25 MG Tab    losartan (COZAAR) 50 MG Tab   9. Other pulmonary embolism without acute cor pulmonale, unspecified chronicity (HCC)  rivaroxaban (XARELTO) 20 MG Tab tablet       Medical Decision Making:  Today's Assessment / Status / Plan:   Will get blood test. No recent results.  Cont current medications at current dose.     I will see patient back in clinic with lab tests and studies results in 6 months.    I thank you Dr. Cameron for referring patient to our Cardiology Clinic today.         Angel Cameron M.D.  [address] [address]  VIA In Basket

## 2018-12-06 ENCOUNTER — DOCUMENTATION (OUTPATIENT)
Dept: VASCULAR LAB | Facility: MEDICAL CENTER | Age: 73
End: 2018-12-06

## 2018-12-13 ENCOUNTER — SLEEP CENTER VISIT (OUTPATIENT)
Dept: SLEEP MEDICINE | Facility: MEDICAL CENTER | Age: 73
End: 2018-12-13
Payer: MEDICARE

## 2018-12-13 VITALS
HEIGHT: 76 IN | DIASTOLIC BLOOD PRESSURE: 66 MMHG | RESPIRATION RATE: 16 BRPM | SYSTOLIC BLOOD PRESSURE: 122 MMHG | WEIGHT: 266 LBS | OXYGEN SATURATION: 92 % | BODY MASS INDEX: 32.39 KG/M2 | HEART RATE: 58 BPM

## 2018-12-13 DIAGNOSIS — Z79.01 CHRONIC ANTICOAGULATION: ICD-10-CM

## 2018-12-13 DIAGNOSIS — I10 ESSENTIAL HYPERTENSION: ICD-10-CM

## 2018-12-13 DIAGNOSIS — G47.33 OSA (OBSTRUCTIVE SLEEP APNEA): ICD-10-CM

## 2018-12-13 DIAGNOSIS — I26.99 OTHER PULMONARY EMBOLISM WITHOUT ACUTE COR PULMONALE, UNSPECIFIED CHRONICITY (HCC): ICD-10-CM

## 2018-12-13 DIAGNOSIS — Z92.29 UP TO DATE WITH INFLUENZA VACCINATION: ICD-10-CM

## 2018-12-13 DIAGNOSIS — E66.9 OBESITY (BMI 30-39.9): ICD-10-CM

## 2018-12-13 DIAGNOSIS — Z78.9 NON-SMOKER: ICD-10-CM

## 2018-12-13 DIAGNOSIS — E78.5 DYSLIPIDEMIA: ICD-10-CM

## 2018-12-13 PROCEDURE — 99214 OFFICE O/P EST MOD 30 MIN: CPT | Performed by: INTERNAL MEDICINE

## 2018-12-13 RX ORDER — INFLUENZA VACCINE, ADJUVANTED 15; 15; 15 UG/.5ML; UG/.5ML; UG/.5ML
INJECTION, SUSPENSION INTRAMUSCULAR
Refills: 0 | COMMUNITY
Start: 2018-11-20 | End: 2018-12-04

## 2018-12-13 NOTE — PROGRESS NOTES
CC: Follow up for SHYAM on CPAP and oxygen bleed in    HPI:  Mr. Anthony Cloud is a 73-year-old male who was last seen 6/13/2017 for SHYAM on bilevel with O2 bleed in.      He forgot to bring his compliance card in today but is doing very well.  He continues to have significant benefit from the use of his unit.  Is not having any significant issues with mask fit, mask leak, pressure tolerance, or claustrophobia.  He is in need of new mask and supplies and this will be provided today.    Significant comorbidities and modifying factors include history of pulmonary embolism, coronary artery disease, status post stenting x3, essential hypertension, chronic anticoagulation, dyslipidemia, restrictive lung disease, non-smoker, and obesity.          Patient Active Problem List    Diagnosis Date Noted   • Pulmonary embolism (HCC) 11/09/2016     Priority: High   • Essential hypertension 12/07/2015     Priority: High   • Coronary artery disease due to lipid rich plaque 12/07/2015     Priority: High   • Chronic anticoagulation 09/16/2015     Priority: High   • Dyslipidemia      Priority: High   • Old myocardial infarction      Priority: High   • Personal history of venous thrombosis and embolism      Priority: High   • Impaired fasting glucose 08/04/2016     Priority: Medium   • Restrictive lung disease 08/04/2016     Priority: Medium   • SHYAM (obstructive sleep apnea) 06/14/2016     Priority: Medium   • Peptic ulcer disease 08/04/2016     Priority: Low   • H/O: GI bleed 08/04/2016     Priority: Low   • Seasonal affective disorder (CMS-HCC)      Priority: Low   • Non-smoker 12/13/2018   • Acquired hypothyroidism 11/16/2018   • High risk medication use 11/16/2018   • Diastolic dysfunction 03/15/2018   • Pulmonary hypertension (HCC) 03/15/2018   • Obesity (BMI 30-39.9) 01/16/2018       Past Medical History:   Diagnosis Date   • CAD (coronary artery disease) 2003    PCI/BMS x 3 to the RCA (Zeta 4.0 x 23mm, 3.5 x 23mm, 3.0 x  18mm).   • Central sleep apnea      ICD-10 transition   • Chronic anticoagulation    • Depression    • Depressive disorder, not elsewhere classified         • Dyslipidemia    • Hypertension    • Hypothyroidism    • Mixed hyperlipidemia    • Obesity    • Old myocardial infarction January 2003        • Peptic ulcer disease 8/4/2016   • Personal history of venous thrombosis and embolism     On Xarelto   • Pulmonary embolism (HCC)    • Sleep apnea     BiPAP with oxygen        Past Surgical History:   Procedure Laterality Date   • WRIST ORIF Left 2/23/2018    Procedure: WRIST ORIF;  Surgeon: Jasper Hammond M.D.;  Location: SURGERY [facility];  Service: Orthopedics   • INCISION AND DRAINAGE ORTHOPEDIC  8/29/2014    Performed by Wolfgang Merrill M.D. at SURGERY [facility]   • CARDIAC CATH, RIGHT/LEFT HEART  2003 01/2003 4.0x23mm Zeta stent to proximal RCA,3.5x22mm distal to first stent, 3.0x15mm at the point of original occlusion.   • BOWEL RESECTION     • PB ANESTH,LOWER LEG ARTERIES SURG         Family History   Problem Relation Age of Onset   • Other Mother         Passed at 98   • Heart Disease Father 60        CABG       Social History     Social History   • Marital status:      Spouse name: N/A   • Number of children: N/A   • Years of education: N/A     Occupational History   • Not on file.     Social History Main Topics   • Smoking status: Never Smoker   • Smokeless tobacco: Never Used   • Alcohol use Yes      Comment: 6 drinks a week    • Drug use: No   • Sexual activity: Not on file     Other Topics Concern   • Not on file     Social History Narrative   • No narrative on file       Current Outpatient Prescriptions   Medication Sig Dispense Refill   • carvedilol (COREG) 25 MG Tab Take 1 Tab by mouth 2 times a day, with meals. 180 Tab 3   • atorvastatin (LIPITOR) 40 MG Tab Take 1 Tab by mouth every day. 90 Tab 3   • losartan (COZAAR) 50 MG Tab Take 1 Tab by mouth 2 Times a Day. 180 Tab 3  "  • rivaroxaban (XARELTO) 20 MG Tab tablet Take 1 Tab by mouth with dinner. 90 Tab 1   • omeprazole (PRILOSEC) 20 MG delayed-release capsule Take 1 Cap by mouth every day. 90 Cap 3   • Fluticasone Furoate-Vilanterol (BREO ELLIPTA) 200-25 MCG/INH AEROSOL POWDER, BREATH ACTIVATED Inhale 1 Puff by mouth every day. 1 Each 6   • levalbuterol (XOPENEX HFA) 45 MCG/ACT inhaler INHALE TWO PUFFS BY MOUTH EVERY 4 HOURS AS NEEDED FOR SHORTNESS OF BREATH 1 Inhaler 6   • escitalopram (LEXAPRO) 10 MG Tab TAKE ONE (1) TABLET BY MOUTH EVERY DAY 90 Tab 3   • aspirin (ASA) 81 MG Chew Tab chewable tablet Take 81 mg by mouth every evening. 100 Tab 11   • fluticasone (FLONASE) 50 MCG/ACT nasal spray Spray 1 Spray in nose 2 times a day. 1 Bottle 0     No current facility-administered medications for this visit.     \"CURRENT RX\"    ALLERGIES: Lisinopril    ROS    Constitutional: Denies fever, chills, sweats,  weight loss, fatigue  Cardiovascular: Denies chest pain, tightness, palpitations, swelling in legs/feet, fainting, difficulty breathing when lying down but gets better when sitting up.   Respiratory: Denies shortness of breath, cough, sputum, wheezing, painful breathing, coughing up blood.   Sleep: per HPI  Gastrointestinal: Denies  difficulty swallowing, nausea, abdominal pain, diarrhea, constipation, heartburn..   Musculoskeletal: Denies painful joints, sore muscles, back pain.        PHYSICAL EXAM  Obese    /66 (BP Location: Left arm, Patient Position: Sitting, BP Cuff Size: Large adult)   Pulse (!) 58   Resp 16   Ht 1.93 m (6' 4\")   Wt 120.7 kg (266 lb)   SpO2 92%   BMI 32.38 kg/m²   Appearance: Well-nourished, well-developed, no acute distress  Eyes:  PERRLA, EOMI  ENMT: without lesions, deformities;hearing grossly intact; tongue normal, posterior pharynx without erythema or exudate; Mallampati classification:   Neck: Supple, trachea midline, no masses  Respiratory effort:  No intercostal retractions or use of " accessory muscles  Lung auscultation:  No wheezes rhonchi rubs or rales  Cardiac: No murmurs, rubs, or gallops; regular rhythm, normal rate; no edema  Abdomen:  No tenderness, no organomegaly.  Obese  Musculoskeletal:  Grossly normal; gait and station normal; digits and nails normal  Skin:  No rashes, petechiae, cyanosis  Neurologic: without focal signs; oriented to person, time, place, and purpose; judgement intact  Psychiatric:  No depression, anxiety, agitation        PROBLEMS:  1. SHYAM (obstructive sleep apnea)    - DME Mask and Supplies    2. Other pulmonary embolism without acute cor pulmonale, unspecified chronicity (HCC)      3. Essential hypertension      4. Dyslipidemia      5. Obesity (BMI 30-39.9)    - OBESITY COUNSELING (No Charge): Patient identified as having weight management issue.  Appropriate orders and counseling given.    6. Non-smoker      7. Up to date with influenza vaccination      8. Chronic anticoagulation          PLAN:     Return in about 1 year (around 12/13/2019).    Has been advised to continue the current Pap with O2 bleeding, clean equipment frequently, and get new mask and supplies as allowed by insurance and DME. Advised about potential problems including nasal obstruction/stuffiness, mask leak or discomfort , frequent awakenings, chill or dryness of the upper airway, noise, inconvenience, and lack of benefit. Recommend an earlier appointment, if significant treatment barriers develop.

## 2018-12-15 LAB
ALBUMIN SERPL-MCNC: 4.3 G/DL (ref 3.5–4.8)
ALBUMIN/GLOB SERPL: 1.4 {RATIO} (ref 1.2–2.2)
ALP SERPL-CCNC: 84 IU/L (ref 39–117)
ALT SERPL-CCNC: 22 IU/L (ref 0–44)
AST SERPL-CCNC: 20 IU/L (ref 0–40)
BASOPHILS # BLD AUTO: 0 X10E3/UL (ref 0–0.2)
BASOPHILS NFR BLD AUTO: 0 %
BILIRUB SERPL-MCNC: 0.4 MG/DL (ref 0–1.2)
BUN SERPL-MCNC: 26 MG/DL (ref 8–27)
BUN/CREAT SERPL: 22 (ref 10–24)
CALCIUM SERPL-MCNC: 9 MG/DL (ref 8.6–10.2)
CHLORIDE SERPL-SCNC: 103 MMOL/L (ref 96–106)
CHOLEST SERPL-MCNC: 124 MG/DL (ref 100–199)
CO2 SERPL-SCNC: 21 MMOL/L (ref 20–29)
CREAT SERPL-MCNC: 1.2 MG/DL (ref 0.76–1.27)
EOSINOPHIL # BLD AUTO: 0.1 X10E3/UL (ref 0–0.4)
EOSINOPHIL NFR BLD AUTO: 1 %
ERYTHROCYTE [DISTWIDTH] IN BLOOD BY AUTOMATED COUNT: 13.9 % (ref 12.3–15.4)
GLOBULIN SER CALC-MCNC: 3.1 G/DL (ref 1.5–4.5)
GLUCOSE SERPL-MCNC: 131 MG/DL (ref 65–99)
HBA1C MFR BLD: 6.1 % (ref 4.8–5.6)
HCT VFR BLD AUTO: 41 % (ref 37.5–51)
HDLC SERPL-MCNC: 44 MG/DL
HGB BLD-MCNC: 13.9 G/DL (ref 13–17.7)
IF AFRICAN AMERICAN  100797: 70 ML/MIN/1.73
IF NON AFRICAN AMER 100791: 60 ML/MIN/1.73
IMM GRANULOCYTES # BLD: 0 X10E3/UL (ref 0–0.1)
IMM GRANULOCYTES NFR BLD: 0 %
IMMATURE CELLS  115398: ABNORMAL
LABORATORY COMMENT REPORT: NORMAL
LDLC SERPL CALC-MCNC: 64 MG/DL (ref 0–99)
LYMPHOCYTES # BLD AUTO: 1.1 X10E3/UL (ref 0.7–3.1)
LYMPHOCYTES NFR BLD AUTO: 21 %
MCH RBC QN AUTO: 33.6 PG (ref 26.6–33)
MCHC RBC AUTO-ENTMCNC: 33.9 G/DL (ref 31.5–35.7)
MCV RBC AUTO: 99 FL (ref 79–97)
MONOCYTES # BLD AUTO: 0.7 X10E3/UL (ref 0.1–0.9)
MONOCYTES NFR BLD AUTO: 13 %
MORPHOLOGY BLD-IMP: ABNORMAL
NEUTROPHILS # BLD AUTO: 3.2 X10E3/UL (ref 1.4–7)
NEUTROPHILS NFR BLD AUTO: 65 %
NRBC BLD AUTO-RTO: ABNORMAL %
PLATELET # BLD AUTO: 190 X10E3/UL (ref 150–379)
POTASSIUM SERPL-SCNC: 4.6 MMOL/L (ref 3.5–5.2)
PROT SERPL-MCNC: 7.4 G/DL (ref 6–8.5)
RBC # BLD AUTO: 4.14 X10E6/UL (ref 4.14–5.8)
SODIUM SERPL-SCNC: 138 MMOL/L (ref 134–144)
TRIGL SERPL-MCNC: 82 MG/DL (ref 0–149)
TSH SERPL DL<=0.005 MIU/L-ACNC: 2.24 UIU/ML (ref 0.45–4.5)
VLDLC SERPL CALC-MCNC: 16 MG/DL (ref 5–40)
WBC # BLD AUTO: 5.1 X10E3/UL (ref 3.4–10.8)

## 2019-01-02 ENCOUNTER — DOCUMENTATION (OUTPATIENT)
Dept: VASCULAR LAB | Facility: MEDICAL CENTER | Age: 74
End: 2019-01-02

## 2019-01-02 NOTE — PROGRESS NOTES
Reviewed pt's labs. H/h, plt count stable. Actual cr cl 92 ml/min. Pt to cont Xarelto 20 mg QD. Will f/u with pt in April 2019.    Shannan SLAUGHTER

## 2019-01-08 DIAGNOSIS — I10 ESSENTIAL HYPERTENSION: ICD-10-CM

## 2019-01-08 RX ORDER — CARVEDILOL 25 MG/1
25 TABLET ORAL 2 TIMES DAILY WITH MEALS
Qty: 180 TAB | Refills: 3 | Status: SHIPPED | OUTPATIENT
Start: 2019-01-08 | End: 2019-07-02 | Stop reason: SDUPTHER

## 2019-01-14 DIAGNOSIS — I10 ESSENTIAL HYPERTENSION: ICD-10-CM

## 2019-01-14 RX ORDER — LOSARTAN POTASSIUM 50 MG/1
50 TABLET ORAL 2 TIMES DAILY
Qty: 180 TAB | Refills: 3 | Status: SHIPPED | OUTPATIENT
Start: 2019-01-14 | End: 2019-07-02 | Stop reason: SDUPTHER

## 2019-01-17 ENCOUNTER — OFFICE VISIT (OUTPATIENT)
Dept: INTERNAL MEDICINE | Facility: MEDICAL CENTER | Age: 74
End: 2019-01-17
Payer: MEDICARE

## 2019-01-17 VITALS
SYSTOLIC BLOOD PRESSURE: 121 MMHG | HEART RATE: 59 BPM | WEIGHT: 257.6 LBS | HEIGHT: 76 IN | BODY MASS INDEX: 31.37 KG/M2 | DIASTOLIC BLOOD PRESSURE: 76 MMHG | OXYGEN SATURATION: 92 % | TEMPERATURE: 97.5 F

## 2019-01-17 DIAGNOSIS — K85.90 ACUTE PANCREATITIS, UNSPECIFIED COMPLICATION STATUS, UNSPECIFIED PANCREATITIS TYPE: ICD-10-CM

## 2019-01-17 DIAGNOSIS — E78.5 DYSLIPIDEMIA: ICD-10-CM

## 2019-01-17 DIAGNOSIS — Z79.01 CHRONIC ANTICOAGULATION: ICD-10-CM

## 2019-01-17 DIAGNOSIS — I10 ESSENTIAL HYPERTENSION: ICD-10-CM

## 2019-01-17 DIAGNOSIS — Z09 HOSPITAL DISCHARGE FOLLOW-UP: ICD-10-CM

## 2019-01-17 DIAGNOSIS — R73.01 IMPAIRED FASTING GLUCOSE: ICD-10-CM

## 2019-01-17 PROCEDURE — 99214 OFFICE O/P EST MOD 30 MIN: CPT | Performed by: INTERNAL MEDICINE

## 2019-01-17 ASSESSMENT — PATIENT HEALTH QUESTIONNAIRE - PHQ9: CLINICAL INTERPRETATION OF PHQ2 SCORE: 0

## 2019-01-17 NOTE — PROGRESS NOTES
Established Patient    Patient Care Team:  Angel Cameron M.D. as PCP - General  FREDO Carr. as Mid Level Provider (Cardiology)  Padmini Calhoun M.D. as Consulting Physician (Cardiology)  cpap and more as Respiratory  Austin Schreiber M.D. as Consulting Physician (Pulmonary Medicine)    Anthony Cloud is a 73 y.o. male who presents today with the following Chief Complaint(s): Follow up for follow-up from recent hospitalization as well as chronic medical problems.    HPI:  1. Hospital discharge follow-up  Anthony was recently hospitalized, for 2 days stay at [facility].  He started with some right upper quadrant pain, and was initially evaluated in the emergency department with no significant abnormalities noted.  He subsequently had worsening of his pain went back to the hospital and was admitted for pancreatitis.    2. Acute pancreatitis, unspecified complication status, unspecified pancreatitis type  Regarding pancreatitis, he reports that he had a CT scan done that showed some inflammatory changes around the pancreas.  He had a lipase level of 348 that subsequently came down to 172 after hydration and bowel rest.  He was ultimately discharged home, and has had no recurrence of symptoms.  He did have a follow-up with gastroenterology who felt that his symptoms could be explained by a very small bile stone that passed.  As of now, he has returned to his usual state of health, and has initiated a lower fat diet.    3. Dyslipidemia  Anthony is tolerating medication well.  No intolerable side-effects noted.  No new symptoms of muscle aches or weakness.  Patient reports good adherence to medication regimen.  No change in medication since the last visit. Anthony is trying to follow a low-cholesterol diet.  Exercise is adequate.     4. Impaired fasting glucose  He has lost about 12 pounds since his hospitalization.  He does not do any dedicated exercise time, but overall is quite active.  He has been  working on his diet as well.    5. Essential hypertension  Blood pressure is controlled in the office today.  Anthony did not bring blood pressure logs to the office today. Home Blood Pressure readings are unchanged from previous and consistent with in-office readings. Anthony reports good adherence to medication regimen with no changes.  No dizziness reported.  No intolerable side effects noted.     6. Chronic anticoagulation  Doing well on Xarelto.  He uses this for history of PE/DVT.  No abnormalities noted with regard to bleeding.    ROS:     Denies any new chest pain or shortness of breath.  No changes to urinary or bowel function.  See HPI.    Past Medical History:   Diagnosis Date   • CAD (coronary artery disease) 2003    PCI/BMS x 3 to the RCA (Zeta 4.0 x 23mm, 3.5 x 23mm, 3.0 x 18mm).   • Central sleep apnea      ICD-10 transition   • Chronic anticoagulation    • Depression    • Depressive disorder, not elsewhere classified         • Dyslipidemia    • Hypertension    • Hypothyroidism    • Mixed hyperlipidemia    • Obesity    • Old myocardial infarction January 2003        • Peptic ulcer disease 8/4/2016   • Personal history of venous thrombosis and embolism     On Xarelto   • Pulmonary embolism (HCC)    • Sleep apnea     BiPAP with oxygen     Social History   Substance Use Topics   • Smoking status: Never Smoker   • Smokeless tobacco: Never Used   • Alcohol use Yes      Comment: 6 drinks a week      Current Outpatient Prescriptions   Medication Sig Dispense Refill   • Zoster Vac Recomb Adjuvanted (SHINGRIX) 50 MCG Recon Susp 0.5 mL by Intramuscular route Once for 1 dose. Repeat vaccine x 1 in 2-6 months. 0.5 mL 1   • losartan (COZAAR) 50 MG Tab Take 1 Tab by mouth 2 Times a Day. 180 Tab 3   • carvedilol (COREG) 25 MG Tab Take 1 Tab by mouth 2 times a day, with meals. 180 Tab 3   • atorvastatin (LIPITOR) 40 MG Tab Take 1 Tab by mouth every day. 90 Tab 3   • rivaroxaban (XARELTO) 20 MG Tab tablet Take 1 Tab by  "mouth with dinner. 90 Tab 1   • omeprazole (PRILOSEC) 20 MG delayed-release capsule Take 1 Cap by mouth every day. 90 Cap 3   • Fluticasone Furoate-Vilanterol (BREO ELLIPTA) 200-25 MCG/INH AEROSOL POWDER, BREATH ACTIVATED Inhale 1 Puff by mouth every day. 1 Each 6   • levalbuterol (XOPENEX HFA) 45 MCG/ACT inhaler INHALE TWO PUFFS BY MOUTH EVERY 4 HOURS AS NEEDED FOR SHORTNESS OF BREATH 1 Inhaler 6   • escitalopram (LEXAPRO) 10 MG Tab TAKE ONE (1) TABLET BY MOUTH EVERY DAY 90 Tab 3   • fluticasone (FLONASE) 50 MCG/ACT nasal spray Spray 1 Spray in nose 2 times a day. 1 Bottle 0   • aspirin (ASA) 81 MG Chew Tab chewable tablet Take 81 mg by mouth every evening. 100 Tab 11     No current facility-administered medications for this visit.      Physical Exam:  /76 (BP Location: Left arm, Patient Position: Sitting)   Pulse (!) 59   Temp 36.4 °C (97.5 °F) (Temporal)   Ht 1.93 m (6' 4\")   Wt 116.8 kg (257 lb 9.6 oz)   SpO2 92%   BMI 31.36 kg/m²   General: Well developed, well nourished male, in no distress.  Eyes: Conjuntiva without any obvious injection or erythema.   Cardiovascular: Heart is regular with no murmur  Lungs: Clear to auscultation bilaterally. No wheezes, rhonci or crackles heard. Respiratory effort is normal.  Abd: Soft, non-tender  Ext: No edema    Assessment and Plan:   1. Hospital discharge follow-up  Overall doing well status post hospital discharge.  No need for home health or other posthospitalization issues.    2. Acute pancreatitis, unspecified complication status, unspecified pancreatitis type  He is now recovered seemingly completely from his acute pancreatitis.  We talked about the implications of pancreatitis, and the need to be evaluated if he has recurrence of his pain.    3. Dyslipidemia  Currently this is stable and well controlled.  The patient should continue current therapy with no changes at this time.     - COMP METABOLIC PANEL; Future    4. Impaired fasting glucose  Most " recent hemoglobin A1c is 6.1%.  His fasting blood sugar was elevated as well.  We will continue to monitor this, and recheck labs in 6 months.  Continue to recommend healthy diet, and some dedicated exercise time.  - COMP METABOLIC PANEL; Future  - HEMOGLOBIN A1C; Future    5. Essential hypertension  Currently this is stable and well controlled.  The patient should continue current therapy with no changes at this time.     - COMP METABOLIC PANEL; Future    6. Chronic anticoagulation  Currently this is stable and well controlled.  The patient should continue current therapy with no changes at this time.       Health Maintenance  I recommened getting the Shingrix shingles vaccine.  I informed the patient that this is a two vaccine series, the second vaccine is done approximately 2-6 months after the first and that this would be done through their local pharmacy.      Return in about 6 months (around 7/17/2019).  Patient Instructions   · Consider getting the Shingrix shingles vaccine.  This is a two vaccine series, the second vaccine is done approximately 2-6 months after the first.  This is done through your local pharmacy.  · Continue to work on your diet and get some exercise.  · Continue your current medications.      Angel Cameron M.D.   of Internal Medicine  [facility]    This note was created using voice recognition software.  While every attempt is made to ensure accuracy of transcription, occasionally errors occur.

## 2019-01-17 NOTE — PATIENT INSTRUCTIONS
· Consider getting the Shingrix shingles vaccine.  This is a two vaccine series, the second vaccine is done approximately 2-6 months after the first.  This is done through your local pharmacy.  · Continue to work on your diet and get some exercise.  · Continue your current medications.

## 2019-04-15 ENCOUNTER — ANTICOAGULATION VISIT (OUTPATIENT)
Dept: VASCULAR LAB | Facility: MEDICAL CENTER | Age: 74
End: 2019-04-15
Attending: INTERNAL MEDICINE
Payer: MEDICARE

## 2019-04-15 VITALS — SYSTOLIC BLOOD PRESSURE: 134 MMHG | HEART RATE: 54 BPM | DIASTOLIC BLOOD PRESSURE: 81 MMHG

## 2019-04-15 DIAGNOSIS — Z86.718 PERSONAL HISTORY OF VENOUS THROMBOSIS AND EMBOLISM: ICD-10-CM

## 2019-04-15 DIAGNOSIS — I26.99 OTHER PULMONARY EMBOLISM WITHOUT ACUTE COR PULMONALE, UNSPECIFIED CHRONICITY (HCC): ICD-10-CM

## 2019-04-15 LAB
INR BLD: 1.2 (ref 0.9–1.2)
INR PPP: 1.2 (ref 2–3.5)

## 2019-04-15 PROCEDURE — 99212 OFFICE O/P EST SF 10 MIN: CPT | Performed by: NURSE PRACTITIONER

## 2019-04-15 PROCEDURE — 85610 PROTHROMBIN TIME: CPT

## 2019-04-15 NOTE — Clinical Note
Hi -  Just to double check, I can change people to Xarelto 10 mg for VTE prophlaxis as long as they have completed 6 months of standard therapy or aren't having any current VTE symptoms, right? I still get a little confused.  This aleyda was having epistaxis so I changed him to 10 mg.  Thanks for your help

## 2019-04-15 NOTE — PROGRESS NOTES
Indication: PE/DVT, Indefinite  Drug: Xarelto 20 mg daily.   CHADsVASC = n/a  HAS-BLED = 1 (age)    Health Status Since Last Assessment  Pt noticed he is having more nose bleeds lately. Has a hard time stopping them. Last about 10 minutes. Had one this AM. Wants to know what his options are.     Otherwise tolerating Xarelto. No s/sx of blood clots.    Copay expensive but he can afford.    Adherence with DOAC Therapy  No missed doses  BLEEDING RISK ASSESSMENT NB:    Bleeding Risk Assessment  None of the following other than epistaxis   Hemoptysis    Excessive or unusual bruising / hematomas   GIB / melena / BRBPR / hematemesis    Hematuria  Concerning daily headache or subdural hematoma symptoms    Decreasing hemoglobin or new anemia    Latest hemoglobin:     Lab Results   Component Value Date/Time    WBC 5.1 12/14/2018 07:20 AM    WBC 3.9 (L) 08/29/2014 01:00 PM    RBC 4.14 12/14/2018 07:20 AM    RBC 3.73 (L) 08/29/2014 01:00 PM    HEMOGLOBIN 13.9 12/14/2018 07:20 AM    HEMOGLOBIN 12.4 (L) 08/29/2014 01:00 PM    HEMATOCRIT 41.0 12/14/2018 07:20 AM    HEMATOCRIT 36.1 (L) 08/29/2014 01:00 PM    MCV 99 (H) 12/14/2018 07:20 AM    MCV 96.8 08/29/2014 01:00 PM    MCH 33.6 (H) 12/14/2018 07:20 AM    MCH 33.2 (H) 08/29/2014 01:00 PM    MCHC 33.9 12/14/2018 07:20 AM    MCHC 34.3 08/29/2014 01:00 PM    MPV 8.8 (L) 08/29/2014 01:00 PM    NEUTSPOLYS 65 12/14/2018 07:20 AM    NEUTSPOLYS 48.8 08/29/2014 01:00 PM    LYMPHOCYTES 21 12/14/2018 07:20 AM    LYMPHOCYTES 33.3 08/29/2014 01:00 PM    MONOCYTES 13 12/14/2018 07:20 AM    MONOCYTES 12.4 08/29/2014 01:00 PM    EOSINOPHILS 1 12/14/2018 07:20 AM    EOSINOPHILS 3.9 08/29/2014 01:00 PM    BASOPHILS 0 12/14/2018 07:20 AM    BASOPHILS 0.8 08/29/2014 01:00 PM        EtOH overuse  - denies  Falls, presyncope, syncope, or seizures  - no  Uncontrolled hypertension - no  CREATININE CLEARANCE /    Creatinine Clearance/Renal Function  Latest eGFR / creatinine:  Lab Results   Component  Value Date/Time    SODIUM 138 12/14/2018 07:20 AM    SODIUM 137 02/23/2018 09:15 AM    POTASSIUM 4.6 12/14/2018 07:20 AM    POTASSIUM 4.3 02/23/2018 09:15 AM    CHLORIDE 103 12/14/2018 07:20 AM    CHLORIDE 101 02/23/2018 09:15 AM    CO2 21 12/14/2018 07:20 AM    CO2 28 02/23/2018 09:15 AM    GLUCOSE 131 (H) 12/14/2018 07:20 AM    GLUCOSE 95 02/23/2018 09:15 AM    BUN 26 12/14/2018 07:20 AM    BUN 27 (H) 02/23/2018 09:15 AM    CREATININE 1.20 12/14/2018 07:20 AM    CREATININE 1.32 02/23/2018 09:15 AM    CREATININE 1.06 01/04/2013 08:01 AM    BUNCREATRAT 22 12/14/2018 07:20 AM    BUNCREATRAT 25 (H) 01/04/2013 08:01 AM      • Is eGFR less than 50ml/min  - no but needs recent labs  If YES, calculate CrCl (see back)  Any recent dehydrating illness or medications added/changed? i.e. diuretics    Drug Interactions  ASA / other antiplatelets - baby ASA per cards  NSAID- avoids  Other drug interactions   (Review med list / OTCs;)  Current Outpatient Prescriptions on File Prior to Visit   Medication Sig Dispense Refill   • losartan (COZAAR) 50 MG Tab Take 1 Tab by mouth 2 Times a Day. 180 Tab 3   • carvedilol (COREG) 25 MG Tab Take 1 Tab by mouth 2 times a day, with meals. 180 Tab 3   • atorvastatin (LIPITOR) 40 MG Tab Take 1 Tab by mouth every day. 90 Tab 3   • omeprazole (PRILOSEC) 20 MG delayed-release capsule Take 1 Cap by mouth every day. 90 Cap 3   • Fluticasone Furoate-Vilanterol (BREO ELLIPTA) 200-25 MCG/INH AEROSOL POWDER, BREATH ACTIVATED Inhale 1 Puff by mouth every day. 1 Each 6   • levalbuterol (XOPENEX HFA) 45 MCG/ACT inhaler INHALE TWO PUFFS BY MOUTH EVERY 4 HOURS AS NEEDED FOR SHORTNESS OF BREATH 1 Inhaler 6   • escitalopram (LEXAPRO) 10 MG Tab TAKE ONE (1) TABLET BY MOUTH EVERY DAY 90 Tab 3   • fluticasone (FLONASE) 50 MCG/ACT nasal spray Spray 1 Spray in nose 2 times a day. 1 Bottle 0   • aspirin (ASA) 81 MG Chew Tab chewable tablet Take 81 mg by mouth every evening. 100 Tab 11     No current  facility-administered medications on file prior to visit.        Examination  Blood pressure:  134/81  • Elevated BP  - no (sBP greater than 160 mmHg)  • Symptomatic hypotension  - no  Significant gait impairment / imbalance / high risk for falls  - age is a risk    Final Assessment and Recommendations:  Patient appears stable from the anticoagulation standpoint  Benefits of continued DOAC therapy outweigh risks for this patient    Discussed case with Dr. Bloch. Pt has hx of recurrent VTEs (history of DVT/PE in August of 2003 had PE again in April 2004), but given his epistaxis and the fact that he hasn't had any clot  recurrence since, we can decrease to low dose DOAC. Pt in agreement with this plan.    Stop Xarelto 20 mg and start Xarelto 10 mg daily  Seek medical attention for any signs of blood clots or prolonged bleeding  Let all your providers know you take Xarelto  Follow up sooner than 6 months if you have any problems with Xarelto    Other Actions:   The rationale for continued DOAC therapy  The potential for minor, major or life-threatening bleeding  Dosing instructions, adherence, risks of non-adherence, handling missed doses  Avoiding OTC ASA & NSAIDs & minimizing EtOH to reduce bleeding risks  Dosing around upcoming procedure / surgery if applicable (see back)    Follow up:  Will follow up with patient in 6 months with labs per pt's request    Next Appointment: Monday, October 14, 2019 at 10;00 am.     Shannan SLAUGHTER

## 2019-04-18 DIAGNOSIS — J98.4 RESTRICTIVE LUNG DISEASE: ICD-10-CM

## 2019-04-18 NOTE — TELEPHONE ENCOUNTER
Have we ever prescribed this med? Yes.  If yes, what date? 09/24/2018    Last OV: 12/13/2018    Next OV: 12/13/2019    DX: Restrictive lung disease (J98.4)    Medications: XOPENEX HFA 45 MCG/ACT inhaler [[id number]]

## 2019-04-22 RX ORDER — LEVALBUTEROL TARTRATE 45 UG/1
AEROSOL, METERED ORAL
Qty: 1 INHALER | Refills: 6 | Status: SHIPPED
Start: 2019-04-22 | End: 2020-02-11

## 2019-05-12 DIAGNOSIS — J98.4 RESTRICTIVE LUNG DISEASE: ICD-10-CM

## 2019-05-13 NOTE — TELEPHONE ENCOUNTER
Have we ever prescribed this med? Yes.  If yes, what date? 09/24/2018    Last OV: 12/13/2018    Next OV: 12/13/2019    DX:   Restrictive lung disease (J98.4)  Medications: Fluticasone Furoate-Vilanterol (BREO ELLIPTA) 200-25 MCG/INH AEROSOL POWDER, BREATH ACTIVATED [[id number]]

## 2019-05-16 ENCOUNTER — TELEPHONE (OUTPATIENT)
Dept: VASCULAR LAB | Facility: MEDICAL CENTER | Age: 74
End: 2019-05-16

## 2019-06-10 ENCOUNTER — TELEPHONE (OUTPATIENT)
Dept: VASCULAR LAB | Facility: MEDICAL CENTER | Age: 74
End: 2019-06-10

## 2019-07-02 ENCOUNTER — OFFICE VISIT (OUTPATIENT)
Dept: CARDIOLOGY | Facility: MEDICAL CENTER | Age: 74
End: 2019-07-02
Payer: MEDICARE

## 2019-07-02 VITALS
SYSTOLIC BLOOD PRESSURE: 132 MMHG | HEART RATE: 70 BPM | WEIGHT: 262 LBS | HEIGHT: 75 IN | OXYGEN SATURATION: 93 % | BODY MASS INDEX: 32.58 KG/M2 | DIASTOLIC BLOOD PRESSURE: 80 MMHG

## 2019-07-02 DIAGNOSIS — E78.2 MIXED HYPERLIPIDEMIA: ICD-10-CM

## 2019-07-02 DIAGNOSIS — I10 HTN (HYPERTENSION), MALIGNANT: ICD-10-CM

## 2019-07-02 DIAGNOSIS — I25.10 CORONARY ARTERY DISEASE DUE TO LIPID RICH PLAQUE: ICD-10-CM

## 2019-07-02 DIAGNOSIS — I27.82 CHRONIC SEPTIC PULMONARY EMBOLISM WITH ACUTE COR PULMONALE (HCC): ICD-10-CM

## 2019-07-02 DIAGNOSIS — Z79.01 CHRONIC ANTICOAGULATION: ICD-10-CM

## 2019-07-02 DIAGNOSIS — I26.01 CHRONIC SEPTIC PULMONARY EMBOLISM WITH ACUTE COR PULMONALE (HCC): ICD-10-CM

## 2019-07-02 DIAGNOSIS — Z95.5 STENTED CORONARY ARTERY: ICD-10-CM

## 2019-07-02 DIAGNOSIS — I25.83 CORONARY ARTERY DISEASE DUE TO LIPID RICH PLAQUE: ICD-10-CM

## 2019-07-02 DIAGNOSIS — Z79.899 HIGH RISK MEDICATION USE: ICD-10-CM

## 2019-07-02 PROCEDURE — 99214 OFFICE O/P EST MOD 30 MIN: CPT | Performed by: INTERNAL MEDICINE

## 2019-07-02 RX ORDER — LOSARTAN POTASSIUM 50 MG/1
50 TABLET ORAL 2 TIMES DAILY
Qty: 180 TAB | Refills: 3 | Status: SHIPPED | OUTPATIENT
Start: 2019-07-02 | End: 2020-09-10 | Stop reason: SDUPTHER

## 2019-07-02 RX ORDER — ATORVASTATIN CALCIUM 40 MG/1
40 TABLET, FILM COATED ORAL DAILY
Qty: 90 TAB | Refills: 3 | Status: SHIPPED | OUTPATIENT
Start: 2019-07-02 | End: 2020-08-31 | Stop reason: SDUPTHER

## 2019-07-02 RX ORDER — CARVEDILOL 25 MG/1
25 TABLET ORAL 2 TIMES DAILY WITH MEALS
Qty: 180 TAB | Refills: 3 | Status: ON HOLD
Start: 2019-07-02 | End: 2020-07-17

## 2019-07-02 ASSESSMENT — ENCOUNTER SYMPTOMS
CHILLS: 0
DEPRESSION: 0
EYE PAIN: 0
PND: 0
ORTHOPNEA: 0
SHORTNESS OF BREATH: 0
EYE DISCHARGE: 0
VOMITING: 0
FALLS: 0
LOSS OF CONSCIOUSNESS: 0
DIZZINESS: 0
DOUBLE VISION: 0
NAUSEA: 0
SENSORY CHANGE: 0
ABDOMINAL PAIN: 0
MYALGIAS: 0
CLAUDICATION: 0
PALPITATIONS: 0
BRUISES/BLEEDS EASILY: 0
FEVER: 0
HEADACHES: 0
COUGH: 0
BLURRED VISION: 0
BLOOD IN STOOL: 0
HALLUCINATIONS: 0
WEIGHT LOSS: 0
SPEECH CHANGE: 0

## 2019-07-02 NOTE — LETTER
[facility]   [address]   [address]  [place] [address]  Phone: [phone]  Fax: [phone]              Anthony Cloud  1945    Encounter Date: 7/2/2019    Epifanio Felix M.D.          PROGRESS NOTE:  Chief Complaint   Patient presents with   • HTN (Controlled)   • Coronary Artery Disease   • Pulmonary Hypertension       Subjective:   Anthony Cloud is a 73 y.o. male who presents today for cardiac care and management due to CAD s/o PCI to RCA with 3 stents in 2003, HTN, HLP along with prior PE.     In the interim, patient has been doing well without having any symptoms. Patient denies having chest pain, dyspnea, palpitation, presyncope, syncope episodes. Able to climb up at least 2 flights of stairs.    Past Medical History:   Diagnosis Date   • CAD (coronary artery disease) 2003    PCI/BMS x 3 to the RCA (Zeta 4.0 x 23mm, 3.5 x 23mm, 3.0 x 18mm).   • Central sleep apnea      ICD-10 transition   • Chronic anticoagulation    • Depression    • Depressive disorder, not elsewhere classified         • Dyslipidemia    • Hypertension    • Hypothyroidism    • Mixed hyperlipidemia    • Obesity    • Old myocardial infarction January 2003        • Peptic ulcer disease 8/4/2016   • Personal history of venous thrombosis and embolism     On Xarelto   • Pulmonary embolism (HCC)    • Sleep apnea     BiPAP with oxygen     Past Surgical History:   Procedure Laterality Date   • WRIST ORIF Left 2/23/2018    Procedure: WRIST ORIF;  Surgeon: Jasper Hammond M.D.;  Location: SURGERY [facility];  Service: Orthopedics   • INCISION AND DRAINAGE ORTHOPEDIC  8/29/2014    Performed by Wolfgang Merrill M.D. at SURGERY [facility]   • CARDIAC CATH, RIGHT/LEFT HEART  2003 01/2003 4.0x23mm Zeta stent to proximal RCA,3.5x22mm distal to first stent, 3.0x15mm at the point of original occlusion.   • BOWEL RESECTION     • PB ANESTH,LOWER  LEG ARTERIES SURG       Family History   Problem Relation Age of Onset   • Other Mother         Passed at 98   • Heart Disease Father 60        CABG     Social History     Social History   • Marital status:      Spouse name: N/A   • Number of children: N/A   • Years of education: N/A     Occupational History   • Not on file.     Social History Main Topics   • Smoking status: Never Smoker   • Smokeless tobacco: Never Used   • Alcohol use Yes      Comment: 6 drinks a week    • Drug use: No   • Sexual activity: Not on file     Other Topics Concern   • Not on file     Social History Narrative   • No narrative on file     Allergies   Allergen Reactions   • Lisinopril      cough     Outpatient Encounter Prescriptions as of 7/2/2019   Medication Sig Dispense Refill   • atorvastatin (LIPITOR) 40 MG Tab Take 1 Tab by mouth every day. 90 Tab 3   • carvedilol (COREG) 25 MG Tab Take 1 Tab by mouth 2 times a day, with meals. 180 Tab 3   • losartan (COZAAR) 50 MG Tab Take 1 Tab by mouth 2 Times a Day. 180 Tab 3   • BREO ELLIPTA 200-25 MCG/INH AEROSOL POWDER, BREATH ACTIVATED INHALE ONE PUFF BY MOUTH EVERY DAY 1 Each 6   • levalbuterol (XOPENEX HFA) 45 MCG/ACT inhaler INHALE TWO PUFFS BY MOUTH EVERY 4 HOURS AS NEEDED FOR SHORTNESS OF BREATH 1 Inhaler 6   • rivaroxaban (XARELTO) 10 MG Tab tablet Take 1 Tab by mouth with dinner. 90 Tab 1   • omeprazole (PRILOSEC) 20 MG delayed-release capsule Take 1 Cap by mouth every day. 90 Cap 3   • escitalopram (LEXAPRO) 10 MG Tab TAKE ONE (1) TABLET BY MOUTH EVERY DAY 90 Tab 3   • fluticasone (FLONASE) 50 MCG/ACT nasal spray Spray 1 Spray in nose 2 times a day. 1 Bottle 0   • aspirin (ASA) 81 MG Chew Tab chewable tablet Take 81 mg by mouth every evening. 100 Tab 11   • [DISCONTINUED] losartan (COZAAR) 50 MG Tab Take 1 Tab by mouth 2 Times a Day. 180 Tab 3   • [DISCONTINUED] carvedilol (COREG) 25 MG Tab Take 1 Tab by mouth 2 times a day, with meals. 180 Tab 3   • [DISCONTINUED]  "atorvastatin (LIPITOR) 40 MG Tab Take 1 Tab by mouth every day. 90 Tab 3     No facility-administered encounter medications on file as of 7/2/2019.      Review of Systems   Constitutional: Negative for chills, fever, malaise/fatigue and weight loss.   HENT: Negative for ear discharge, ear pain, hearing loss and nosebleeds.    Eyes: Negative for blurred vision, double vision, pain and discharge.   Respiratory: Negative for cough and shortness of breath.    Cardiovascular: Negative for chest pain, palpitations, orthopnea, claudication, leg swelling and PND.   Gastrointestinal: Negative for abdominal pain, blood in stool, melena, nausea and vomiting.   Genitourinary: Negative for dysuria and hematuria.   Musculoskeletal: Negative for falls, joint pain and myalgias.   Skin: Negative for itching and rash.   Neurological: Negative for dizziness, sensory change, speech change, loss of consciousness and headaches.   Endo/Heme/Allergies: Negative for environmental allergies. Does not bruise/bleed easily.   Psychiatric/Behavioral: Negative for depression, hallucinations and suicidal ideas.        Objective:   /80 (BP Location: Left arm, Patient Position: Sitting, BP Cuff Size: Adult)   Pulse 70   Ht 1.905 m (6' 3\")   Wt 118.8 kg (262 lb)   SpO2 93%   BMI 32.75 kg/m²      Physical Exam   Constitutional: He is oriented to person, place, and time. No distress.   HENT:   Head: Normocephalic and atraumatic.   Right Ear: External ear normal.   Left Ear: External ear normal.   Eyes: Right eye exhibits no discharge. Left eye exhibits no discharge.   Neck: No JVD present. No thyromegaly present.   Cardiovascular: Normal rate, regular rhythm, normal heart sounds and intact distal pulses.  Exam reveals no gallop and no friction rub.    No murmur heard.  Pulmonary/Chest: Breath sounds normal. No respiratory distress.   Abdominal: Bowel sounds are normal. He exhibits no distension. There is no tenderness.   Musculoskeletal: He " exhibits no edema or tenderness.   Neurological: He is alert and oriented to person, place, and time. No cranial nerve deficit.   Skin: Skin is warm and dry. He is not diaphoretic.   Psychiatric: He has a normal mood and affect. His behavior is normal.   Nursing note and vitals reviewed.      Assessment:     1. Coronary artery disease due to lipid rich plaque  atorvastatin (LIPITOR) 40 MG Tab    carvedilol (COREG) 25 MG Tab    losartan (COZAAR) 50 MG Tab    Comp Metabolic Panel    LIPID PANEL   2. 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 Chronic anticoagulation     4. Stented coronary artery  atorvastatin (LIPITOR) 40 MG Tab   5. Mixed hyperlipidemia  atorvastatin (LIPITOR) 40 MG Tab   6. HTN (hypertension), malignant  carvedilol (COREG) 25 MG Tab    losartan (COZAAR) 50 MG Tab   7. High risk medication use  Comp Metabolic Panel       Medical Decision Making:  Today's Assessment / Status / Plan:   Coronary arterial disease s/p PCI to RCA with 3 stents in 2003:  Betablocker as Carvedilol 25 mg po 2x daily.  ASA 81 mg po daily.  Statin as Atorvastatin 40 mg po daily  ARB as Losartan 50 mg po 2x daily.     Hypertension:  Optimize control with BB, ARB as permitted above in conjunction with CAD treatment.     Hyperlipidemia:  Optimize statin as within guidelines of CAD treatment as above.    History of PE:  Continue Xarelto 10 mg po daily.    Overall, based on prior history of obstructive coronary arterial disease, patient is at at high risk for recurrent events and will require consistent ongoing guidelines directed medical therapy to reduce his cardiovascular mortality and associated complications.    I will see patient back in clinic with lab tests and studies results in 6 months.    I thank you for referring patient to our Cardiology Clinic today.      Epifanio Felix MD.   [facility].  [phone]  [place], [place].            Angel Cameron,  M.D.  [address] [facility] [address]  [facility] [address]  VIA In Basket

## 2019-07-15 ENCOUNTER — TELEPHONE (OUTPATIENT)
Dept: VASCULAR LAB | Facility: MEDICAL CENTER | Age: 74
End: 2019-07-15

## 2019-07-16 NOTE — TELEPHONE ENCOUNTER
Spoke with pt by telephone. Reminded him to have Xarelto labs done. He states he will go next week as he also has labs for cardiology.    Will await results.    Shannan SLAUGHTER

## 2019-10-14 ENCOUNTER — ANTICOAGULATION VISIT (OUTPATIENT)
Dept: VASCULAR LAB | Facility: MEDICAL CENTER | Age: 74
End: 2019-10-14
Attending: INTERNAL MEDICINE
Payer: MEDICARE

## 2019-10-14 VITALS — DIASTOLIC BLOOD PRESSURE: 85 MMHG | SYSTOLIC BLOOD PRESSURE: 145 MMHG | HEART RATE: 60 BPM

## 2019-10-14 DIAGNOSIS — I26.99 OTHER PULMONARY EMBOLISM WITHOUT ACUTE COR PULMONALE, UNSPECIFIED CHRONICITY (HCC): ICD-10-CM

## 2019-10-14 DIAGNOSIS — Z86.718 PERSONAL HISTORY OF VENOUS THROMBOSIS AND EMBOLISM: ICD-10-CM

## 2019-10-14 PROCEDURE — 99212 OFFICE O/P EST SF 10 MIN: CPT | Performed by: NURSE PRACTITIONER

## 2019-10-14 NOTE — PROGRESS NOTES
Indication: PE/DVT, Indefinite  Drug: Xarelto 10 mg daily.   CHADsVASC = n/a  HAS-BLED = 1 (age)    Health Status Since Last Assessment  Doing well on Xarelto. Taking 10 mg daily. No problems. He can afford.    STILL needs labs done. Last labs were in Dec 2018. States he will go this week.    Adherence with DOAC Therapy  0 missed dose(s)  BLEEDING RISK ASSESSMENT NB:    Bleeding Risk Assessment  Severe epistaxis - no   Hemoptysis - no  Excessive or unusual bruising / hematomas - no  GIB/melena/BRBPR/hematemesis - no  Hematuria - no   Abnormal vaginal bleeding - n/a  Concerning daily headache or subdural hematoma symptoms - no  Decreasing hemoglobin or new anemia - no  Falls, presyncope, syncope, or seizures - no  Platelets: NEEDS RECENT LABS  Latest hemoglobin:     Lab Results   Component Value Date/Time    WBC 5.1 12/14/2018 07:20 AM    WBC 3.9 (L) 08/29/2014 01:00 PM    RBC 4.14 12/14/2018 07:20 AM    RBC 3.73 (L) 08/29/2014 01:00 PM    HEMOGLOBIN 13.9 12/14/2018 07:20 AM    HEMOGLOBIN 12.4 (L) 08/29/2014 01:00 PM    HEMATOCRIT 41.0 12/14/2018 07:20 AM    HEMATOCRIT 36.1 (L) 08/29/2014 01:00 PM    MCV 99 (H) 12/14/2018 07:20 AM    MCV 96.8 08/29/2014 01:00 PM    MCH 33.6 (H) 12/14/2018 07:20 AM    MCH 33.2 (H) 08/29/2014 01:00 PM    MCHC 33.9 12/14/2018 07:20 AM    MCHC 34.3 08/29/2014 01:00 PM    MPV 8.8 (L) 08/29/2014 01:00 PM    NEUTSPOLYS 65 12/14/2018 07:20 AM    NEUTSPOLYS 48.8 08/29/2014 01:00 PM    LYMPHOCYTES 21 12/14/2018 07:20 AM    LYMPHOCYTES 33.3 08/29/2014 01:00 PM    MONOCYTES 13 12/14/2018 07:20 AM    MONOCYTES 12.4 08/29/2014 01:00 PM    EOSINOPHILS 1 12/14/2018 07:20 AM    EOSINOPHILS 3.9 08/29/2014 01:00 PM    BASOPHILS 0 12/14/2018 07:20 AM    BASOPHILS 0.8 08/29/2014 01:00 PM        HAS-BLED:  Hypertension (uncontrolled, >160 mmHg systolic) - no  Renal disease (dialysis, transplant, Cr >2.26 mg/dL or >200 µmol/L) - no  Liver disease (cirrhosis or bilirubin >2x normal with AST/ALT/AP >3x  normal) - no  Stroke history - no  Prior major bleeding or predisposition to bleeding - no  Labile INR Unstable/high INRs, time in therapeutic range <60% - n/a  Age >65 -yes  Medication usage predisposing to bleeding (aspirin, clopidogrel, NSAIDs) - no  Alcohol use  ?8 drinks/week - no      Creatinine Clearance/Renal Function  Latest eGFR / creatinine:  Lab Results   Component Value Date/Time    SODIUM 138 12/14/2018 07:20 AM    SODIUM 137 02/23/2018 09:15 AM    POTASSIUM 4.6 12/14/2018 07:20 AM    POTASSIUM 4.3 02/23/2018 09:15 AM    CHLORIDE 103 12/14/2018 07:20 AM    CHLORIDE 101 02/23/2018 09:15 AM    CO2 21 12/14/2018 07:20 AM    CO2 28 02/23/2018 09:15 AM    GLUCOSE 131 (H) 12/14/2018 07:20 AM    GLUCOSE 95 02/23/2018 09:15 AM    BUN 26 12/14/2018 07:20 AM    BUN 27 (H) 02/23/2018 09:15 AM    CREATININE 1.20 12/14/2018 07:20 AM    CREATININE 1.32 02/23/2018 09:15 AM    CREATININE 1.06 01/04/2013 08:01 AM    BUNCREATRAT 22 12/14/2018 07:20 AM    BUNCREATRAT 25 (H) 01/04/2013 08:01 AM      • Is eGFR less than 50ml/min  - NEEDS RECENT LABS    If YES, calculate CrCl (see back)  Any recent dehydrating illness or medications added/changed? i.e. Diuretics      Drug Interactions  ASA/antiplatelets - avoids  NSAID - avoids  Other drug interactions -  (Review med list / OTCs;)  Current Outpatient Medications on File Prior to Visit   Medication Sig Dispense Refill   • atorvastatin (LIPITOR) 40 MG Tab Take 1 Tab by mouth every day. 90 Tab 3   • carvedilol (COREG) 25 MG Tab Take 1 Tab by mouth 2 times a day, with meals. 180 Tab 3   • losartan (COZAAR) 50 MG Tab Take 1 Tab by mouth 2 Times a Day. 180 Tab 3   • BREO ELLIPTA 200-25 MCG/INH AEROSOL POWDER, BREATH ACTIVATED INHALE ONE PUFF BY MOUTH EVERY DAY 1 Each 6   • levalbuterol (XOPENEX HFA) 45 MCG/ACT inhaler INHALE TWO PUFFS BY MOUTH EVERY 4 HOURS AS NEEDED FOR SHORTNESS OF BREATH 1 Inhaler 6   • rivaroxaban (XARELTO) 10 MG Tab tablet Take 1 Tab by mouth with dinner. 90  Tab 1   • omeprazole (PRILOSEC) 20 MG delayed-release capsule Take 1 Cap by mouth every day. 90 Cap 3   • escitalopram (LEXAPRO) 10 MG Tab TAKE ONE (1) TABLET BY MOUTH EVERY DAY 90 Tab 3   • fluticasone (FLONASE) 50 MCG/ACT nasal spray Spray 1 Spray in nose 2 times a day. 1 Bottle 0   • aspirin (ASA) 81 MG Chew Tab chewable tablet Take 81 mg by mouth every evening. 100 Tab 11     No current facility-administered medications on file prior to visit.      Verified no anticonvulsant or azole therapy, education provided for future use.      Examination  Blood pressure:  145/85  • Elevated BP - no (sBP greater than 160 mmHg)  • Symptomatic hypotension - no  Significant gait impairment / imbalance / high risk for falls - age is a risk    Final Assessment and Recommendations:  Patient appears stable from the anticoagulation standpoint  Benefits of continued DOAC therapy outweigh risks for this patient  Recommend continue current DOAC at same dose    - continue taking Xarelto 10 mg daily   - PLEASE GET LABS DONE THIS WEEK AND AGAIN IN 6 MONTHS  - go to the ER for shortness of breath, chest pain, pain with deep inhalation, worsening leg swelling and/or pain in calf or leg   - avoid sedentary periods  - let all your providers know you take this medication  - don't stop this medication without permission from your doctor or our clinic  -  refills before you run out  - continue complete avoidance of tobacco products  - to avoid Aspirin and anti-inflammatories (eg. Advil, ibuprofen, Aleve, naproxen, etc) while anticoagulated   - Avoid skiing or other dangerous activities to reduce risk of head injury and brain bleeds  - elevate legs as much as possible, use compression stockings/socks if directed by your provider  - if any bleeding lasting 30min without stopping, please seek care with your PCP, urgent care, or ED  - if having any invasive procedure, please make sure the doctor knows of your history of blood clots and  current anticoagulation status  - let us know of any medication changes    Other Actions:   The rationale for continued DOAC therapy  The potential for minor, major or life-threatening bleeding  Dosing instructions, adherence, risks of non-adherence, handling missed doses  Avoiding OTC ASA & NSAIDs & minimizing EtOH to reduce bleeding risks  Dosing around upcoming procedure / surgery if applicable (see back)    Follow up:  Will follow up with patient in 6 months    Next appointment: Monday, April 13, 2020 at 10:00 am.       Shannan SLAUGHTER

## 2019-10-28 ENCOUNTER — TELEPHONE (OUTPATIENT)
Dept: VASCULAR LAB | Facility: MEDICAL CENTER | Age: 74
End: 2019-10-28

## 2019-11-07 ENCOUNTER — HOSPITAL ENCOUNTER (OUTPATIENT)
Dept: LAB | Facility: MEDICAL CENTER | Age: 74
End: 2019-11-07
Attending: INTERNAL MEDICINE
Payer: MEDICARE

## 2019-11-07 DIAGNOSIS — Z79.899 HIGH RISK MEDICATION USE: ICD-10-CM

## 2019-11-07 DIAGNOSIS — I25.83 CORONARY ARTERY DISEASE DUE TO LIPID RICH PLAQUE: ICD-10-CM

## 2019-11-07 DIAGNOSIS — I25.10 CORONARY ARTERY DISEASE DUE TO LIPID RICH PLAQUE: ICD-10-CM

## 2019-11-07 LAB
ALBUMIN SERPL BCP-MCNC: 4.3 G/DL (ref 3.2–4.9)
ALBUMIN/GLOB SERPL: 1.6 G/DL
ALP SERPL-CCNC: 82 U/L (ref 30–99)
ALT SERPL-CCNC: 73 U/L (ref 2–50)
ANION GAP SERPL CALC-SCNC: 6 MMOL/L (ref 0–11.9)
AST SERPL-CCNC: 43 U/L (ref 12–45)
BILIRUB SERPL-MCNC: 0.7 MG/DL (ref 0.1–1.5)
BUN SERPL-MCNC: 29 MG/DL (ref 8–22)
CALCIUM SERPL-MCNC: 8.9 MG/DL (ref 8.5–10.5)
CHLORIDE SERPL-SCNC: 108 MMOL/L (ref 96–112)
CHOLEST SERPL-MCNC: 101 MG/DL (ref 100–199)
CO2 SERPL-SCNC: 27 MMOL/L (ref 20–33)
CREAT SERPL-MCNC: 1.33 MG/DL (ref 0.5–1.4)
FASTING STATUS PATIENT QL REPORTED: NORMAL
GLOBULIN SER CALC-MCNC: 2.7 G/DL (ref 1.9–3.5)
GLUCOSE SERPL-MCNC: 116 MG/DL (ref 65–99)
HDLC SERPL-MCNC: 40 MG/DL
LDLC SERPL CALC-MCNC: 48 MG/DL
POTASSIUM SERPL-SCNC: 4.8 MMOL/L (ref 3.6–5.5)
PROT SERPL-MCNC: 7 G/DL (ref 6–8.2)
SODIUM SERPL-SCNC: 141 MMOL/L (ref 135–145)
TRIGL SERPL-MCNC: 64 MG/DL (ref 0–149)

## 2019-11-07 PROCEDURE — 36415 COLL VENOUS BLD VENIPUNCTURE: CPT

## 2019-11-07 PROCEDURE — 80061 LIPID PANEL: CPT

## 2019-11-07 PROCEDURE — 80053 COMPREHEN METABOLIC PANEL: CPT

## 2019-12-02 ENCOUNTER — DOCUMENTATION (OUTPATIENT)
Dept: VASCULAR LAB | Facility: MEDICAL CENTER | Age: 74
End: 2019-12-02

## 2019-12-02 DIAGNOSIS — Z79.01 CHRONIC ANTICOAGULATION: ICD-10-CM

## 2019-12-02 NOTE — PROGRESS NOTES
Reviewed labs for Xarelto. Actual cr cl 84.4 ml/min. For some reason, CBC now drawn. Pt prefers to hold off on CBC and get in a couple months with next BMP. Will f/u with pt in April 2020.    Shannan SLAUGHTER

## 2019-12-26 DIAGNOSIS — J98.4 RESTRICTIVE LUNG DISEASE: ICD-10-CM

## 2019-12-26 NOTE — TELEPHONE ENCOUNTER
Have we ever prescribed this med? Yes.  If yes, what date? 5/13/19    Last OV: 12/13/2018 Abdoul     Next OV: 3/12/20 Abdoul     DX: Restrictive lung disease (J98.4)    Medications:BREO ELLIPTA 200-25 MCG/INH AEROSOL POWDER, BREATH ACTIVATED

## 2020-01-20 ENCOUNTER — APPOINTMENT (OUTPATIENT)
Dept: CARDIOLOGY | Facility: MEDICAL CENTER | Age: 75
End: 2020-01-20
Payer: MEDICARE

## 2020-02-11 ENCOUNTER — OFFICE VISIT (OUTPATIENT)
Dept: URGENT CARE | Facility: CLINIC | Age: 75
End: 2020-02-11
Payer: MEDICARE

## 2020-02-11 ENCOUNTER — HOSPITAL ENCOUNTER (INPATIENT)
Facility: MEDICAL CENTER | Age: 75
LOS: 2 days | DRG: 193 | End: 2020-02-13
Attending: EMERGENCY MEDICINE | Admitting: HOSPITALIST
Payer: MEDICARE

## 2020-02-11 ENCOUNTER — APPOINTMENT (OUTPATIENT)
Dept: RADIOLOGY | Facility: MEDICAL CENTER | Age: 75
DRG: 193 | End: 2020-02-11
Attending: EMERGENCY MEDICINE
Payer: MEDICARE

## 2020-02-11 ENCOUNTER — APPOINTMENT (OUTPATIENT)
Dept: CARDIOLOGY | Facility: MEDICAL CENTER | Age: 75
DRG: 193 | End: 2020-02-11
Attending: STUDENT IN AN ORGANIZED HEALTH CARE EDUCATION/TRAINING PROGRAM
Payer: MEDICARE

## 2020-02-11 VITALS
OXYGEN SATURATION: 93 % | HEART RATE: 102 BPM | WEIGHT: 263 LBS | DIASTOLIC BLOOD PRESSURE: 80 MMHG | HEIGHT: 76 IN | BODY MASS INDEX: 32.03 KG/M2 | TEMPERATURE: 99.1 F | SYSTOLIC BLOOD PRESSURE: 150 MMHG | RESPIRATION RATE: 20 BRPM

## 2020-02-11 DIAGNOSIS — I48.91 ATRIAL FIBRILLATION, UNSPECIFIED TYPE (HCC): ICD-10-CM

## 2020-02-11 DIAGNOSIS — J10.1 INFLUENZA A: ICD-10-CM

## 2020-02-11 DIAGNOSIS — R94.31 ABNORMAL Q WAVES ON ELECTROCARDIOGRAM: ICD-10-CM

## 2020-02-11 DIAGNOSIS — J96.01 ACUTE HYPOXEMIC RESPIRATORY FAILURE (HCC): ICD-10-CM

## 2020-02-11 DIAGNOSIS — I48.91 ATRIAL FIBRILLATION, NEW ONSET (HCC): ICD-10-CM

## 2020-02-11 LAB
ALBUMIN SERPL BCP-MCNC: 4.1 G/DL (ref 3.2–4.9)
ALBUMIN/GLOB SERPL: 1.2 G/DL
ALP SERPL-CCNC: 79 U/L (ref 30–99)
ALT SERPL-CCNC: 43 U/L (ref 2–50)
ANION GAP SERPL CALC-SCNC: 7 MMOL/L (ref 0–11.9)
APPEARANCE UR: CLEAR
AST SERPL-CCNC: 37 U/L (ref 12–45)
BACTERIA #/AREA URNS HPF: NEGATIVE /HPF
BASOPHILS # BLD AUTO: 0.5 % (ref 0–1.8)
BASOPHILS # BLD: 0.02 K/UL (ref 0–0.12)
BILIRUB SERPL-MCNC: 0.9 MG/DL (ref 0.1–1.5)
BILIRUB UR QL STRIP.AUTO: NEGATIVE
BUN SERPL-MCNC: 24 MG/DL (ref 8–22)
CALCIUM SERPL-MCNC: 8.6 MG/DL (ref 8.5–10.5)
CHLORIDE SERPL-SCNC: 104 MMOL/L (ref 96–112)
CO2 SERPL-SCNC: 28 MMOL/L (ref 20–33)
COLOR UR: YELLOW
CREAT SERPL-MCNC: 1.11 MG/DL (ref 0.5–1.4)
EOSINOPHIL # BLD AUTO: 0.05 K/UL (ref 0–0.51)
EOSINOPHIL NFR BLD: 1.3 % (ref 0–6.9)
EPI CELLS #/AREA URNS HPF: NEGATIVE /HPF
ERYTHROCYTE [DISTWIDTH] IN BLOOD BY AUTOMATED COUNT: 59.9 FL (ref 35.9–50)
FLUAV+FLUBV AG SPEC QL IA: NORMAL
GLOBULIN SER CALC-MCNC: 3.3 G/DL (ref 1.9–3.5)
GLUCOSE SERPL-MCNC: 89 MG/DL (ref 65–99)
GLUCOSE UR STRIP.AUTO-MCNC: NEGATIVE MG/DL
HCT VFR BLD AUTO: 36 % (ref 42–52)
HGB BLD-MCNC: 11.8 G/DL (ref 14–18)
HYALINE CASTS #/AREA URNS LPF: ABNORMAL /LPF
IMM GRANULOCYTES # BLD AUTO: 0.01 K/UL (ref 0–0.11)
IMM GRANULOCYTES NFR BLD AUTO: 0.3 % (ref 0–0.9)
INT CON NEG: NORMAL
INT CON POS: NORMAL
KETONES UR STRIP.AUTO-MCNC: NEGATIVE MG/DL
LACTATE BLD-SCNC: 1.2 MMOL/L (ref 0.5–2)
LEUKOCYTE ESTERASE UR QL STRIP.AUTO: NEGATIVE
LYMPHOCYTES # BLD AUTO: 0.34 K/UL (ref 1–4.8)
LYMPHOCYTES NFR BLD: 8.6 % (ref 22–41)
MCH RBC QN AUTO: 34.5 PG (ref 27–33)
MCHC RBC AUTO-ENTMCNC: 32.8 G/DL (ref 33.7–35.3)
MCV RBC AUTO: 105.3 FL (ref 81.4–97.8)
MICRO URNS: ABNORMAL
MONOCYTES # BLD AUTO: 0.46 K/UL (ref 0–0.85)
MONOCYTES NFR BLD AUTO: 11.6 % (ref 0–13.4)
NEUTROPHILS # BLD AUTO: 3.07 K/UL (ref 1.82–7.42)
NEUTROPHILS NFR BLD: 77.7 % (ref 44–72)
NITRITE UR QL STRIP.AUTO: NEGATIVE
NRBC # BLD AUTO: 0 K/UL
NRBC BLD-RTO: 0 /100 WBC
NT-PROBNP SERPL IA-MCNC: 1984 PG/ML (ref 0–125)
PH UR STRIP.AUTO: 6.5 [PH] (ref 5–8)
PLATELET # BLD AUTO: 122 K/UL (ref 164–446)
PMV BLD AUTO: 8.9 FL (ref 9–12.9)
POTASSIUM SERPL-SCNC: 4.1 MMOL/L (ref 3.6–5.5)
PROT SERPL-MCNC: 7.4 G/DL (ref 6–8.2)
PROT UR QL STRIP: 100 MG/DL
RBC # BLD AUTO: 3.42 M/UL (ref 4.7–6.1)
RBC # URNS HPF: ABNORMAL /HPF
RBC UR QL AUTO: NEGATIVE
SODIUM SERPL-SCNC: 139 MMOL/L (ref 135–145)
SP GR UR STRIP.AUTO: 1.02
TROPONIN T SERPL-MCNC: 9 NG/L (ref 6–19)
UROBILINOGEN UR STRIP.AUTO-MCNC: 1 MG/DL
WBC # BLD AUTO: 4 K/UL (ref 4.8–10.8)
WBC #/AREA URNS HPF: ABNORMAL /HPF

## 2020-02-11 PROCEDURE — 99285 EMERGENCY DEPT VISIT HI MDM: CPT

## 2020-02-11 PROCEDURE — A9270 NON-COVERED ITEM OR SERVICE: HCPCS | Performed by: EMERGENCY MEDICINE

## 2020-02-11 PROCEDURE — 700101 HCHG RX REV CODE 250: Performed by: STUDENT IN AN ORGANIZED HEALTH CARE EDUCATION/TRAINING PROGRAM

## 2020-02-11 PROCEDURE — 96374 THER/PROPH/DIAG INJ IV PUSH: CPT

## 2020-02-11 PROCEDURE — 36415 COLL VENOUS BLD VENIPUNCTURE: CPT

## 2020-02-11 PROCEDURE — 700105 HCHG RX REV CODE 258: Performed by: STUDENT IN AN ORGANIZED HEALTH CARE EDUCATION/TRAINING PROGRAM

## 2020-02-11 PROCEDURE — 85025 COMPLETE CBC W/AUTO DIFF WBC: CPT

## 2020-02-11 PROCEDURE — 84484 ASSAY OF TROPONIN QUANT: CPT

## 2020-02-11 PROCEDURE — 700102 HCHG RX REV CODE 250 W/ 637 OVERRIDE(OP): Performed by: EMERGENCY MEDICINE

## 2020-02-11 PROCEDURE — 81001 URINALYSIS AUTO W/SCOPE: CPT

## 2020-02-11 PROCEDURE — 94760 N-INVAS EAR/PLS OXIMETRY 1: CPT

## 2020-02-11 PROCEDURE — 93000 ELECTROCARDIOGRAM COMPLETE: CPT | Performed by: PHYSICIAN ASSISTANT

## 2020-02-11 PROCEDURE — 304561 HCHG STAT O2

## 2020-02-11 PROCEDURE — 700102 HCHG RX REV CODE 250 W/ 637 OVERRIDE(OP): Performed by: STUDENT IN AN ORGANIZED HEALTH CARE EDUCATION/TRAINING PROGRAM

## 2020-02-11 PROCEDURE — 99214 OFFICE O/P EST MOD 30 MIN: CPT | Performed by: PHYSICIAN ASSISTANT

## 2020-02-11 PROCEDURE — A9270 NON-COVERED ITEM OR SERVICE: HCPCS | Performed by: STUDENT IN AN ORGANIZED HEALTH CARE EDUCATION/TRAINING PROGRAM

## 2020-02-11 PROCEDURE — 71045 X-RAY EXAM CHEST 1 VIEW: CPT

## 2020-02-11 PROCEDURE — 83605 ASSAY OF LACTIC ACID: CPT

## 2020-02-11 PROCEDURE — 80053 COMPREHEN METABOLIC PANEL: CPT

## 2020-02-11 PROCEDURE — 83880 ASSAY OF NATRIURETIC PEPTIDE: CPT

## 2020-02-11 PROCEDURE — 87804 INFLUENZA ASSAY W/OPTIC: CPT | Performed by: PHYSICIAN ASSISTANT

## 2020-02-11 PROCEDURE — 770020 HCHG ROOM/CARE - TELE (206)

## 2020-02-11 RX ORDER — POLYETHYLENE GLYCOL 3350 17 G/17G
1 POWDER, FOR SOLUTION ORAL
Status: DISCONTINUED | OUTPATIENT
Start: 2020-02-11 | End: 2020-02-13 | Stop reason: HOSPADM

## 2020-02-11 RX ORDER — BISACODYL 10 MG
10 SUPPOSITORY, RECTAL RECTAL
Status: DISCONTINUED | OUTPATIENT
Start: 2020-02-11 | End: 2020-02-13 | Stop reason: HOSPADM

## 2020-02-11 RX ORDER — GUAIFENESIN/DEXTROMETHORPHAN 100-10MG/5
10 SYRUP ORAL EVERY 6 HOURS PRN
Status: DISCONTINUED | OUTPATIENT
Start: 2020-02-11 | End: 2020-02-13 | Stop reason: HOSPADM

## 2020-02-11 RX ORDER — OSELTAMIVIR PHOSPHATE 75 MG/1
75 CAPSULE ORAL 2 TIMES DAILY
Qty: 10 CAP | Refills: 0 | Status: SHIPPED
Start: 2020-02-11 | End: 2020-02-11

## 2020-02-11 RX ORDER — OSELTAMIVIR PHOSPHATE 75 MG/1
75 CAPSULE ORAL ONCE
Status: COMPLETED | OUTPATIENT
Start: 2020-02-11 | End: 2020-02-11

## 2020-02-11 RX ORDER — SODIUM CHLORIDE 9 MG/ML
INJECTION, SOLUTION INTRAVENOUS CONTINUOUS
Status: DISCONTINUED | OUTPATIENT
Start: 2020-02-11 | End: 2020-02-13

## 2020-02-11 RX ORDER — LABETALOL HYDROCHLORIDE 5 MG/ML
10 INJECTION, SOLUTION INTRAVENOUS EVERY 4 HOURS PRN
Status: DISCONTINUED | OUTPATIENT
Start: 2020-02-11 | End: 2020-02-13 | Stop reason: HOSPADM

## 2020-02-11 RX ORDER — OSELTAMIVIR PHOSPHATE 75 MG/1
75 CAPSULE ORAL 2 TIMES DAILY
Status: DISCONTINUED | OUTPATIENT
Start: 2020-02-12 | End: 2020-02-13 | Stop reason: HOSPADM

## 2020-02-11 RX ORDER — AMOXICILLIN 250 MG
2 CAPSULE ORAL 2 TIMES DAILY
Status: DISCONTINUED | OUTPATIENT
Start: 2020-02-12 | End: 2020-02-13 | Stop reason: HOSPADM

## 2020-02-11 RX ORDER — METOPROLOL TARTRATE 1 MG/ML
2.5 INJECTION, SOLUTION INTRAVENOUS ONCE
Status: COMPLETED | OUTPATIENT
Start: 2020-02-11 | End: 2020-02-11

## 2020-02-11 RX ORDER — CARVEDILOL 3.12 MG/1
3.12 TABLET ORAL 2 TIMES DAILY WITH MEALS
Status: DISCONTINUED | OUTPATIENT
Start: 2020-02-12 | End: 2020-02-12

## 2020-02-11 RX ORDER — ACETAMINOPHEN 325 MG/1
650 TABLET ORAL EVERY 6 HOURS PRN
Status: DISCONTINUED | OUTPATIENT
Start: 2020-02-11 | End: 2020-02-13 | Stop reason: HOSPADM

## 2020-02-11 RX ADMIN — METOPROLOL TARTRATE 2.5 MG: 5 INJECTION, SOLUTION INTRAVENOUS at 22:00

## 2020-02-11 RX ADMIN — GUAIFENESIN AND DEXTROMETHORPHAN 10 ML: 100; 10 SYRUP ORAL at 22:17

## 2020-02-11 RX ADMIN — ACETAMINOPHEN 650 MG: 325 TABLET, FILM COATED ORAL at 22:17

## 2020-02-11 RX ADMIN — SODIUM CHLORIDE: 9 INJECTION, SOLUTION INTRAVENOUS at 22:19

## 2020-02-11 RX ADMIN — OSELTAMIVIR PHOSPHATE 75 MG: 75 CAPSULE ORAL at 18:27

## 2020-02-11 ASSESSMENT — LIFESTYLE VARIABLES
DOES PATIENT WANT TO STOP DRINKING: NO
EVER FELT BAD OR GUILTY ABOUT YOUR DRINKING: NO
HOW MANY TIMES IN THE PAST YEAR HAVE YOU HAD 5 OR MORE DRINKS IN A DAY: 30
EVER HAD A DRINK FIRST THING IN THE MORNING TO STEADY YOUR NERVES TO GET RID OF A HANGOVER: NO
CONSUMPTION TOTAL: POSITIVE
HAVE PEOPLE ANNOYED YOU BY CRITICIZING YOUR DRINKING: NO
TOTAL SCORE: 0
TOTAL SCORE: 0
DO YOU DRINK ALCOHOL: YES
TOTAL SCORE: 0
ON A TYPICAL DAY WHEN YOU DRINK ALCOHOL HOW MANY DRINKS DO YOU HAVE: 3
AVERAGE NUMBER OF DAYS PER WEEK YOU HAVE A DRINK CONTAINING ALCOHOL: 2
HAVE YOU EVER FELT YOU SHOULD CUT DOWN ON YOUR DRINKING: NO

## 2020-02-11 ASSESSMENT — ENCOUNTER SYMPTOMS
VOMITING: 0
SORE THROAT: 0
NAUSEA: 0
COUGH: 1
DIZZINESS: 0
EYES NEGATIVE: 1
FEVER: 0
SHORTNESS OF BREATH: 0
CHILLS: 1
WHEEZING: 0
ABDOMINAL PAIN: 0
HEMOPTYSIS: 0
HEADACHES: 1
RHINORRHEA: 1
DIARRHEA: 0

## 2020-02-12 ENCOUNTER — APPOINTMENT (OUTPATIENT)
Dept: CARDIOLOGY | Facility: MEDICAL CENTER | Age: 75
DRG: 193 | End: 2020-02-12
Attending: STUDENT IN AN ORGANIZED HEALTH CARE EDUCATION/TRAINING PROGRAM
Payer: MEDICARE

## 2020-02-12 PROBLEM — D61.818 PANCYTOPENIA (HCC): Status: ACTIVE | Noted: 2020-02-12

## 2020-02-12 PROBLEM — F10.20 UNCOMPLICATED ALCOHOL DEPENDENCE (HCC): Status: ACTIVE | Noted: 2020-02-12

## 2020-02-12 PROBLEM — J11.1 INFLUENZA: Status: ACTIVE | Noted: 2020-02-12

## 2020-02-12 PROBLEM — D64.9 ANEMIA: Status: ACTIVE | Noted: 2020-02-12

## 2020-02-12 PROBLEM — I48.91 AF (ATRIAL FIBRILLATION) (HCC): Status: ACTIVE | Noted: 2020-02-12

## 2020-02-12 PROBLEM — I51.7 CARDIOMEGALY: Status: ACTIVE | Noted: 2020-02-12

## 2020-02-12 LAB
ALBUMIN SERPL BCP-MCNC: 3.9 G/DL (ref 3.2–4.9)
ALBUMIN SERPL BCP-MCNC: 4 G/DL (ref 3.2–4.9)
ALBUMIN/GLOB SERPL: 1.3 G/DL
ALBUMIN/GLOB SERPL: 1.4 G/DL
ALP SERPL-CCNC: 70 U/L (ref 30–99)
ALP SERPL-CCNC: 74 U/L (ref 30–99)
ALT SERPL-CCNC: 45 U/L (ref 2–50)
ALT SERPL-CCNC: 48 U/L (ref 2–50)
ANION GAP SERPL CALC-SCNC: 5 MMOL/L (ref 0–11.9)
ANION GAP SERPL CALC-SCNC: 6 MMOL/L (ref 0–11.9)
AST SERPL-CCNC: 38 U/L (ref 12–45)
AST SERPL-CCNC: 45 U/L (ref 12–45)
BASOPHILS # BLD AUTO: 0.9 % (ref 0–1.8)
BASOPHILS # BLD: 0.03 K/UL (ref 0–0.12)
BILIRUB SERPL-MCNC: 0.6 MG/DL (ref 0.1–1.5)
BILIRUB SERPL-MCNC: 0.8 MG/DL (ref 0.1–1.5)
BUN SERPL-MCNC: 19 MG/DL (ref 8–22)
BUN SERPL-MCNC: 21 MG/DL (ref 8–22)
CALCIUM SERPL-MCNC: 8 MG/DL (ref 8.5–10.5)
CALCIUM SERPL-MCNC: 8.3 MG/DL (ref 8.5–10.5)
CHLORIDE SERPL-SCNC: 105 MMOL/L (ref 96–112)
CHLORIDE SERPL-SCNC: 107 MMOL/L (ref 96–112)
CO2 SERPL-SCNC: 25 MMOL/L (ref 20–33)
CO2 SERPL-SCNC: 27 MMOL/L (ref 20–33)
CREAT SERPL-MCNC: 1.08 MG/DL (ref 0.5–1.4)
CREAT SERPL-MCNC: 1.35 MG/DL (ref 0.5–1.4)
EOSINOPHIL # BLD AUTO: 0.05 K/UL (ref 0–0.51)
EOSINOPHIL NFR BLD: 1.5 % (ref 0–6.9)
ERYTHROCYTE [DISTWIDTH] IN BLOOD BY AUTOMATED COUNT: 62.1 FL (ref 35.9–50)
FERRITIN SERPL-MCNC: 101.8 NG/ML (ref 22–322)
FOLATE SERPL-MCNC: 17 NG/ML
GLOBULIN SER CALC-MCNC: 2.8 G/DL (ref 1.9–3.5)
GLOBULIN SER CALC-MCNC: 3 G/DL (ref 1.9–3.5)
GLUCOSE SERPL-MCNC: 120 MG/DL (ref 65–99)
GLUCOSE SERPL-MCNC: 97 MG/DL (ref 65–99)
HCT VFR BLD AUTO: 33.5 % (ref 42–52)
HGB BLD-MCNC: 10.9 G/DL (ref 14–18)
IMM GRANULOCYTES # BLD AUTO: 0.01 K/UL (ref 0–0.11)
IMM GRANULOCYTES NFR BLD AUTO: 0.3 % (ref 0–0.9)
IRON SERPL-MCNC: 18 UG/DL (ref 50–180)
LV EJECT FRACT  99904: 50
LV EJECT FRACT MOD 2C 99903: 43.9
LV EJECT FRACT MOD 4C 99902: 52.24
LV EJECT FRACT MOD BP 99901: 47.71
LYMPHOCYTES # BLD AUTO: 0.51 K/UL (ref 1–4.8)
LYMPHOCYTES NFR BLD: 15.7 % (ref 22–41)
MCH RBC QN AUTO: 34.5 PG (ref 27–33)
MCHC RBC AUTO-ENTMCNC: 32.5 G/DL (ref 33.7–35.3)
MCV RBC AUTO: 106 FL (ref 81.4–97.8)
MONOCYTES # BLD AUTO: 0.5 K/UL (ref 0–0.85)
MONOCYTES NFR BLD AUTO: 15.4 % (ref 0–13.4)
NEUTROPHILS # BLD AUTO: 2.15 K/UL (ref 1.82–7.42)
NEUTROPHILS NFR BLD: 66.2 % (ref 44–72)
NRBC # BLD AUTO: 0 K/UL
NRBC BLD-RTO: 0 /100 WBC
PLATELET # BLD AUTO: 132 K/UL (ref 164–446)
PMV BLD AUTO: 9.5 FL (ref 9–12.9)
POTASSIUM SERPL-SCNC: 4.1 MMOL/L (ref 3.6–5.5)
POTASSIUM SERPL-SCNC: 4.3 MMOL/L (ref 3.6–5.5)
PROT SERPL-MCNC: 6.8 G/DL (ref 6–8.2)
PROT SERPL-MCNC: 6.9 G/DL (ref 6–8.2)
RBC # BLD AUTO: 3.16 M/UL (ref 4.7–6.1)
SODIUM SERPL-SCNC: 137 MMOL/L (ref 135–145)
SODIUM SERPL-SCNC: 138 MMOL/L (ref 135–145)
T4 FREE SERPL-MCNC: 0.81 NG/DL (ref 0.53–1.43)
TSH SERPL DL<=0.005 MIU/L-ACNC: 0.84 UIU/ML (ref 0.38–5.33)
VIT B12 SERPL-MCNC: 347 PG/ML (ref 211–911)
WBC # BLD AUTO: 3.3 K/UL (ref 4.8–10.8)

## 2020-02-12 PROCEDURE — 700105 HCHG RX REV CODE 258: Performed by: STUDENT IN AN ORGANIZED HEALTH CARE EDUCATION/TRAINING PROGRAM

## 2020-02-12 PROCEDURE — 93005 ELECTROCARDIOGRAM TRACING: CPT | Performed by: STUDENT IN AN ORGANIZED HEALTH CARE EDUCATION/TRAINING PROGRAM

## 2020-02-12 PROCEDURE — 83540 ASSAY OF IRON: CPT

## 2020-02-12 PROCEDURE — 770020 HCHG ROOM/CARE - TELE (206)

## 2020-02-12 PROCEDURE — 84443 ASSAY THYROID STIM HORMONE: CPT

## 2020-02-12 PROCEDURE — 80053 COMPREHEN METABOLIC PANEL: CPT

## 2020-02-12 PROCEDURE — 36415 COLL VENOUS BLD VENIPUNCTURE: CPT

## 2020-02-12 PROCEDURE — 85025 COMPLETE CBC W/AUTO DIFF WBC: CPT

## 2020-02-12 PROCEDURE — 99223 1ST HOSP IP/OBS HIGH 75: CPT | Mod: GC | Performed by: HOSPITALIST

## 2020-02-12 PROCEDURE — 700102 HCHG RX REV CODE 250 W/ 637 OVERRIDE(OP): Performed by: STUDENT IN AN ORGANIZED HEALTH CARE EDUCATION/TRAINING PROGRAM

## 2020-02-12 PROCEDURE — 93306 TTE W/DOPPLER COMPLETE: CPT | Mod: 26 | Performed by: INTERNAL MEDICINE

## 2020-02-12 PROCEDURE — A9270 NON-COVERED ITEM OR SERVICE: HCPCS | Performed by: STUDENT IN AN ORGANIZED HEALTH CARE EDUCATION/TRAINING PROGRAM

## 2020-02-12 PROCEDURE — 82746 ASSAY OF FOLIC ACID SERUM: CPT

## 2020-02-12 PROCEDURE — 93306 TTE W/DOPPLER COMPLETE: CPT

## 2020-02-12 PROCEDURE — 84439 ASSAY OF FREE THYROXINE: CPT

## 2020-02-12 PROCEDURE — 82607 VITAMIN B-12: CPT

## 2020-02-12 PROCEDURE — 82728 ASSAY OF FERRITIN: CPT

## 2020-02-12 RX ORDER — OMEPRAZOLE 20 MG/1
20 CAPSULE, DELAYED RELEASE ORAL DAILY
Status: DISCONTINUED | OUTPATIENT
Start: 2020-02-12 | End: 2020-02-13 | Stop reason: HOSPADM

## 2020-02-12 RX ORDER — CARVEDILOL 25 MG/1
25 TABLET ORAL 2 TIMES DAILY WITH MEALS
Status: DISCONTINUED | OUTPATIENT
Start: 2020-02-12 | End: 2020-02-13 | Stop reason: HOSPADM

## 2020-02-12 RX ORDER — BUDESONIDE AND FORMOTEROL FUMARATE DIHYDRATE 160; 4.5 UG/1; UG/1
2 AEROSOL RESPIRATORY (INHALATION)
Status: DISCONTINUED | OUTPATIENT
Start: 2020-02-12 | End: 2020-02-13 | Stop reason: HOSPADM

## 2020-02-12 RX ORDER — ATORVASTATIN CALCIUM 40 MG/1
40 TABLET, FILM COATED ORAL DAILY
Status: DISCONTINUED | OUTPATIENT
Start: 2020-02-12 | End: 2020-02-13 | Stop reason: HOSPADM

## 2020-02-12 RX ORDER — LOSARTAN POTASSIUM 50 MG/1
50 TABLET ORAL 2 TIMES DAILY
Status: DISCONTINUED | OUTPATIENT
Start: 2020-02-12 | End: 2020-02-13 | Stop reason: HOSPADM

## 2020-02-12 RX ORDER — ESCITALOPRAM OXALATE 10 MG/1
10 TABLET ORAL DAILY
Status: DISCONTINUED | OUTPATIENT
Start: 2020-02-12 | End: 2020-02-13 | Stop reason: HOSPADM

## 2020-02-12 RX ORDER — ASPIRIN 81 MG/1
81 TABLET, CHEWABLE ORAL EVERY EVENING
Status: DISCONTINUED | OUTPATIENT
Start: 2020-02-12 | End: 2020-02-13 | Stop reason: HOSPADM

## 2020-02-12 RX ADMIN — ESCITALOPRAM OXALATE 10 MG: 10 TABLET ORAL at 10:15

## 2020-02-12 RX ADMIN — ASPIRIN 81 MG 81 MG: 81 TABLET ORAL at 17:35

## 2020-02-12 RX ADMIN — CARVEDILOL 25 MG: 25 TABLET, FILM COATED ORAL at 17:35

## 2020-02-12 RX ADMIN — LOSARTAN POTASSIUM 50 MG: 50 TABLET, FILM COATED ORAL at 17:35

## 2020-02-12 RX ADMIN — RIVAROXABAN 20 MG: 20 TABLET, FILM COATED ORAL at 17:35

## 2020-02-12 RX ADMIN — ATORVASTATIN CALCIUM 40 MG: 40 TABLET, FILM COATED ORAL at 10:16

## 2020-02-12 RX ADMIN — ACETAMINOPHEN 650 MG: 325 TABLET, FILM COATED ORAL at 17:41

## 2020-02-12 RX ADMIN — SODIUM CHLORIDE: 9 INJECTION, SOLUTION INTRAVENOUS at 17:36

## 2020-02-12 RX ADMIN — LOSARTAN POTASSIUM 50 MG: 50 TABLET, FILM COATED ORAL at 10:15

## 2020-02-12 RX ADMIN — OSELTAMIVIR PHOSPHATE 75 MG: 75 CAPSULE ORAL at 17:36

## 2020-02-12 RX ADMIN — CARVEDILOL 25 MG: 25 TABLET, FILM COATED ORAL at 10:16

## 2020-02-12 RX ADMIN — OSELTAMIVIR PHOSPHATE 75 MG: 75 CAPSULE ORAL at 06:57

## 2020-02-12 ASSESSMENT — ENCOUNTER SYMPTOMS
HEADACHES: 1
WEIGHT LOSS: 0
DOUBLE VISION: 0
CONSTIPATION: 0
WEAKNESS: 0
POLYDIPSIA: 0
DIZZINESS: 0
FEVER: 0
HEMOPTYSIS: 0
BRUISES/BLEEDS EASILY: 0
ORTHOPNEA: 0
MYALGIAS: 1
TREMORS: 0
TINGLING: 0
PALPITATIONS: 0
SINUS PAIN: 0
DIAPHORESIS: 0
SHORTNESS OF BREATH: 1
BACK PAIN: 0
HEADACHES: 0
SHORTNESS OF BREATH: 0
DIARRHEA: 0
PHOTOPHOBIA: 0
ABDOMINAL PAIN: 0
DEPRESSION: 0
PSYCHIATRIC NEGATIVE: 1
CHILLS: 1
VOMITING: 0
WHEEZING: 0
NECK PAIN: 0
SORE THROAT: 0
SPUTUM PRODUCTION: 1
BLOOD IN STOOL: 0
NAUSEA: 0
COUGH: 1
SPUTUM PRODUCTION: 0
BLURRED VISION: 0

## 2020-02-12 ASSESSMENT — COGNITIVE AND FUNCTIONAL STATUS - GENERAL
SUGGESTED CMS G CODE MODIFIER MOBILITY: CJ
MOBILITY SCORE: 22
MOVING TO AND FROM BED TO CHAIR: A LITTLE
DAILY ACTIVITIY SCORE: 24
SUGGESTED CMS G CODE MODIFIER DAILY ACTIVITY: CH
TURNING FROM BACK TO SIDE WHILE IN FLAT BAD: A LITTLE

## 2020-02-12 ASSESSMENT — PATIENT HEALTH QUESTIONNAIRE - PHQ9
1. LITTLE INTEREST OR PLEASURE IN DOING THINGS: NOT AT ALL
2. FEELING DOWN, DEPRESSED, IRRITABLE, OR HOPELESS: NOT AT ALL
SUM OF ALL RESPONSES TO PHQ9 QUESTIONS 1 AND 2: 0

## 2020-02-12 ASSESSMENT — LIFESTYLE VARIABLES
EVER_SMOKED: NEVER
EVER_SMOKED: NEVER
SUBSTANCE_ABUSE: 0

## 2020-02-12 NOTE — ED NOTES
Pt given morning Tamiflu and is resting in bed in no apparent distress. Pt's breaths are even and unlabored at this time. Report given to IBETH Sears.

## 2020-02-12 NOTE — ED NOTES
Patient ambulated to the restroom    Returned to room, states he did not sleep at all last night, really wants to sleep now but uses BIPAP at night     RN with notify admitting team     Hourly rounding completed  Patient is up to date on current plan of care  Patient is resting comfortably in bed   Patient denies any need for use of the restroom, denies need for repositioning, denies need for any comfort care at this time

## 2020-02-12 NOTE — ASSESSMENT & PLAN NOTE
"- Drinks 3-4 cocktails \"3-4\" nights per week.   - Counseled extensively on abstaining from alcohol use.   "

## 2020-02-12 NOTE — ED NOTES
Breakfast tray bedside    Hourly rounding completed  Patient is up to date on current plan of care  Patient is resting comfortably in bed   Patient denies any need for use of the restroom, denies need for repositioning, denies need for any comfort care at this time

## 2020-02-12 NOTE — ED NOTES
Received report from IBETH Monet. Pt is currently being transported from [place] [address] to [place] [address]. Will monitor pt upon arrival to [place] [address].

## 2020-02-12 NOTE — NON-PROVIDER
Daily Progress Note:     Date of Service: 2/12/2020  Primary Team:    Attending: Vlad Lima M.D.   Senior Resident: Dr. Ochoa   Intern: Dr. Avalos  Contact:  [phone]    Reason for visit: Atrial fibrillation and Influenza     HPI:  Pt is a 73 YO M with a PMH of CAD s/p RCA 3 stents, MI 2003, PE 2003 2004, HTN, dyslipidemia, SHYAM 3L O2 presenting with a 1 day hx of subjective fevers, chills, congestion, and fatigue. Yesterday pt went to Urgent Care where he got a nasal swab that tested positive for influenza A. An EKG was also done which showed new onset asymptomatic Atrial Fibrillation, prompting him to go to the ED and get admitted at [facility]. He denied any chest pain, dizziness, and weakness. He has a family hx of MI and colon cancer in his father. Pt also reported that he drinks 9 drinks/week.    Pt denies any acute events overnight. He primarily complains of SOB upon exertion and cough productive of white-yellow sputum. He was on 2L O2 NC, which was d/c this AM and had normal O2 sats. He also has congestion, rhinorrhea, headache, and fatigue.  Pt explains that he has chest pain when he coughs. It does not change occur during inspiration. He denies any dizziness, weakness and palpitations.     Review of Systems:    Review of Systems   Constitutional: Positive for chills and malaise/fatigue. Negative for fever.   HENT: Positive for congestion. Negative for hearing loss, sinus pain, sore throat and tinnitus.    Eyes: Negative for blurred vision and double vision.   Respiratory: Positive for cough, sputum production and shortness of breath. Negative for hemoptysis and wheezing.         Yellow-white sputum production   Cardiovascular: Positive for chest pain. Negative for palpitations and leg swelling.        Chest pain only during cough. No pleuritic pain.   Gastrointestinal: Negative for abdominal pain, blood in stool, constipation, diarrhea, nausea and vomiting.   Genitourinary: Negative.     Musculoskeletal: Positive for myalgias.   Skin: Negative for itching and rash.   Neurological: Positive for headaches. Negative for dizziness, tingling, tremors and weakness.   Endo/Heme/Allergies: Does not bruise/bleed easily.   Psychiatric/Behavioral: Negative.        Objective Data:   Physical Exam:   Vitals:   Temp:  [37.5 °C (99.5 °F)] 37.5 °C (99.5 °F)  Pulse:  [] 81  Resp:  [16-20] 20  BP: (125-178)/() 145/95  SpO2:  [88 %-96 %] 94 %     Physical Exam  Constitutional:       General: He is not in acute distress.     Appearance: Normal appearance. He is obese. He is ill-appearing. He is not toxic-appearing.   HENT:      Head: Normocephalic and atraumatic.      Nose: Congestion and rhinorrhea present.      Mouth/Throat:      Mouth: Mucous membranes are moist.      Pharynx: Oropharynx is clear. No oropharyngeal exudate.   Eyes:      Extraocular Movements: Extraocular movements intact.      Pupils: Pupils are equal, round, and reactive to light.   Neck:      Musculoskeletal: Neck supple.   Cardiovascular:      Rate and Rhythm: Normal rate. Rhythm irregular.      Pulses: Normal pulses.      Heart sounds: No murmur. No friction rub. No gallop.    Pulmonary:      Effort: Pulmonary effort is normal. No respiratory distress.      Breath sounds: Normal breath sounds. No wheezing, rhonchi or rales.   Abdominal:      General: Bowel sounds are normal. There is no distension.      Palpations: Abdomen is soft. There is no mass.      Tenderness: There is no abdominal tenderness. There is no guarding or rebound.      Hernia: No hernia is present.   Genitourinary:     Rectum: Guaiac result negative.   Musculoskeletal:         General: No swelling.   Lymphadenopathy:      Cervical: No cervical adenopathy.   Skin:     General: Skin is warm.      Capillary Refill: Capillary refill takes less than 2 seconds.   Neurological:      General: No focal deficit present.      Mental Status: He is alert and oriented to person,  place, and time.   Psychiatric:         Mood and Affect: Mood normal.         Behavior: Behavior normal.       Labs:   Recent Labs     02/11/20  1832 02/12/20  0600   WBC 4.0* 3.3*   RBC 3.42* 3.16*   HEMOGLOBIN 11.8* 10.9*   HEMATOCRIT 36.0* 33.5*   .3* 106.0*   MCH 34.5* 34.5*   RDW 59.9* 62.1*   PLATELETCT 122* 132*   MPV 8.9* 9.5   NEUTSPOLYS 77.70* 66.20   LYMPHOCYTES 8.60* 15.70*   MONOCYTES 11.60 15.40*   EOSINOPHILS 1.30 1.50   BASOPHILS 0.50 0.90     Recent Labs     02/11/20 1832 02/12/20  0600   SODIUM 139 138   POTASSIUM 4.1 4.1   CHLORIDE 104 105   CO2 28 27   GLUCOSE 89 120*   BUN 24* 21     Recent Labs     02/11/20 1832 02/12/20  0600   ALBUMIN 4.1 3.9   TBILIRUBIN 0.9 0.6   ALKPHOSPHAT 79 74   TOTPROTEIN 7.4 6.9   ALTSGPT 43 48   ASTSGOT 37 45   CREATININE 1.11 1.08     Abnormal ED Labs   PROBRAIN NATRIURETIC PEPTIDE, NT - Abnormal; Notable for the following components:    NT-proBNP 1984 (*)     All other components within normal limits   URINALYSIS,CULTURE IF INDICATED - Abnormal; Notable for the following components:    Protein 100 (*)     All other components within normal limits   URINE MICROSCOPIC (W/UA) - Abnormal; Notable for the following components:    WBC 0-2 (*)     RBC 0-2 (*)     All other components within normal limits   COMP METABOLIC PANEL - Abnormal; Notable for the following components:    Glucose 120 (*)     Calcium 8.3 (*)     All other components within normal limits   IRON - Abnormal; Notable for the following components:    Iron 18 (*)     All other components within normal limits   B12 347   Folate 17  Ferritin 101.8    Imaging:   CXR 2/11   IMPRESSION:  1.  Postoperative changes as described.  2.  Prominent RIGHT hilar structures again seen, likely vascular although adenopathy is not excluded.  3.  Stable cardiomegaly.    Pt is a 73 YO M with a PMH of CAD, MI, SHYAM, HTN, dyslipidemia presenting with atrial fibrillation with atrial flutter changes and influenza A.  Vitals upon ED admission, pt satisfied 1/4 SIRS (). CXR showed R hilar abnormalities and cardiomegaly. However, CHF exacerbation is unlikely as PE was negative for crackles, no peripheral edema, trop and BNP were not concerning for CHF exacerbation.  Echo is pending.     Influenza A  1. Tamiflu 75 mg 2 times daily  2. Guaifenesin 10 mL  3. Acetaminophen 650 mg  4. Albuterol 2.5mg/.5ml nebulizer solution   5. RT per protocol    Atrial Fibrillation  Afib is new onset with undetermined cause. It is possible that he will return to regular rhythm after influenza resolution. Cause may also be cardiac, which can be further evaluated once Echo results return.   1. Aspirin 81 mg  2. Atorvastatin 40 mg  3. Carvedilol 25 mg  4. Rivaroxaban 10 mg  5. EKG  6. Outpatient management with cardiology in March    Macrocytic anemia and Leukopenia  Pt had an  with normal B12 and folate levels. Anemia is consistent with pt's hx of alcohol use. Iron is 18; however, MCV does not suggest microcytic anemia. F/o may be necessary to r/o for GI bleed. Guaiac is negative. 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  1. Alcohol use counseling   2. Outpatient f/o for colonoscopy 2020  3. TSH + free T4 r/o anemia secondary to hypothyroidism     Hypertension  1. Losartan 50 mg twice daily    Dyslipidemia  1. Atorvastatin 40 mg daily    Depression  1. escitalopram 10 mg daily     Prophylaxis  1. Bowel regimen   2. SCDs

## 2020-02-12 NOTE — ED NOTES
Blood redrawn and sent to lab. Pt placed on 2L O2 via NC as he wears O2 at home when he is sleeping. Pt dozing in bed with even and unlabored breaths, no signs of distress noted at this time. Will continue to monitor pt while awaiting room assignment in hospital.

## 2020-02-12 NOTE — ED NOTES
BSSR to Tele RN   Patient chart and current status reviewed with oncoming RN and all questions answered  This RN's primary care of patient terminated at this time     Monitored transport to the floor at this time

## 2020-02-12 NOTE — PROGRESS NOTES
"Subjective:      SIN Cloud is a 74 y.o. male who presents with Cough (x last night, some productive cough, shortness of breath, fever, chill and bodyaches)        Cough   This is a new problem. The current episode started yesterday. The problem has been gradually worsening. The problem occurs constantly. The cough is non-productive. Associated symptoms include chills, headaches and rhinorrhea. Pertinent negatives include no chest pain, fever, hemoptysis, nasal congestion, sore throat, shortness of breath or wheezing. Nothing aggravates the symptoms. Treatments tried: Cold and flu meds, mucinex. The treatment provided moderate relief. There is no history of asthma.   Onset of symptoms yesterday with chills.     First time in years he did not get a flu shot.   Uses oxygen at night with BiPap.     He denies any chest pain, dizziness, lightheadedness, difficulty breathing, wheezing.    Patient has significant history of old myocardial infarction, coronary artery disease due to lipid rich plaque, pulmonary embolism, dyslipidemia.    Patient denies history of any irregular heartbeat or atrial fibrillation.    Review of Systems   Constitutional: Positive for chills. Negative for fever.   HENT: Positive for rhinorrhea. Negative for sore throat.    Eyes: Negative.    Respiratory: Positive for cough. Negative for hemoptysis, shortness of breath and wheezing.    Cardiovascular: Negative for chest pain.   Gastrointestinal: Negative for abdominal pain, diarrhea, nausea and vomiting.   Genitourinary: Negative.    Skin: Negative.    Neurological: Positive for headaches. Negative for dizziness.          Objective:     /80 (BP Location: Left arm, Patient Position: Sitting, BP Cuff Size: Large adult)   Pulse (!) 102   Temp 37.3 °C (99.1 °F) (Temporal)   Resp 20   Ht 1.93 m (6' 4\")   Wt 119.3 kg (263 lb)   SpO2 93%   BMI 32.01 kg/m²      Physical Exam  Vitals signs reviewed.   Constitutional:       Appearance: " Normal appearance.   HENT:      Nose: Rhinorrhea present.      Mouth/Throat:      Mouth: Mucous membranes are moist.      Pharynx: Oropharynx is clear. No oropharyngeal exudate or posterior oropharyngeal erythema.   Eyes:      Conjunctiva/sclera: Conjunctivae normal.   Cardiovascular:      Rate and Rhythm: Tachycardia present. Rhythm irregular.      Heart sounds: No murmur. No friction rub. No gallop.    Pulmonary:      Effort: Pulmonary effort is normal. No respiratory distress.      Breath sounds: Normal breath sounds. No wheezing, rhonchi or rales.   Skin:     General: Skin is warm and dry.   Neurological:      General: No focal deficit present.      Mental Status: He is alert and oriented to person, place, and time.   Psychiatric:         Mood and Affect: Mood normal.         Behavior: Behavior normal.       Past Medical History:   Diagnosis Date   • CAD (coronary artery disease) 2003    PCI/BMS x 3 to the RCA (Zeta 4.0 x 23mm, 3.5 x 23mm, 3.0 x 18mm).   • Central sleep apnea      ICD-10 transition   • Chronic anticoagulation    • Depression    • Depressive disorder, not elsewhere classified         • Dyslipidemia    • Hypertension    • Hypothyroidism    • Mixed hyperlipidemia    • Obesity    • Old myocardial infarction January 2003        • Peptic ulcer disease 8/4/2016   • Personal history of venous thrombosis and embolism     On Xarelto   • Pulmonary embolism (HCC)    • Sleep apnea     BiPAP with oxygen      Past Surgical History:   Procedure Laterality Date   • WRIST ORIF Left 2/23/2018    Procedure: WRIST ORIF;  Surgeon: Jasper Hammond M.D.;  Location: [facility];  Service: Orthopedics   • INCISION AND DRAINAGE ORTHOPEDIC  8/29/2014    Performed by Wolfgang Merrill M.D. at SUR[facility]   • CARDIAC CATH, RIGHT/LEFT HEART  2003 01/2003 4.0x23mm Zeta stent to proximal RCA,3.5x22mm distal to first stent, 3.0x15mm at the point of original occlusion.   • BOWEL RESECTION     • PB  ANESTH,LOWER LEG ARTERIES SURG        Social History     Socioeconomic History   • Marital status:      Spouse name: Not on file   • Number of children: Not on file   • Years of education: Not on file   • Highest education level: Not on file   Occupational History   • Not on file   Social Needs   • Financial resource strain: Not on file   • Food insecurity:     Worry: Not on file     Inability: Not on file   • Transportation needs:     Medical: Not on file     Non-medical: Not on file   Tobacco Use   • Smoking status: Never Smoker   • Smokeless tobacco: Never Used   Substance and Sexual Activity   • Alcohol use: Yes     Comment: 6 drinks a week    • Drug use: No   • Sexual activity: Not on file   Lifestyle   • Physical activity:     Days per week: Not on file     Minutes per session: Not on file   • Stress: Not on file   Relationships   • Social connections:     Talks on phone: Not on file     Gets together: Not on file     Attends [employer] service: Not on file     Active member of club or organization: Not on file     Attends meetings of clubs or organizations: Not on file     Relationship status: Not on file   • Intimate partner violence:     Fear of current or ex partner: Not on file     Emotionally abused: Not on file     Physically abused: Not on file     Forced sexual activity: Not on file   Other Topics Concern   • Not on file   Social History Narrative   • Not on file    Lisinopril       Assessment/Plan:     1. Influenza A    - POCT Influenza A/B - Positive A  - oseltamivir (TAMIFLU) 75 MG Cap; Take 1 Cap by mouth 2 times a day for 5 days.  Dispense: 10 Cap; Refill: 0  - EKG - Clinic Performed    2. Atrial fibrillation, new onset (HCC)    - EKG - Clinic Performed    3. Abnormal Q waves on electrocardiogram    Patient has significant history of old myocardial infarction, coronary artery disease due to lipid rich plaque, pulmonary embolism, dyslipidemia.    Patient denies history of any irregular  heartbeat or atrial fibrillation.    Discussed with patient signs and symptoms are consistent with positive influenza A.     On examination, it was noticed that he had an irregular heartbeat.  Per patient, he is never had irregular heartbeat or atrial fibrillation before.  EKG showed atrial fibrillation and abnormal Q waves. Last cardiology visit showed no A-fib or irregular heart rhythm on examination.     Due to findings here in the clinic, significant history of old MI, coronary artery disease, and PE, agreed to call EMS for transfer over to emergency department for further evaluation and treatment.  Patient understood and agreed to plan of care.     Overall, the patient is stable for transfer.  Report given to EMS.  Summary of visit, vital signs and copy of EKG given to EMS.

## 2020-02-12 NOTE — ASSESSMENT & PLAN NOTE
WBC 3.3, Hgb 10.9, platelets 132.  .    Given patient's history of chronic alcohol use, pancytopenia likely secondary to that.  Folate, vitamin B12 appropriate.  Guaiac negative.   Counseled patient extensively on abstaining from alcohol use.

## 2020-02-12 NOTE — ED NOTES
Pt BIB EMS from  for positive flu and new onset A-fib. Patient denies any pain. Placed in gown and VSS in place.

## 2020-02-12 NOTE — ED NOTES
Repositioned in bed  Hourly rounding completed  Patient is up to date on current plan of care  Patient is resting comfortably in bed   Patient denies any need for use of the restroom, denies need for repositioning, denies need for any comfort care at this time

## 2020-02-12 NOTE — CARE PLAN
Problem: Respiratory:  Goal: Respiratory status will improve  Outcome: PROGRESSING AS EXPECTED     Problem: Safety  Goal: Will remain free from injury  Outcome: PROGRESSING AS EXPECTED  Goal: Will remain free from falls  Outcome: PROGRESSING AS EXPECTED

## 2020-02-12 NOTE — PROGRESS NOTES
Received report from IBETH Sutton. Assumed care of patient at 1500. Patient A&Ox 4, speaking in full sentences, follows commands and responds appropriately to questions. On 2 L NC, no signs of respiratory distress. Respirations are even and unlabored. Patient states 0/10 pain at this time. POC discussed and agreed upon with patient. Call light and belongings within reach. Bed in lowest locked position. Upper side rails raised. Bed alarm on. Fall risk precautions in place. Hourly rounding. Will continue to monitor.

## 2020-02-12 NOTE — ED NOTES
Echo at bedside, patient still remains on the cardiac monitor, a-fib, no acute distress     Hourly rounding completed  Patient is up to date on current plan of care  Patient is resting comfortably in bed   Patient denies any need for use of the restroom, denies need for repositioning, denies need for any comfort care at this time

## 2020-02-12 NOTE — ED PROVIDER NOTES
ED Provider Note    CHIEF COMPLAINT  Chief Complaint   Patient presents with   • Sent by MD     sent from  for positive flu and new onset A-fib       HPI  Anthony Cloud is a 74 y.o. male sent from urgent care out of concern for new onset atrial fibrillation in the setting of influenza A.  Patient has been feeling chills and has had a cough with unmeasured fevers at home since yesterday afternoon.  He notes more fatigue than usual and does feel short of breath.  He notes that he uses oxygen at night and a concentrator form with his CPAP machine.    REVIEW OF SYSTEMS  Constitutional: Subjective fevers, chills, fatigue noted.  Skin: No rashes  HEENT: Mild sore throat with runny nose.  Neck: No neck pain, stiffness, or masses.  Chest: No pain or rashes  Pulm: Shortness of breath with cough.  No wheezing or stridor noted.  No pain with inspiration or expiration.  Gastrointestinal: No nausea, vomiting, diarrhea, constipation, bloating, melena, hematochezia or pain.  Genitourinary: No dysuria or hematuria  Musculoskeletal: No recent trauma, pain, swelling, weakness  Neurologic: No sensory or motor changes. No confusion or disorientation.  Heme: Bruises and bleeds easily due to Xarelto use.  History of pulmonary embolism  Immuno: No hx of recurrent infections      PAST MEDICAL HISTORY   has a past medical history of CAD (coronary artery disease) (2003), Central sleep apnea, Chronic anticoagulation, Depression, Depressive disorder, not elsewhere classified, Dyslipidemia, Hypertension, Hypothyroidism, Mixed hyperlipidemia, Obesity, Old myocardial infarction (January 2003), Peptic ulcer disease (8/4/2016), Personal history of venous thrombosis and embolism, Pulmonary embolism (HCC), and Sleep apnea.    SOCIAL HISTORY  Social History     Tobacco Use   • Smoking status: Never Smoker   • Smokeless tobacco: Never Used   Substance and Sexual Activity   • Alcohol use: Yes     Comment: 6 drinks a week    • Drug use: No  "  • Sexual activity: Not on file       SURGICAL HISTORY   has a past surgical history that includes bowel resection; incision and drainage orthopedic (8/29/2014); cardiac cath, right/left heart (2003); anesth,lower leg arteries surg; and wrist orif (Left, 2/23/2018).    CURRENT MEDICATIONS  Home Medications     Reviewed by Kyung Parkinson (Pharmacy Tech) on 02/11/20 at 2048  Med List Status: Complete   Medication Last Dose Status   aspirin (ASA) 81 MG Chew Tab chewable tablet 2/10/2020 Active   atorvastatin (LIPITOR) 40 MG Tab 2/10/2020 Active   BREO ELLIPTA 200-25 MCG/INH AEROSOL POWDER, BREATH ACTIVATED 2/11/2020 Active   carvedilol (COREG) 25 MG Tab 2/11/2020 Active   escitalopram (LEXAPRO) 10 MG Tab 2/10/2020 Active   losartan (COZAAR) 50 MG Tab 2/11/2020 Active   omeprazole (PRILOSEC) 20 MG delayed-release capsule 2/11/2020 Active   rivaroxaban (XARELTO) 10 MG Tab tablet 2/10/2020 Active                ALLERGIES  Allergies   Allergen Reactions   • Lisinopril      cough       PHYSICAL EXAM  VITAL SIGNS: /88   Pulse (!) 104   Temp 37.5 °C (99.5 °F) (Oral)   Resp 20   Ht 1.93 m (6' 4\")   Wt 113.4 kg (250 lb)   SpO2 95%   BMI 30.43 kg/m²    Gen: Appears tired, otherwise alert  HEENT: No signs of trauma, Bilateral external ears normal, Nose normal. Conjunctiva normal, Non-icteric.   Neck:  No tenderness, Supple, No masses  Lymphatic: No cervical lymphadenopathy noted.   Cardiovascular: Irregularly irregular mild tachycardia.  Capillary refill less than 3 seconds to all extremities, 2+ distal pulses.  Thorax & Lungs: No tachypnea.  No retractions.  No wheezing or rales noted.  Abdomen: Bowel sounds normal, Soft, No tenderness, No masses, No pulsatile masses. No Guarding or rebound  Skin: Warm, Dry, No erythema, No rash.   Extremities: Intact distal pulses, No edema  Neurologic: Alert , no facial droop, grossly normal coordination and strength  Psychiatric: Affect normal      LABS  Results for orders " placed or performed during the hospital encounter of 02/11/20   CBC WITH DIFFERENTIAL   Result Value Ref Range    WBC 4.0 (L) 4.8 - 10.8 K/uL    RBC 3.42 (L) 4.70 - 6.10 M/uL    Hemoglobin 11.8 (L) 14.0 - 18.0 g/dL    Hematocrit 36.0 (L) 42.0 - 52.0 %    .3 (H) 81.4 - 97.8 fL    MCH 34.5 (H) 27.0 - 33.0 pg    MCHC 32.8 (L) 33.7 - 35.3 g/dL    RDW 59.9 (H) 35.9 - 50.0 fL    Platelet Count 122 (L) 164 - 446 K/uL    MPV 8.9 (L) 9.0 - 12.9 fL    Neutrophils-Polys 77.70 (H) 44.00 - 72.00 %    Lymphocytes 8.60 (L) 22.00 - 41.00 %    Monocytes 11.60 0.00 - 13.40 %    Eosinophils 1.30 0.00 - 6.90 %    Basophils 0.50 0.00 - 1.80 %    Immature Granulocytes 0.30 0.00 - 0.90 %    Nucleated RBC 0.00 /100 WBC    Neutrophils (Absolute) 3.07 1.82 - 7.42 K/uL    Lymphs (Absolute) 0.34 (L) 1.00 - 4.80 K/uL    Monos (Absolute) 0.46 0.00 - 0.85 K/uL    Eos (Absolute) 0.05 0.00 - 0.51 K/uL    Baso (Absolute) 0.02 0.00 - 0.12 K/uL    Immature Granulocytes (abs) 0.01 0.00 - 0.11 K/uL    NRBC (Absolute) 0.00 K/uL   COMP METABOLIC PANEL   Result Value Ref Range    Sodium 139 135 - 145 mmol/L    Potassium 4.1 3.6 - 5.5 mmol/L    Chloride 104 96 - 112 mmol/L    Co2 28 20 - 33 mmol/L    Anion Gap 7.0 0.0 - 11.9    Glucose 89 65 - 99 mg/dL    Bun 24 (H) 8 - 22 mg/dL    Creatinine 1.11 0.50 - 1.40 mg/dL    Calcium 8.6 8.5 - 10.5 mg/dL    AST(SGOT) 37 12 - 45 U/L    ALT(SGPT) 43 2 - 50 U/L    Alkaline Phosphatase 79 30 - 99 U/L    Total Bilirubin 0.9 0.1 - 1.5 mg/dL    Albumin 4.1 3.2 - 4.9 g/dL    Total Protein 7.4 6.0 - 8.2 g/dL    Globulin 3.3 1.9 - 3.5 g/dL    A-G Ratio 1.2 g/dL   TROPONIN   Result Value Ref Range    Troponin T 9 6 - 19 ng/L   proBrain Natriuretic Peptide, NT   Result Value Ref Range    NT-proBNP 1984 (H) 0 - 125 pg/mL   LACTIC ACID   Result Value Ref Range    Lactic Acid 1.2 0.5 - 2.0 mmol/L   URINALYSIS CULTURE, IF INDICATED   Result Value Ref Range    Color Yellow     Character Clear     Specific Gravity 1.023  <1.035    Ph 6.5 5.0 - 8.0    Glucose Negative Negative mg/dL    Ketones Negative Negative mg/dL    Protein 100 (A) Negative mg/dL    Bilirubin Negative Negative    Urobilinogen, Urine 1.0 Negative    Nitrite Negative Negative    Leukocyte Esterase Negative Negative    Occult Blood Negative Negative    Micro Urine Req Microscopic    URINE MICROSCOPIC (W/UA)   Result Value Ref Range    WBC 0-2 (A) /hpf    RBC 0-2 (A) /hpf    Bacteria Negative None /hpf    Epithelial Cells Negative /hpf    Hyaline Cast 0-2 /lpf   ESTIMATED GFR   Result Value Ref Range    GFR If African American >60 >60 mL/min/1.73 m 2    GFR If Non African American >60 >60 mL/min/1.73 m 2       RADIOLOGY  DX-CHEST-PORTABLE (1 VIEW)   Final Result      1.  Postoperative changes as described.   2.  Prominent RIGHT hilar structures again seen, likely vascular although adenopathy is not excluded.   3.  Stable cardiomegaly.      EC-ECHOCARDIOGRAM COMPLETE W/ CONT    (Results Pending)     Critical Care Note  Upon my evaluation, this patient had high probability of imminent and life-threatening deterioration due to influenza A, hypoxemic respiratory failure, new onset atrial fibrillation with rapid ventricular rate, which required my direct attention, intervention, and personal management. I personally provided 35 minutes of critical care time exclusive of time spent on separately billable procedures. Time includes review of laboratory data, radiology results, discussion with consultants, and monitoring for potential decompensation.     COURSE & MEDICAL DECISION MAKING  Patient's initial evaluation is concerning for possible complication related to new onset atrial fibrillation in the setting of influenza.  Of particular concern is the possibility of congestive heart failure or sepsis however the patient currently does not meet sepsis criteria.  He is noted to have a heart rate over 90 but is not febrile.  He is not tachypneic.  We will perform screening  labs including CBC, CMP, and cardiac enzymes and get a chest x-ray.  It is unclear how long the patient has been in atrial fibrillation as he does not remember a specific time of onset or set of symptoms that would indicate when it started.  It is notable however he is on Xarelto and is anticoagulated.  I am unclear if, in the setting of active influenza, electrical cardioversion in the emergency department is prudent.  Will result the diagnostic tests and reevaluate the patient.      10 PM  Patient remains mildly tachycardic however he is hemodynamically stable otherwise.  He is requiring supplemental oxygenation and there were findings that would suggest new onset congestive heart failure including an elevated BNP and increasing oxygen needs.  There is no overt pulmonary edema however given his comorbidities I feel he is unsafe for discharge and will require further treatment, observation, and evaluation in the hospital.  Patient will be admitted for observation by the [facility] medicine team.  He remained hemodynamically stable in the emergency department and did not require mechanical or positive pressure ventilation.     FINAL IMPRESSION  1. Atrial fibrillation, unspecified type (HCC)    2. Acute hypoxemic respiratory failure (HCC)    3. Influenza A        Electronically signed by: Vlad Lima M.D., 2/11/2020 5:42 PM

## 2020-02-12 NOTE — ASSESSMENT & PLAN NOTE
- Influenza A positive with chills, myalgias at home.     Plan:  - Continue Tamiflu, 5 day total course  - Tylenol PRN.

## 2020-02-12 NOTE — H&P
History & Physical Note    Date of Admission: 2/12/2020  Admission Status: Inpatient  [facility] Team: [facility]  Attending: Dr. Hook  Senior Resident: Dr. Ochoa  Intern: Dr. Aavlos  Contact Number: [phone]    Chief Complaint: Sent by MD (sent from  for positive flu and new onset A-fib)       History of Present Illness (HPI): Patient is a 74-year-old male with a history of hyperlipidemia, hypertension, and depression, presenting to emergency department with a one-day history of chills that began last night.  Symptoms were progressively worsening when he decided to report to an urgent care today.  EKG was completed and detected atrial fibrillation.  Denies prior events.  Attempted to take [place] and other over-the-counter such as Tylenol, and cold and cough over the counters.  Minimal symptom relief.  No alleviating or exacerbating factors.  No recent travels.  Denies sick contacts.  Endorses dyspnea and productive cough with green-yellow phlegm .  Also comments that has experienced myalgia over and generalized headaches over the past day.  He denies chest pain, fever, nausea, vomit.    Review of Systems: Review of Systems   Constitutional: Positive for chills. Negative for fever and malaise/fatigue.   HENT: Negative for ear pain and tinnitus.    Eyes: Negative for double vision and photophobia.   Respiratory: Positive for sputum production and shortness of breath. Negative for hemoptysis.    Cardiovascular: Negative for chest pain and palpitations.   Gastrointestinal: Negative for abdominal pain and nausea.   Genitourinary: Negative for frequency and urgency.   Musculoskeletal: Negative for back pain and neck pain.   Skin: Negative for itching and rash.   Neurological: Positive for headaches. Negative for tingling and tremors.   Endo/Heme/Allergies: Negative for environmental allergies. Does not bruise/bleed easily.   Psychiatric/Behavioral: Negative for substance abuse and suicidal ideas.         Past Medical History:    has a past medical history of CAD (coronary artery disease) (2003), Central sleep apnea, Chronic anticoagulation, Depression, Depressive disorder, not elsewhere classified, Dyslipidemia, Hypertension, Hypothyroidism, Mixed hyperlipidemia, Obesity, Old myocardial infarction (January 2003), Peptic ulcer disease (8/4/2016), Personal history of venous thrombosis and embolism, Pulmonary embolism (HCC), and Sleep apnea. He also has no past medical history of GERD (gastroesophageal reflux disease).    Past Surgical History: P has a past surgical history that includes bowel resection; incision and drainage orthopedic (8/29/2014); cardiac cath, right/left heart (2003); pr anesth,lower leg arteries surg; and wrist orif (Left, 2/23/2018).    Medications:   Prior to Admission Medications   Prescriptions Last Dose Informant Patient Reported? Taking?   BREO ELLIPTA 200-25 MCG/INH AEROSOL POWDER, BREATH ACTIVATED 2/11/2020 at am  No No   Sig: INHALE ONE PUFF BY MOUTH EVERY DAY   aspirin (ASA) 81 MG Chew Tab chewable tablet 2/10/2020 at Unknown time Patient Yes No   Sig: Take 81 mg by mouth every evening.   atorvastatin (LIPITOR) 40 MG Tab 2/10/2020 at Unknown time  No No   Sig: Take 1 Tab by mouth every day.   carvedilol (COREG) 25 MG Tab 2/11/2020 at am  No No   Sig: Take 1 Tab by mouth 2 times a day, with meals.   escitalopram (LEXAPRO) 10 MG Tab 2/10/2020 at hs  No No   Sig: TAKE ONE (1) TABLET BY MOUTH EVERY DAY   losartan (COZAAR) 50 MG Tab 2/11/2020 at am  No No   Sig: Take 1 Tab by mouth 2 Times a Day.   omeprazole (PRILOSEC) 20 MG delayed-release capsule 2/11/2020 at am  No No   Sig: Take 1 Cap by mouth every day.   rivaroxaban (XARELTO) 10 MG Tab tablet 2/10/2020 at Unknown time  No No   Sig: Take 1 Tab by mouth with dinner.      Facility-Administered Medications: None        Allergies:   Allergies   Allergen Reactions   • Lisinopril      cough       Family History:   family history includes  Heart Disease (age of onset: 60) in his father; Other in his mother.     Social History:   Tobacco: denies   Alcohol: weekend beer   Recreational drugs (illegal and prescription):  denies   Employment: no  Living situation:  Home, independent    Primary Care Provider:  Angel Cameron M.D.      Vitals:  Temp:  [37.5 °C (99.5 °F)] 37.5 °C (99.5 °F)  Pulse:  [] 73  Resp:  [16-20] 16  BP: (137-178)/() 137/83  SpO2:  [88 %-96 %] 94 %    Physical Exam    Labs:   Results for orders placed or performed during the hospital encounter of 02/11/20   CBC WITH DIFFERENTIAL   Result Value Ref Range    WBC 4.0 (L) 4.8 - 10.8 K/uL    RBC 3.42 (L) 4.70 - 6.10 M/uL    Hemoglobin 11.8 (L) 14.0 - 18.0 g/dL    Hematocrit 36.0 (L) 42.0 - 52.0 %    .3 (H) 81.4 - 97.8 fL    MCH 34.5 (H) 27.0 - 33.0 pg    MCHC 32.8 (L) 33.7 - 35.3 g/dL    RDW 59.9 (H) 35.9 - 50.0 fL    Platelet Count 122 (L) 164 - 446 K/uL    MPV 8.9 (L) 9.0 - 12.9 fL    Neutrophils-Polys 77.70 (H) 44.00 - 72.00 %    Lymphocytes 8.60 (L) 22.00 - 41.00 %    Monocytes 11.60 0.00 - 13.40 %    Eosinophils 1.30 0.00 - 6.90 %    Basophils 0.50 0.00 - 1.80 %    Immature Granulocytes 0.30 0.00 - 0.90 %    Nucleated RBC 0.00 /100 WBC    Neutrophils (Absolute) 3.07 1.82 - 7.42 K/uL    Lymphs (Absolute) 0.34 (L) 1.00 - 4.80 K/uL    Monos (Absolute) 0.46 0.00 - 0.85 K/uL    Eos (Absolute) 0.05 0.00 - 0.51 K/uL    Baso (Absolute) 0.02 0.00 - 0.12 K/uL    Immature Granulocytes (abs) 0.01 0.00 - 0.11 K/uL    NRBC (Absolute) 0.00 K/uL   COMP METABOLIC PANEL   Result Value Ref Range    Sodium 139 135 - 145 mmol/L    Potassium 4.1 3.6 - 5.5 mmol/L    Chloride 104 96 - 112 mmol/L    Co2 28 20 - 33 mmol/L    Anion Gap 7.0 0.0 - 11.9    Glucose 89 65 - 99 mg/dL    Bun 24 (H) 8 - 22 mg/dL    Creatinine 1.11 0.50 - 1.40 mg/dL    Calcium 8.6 8.5 - 10.5 mg/dL    AST(SGOT) 37 12 - 45 U/L    ALT(SGPT) 43 2 - 50 U/L    Alkaline Phosphatase 79 30 - 99 U/L    Total Bilirubin 0.9 0.1  - 1.5 mg/dL    Albumin 4.1 3.2 - 4.9 g/dL    Total Protein 7.4 6.0 - 8.2 g/dL    Globulin 3.3 1.9 - 3.5 g/dL    A-G Ratio 1.2 g/dL   TROPONIN   Result Value Ref Range    Troponin T 9 6 - 19 ng/L   proBrain Natriuretic Peptide, NT   Result Value Ref Range    NT-proBNP 1984 (H) 0 - 125 pg/mL   LACTIC ACID   Result Value Ref Range    Lactic Acid 1.2 0.5 - 2.0 mmol/L   URINALYSIS CULTURE, IF INDICATED   Result Value Ref Range    Color Yellow     Character Clear     Specific Gravity 1.023 <1.035    Ph 6.5 5.0 - 8.0    Glucose Negative Negative mg/dL    Ketones Negative Negative mg/dL    Protein 100 (A) Negative mg/dL    Bilirubin Negative Negative    Urobilinogen, Urine 1.0 Negative    Nitrite Negative Negative    Leukocyte Esterase Negative Negative    Occult Blood Negative Negative    Micro Urine Req Microscopic    URINE MICROSCOPIC (W/UA)   Result Value Ref Range    WBC 0-2 (A) /hpf    RBC 0-2 (A) /hpf    Bacteria Negative None /hpf    Epithelial Cells Negative /hpf    Hyaline Cast 0-2 /lpf   ESTIMATED GFR   Result Value Ref Range    GFR If African American >60 >60 mL/min/1.73 m 2    GFR If Non African American >60 >60 mL/min/1.73 m 2        EKG:   Results for orders placed or performed during the hospital encounter of 18   EKG   Result Value Ref Range    Report       [facility]    Test Date:  2018  Pt Name:    TALIB MCCLENDON           Department: [facility]  MRN:        [id number]                      Room:       [facility]  Gender:     Male                         Technician: [employer]  :        1945                   Requested By:MIKE QUIROZ  Order #:    [id number]                    Reading MD: David Gan MD    Measurements  Intervals                                Axis  Rate:       61                           P:          59  VA:         208                          QRS:        14  QRSD:       98                           T:          16  QT:         440  QTc:        444    Interpretive  Statements  SINUS RHYTHM  EARLY PRECORDIAL R/S TRANSITION  PROBABLE INFERIOR INFARCT, AGE INDETERMINATE  No previous ECG available for comparison    Electronically Signed On 2- 9:33:15 PST by David Gan MD          Imaging:   DX-CHEST-PORTABLE (1 VIEW)   Final Result      1.  Postoperative changes as described.   2.  Prominent RIGHT hilar structures again seen, likely vascular although adenopathy is not excluded.   3.  Stable cardiomegaly.      EC-ECHOCARDIOGRAM COMPLETE W/ CONT    (Results Pending)        Previous Data Review: Reviewed    AF (atrial fibrillation) ([facility])  Assessment & Plan  Patient was sent by urgent care where EKG noted atrial fibrillation.  No known history of arrhythmias.  On home rivaroxaban  108 pulse on presentation to ED  Follow for repeat EKG    Influenza  Assessment & Plan  Patient is a 74-year-old male with a history of hyperlipidemia, hypertension, and depression, presenting to emergency department with a one-day history of chills that began last night.  Endorses dyspnea and productive cough with green-yellow phlegm .  Also comments that has experienced generalized myalgia and headaches over the past day.      Influenza positive  Afebrile on presentation to ED, pulse 108  Treating with Tamiflu  Fluids:NS  RT ordered  Antipyretic: PRN acetaminophen      Essential hypertension- (present on admission)  Assessment & Plan  Full history of hypertension  Blood pressure 165/100 on presentation to the emergency department  Continue home carvedilol  Continue home losartan    Personal history of venous thrombosis and embolism- (present on admission)  Assessment & Plan  Patient has past medical history in chart of venous thrombosis and pulmonary embolism  Continue home rivaroxaban  Ordering PT/PTT/INR    Dyslipidemia- (present on admission)  Assessment & Plan  History of dyslipidemia  Continue home atorvastatin    Cardiomegaly  Assessment & Plan  One day hx of dyspnea he  states  Cardiomegaly noted on chest x-ray  BNP 1984  Troponin 9  Ordering echocardiogram (last on file 2014 was 60-65)  Can follow up with cardiology upon echo result    Peptic ulcer disease- (present on admission)  Assessment & Plan  Medical history of peptic ulcer disease  Continue home omeprazole    Seasonal affective disorder (CMS-HCC)- (present on admission)  Assessment & Plan  Has medical history of seasonal affective disorder  Continue home Lexapro

## 2020-02-12 NOTE — ED NOTES
Called RT for BIPAP and updated patient    Hourly rounding completed  Patient is up to date on current plan of care  Patient is resting comfortably in bed   Patient denies any need for use of the restroom, denies need for repositioning, denies need for any comfort care at this time

## 2020-02-12 NOTE — PROGRESS NOTES
Internal Medicine Interval Note  Note Author: America Ochoa M.D.     Name Anthony Cloud 1945   Age/Sex 74 y.o. male   MRN [id number]   Code Status FULL     After 5PM or if no immediate response to page, please call for cross-coverage  Attending/Team: Dr. Hook See Patient List for primary contact information  Call [phone] to page    1st Call - Day Intern (R1):   Dr. Avalos 2nd Call - Day Sr. Resident (R2/R3):   Dr. Ochoa       Chief complaint:   Myalgias, fevers/chills    Subjective:   Admitted overnight after presenting to urgent care with coughing, chills and was found to be positive for influenza A.  Was also found to be in atrial fibrillation.  This morning patient reports he is feeling much better after starting tamsulosin.  Remains in atrial fibrillation with intermittent atrial flutter.  Echo pending.  Guaiac negative.     Review of Systems   Constitutional: Positive for chills (prior to admission) and malaise/fatigue. Negative for diaphoresis, fever and weight loss.   HENT: Negative for hearing loss and tinnitus.    Eyes: Negative for blurred vision, double vision and photophobia.   Respiratory: Positive for cough. Negative for hemoptysis, sputum production, shortness of breath and wheezing.    Cardiovascular: Negative for chest pain, palpitations, orthopnea and leg swelling.   Gastrointestinal: Negative for abdominal pain, diarrhea, nausea and vomiting.   Genitourinary: Negative for dysuria, frequency and urgency.   Musculoskeletal: Positive for myalgias. Negative for back pain, joint pain and neck pain.   Skin: Negative for itching and rash.   Neurological: Negative for dizziness, tingling, tremors and headaches.   Endo/Heme/Allergies: Negative for polydipsia.   Psychiatric/Behavioral: Negative for depression and suicidal ideas.         Consultants/Specialty  None  PCP: Angel Cameron M.D.      Quality Measures  Quality-Core Measures   Reviewed items::  EKG  reviewed  Roberts catheter::  No Roberts  DVT: rivaroxaban for h/o PE and now atrial fibrillation.        Physical Exam       Vitals:    02/12/20 1203 02/12/20 1214 02/12/20 1229 02/12/20 1249   BP:   136/70    Pulse: 80 96 87 83   Resp:       Temp:       TempSrc:       SpO2: 93% 91% 93% 93%   Weight:       Height:         Body mass index is 30.43 kg/m². Weight: 113.4 kg (250 lb)  Oxygen Therapy:  Pulse Oximetry: 93 %, O2 (LPM): 2, FiO2%: 28 %, O2 Delivery Device: Nasal Cannula    Physical Exam   Constitutional: He is oriented to person, place, and time and well-developed, well-nourished, and in no distress. No distress.   HENT:   Head: Normocephalic and atraumatic.   Eyes: Pupils are equal, round, and reactive to light. No scleral icterus.   Neck: Normal range of motion. Neck supple. No JVD present.   Cardiovascular: Normal rate.   No murmur heard.  Irregular rhythm.   Pulmonary/Chest: Effort normal and breath sounds normal. No respiratory distress. He has no wheezes. He has no rales.   Abdominal: Soft. Bowel sounds are normal. He exhibits no distension. There is no abdominal tenderness. There is no rebound.   Musculoskeletal:         General: No edema.   Neurological: He is alert and oriented to person, place, and time.   Skin: Skin is warm. He is not diaphoretic.   Psychiatric: Affect and judgment normal.         Assessment/Plan     * Influenza  Assessment & Plan  - Influenza A positive with chills, myalgias at home.   - Continue Tamiflu.   - RT.   - Tylenol PRN.     AF (atrial fibrillation) ([facility])  Assessment & Plan  - Unclear if new or longstanding.  Patient denies h/o atrial fibrillation or palpitations.  May be due to stress from influenza infection.   - Extensive discussion with patient re: risks/benefits of continued anticoagulation, which he was already on for h/o recurrent PE.    - Will increase home rivaroxaban to 20 mg daily for atrial fibrillation.  Patient to follow up with outpatient Cardiology and PCP  "re: continuing at this dosage.    - Follow up repeat EKG.    - TSH, free T4.     Chronic alcohol dependence (HCC)- (present on admission)  Assessment & Plan  - Drinks 3-4 cocktails \"3-4\" nights per week.   - Counseled extensively on abstaining from alcohol use.     Pancytopenia (HCC)- (present on admission)  Assessment & Plan  WBC 3.3, Hgb 10.9, platelets 132.  .    Given patient's history of chronic alcohol use, pancytopenia likely secondary to that.  Folate, vitamin B12 appropriate.  Guaiac negative.   Counseled patient extensively on abstaining from alcohol use.    Essential hypertension- (present on admission)  Assessment & Plan  Full history of hypertension  Blood pressure 165/100 on presentation to the emergency department  Continue home carvedilol  Continue home losartan    Personal history of venous thrombosis and embolism- (present on admission)  Assessment & Plan  Patient has past medical history in chart of venous thrombosis and pulmonary embolism  Continue home rivaroxaban at increased dose given new atrial fibrillation.     Dyslipidemia- (present on admission)  Assessment & Plan  History of dyslipidemia  Continue home atorvastatin    Cardiomegaly, elevated BNP  Assessment & Plan  - No signs of heart failure decompensation at the moment.   - Follow up repeat echocardiogram.     Peptic ulcer disease- (present on admission)  Assessment & Plan  Medical history of peptic ulcer disease  Continue home omeprazole    Seasonal affective disorder (CMS-HCC)- (present on admission)  Assessment & Plan  Has medical history of seasonal affective disorder  Continue home Lexapro          "

## 2020-02-12 NOTE — ED NOTES
Pt still resting in bed without complaints    Hourly rounding completed  Patient is up to date on current plan of care  Patient is resting comfortably in bed   Patient denies any need for use of the restroom, denies need for repositioning, denies need for any comfort care at this time

## 2020-02-12 NOTE — ED NOTES
This RN received hand off report on patient from Amy CRISTINA   This RN's primary care of patient began at this time

## 2020-02-12 NOTE — ASSESSMENT & PLAN NOTE
Patient has past medical history in chart of venous thrombosis and pulmonary embolism  Continue home rivaroxaban at increased dose given new atrial fibrillation.   -discussed risks, patient states he had previously been at high dose but had increased skin bleeding but has been told it was recommended

## 2020-02-12 NOTE — ASSESSMENT & PLAN NOTE
- No signs of heart failure decompensation at the moment.   -Echo shows increased right sided pressures from 35 to 55 mmHg    Plan:  -Patient to follow up with pulmonologist regarding lung pathology, SHYAM as likely causes of worsening pulmonary HTN to make sure that he is on the right treatment path

## 2020-02-12 NOTE — SENIOR ADMIT NOTE
[facility] INTERNAL MEDICINE SENIOR ADMIT NOTE:    Patient ID: [id number]  Name:             Anthony Cloud   YOB: 1945  Age:                 74 y.o.  male   MRN:               [id number]                                                          Chief Complaint:       Cough and shortness of breath with myalgias since 1 day post    History of Present Illness:    Mr. Cloud is a 74 y.o. male with a PMHx significant for CAD s/p PCI to RCA with 3 stents in 2003 and one in 2004,Hx of hypertension, hyperlipidemia, and prior pulmonary embolism presented to the emergency department at [facility] after being referred from urgent care in [place] with nonproductive cough with shortness of breath associated with subjective fever with chills with malaise, fatigue and myalgias.  Studies at the urgent care center revealed newly diagnosed atrial fibrillation with influenza A. patient is on home oxygen therapy and uses a BiPAP machine at home  On presentation to the ER at [facility] significant labs included a HB-11.8 wi WBC - 4 and a Platelet count- 122 with elevated BUN of 24, troponin of 9 and a proBNP of 1984.  CXR-shows prominent right hilar structures possibly vascular with stable cardiomegaly.  Rapid influenza A/B showed  a positive influenza test A.  Patient was admitted to the medical floor on telemetry for monitoring for atrial fibrillation of new onset and treatment of influenza A.  ROS: Significant for subjective fever with chills, rhinorrhea, cough and headaches with myalgias.  EX: Malaise with fatigue, with rhinorrhea.  Regularly irregular heartbeat with tachycardia  With no wheeze or rhonchi  LABS: .  Results for orders placed or performed during the hospital encounter of 02/11/20   CBC WITH DIFFERENTIAL   Result Value Ref Range    WBC 4.0 (L) 4.8 - 10.8 K/uL    RBC 3.42 (L) 4.70 - 6.10 M/uL    Hemoglobin 11.8 (L) 14.0 - 18.0 g/dL    Hematocrit 36.0 (L) 42.0 - 52.0 %     .3 (H) 81.4 - 97.8 fL    MCH 34.5 (H) 27.0 - 33.0 pg    MCHC 32.8 (L) 33.7 - 35.3 g/dL    RDW 59.9 (H) 35.9 - 50.0 fL    Platelet Count 122 (L) 164 - 446 K/uL    MPV 8.9 (L) 9.0 - 12.9 fL    Neutrophils-Polys 77.70 (H) 44.00 - 72.00 %    Lymphocytes 8.60 (L) 22.00 - 41.00 %    Monocytes 11.60 0.00 - 13.40 %    Eosinophils 1.30 0.00 - 6.90 %    Basophils 0.50 0.00 - 1.80 %    Immature Granulocytes 0.30 0.00 - 0.90 %    Nucleated RBC 0.00 /100 WBC    Neutrophils (Absolute) 3.07 1.82 - 7.42 K/uL    Lymphs (Absolute) 0.34 (L) 1.00 - 4.80 K/uL    Monos (Absolute) 0.46 0.00 - 0.85 K/uL    Eos (Absolute) 0.05 0.00 - 0.51 K/uL    Baso (Absolute) 0.02 0.00 - 0.12 K/uL    Immature Granulocytes (abs) 0.01 0.00 - 0.11 K/uL    NRBC (Absolute) 0.00 K/uL   COMP METABOLIC PANEL   Result Value Ref Range    Sodium 139 135 - 145 mmol/L    Potassium 4.1 3.6 - 5.5 mmol/L    Chloride 104 96 - 112 mmol/L    Co2 28 20 - 33 mmol/L    Anion Gap 7.0 0.0 - 11.9    Glucose 89 65 - 99 mg/dL    Bun 24 (H) 8 - 22 mg/dL    Creatinine 1.11 0.50 - 1.40 mg/dL    Calcium 8.6 8.5 - 10.5 mg/dL    AST(SGOT) 37 12 - 45 U/L    ALT(SGPT) 43 2 - 50 U/L    Alkaline Phosphatase 79 30 - 99 U/L    Total Bilirubin 0.9 0.1 - 1.5 mg/dL    Albumin 4.1 3.2 - 4.9 g/dL    Total Protein 7.4 6.0 - 8.2 g/dL    Globulin 3.3 1.9 - 3.5 g/dL    A-G Ratio 1.2 g/dL   TROPONIN   Result Value Ref Range    Troponin T 9 6 - 19 ng/L   proBrain Natriuretic Peptide, NT   Result Value Ref Range    NT-proBNP 1984 (H) 0 - 125 pg/mL   LACTIC ACID   Result Value Ref Range    Lactic Acid 1.2 0.5 - 2.0 mmol/L   URINALYSIS CULTURE, IF INDICATED   Result Value Ref Range    Color Yellow     Character Clear     Specific Gravity 1.023 <1.035    Ph 6.5 5.0 - 8.0    Glucose Negative Negative mg/dL    Ketones Negative Negative mg/dL    Protein 100 (A) Negative mg/dL    Bilirubin Negative Negative    Urobilinogen, Urine 1.0 Negative    Nitrite Negative Negative    Leukocyte Esterase Negative  Negative    Occult Blood Negative Negative    Micro Urine Req Microscopic    URINE MICROSCOPIC (W/UA)   Result Value Ref Range    WBC 0-2 (A) /hpf    RBC 0-2 (A) /hpf    Bacteria Negative None /hpf    Epithelial Cells Negative /hpf    Hyaline Cast 0-2 /lpf   ESTIMATED GFR   Result Value Ref Range    GFR If African American >60 >60 mL/min/1.73 m 2    GFR If Non African American >60 >60 mL/min/1.73 m 2     IMAGING:   DX-CHEST-PORTABLE (1 VIEW)   Final Result      1.  Postoperative changes as described.   2.  Prominent RIGHT hilar structures again seen, likely vascular although adenopathy is not excluded.   3.  Stable cardiomegaly.      EC-ECHOCARDIOGRAM COMPLETE W/ CONT    (Results Pending)       Active Ambulatory Problems     Diagnosis Date Noted   • Dyslipidemia    • Seasonal affective disorder (CMS-HCC)    • Old myocardial infarction    • Personal history of venous thrombosis and embolism    • Chronic anticoagulation 09/16/2015   • Essential hypertension 12/07/2015   • Coronary artery disease due to lipid rich plaque 12/07/2015   • SHYAM (obstructive sleep apnea) 06/14/2016   • Peptic ulcer disease 08/04/2016   • H/O: GI bleed 08/04/2016   • Impaired fasting glucose 08/04/2016   • Restrictive lung disease 08/04/2016   • Pulmonary embolism (HCC) 11/09/2016   • Obesity (BMI 30-39.9) 01/16/2018   • Diastolic dysfunction 03/15/2018   • Pulmonary hypertension (HCC) 03/15/2018   • Acquired hypothyroidism 11/16/2018   • High risk medication use 11/16/2018   • Non-smoker 12/13/2018     Resolved Ambulatory Problems     Diagnosis Date Noted   • Central sleep apnea    • Malleolar fracture 08/29/2014   • Anemia, iron deficiency 08/04/2016   • Helicobacter pylori infection 08/04/2016   • Hypoxia 02/23/2018     Past Medical History:   Diagnosis Date   • CAD (coronary artery disease) 2003   • Depression    • Depressive disorder, not elsewhere classified    • Hypertension    • Hypothyroidism    • Mixed hyperlipidemia    • Obesity   "  • Sleep apnea        PHYSICAL EXAM  Vitals:   Weight/BMI: Body mass index is 30.43 kg/m².  BP (!) 165/100   Pulse (!) 108   Temp 37.5 °C (99.5 °F) (Oral)   Resp 16   Ht 1.93 m (6' 4\")   Wt 113.4 kg (250 lb)   SpO2 92%   Vitals:    02/11/20 1735 02/11/20 1739   BP: (!) 165/100    Pulse: (!) 108    Resp: 16    Temp: 37.5 °C (99.5 °F)    TempSrc: Oral    SpO2: 92%    Weight:  113.4 kg (250 lb)   Height:  1.93 m (6' 4\")     Oxygen Therapy:  Pulse Oximetry: 92 %, O2 Delivery: None (Room Air)  EKG at outpatient facility shows atrial fibrillation with abnormal Q waves    IMPRESSION  Acute hypoxemic respiratory failure  Influenza A+  New onset atrial fibrillation.  New onset congestive heart failure  PLAN:  -Admit telemetry.  -Patient on Tamiflu.  -Echocardiography and review  -IV metoprolol 2.5 mg stat  -Rate control with carvedilol 25 mg twice daily  -May need cardioversion if hemodynamically unstable  -We will continue anticoagulation with Xarelto.  -Low threshold for positive pressure ventilation  -Cardiology consult as needed by the day team.  Please refer to Interns Dr.Adrianna Collins&[email or web address] for complete admission details.      "

## 2020-02-12 NOTE — ASSESSMENT & PLAN NOTE
Full history of hypertension  Blood pressure 165/100 on presentation to the emergency department    Plan:  Continue home carvedilol  Continue home losartan  -Patient states he was using another antihypertensive, continues to have hypertension at nights and may benefit from treatment at night per PCP

## 2020-02-12 NOTE — ASSESSMENT & PLAN NOTE
- Unclear if new or longstanding.  Patient denies h/o atrial fibrillation or palpitations.  May be due to stress from influenza infection.  Rate controlled, still present by time of discharge  - Extensive discussion with patient re: risks/benefits of continued anticoagulation, which he was already on for h/o recurrent PE.    -thyroid levels WNL    Plan:  - Will increase home rivaroxaban to 20 mg daily for atrial fibrillation.    -Patient to follow up with outpatient Cardiology, anticoagulation clinic and PCP re: continuing at this dosage.

## 2020-02-13 ENCOUNTER — PATIENT OUTREACH (OUTPATIENT)
Dept: HEALTH INFORMATION MANAGEMENT | Facility: OTHER | Age: 75
End: 2020-02-13

## 2020-02-13 VITALS
RESPIRATION RATE: 18 BRPM | WEIGHT: 258.82 LBS | SYSTOLIC BLOOD PRESSURE: 123 MMHG | BODY MASS INDEX: 31.52 KG/M2 | HEART RATE: 97 BPM | TEMPERATURE: 97.1 F | HEIGHT: 76 IN | DIASTOLIC BLOOD PRESSURE: 87 MMHG | OXYGEN SATURATION: 91 %

## 2020-02-13 PROBLEM — I50.30 HEART FAILURE WITH PRESERVED EJECTION FRACTION (HCC): Status: ACTIVE | Noted: 2020-02-12

## 2020-02-13 LAB
ANION GAP SERPL CALC-SCNC: 4 MMOL/L (ref 0–11.9)
BASOPHILS # BLD AUTO: 0.7 % (ref 0–1.8)
BASOPHILS # BLD: 0.02 K/UL (ref 0–0.12)
BUN SERPL-MCNC: 21 MG/DL (ref 8–22)
CA-I SERPL-SCNC: 1.1 MMOL/L (ref 1.1–1.3)
CALCIUM SERPL-MCNC: 8.4 MG/DL (ref 8.5–10.5)
CHLORIDE SERPL-SCNC: 108 MMOL/L (ref 96–112)
CO2 SERPL-SCNC: 24 MMOL/L (ref 20–33)
CREAT SERPL-MCNC: 0.99 MG/DL (ref 0.5–1.4)
EKG IMPRESSION: NORMAL
EOSINOPHIL # BLD AUTO: 0.08 K/UL (ref 0–0.51)
EOSINOPHIL NFR BLD: 2.8 % (ref 0–6.9)
ERYTHROCYTE [DISTWIDTH] IN BLOOD BY AUTOMATED COUNT: 61.6 FL (ref 35.9–50)
GLUCOSE SERPL-MCNC: 94 MG/DL (ref 65–99)
HCT VFR BLD AUTO: 36.1 % (ref 42–52)
HGB BLD-MCNC: 11.8 G/DL (ref 14–18)
IMM GRANULOCYTES # BLD AUTO: 0.01 K/UL (ref 0–0.11)
IMM GRANULOCYTES NFR BLD AUTO: 0.3 % (ref 0–0.9)
LYMPHOCYTES # BLD AUTO: 0.72 K/UL (ref 1–4.8)
LYMPHOCYTES NFR BLD: 25.2 % (ref 22–41)
MAGNESIUM SERPL-MCNC: 2 MG/DL (ref 1.5–2.5)
MCH RBC QN AUTO: 34.4 PG (ref 27–33)
MCHC RBC AUTO-ENTMCNC: 32.7 G/DL (ref 33.7–35.3)
MCV RBC AUTO: 105.2 FL (ref 81.4–97.8)
MONOCYTES # BLD AUTO: 0.43 K/UL (ref 0–0.85)
MONOCYTES NFR BLD AUTO: 15 % (ref 0–13.4)
NEUTROPHILS # BLD AUTO: 1.6 K/UL (ref 1.82–7.42)
NEUTROPHILS NFR BLD: 56 % (ref 44–72)
NRBC # BLD AUTO: 0 K/UL
NRBC BLD-RTO: 0 /100 WBC
PHOSPHATE SERPL-MCNC: 3 MG/DL (ref 2.5–4.5)
PLATELET # BLD AUTO: 123 K/UL (ref 164–446)
PMV BLD AUTO: 9.1 FL (ref 9–12.9)
POTASSIUM SERPL-SCNC: 4.2 MMOL/L (ref 3.6–5.5)
PTH-INTACT SERPL-MCNC: 77.5 PG/ML (ref 14–72)
RBC # BLD AUTO: 3.43 M/UL (ref 4.7–6.1)
SODIUM SERPL-SCNC: 136 MMOL/L (ref 135–145)
WBC # BLD AUTO: 2.9 K/UL (ref 4.8–10.8)

## 2020-02-13 PROCEDURE — 82330 ASSAY OF CALCIUM: CPT

## 2020-02-13 PROCEDURE — 90471 IMMUNIZATION ADMIN: CPT

## 2020-02-13 PROCEDURE — A9270 NON-COVERED ITEM OR SERVICE: HCPCS | Performed by: STUDENT IN AN ORGANIZED HEALTH CARE EDUCATION/TRAINING PROGRAM

## 2020-02-13 PROCEDURE — 83735 ASSAY OF MAGNESIUM: CPT

## 2020-02-13 PROCEDURE — 700105 HCHG RX REV CODE 258: Performed by: STUDENT IN AN ORGANIZED HEALTH CARE EDUCATION/TRAINING PROGRAM

## 2020-02-13 PROCEDURE — 36415 COLL VENOUS BLD VENIPUNCTURE: CPT

## 2020-02-13 PROCEDURE — 3E02340 INTRODUCTION OF INFLUENZA VACCINE INTO MUSCLE, PERCUTANEOUS APPROACH: ICD-10-PCS | Performed by: HOSPITALIST

## 2020-02-13 PROCEDURE — 90662 IIV NO PRSV INCREASED AG IM: CPT | Performed by: HOSPITALIST

## 2020-02-13 PROCEDURE — 700102 HCHG RX REV CODE 250 W/ 637 OVERRIDE(OP): Performed by: STUDENT IN AN ORGANIZED HEALTH CARE EDUCATION/TRAINING PROGRAM

## 2020-02-13 PROCEDURE — 84100 ASSAY OF PHOSPHORUS: CPT

## 2020-02-13 PROCEDURE — 85025 COMPLETE CBC W/AUTO DIFF WBC: CPT

## 2020-02-13 PROCEDURE — 99239 HOSP IP/OBS DSCHRG MGMT >30: CPT | Mod: GC | Performed by: HOSPITALIST

## 2020-02-13 PROCEDURE — 83970 ASSAY OF PARATHORMONE: CPT

## 2020-02-13 PROCEDURE — 700101 HCHG RX REV CODE 250: Performed by: STUDENT IN AN ORGANIZED HEALTH CARE EDUCATION/TRAINING PROGRAM

## 2020-02-13 PROCEDURE — 80048 BASIC METABOLIC PNL TOTAL CA: CPT

## 2020-02-13 PROCEDURE — 700111 HCHG RX REV CODE 636 W/ 250 OVERRIDE (IP): Performed by: HOSPITALIST

## 2020-02-13 PROCEDURE — 93010 ELECTROCARDIOGRAM REPORT: CPT | Performed by: INTERNAL MEDICINE

## 2020-02-13 RX ORDER — OSELTAMIVIR PHOSPHATE 75 MG/1
75 CAPSULE ORAL 2 TIMES DAILY
Qty: 6 CAP | Refills: 0 | Status: SHIPPED
Start: 2020-02-13 | End: 2020-02-25

## 2020-02-13 RX ORDER — OSELTAMIVIR PHOSPHATE 75 MG/1
75 CAPSULE ORAL 2 TIMES DAILY
Qty: 10 CAP | Refills: 0 | Status: SHIPPED | OUTPATIENT
Start: 2020-02-13 | End: 2020-02-13

## 2020-02-13 RX ADMIN — SENNOSIDES AND DOCUSATE SODIUM 2 TABLET: 8.6; 5 TABLET ORAL at 05:50

## 2020-02-13 RX ADMIN — LABETALOL HYDROCHLORIDE 10 MG: 5 INJECTION, SOLUTION INTRAVENOUS at 05:46

## 2020-02-13 RX ADMIN — CARVEDILOL 25 MG: 25 TABLET, FILM COATED ORAL at 08:58

## 2020-02-13 RX ADMIN — LOSARTAN POTASSIUM 50 MG: 50 TABLET, FILM COATED ORAL at 05:50

## 2020-02-13 RX ADMIN — INFLUENZA A VIRUS A/MICHIGAN/45/2015 X-275 (H1N1) ANTIGEN (FORMALDEHYDE INACTIVATED), INFLUENZA A VIRUS A/SINGAPORE/INFIMH-16-0019/2016 IVR-186 (H3N2) ANTIGEN (FORMALDEHYDE INACTIVATED), AND INFLUENZA B VIRUS B/MARYLAND/15/2016 BX-69A (A B/COLORADO/6/2017-LIKE VIRUS) ANTIGEN (FORMALDEHYDE INACTIVATED) 0.5 ML: 60; 60; 60 INJECTION, SUSPENSION INTRAMUSCULAR at 12:48

## 2020-02-13 RX ADMIN — OMEPRAZOLE 20 MG: 20 CAPSULE, DELAYED RELEASE ORAL at 05:50

## 2020-02-13 RX ADMIN — OSELTAMIVIR PHOSPHATE 75 MG: 75 CAPSULE ORAL at 05:50

## 2020-02-13 RX ADMIN — SODIUM CHLORIDE: 9 INJECTION, SOLUTION INTRAVENOUS at 05:42

## 2020-02-13 RX ADMIN — ATORVASTATIN CALCIUM 40 MG: 40 TABLET, FILM COATED ORAL at 05:50

## 2020-02-13 RX ADMIN — ESCITALOPRAM OXALATE 10 MG: 10 TABLET ORAL at 05:50

## 2020-02-13 ASSESSMENT — ENCOUNTER SYMPTOMS
COUGH: 1
BLURRED VISION: 0
PALPITATIONS: 0
DEPRESSION: 0
SHORTNESS OF BREATH: 1
ORTHOPNEA: 0
COUGH: 0
PSYCHIATRIC NEGATIVE: 1
HEADACHES: 0
SHORTNESS OF BREATH: 0
TREMORS: 0
NAUSEA: 0
FEVER: 0
POLYDIPSIA: 0
CHILLS: 0
DOUBLE VISION: 0
TINGLING: 0
HEADACHES: 1
VOMITING: 0
ABDOMINAL PAIN: 0
DIZZINESS: 0
SPUTUM PRODUCTION: 0
WEIGHT LOSS: 0
PHOTOPHOBIA: 0
DIAPHORESIS: 0
CONSTIPATION: 0
DIARRHEA: 0
SPUTUM PRODUCTION: 1
SORE THROAT: 0
MYALGIAS: 0
HEMOPTYSIS: 0
NECK PAIN: 0
WEAKNESS: 0
BACK PAIN: 0
WHEEZING: 0

## 2020-02-13 ASSESSMENT — CHA2DS2 SCORE
VASCULAR DISEASE: YES
PRIOR STROKE OR TIA OR THROMBOEMBOLISM: YES
HYPERTENSION: YES
AGE 75 OR GREATER: NO
CHA2DS2 VASC SCORE: 6
AGE 65 TO 74: YES
DIABETES: NO
SEX: MALE
CHF OR LEFT VENTRICULAR DYSFUNCTION: YES

## 2020-02-13 NOTE — DISCHARGE PLANNING
BATOOL contacted RNLynn for update on D/C. Patient is expected to D/C today. RN reports Home O2 assessment completed, needing 1L on ambulation. However, patient is refusing at this time.    BATOOL paged [facility] mae team to discuss D/C. Informed that patient is refusing O2. They report he does have concentrator at home, just needs nasal canula.     BATOOL updated RN of this. Patient medically clear to D/C. No other needs per team.

## 2020-02-13 NOTE — PROGRESS NOTES
Handoff report received. Assumed patient care. PT is sitting up in bed, AAOx4, no complaints of pain or discomfort. POC discussed with PT and all questions addressed. Safety precautions in place. Call light and personal belongings within reach. Masks provided to patient at request for ambulation. Educated to call for assistance if needed.

## 2020-02-13 NOTE — NON-PROVIDER
"Daily Progress Note:     Date of Service: 2/13/2020  Primary Team:   Attending: JANE Hook M.D.   Senior Resident: Dr. Ochoa  Intern: Dr. Avalos  Contact:  [phone]    Reason for Visit: Atrial Fibrillation and Influenza  Pt is a 75 YO M with a PMH of CAD s/p RCA 3 stents, MI 2003, PE 2003 2004, HTN, dyslipidemia, SHYAM 3L O2 presenting with a 1 day hx of influenza and new onset atrial fibrillation.    Pt denies any acute events overnight and notes an improvement in his dyspnea. He has a cough productive of yellow-white sputum. SaO2 91% on RA. HR is regularly irregular. He primarily complains of intermittent rib cage pain which he describes as his \"lungs spasming\". He characterizes it as sharp pain. Last onset is this AM when talking to his daughter, which lasted 15 seconds. Pain is not triggered by change in position, nontender to palpation, and is not pleuritic in nature. Ambulatory O2 was done showing 91% O2 at rest, 88% on exertion, 92% on exertion with 1L.     Review of Systems:    Review of Systems   Constitutional: Positive for malaise/fatigue. Negative for chills and fever.   HENT: Negative for congestion, hearing loss, sore throat and tinnitus.    Eyes: Negative for blurred vision.   Respiratory: Positive for cough, sputum production and shortness of breath. Negative for wheezing.         Yellow white sputum   Cardiovascular: Positive for chest pain. Negative for palpitations and leg swelling.        Intermittent chest pain attributed to his ribs lasting 15 seconds. Not pleuritic or changed by movement. Chest pain while coughing.    Gastrointestinal: Negative for abdominal pain, constipation, diarrhea, nausea and vomiting.   Genitourinary: Negative.    Musculoskeletal: Negative for myalgias.   Skin: Negative for itching and rash.   Neurological: Positive for headaches. Negative for dizziness, tingling, tremors and weakness.   Endo/Heme/Allergies: Negative.    Psychiatric/Behavioral: Negative.  "       Objective Data:   Physical Exam:   Vitals:   Temp:  [36 °C (96.8 °F)-37 °C (98.6 °F)] 36 °C (96.8 °F)  Pulse:  [65-96] 65  Resp:  [18-20] 18  BP: (126-168)/() 168/106  SpO2:  [90 %-96 %] 90 %    Physical Exam  Constitutional:       General: He is not in acute distress.     Appearance: Normal appearance. He is obese. He is not ill-appearing or toxic-appearing.   HENT:      Head: Normocephalic and atraumatic.      Nose: No rhinorrhea.      Mouth/Throat:      Mouth: Mucous membranes are moist.   Eyes:      Extraocular Movements: Extraocular movements intact.      Pupils: Pupils are equal, round, and reactive to light.   Neck:      Musculoskeletal: Neck supple.   Cardiovascular:      Rate and Rhythm: Normal rate. Rhythm irregular.      Pulses: Normal pulses.      Heart sounds: Normal heart sounds. No murmur. No friction rub. No gallop.    Pulmonary:      Effort: No respiratory distress.      Breath sounds: No stridor. No wheezing, rhonchi or rales.   Chest:      Chest wall: No tenderness.   Abdominal:      General: Bowel sounds are normal. There is no distension.      Palpations: Abdomen is soft. There is no mass.      Tenderness: There is no abdominal tenderness. There is no guarding or rebound.      Hernia: No hernia is present.   Musculoskeletal:         General: No swelling.   Lymphadenopathy:      Cervical: No cervical adenopathy.   Skin:     General: Skin is warm.      Capillary Refill: Capillary refill takes less than 2 seconds.   Neurological:      General: No focal deficit present.      Mental Status: He is alert and oriented to person, place, and time.   Psychiatric:         Mood and Affect: Mood normal.         Behavior: Behavior normal.       Labs:   Recent Labs     02/11/20  1832 02/12/20  0600 02/13/20  0220   WBC 4.0* 3.3* 2.9*   RBC 3.42* 3.16* 3.43*   HEMOGLOBIN 11.8* 10.9* 11.8*   HEMATOCRIT 36.0* 33.5* 36.1*   .3* 106.0* 105.2*   MCH 34.5* 34.5* 34.4*   RDW 59.9* 62.1* 61.6*    PLATELETCT 122* 132* 123*   MPV 8.9* 9.5 9.1   NEUTSPOLYS 77.70* 66.20 56.00   LYMPHOCYTES 8.60* 15.70* 25.20   MONOCYTES 11.60 15.40* 15.00*   EOSINOPHILS 1.30 1.50 2.80   BASOPHILS 0.50 0.90 0.70     Recent Labs     20  1832 20  0600 20  1505   SODIUM 139 138 137   POTASSIUM 4.1 4.1 4.3   CHLORIDE 104 105 107   CO2 28 27 25   GLUCOSE 89 120* 97   BUN 24* 21 19     Recent Labs     20  1832 20  0600 20  1505   ALBUMIN 4.1 3.9 4.0   TBILIRUBIN 0.9 0.6 0.8   ALKPHOSPHAT 79 74 70   TOTPROTEIN 7.4 6.9 6.8   ALTSGPT 43 48 45   ASTSGOT 37 45 38   CREATININE 1.11 1.08 1.35       Imagin20 ECHO  LVEF 50  CONCLUSIONS  Compared to the images of the study done 2014 - there has been   interval decline in estimated ejection fraction, previously  normal,   and increase in the estimated right ventricular systolic pressure,   previously 35 mmHg.   Enlarged right atrium. Dilated inferior vena cava without inspiratory   collapse.  Left ventricular systolic function is mildly reduced.  Left ventricular ejection fraction is visually estimated to be 50%.  Diastolic function is difficult to assess with arrhythmia.  The right ventricle was normal in size and function.  Mild tricuspid regurgitation.  Estimated right ventricular systolic pressure  is 55 mmHg.    EKG 20  ATRIAL FIBRILLATION   INFERIOR INFARCT, AGE INDETERMINATE     Assessment and Plan:  Pt is a 75 YO M with a PMH of CAD, MI, SHYAM, HTN, dyslipidemia presenting with atrial fibrillation with atrial flutter changes and influenza A. Vitals upon ED admission, pt satisfied 1/4 SIRS (). CXR showed R hilar abnormalities and cardiomegaly. However, CHF exacerbation is unlikely as PE was negative for crackles, no peripheral edema, trop and BNP were not concerning for CHF exacerbation.  Echo shows LFEF 50%, R atrial enlargement, and elevated R ventricular pressure at 55 suggesting pulmonary hypertension. Pt can be discharged  home and managed outpatient.      Influenza A  1. Tamiflu 75 mg 2 times daily  2. Guaifenesin 10 mL  3. Acetaminophen 650 mg  4. Albuterol 2.5mg/.5ml nebulizer solution      Atrial Fibrillation  It is possible that he will return to regular rhythm after influenza resolution; however, echo shows R atrial enlargement, suggesting possible cause of atrial fibrillation.   1. Aspirin 81 mg  2. Atorvastatin 40 mg  3. Carvedilol 25 mg  4. Rivaroxaban 10 mg  5.Outpatient management with cardiology in March      Pulmonary Hypertension  Echo showed R atrial enlargement, R ventricular systolic pressure elevated at 55. Hx of SHYAM suggests possible cause of PH. Cardiomegaly was apparent on CXR. PMH of PE in 2003, 2004 may also be causing PH.   1. F/o with pulmonology 2x year for SHAYM  2. Home 2L O2 use upon exertion until reevaluated by Dr. Abdoul Jewell in March    Pancytopenia anemia  Pt had an  with normal B12 and folate levels. Low WBC, platelets, Hb is consistent with pt's hx of alcohol use. Iron is 18; however, MCV does not suggest microcytic anemia. F/o may be necessary to r/o for GI bleed. Guaiac is negative. 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   1. Alcohol use counseling    2. Outpatient f/o for colonoscopy 2020  3. TSH + free T4 r/o anemia secondary to hypothyroidism     Alcohol Use Disorder  Pt explained that he would drink approximately 3 drinks/night, 3x/ week. He seemed receptive to decreasing alcohol intake after describing the risks of alcohol use on his health long term.      Hypertension  1. Losartan 50 mg twice daily     Dyslipidemia  1. Atorvastatin 40 mg daily     Seasonal affective disorder  1. escitalopram 10 mg daily

## 2020-02-13 NOTE — PROGRESS NOTES
Internal Medicine Interval Note  Note Author: Werner Avalos D.O.     Name Anthony Cloud     1945   Age/Sex 74 y.o. male   MRN [id number]   Code Status FULL     After 5PM or if no immediate response to page, please call for cross-coverage  Attending/Team: Dr. Hook See Patient List for primary contact information  Call [phone] to page    1st Call - Day Intern (R1):   Dr. Avalos 2nd Call - Day Sr. Resident (R2/R3):   Dr. Ochoa       Chief complaint:   Myalgias, fevers/chills    Subjective:   -Patient with elevated BP's overnight, usual for him. No issues with CPAP/Bipap, uses 3L bleed in at home    -Patient states he no longer has any fevers, myalgias and denies any shortness of breath. He does have mild 10 second sharp right sided chest pains with musculoskeletal pains intermittently at rest.     -Patient saturates at 88% on room air, at 1L NC is 92% but needs no oxygen at rest.    -Dispo: Patient needs to complete tamiflu course of 5 days total duration and is stable for discharge home. Encouraged to use pulse oximeter at home and continue 1L NC with exertion to keep oxygen saturation >90% but can stop use if >90% on room air when walking    Review of Systems   Constitutional: Positive for malaise/fatigue. Negative for chills (prior to admission), diaphoresis, fever and weight loss.   HENT: Negative for hearing loss and tinnitus.    Eyes: Negative for blurred vision, double vision and photophobia.   Respiratory: Negative for cough, hemoptysis, sputum production, shortness of breath and wheezing.    Cardiovascular: Negative for chest pain, palpitations, orthopnea and leg swelling.   Gastrointestinal: Negative for abdominal pain, diarrhea, nausea and vomiting.   Genitourinary: Negative for dysuria, frequency and urgency.   Musculoskeletal: Negative for back pain, joint pain, myalgias and neck pain.   Skin: Negative for itching and rash.   Neurological: Negative for dizziness,  tingling, tremors and headaches.   Endo/Heme/Allergies: Negative for polydipsia.   Psychiatric/Behavioral: Negative for depression and suicidal ideas.         Consultants/Specialty  None  PCP: Angel Cameron M.D.      Quality Measures  Quality-Core Measures   Reviewed items::  EKG reviewed  Roberts catheter::  No Roberts  DVT: rivaroxaban for h/o PE and now atrial fibrillation.        Physical Exam       Vitals:    02/13/20 0525 02/13/20 0526 02/13/20 0527 02/13/20 0600   BP: (!) 167/112 (!) 166/109 (!) 168/106 (!) 164/94   Pulse: 66 73 65    Resp: 18      Temp: 36 °C (96.8 °F)      TempSrc:       SpO2: 90%      Weight:       Height:         Body mass index is 31.5 kg/m². Weight: 117.4 kg (258 lb 13.1 oz)  Oxygen Therapy:  Pulse Oximetry: 90 %, O2 (LPM): 1, O2 Delivery Device: None - Room Air    Physical Exam   Constitutional: He is oriented to person, place, and time and well-developed, well-nourished, and in no distress. No distress.   HENT:   Head: Normocephalic and atraumatic.   Eyes: Pupils are equal, round, and reactive to light. No scleral icterus.   Neck: Normal range of motion. Neck supple. No JVD present.   Cardiovascular: Normal rate.   No murmur heard.  Irregular rhythm.   Pulmonary/Chest: Effort normal and breath sounds normal. No respiratory distress. He has no wheezes. He has no rales. He exhibits tenderness (Tenderness of intercostals, upper abdominal muscles on palpation).   Abdominal: Soft. Bowel sounds are normal. He exhibits no distension. There is no abdominal tenderness. There is no rebound.   Musculoskeletal:         General: No edema.   Neurological: He is alert and oriented to person, place, and time.   Skin: Skin is warm. He is not diaphoretic.   Psychiatric: Affect and judgment normal.         Assessment/Plan     * Influenza- (present on admission)  Assessment & Plan  - Influenza A positive with chills, myalgias at home.     Plan:  - Continue Tamiflu, 5 day total course  - Tylenol PRN.  "    AF (atrial fibrillation) (HCC)- (present on admission)  Assessment & Plan  - Unclear if new or longstanding.  Patient denies h/o atrial fibrillation or palpitations.  May be due to stress from influenza infection.  Rate controlled, still present by time of discharge  - Extensive discussion with patient re: risks/benefits of continued anticoagulation, which he was already on for h/o recurrent PE.    -thyroid levels WNL    Plan:  - Will increase home rivaroxaban to 20 mg daily for atrial fibrillation.    -Patient to follow up with outpatient Cardiology, anticoagulation clinic and PCP re: continuing at this dosage.        Chronic alcohol dependence (HCC)- (present on admission)  Assessment & Plan  - Drinks 3-4 cocktails \"3-4\" nights per week.   - Counseled extensively on abstaining from alcohol use.     Pancytopenia (HCC)- (present on admission)  Assessment & Plan  WBC 3.3, Hgb 10.9, platelets 132.  .    Given patient's history of chronic alcohol use, pancytopenia likely secondary to that.  Folate, vitamin B12 appropriate.  Guaiac negative.   Counseled patient extensively on abstaining from alcohol use.    Chronic intermittent hypoxia with obstructive sleep apnea  Assessment & Plan  Patient has sleep apnea, recently titrated up. Likely had period of worsening heart failure in the interim before this, appears to be better controlled  -uses 3L bleed in at night  -walk test 88% on RA. Patient has muscle spasms from use of accessory muscles, seems to be improving    Plan:  -Patient encouraged to change filters monthly not ever 3+ months  -Patient to follow up with pulmonologist in March  -Patient to use home oxygen 1L NC if <90% when exerting himself and discuss time used with pulmonologist, may be transient issue as patient returns to better health from flu    Essential hypertension- (present on admission)  Assessment & Plan  Full history of hypertension  Blood pressure 165/100 on presentation to the emergency " department    Plan:  Continue home carvedilol  Continue home losartan  -Patient states he was using another antihypertensive, continues to have hypertension at nights and may benefit from treatment at night per PCP    Personal history of venous thrombosis and embolism- (present on admission)  Assessment & Plan  Patient has past medical history in chart of venous thrombosis and pulmonary embolism  Continue home rivaroxaban at increased dose given new atrial fibrillation.   -discussed risks, patient states he had previously been at high dose but had increased skin bleeding but has been told it was recommended    Dyslipidemia- (present on admission)  Assessment & Plan  History of dyslipidemia  Continue home atorvastatin    Heart failure with preserved ejection fraction (HCC)- (present on admission)  Assessment & Plan  - No signs of heart failure decompensation at the moment.   -Echo shows increased right sided pressures from 35 to 55 mmHg    Plan:  -Patient to follow up with pulmonologist regarding lung pathology, SHYAM as likely causes of worsening pulmonary HTN to make sure that he is on the right treatment path    Peptic ulcer disease- (present on admission)  Assessment & Plan  Medical history of peptic ulcer disease  Continue home omeprazole    Seasonal affective disorder (CMS-HCC)- (present on admission)  Assessment & Plan  Has medical history of seasonal affective disorder  Continue home Lexapro

## 2020-02-13 NOTE — ASSESSMENT & PLAN NOTE
Patient has sleep apnea, recently titrated up. Likely had period of worsening heart failure in the interim before this, appears to be better controlled  -uses 3L bleed in at night  -walk test 88% on RA. Patient has muscle spasms from use of accessory muscles, seems to be improving    Plan:  -Patient encouraged to change filters monthly not ever 3+ months  -Patient to follow up with pulmonologist in March  -Patient to use home oxygen 1L NC if <90% when exerting himself and discuss time used with pulmonologist, may be transient issue as patient returns to better health from flu

## 2020-02-13 NOTE — RESPIRATORY CARE
COPD EDUCATION by COPD CLINICAL EDUCATOR  2/13/2020 at 8:16 AM by Aiyana Chiang, TESS     Patient reviewed by COPD education team. Patient does not have a history or diagnosis of COPD and is a non-smoker, therefore does not qualify for the COPD program.

## 2020-02-13 NOTE — PROGRESS NOTES
Assumed care this am from night shift RN. Patient patient walking around unit during report. Call bell and personal items within reach, bed locked in low position. Hourly rounding in place.

## 2020-02-13 NOTE — DISCHARGE INSTRUCTIONS
"Discharge Instructions    Discharged to home by car with relative. Discharged via walking, hospital escort: Refused.  Special equipment needed: Not Applicable    Be sure to schedule a follow-up appointment with your primary care doctor or any specialists as instructed.     Discharge Plan:   Diet Plan: Discussed  Activity Level: Discussed  Confirmed Follow up Appointment: Patient to Call and Schedule Appointment  Confirmed Symptoms Management: Discussed  Medication Reconciliation Updated: Yes  Influenza Vaccine Indication: Indicated: 9 to 64 years of age    I understand that a diet low in cholesterol, fat, and sodium is recommended for good health. Unless I have been given specific instructions below for another diet, I accept this instruction as my diet prescription.   Other diet: Regular    Special Instructions: None    · Is patient discharged on Warfarin / Coumadin?   No   Influenza, Adult  Influenza (“the flu\") is an infection in the lungs, nose, and throat (respiratory tract). It is caused by a virus. The flu causes many common cold symptoms, as well as a high fever and body aches. It can make you feel very sick.  The flu spreads easily from person to person (is contagious). Getting a flu shot (influenza vaccination) every year is the best way to prevent the flu.  Follow these instructions at home:  Take over-the-counter and prescription medicines only as told by your doctor.  Use a cool mist humidifier to add moisture (humidity) to the air in your home. This can make it easier to breathe.  Rest as needed.  Drink enough fluid to keep your pee (urine) clear or pale yellow.  Cover your mouth and nose when you cough or sneeze.  Wash your hands with soap and water often, especially after you cough or sneeze. If you cannot use soap and water, use hand .  Stay home from work or school as told by your doctor. Unless you are visiting your doctor, try to avoid leaving home until your fever has been gone for 24 " hours without the use of medicine.  Keep all follow-up visits as told by your doctor. This is important.  How is this prevented?  Getting a yearly (annual) flu shot is the best way to avoid getting the flu. You may get the flu shot in late summer, fall, or winter. Ask your doctor when you should get your flu shot.  Wash your hands often or use hand  often.  Avoid contact with people who are sick during cold and flu season.  Eat healthy foods.  Drink plenty of fluids.  Get enough sleep.  Exercise regularly.  Contact a doctor if:  You get new symptoms.  You have:  Chest pain.  Watery poop (diarrhea).  A fever.  Your cough gets worse.  You start to have more mucus.  You feel sick to your stomach (nauseous).  You throw up (vomit).  Get help right away if:  You start to be short of breath or have trouble breathing.  Your skin or nails turn a bluish color.  You have very bad pain or stiffness in your neck.  You get a sudden headache.  You get sudden pain in your face or ear.  You cannot stop throwing up.  This information is not intended to replace advice given to you by your health care provider. Make sure you discuss any questions you have with your health care provider.  Document Released: 09/26/2009 Document Revised: 05/25/2017 Document Reviewed: 10/11/2016  [employer] Interactive Patient Education © 2017 [employer]      Depression / Suicide Risk    As you are discharged from this [facility] facility, it is important to learn how to keep safe from harming yourself.    Recognize the warning signs:  · Abrupt changes in personality, positive or negative- including increase in energy   · Giving away possessions  · Change in eating patterns- significant weight changes-  positive or negative  · Change in sleeping patterns- unable to sleep or sleeping all the time   · Unwillingness or inability to communicate  · Depression  · Unusual sadness, discouragement and loneliness  · Talk of wanting to die  · Neglect of  personal appearance   · Rebelliousness- reckless behavior  · Withdrawal from people/activities they love  · Confusion- inability to concentrate     If you or a loved one observes any of these behaviors or has concerns about self-harm, here's what you can do:  · Talk about it- your feelings and reasons for harming yourself  · Remove any means that you might use to hurt yourself (examples: pills, rope, extension cords, firearm)  · Get professional help from the community (Mental Health, Substance Abuse, psychological counseling)  · Do not be alone:Call your Safe Contact- someone whom you trust who will be there for you.  · Call your local CRISIS HOTLINE 990-3420 or 379-594-7343  · Call your local Children's Mobile Crisis Response Team [place] (877) 470-1665 or www.Callvine  · Call the toll free National Suicide Prevention Hotlines   · National Suicide Prevention Lifeline 679-135-WLQA (4732)  · National Hope Line Network 800-SUICIDE (780-7121)

## 2020-02-13 NOTE — PROGRESS NOTES
Discharge instructions given to patient. Prescriptions given to patient. Discharge instructions given to patient at bedside, verbalizes understanding and states plans for follow-up with PCP. New and home medication review, post-discharge activity level and worsening of symptoms needing follow-up care discussed. Telemetry monitor/IV cathlon removed. All belongings accounted for, all questions answered at this time. Patient refused wheelchair and escort out. Awaiting ride.

## 2020-02-13 NOTE — CARE PLAN
Problem: Respiratory:  Goal: Respiratory status will improve  Outcome: PROGRESSING AS EXPECTED  Intervention: Assess and monitor pulmonary status  Note: IS teaching initiated, patient coughing and deep breathing, decongestant as needed. Patient ambulating. 02 determ study complete.     Problem: Bowel/Gastric:  Goal: Normal bowel function is maintained or improved  Outcome: PROGRESSING AS EXPECTED  Intervention: Educate patient and significant other/support system about diet, fluid intake, medications and activity to promote bowel function  Note: Oral intake adequate, maintaining adequate oral hydration. Patient reports BM yesterday

## 2020-02-13 NOTE — DISCHARGE SUMMARY
Discharge Summary    Date of Admission: 2/11/2020  Date of Discharge: 2/13/2020  Discharging Attending: JANE Hook M.D.   Discharging Senior Resident: Dr. Ochoa  Discharging Intern: Dr. Avalos    CHIEF COMPLAINT ON ADMISSION  Chills, Fever, myalgias    Reason for Admission  Influenza A  Atrial fibrillation- new diagnosis    Admission Date  2/11/2020    CODE STATUS  Full Code    HPI & HOSPITAL COURSE  This is a 74 y.o. male sent here from urgent care with fever, chills, myalgias found to be influenza A positive and in Atrial Fibrillation of unknown onset. Patient was <48 hours of symptoms and was started on tamiflu for 5 day course. He has a history of PE/DVT and was on rivaroxaban and was increased to 20 mg daily for atrial fibrillation prophylaxis.  Echocardiogram showed increased right atrial size and right heart pressures increased from previous 35 to 55 mmHg with preserved ejection fraction of 50% and no signs of acute decompensation. Patient has sleep apnea and uses chronic 3L bleed in at night, he was noted to be 88% on exertion at time of discharge and recommended to use 1L NC as needed to keep oxygen saturation>90%. He continued to be in atrial fibrillation on tele throughout his admission, rate controlled on carvedilol.       Therefore, he is discharged in good and stable condition to home with close outpatient follow-up.    The patient met 2-midnight criteria for an inpatient stay at the time of discharge.    Discharge Date  2/13/2020    FOLLOW UP ITEMS POST DISCHARGE  Pulmonary Hypertension: Patient will discuss elevated right sided heart pressures with pulmonologist at March appointment regarding proper CPAP management.    Hypoxia: Patient instructed to use home oxygen to get >90% on exertion, this is likely temporary while patient recovers from influenza but will need further management if it becomes chronic.    Atrial Fibrillation: Patient will see Dr. Felix to see if patient is having  persistent or paroxysmal atrial fibrillation. Will need followup with change in rivaroxaban dosing with anticoagulation clinic as needed.    DISCHARGE DIAGNOSES  Principal Problem:    Influenza POA: Yes  Active Problems:    AF (atrial fibrillation) (HCC) POA: Yes    Dyslipidemia POA: Yes    Personal history of venous thrombosis and embolism POA: Yes    Essential hypertension POA: Yes    Chronic intermittent hypoxia with obstructive sleep apnea POA: Unknown    Pancytopenia (HCC) POA: Yes    Chronic alcohol dependence (HCC) POA: Yes    Heart failure with preserved ejection fraction (HCC) POA: Yes    Seasonal affective disorder (CMS-HCC) POA: Yes    Peptic ulcer disease POA: Yes  Resolved Problems:    * No resolved hospital problems. *      FOLLOW UP  Future Appointments   Date Time Provider Department Center   3/11/2020  4:30 PM Epifanio Felix M.D. [facility] None   3/12/2020 10:20 AM Austin Schreiber M.D. [facility] None   4/13/2020 10:00 AM [facility] EXAM 4 [facility] None       MEDICATIONS ON DISCHARGE     Medication List      START taking these medications      Instructions   oseltamivir 75 MG Caps  Commonly known as:  TAMIFLU   Doctor's comments:  Patient needs 5 days of Influenza treatment, already completed 4 doses  Take 1 Cap by mouth 2 Times a Day.  Dose:  75 mg        CHANGE how you take these medications      Instructions   rivaroxaban 20 MG Tabs tablet  What changed:    · medication strength  · how much to take  Commonly known as:  XARELTO   Take 1 Tab by mouth with dinner.  Dose:  20 mg        CONTINUE taking these medications      Instructions   aspirin 81 MG Chew chewable tablet  Commonly known as:  ASA   Take 81 mg by mouth every evening.  Dose:  81 mg     atorvastatin 40 MG Tabs  Commonly known as:  LIPITOR   Take 1 Tab by mouth every day.  Dose:  40 mg     Breo Ellipta 200-25 MCG/INH Aepb  Generic drug:  Fluticasone Furoate-Vilanterol   Doctor's comments:  With mouth rinse  INHALE ONE PUFF BY MOUTH EVERY DAY      carvedilol 25 MG Tabs  Commonly known as:  COREG   Take 1 Tab by mouth 2 times a day, with meals.  Dose:  25 mg     escitalopram 10 MG Tabs  Commonly known as:  LEXAPRO   TAKE ONE (1) TABLET BY MOUTH EVERY DAY     losartan 50 MG Tabs  Commonly known as:  COZAAR   Take 1 Tab by mouth 2 Times a Day.  Dose:  50 mg     omeprazole 20 MG delayed-release capsule  Commonly known as:  PRILOSEC   Take 1 Cap by mouth every day.  Dose:  20 mg            Allergies  Allergies   Allergen Reactions   • Lisinopril      cough       DIET  Orders Placed This Encounter   Procedures   • Diet Order Cardiac     Standing Status:   Standing     Number of Occurrences:   1     Order Specific Question:   Diet:     Answer:   Cardiac [6]   • Discontinue Diet Tray     Standing Status:   Standing     Number of Occurrences:   1       ACTIVITY  As tolerated.  Weight bearing as tolerated    CONSULTATIONS  N/A    Time spent discharging patient: 40 minutes

## 2020-02-13 NOTE — CARE PLAN
Problem: Safety  Goal: Will remain free from injury  Outcome: PROGRESSING AS EXPECTED  Note: Patient's risk for injury and falls assessed. Appropriate safety precautions in place. Patient educated to utilize call light for needs. Patient verbalizes understanding.      Problem: Bowel/Gastric:  Goal: Will not experience complications related to bowel motility  Outcome: PROGRESSING AS EXPECTED  Note: Patient bowel function is assessed. Education provided on diet, fluid intake and activities that promote bowel function. Toileting at scheduled intervals in place for patient. Patient verbalizes understanding.

## 2020-02-25 ENCOUNTER — OFFICE VISIT (OUTPATIENT)
Dept: CARDIOLOGY | Facility: MEDICAL CENTER | Age: 75
End: 2020-02-25
Payer: MEDICARE

## 2020-02-25 VITALS
OXYGEN SATURATION: 94 % | RESPIRATION RATE: 14 BRPM | HEIGHT: 76 IN | HEART RATE: 80 BPM | DIASTOLIC BLOOD PRESSURE: 74 MMHG | SYSTOLIC BLOOD PRESSURE: 122 MMHG | BODY MASS INDEX: 30.9 KG/M2 | WEIGHT: 253.75 LBS

## 2020-02-25 DIAGNOSIS — Z79.01 CHRONIC ANTICOAGULATION: ICD-10-CM

## 2020-02-25 DIAGNOSIS — I10 ESSENTIAL HYPERTENSION: ICD-10-CM

## 2020-02-25 DIAGNOSIS — Z95.5 STENTED CORONARY ARTERY: ICD-10-CM

## 2020-02-25 DIAGNOSIS — I48.0 PAROXYSMAL ATRIAL FIBRILLATION (HCC): ICD-10-CM

## 2020-02-25 DIAGNOSIS — E78.5 DYSLIPIDEMIA: ICD-10-CM

## 2020-02-25 DIAGNOSIS — I25.10 CORONARY ARTERY DISEASE DUE TO LIPID RICH PLAQUE: ICD-10-CM

## 2020-02-25 DIAGNOSIS — I25.83 CORONARY ARTERY DISEASE DUE TO LIPID RICH PLAQUE: ICD-10-CM

## 2020-02-25 PROCEDURE — 99204 OFFICE O/P NEW MOD 45 MIN: CPT | Performed by: INTERNAL MEDICINE

## 2020-02-25 RX ORDER — LANOLIN ALCOHOL/MO/W.PET/CERES
CREAM (GRAM) TOPICAL
COMMUNITY
Start: 2015-09-24 | End: 2020-04-28

## 2020-02-25 ASSESSMENT — ENCOUNTER SYMPTOMS
ABDOMINAL PAIN: 0
GASTROINTESTINAL NEGATIVE: 1
DOUBLE VISION: 0
NAUSEA: 0
COUGH: 0
PSYCHIATRIC NEGATIVE: 1
DEPRESSION: 0
MUSCULOSKELETAL NEGATIVE: 1
EYES NEGATIVE: 1
CHILLS: 0
NERVOUS/ANXIOUS: 0
BLURRED VISION: 0
FEVER: 0
SHORTNESS OF BREATH: 1
BRUISES/BLEEDS EASILY: 0
CARDIOVASCULAR NEGATIVE: 1
FOCAL WEAKNESS: 0
NEUROLOGICAL NEGATIVE: 1
PALPITATIONS: 0
WEIGHT LOSS: 0
MYALGIAS: 0
VOMITING: 0
HEADACHES: 0
CLAUDICATION: 0
DIZZINESS: 0
WEAKNESS: 0

## 2020-02-25 NOTE — PROGRESS NOTES
Chief Complaint   Patient presents with   • Coronary Artery Disease     PP of TT       Subjective:   SIN Cloud is a 74 y.o. male who presents today for hospital follow up.    Since the discharge from [facility] on 02/13/20 for atrial fibrillation and influenza A, he has been experiencing fatigue, shortness of breath and has irregular heart beats. He denies chest pain, nausea/vomiting or diaphoresis.    Past Medical History:   Diagnosis Date   • CAD (coronary artery disease) 2003    PCI/BMS x 3 to the RCA (Zeta 4.0 x 23mm, 3.5 x 23mm, 3.0 x 18mm).   • Central sleep apnea      ICD-10 transition   • Chronic anticoagulation    • Depression    • Depressive disorder, not elsewhere classified         • Dyslipidemia    • Hypertension    • Hypothyroidism    • Mixed hyperlipidemia    • Obesity    • Old myocardial infarction January 2003        • Peptic ulcer disease 8/4/2016   • Personal history of venous thrombosis and embolism     On Xarelto   • Pulmonary embolism (HCC)    • Sleep apnea     BiPAP with oxygen     Past Surgical History:   Procedure Laterality Date   • WRIST ORIF Left 2/23/2018    Procedure: WRIST ORIF;  Surgeon: Jasper Hammond M.D.;  Location: [facility];  Service: Orthopedics   • INCISION AND DRAINAGE ORTHOPEDIC  8/29/2014    Performed by Wolfgang Merrill M.D. at SURGERY [facility]   • CARDIAC CATH, RIGHT/LEFT HEART  2003 01/2003 4.0x23mm Zeta stent to proximal RCA,3.5x22mm distal to first stent, 3.0x15mm at the point of original occlusion.   • BOWEL RESECTION     • PB ANESTH,LOWER LEG ARTERIES SURG       Family History   Problem Relation Age of Onset   • Other Mother         Passed at 98   • Heart Disease Father 60        CABG     Social History     Socioeconomic History   • Marital status:      Spouse name: Not on file   • Number of children: Not on file   • Years of education: Not on file   • Highest education level: Not on file   Occupational  History   • Not on file   Social Needs   • Financial resource strain: Not on file   • Food insecurity     Worry: Not on file     Inability: Not on file   • Transportation needs     Medical: Not on file     Non-medical: Not on file   Tobacco Use   • Smoking status: Never Smoker   • Smokeless tobacco: Never Used   Substance and Sexual Activity   • Alcohol use: Yes     Comment: 6 drinks a week    • Drug use: No   • Sexual activity: Not on file   Lifestyle   • Physical activity     Days per week: Not on file     Minutes per session: Not on file   • Stress: Not on file   Relationships   • Social connections     Talks on phone: Not on file     Gets together: Not on file     Attends Mandaeism service: Not on file     Active member of club or organization: Not on file     Attends meetings of clubs or organizations: Not on file     Relationship status: Not on file   • Intimate partner violence     Fear of current or ex partner: Not on file     Emotionally abused: Not on file     Physically abused: Not on file     Forced sexual activity: Not on file   Other Topics Concern   • Not on file   Social History Narrative   • Not on file     Allergies   Allergen Reactions   • Lisinopril      cough     (Medications reviewed.)  Outpatient Encounter Medications as of 2/25/2020   Medication Sig Dispense Refill   • ferrous sulfate (FE TABS) 325 (65 Fe) MG EC tablet FE TABS 325 (65 Fe) MG TBEC     • rivaroxaban (XARELTO) 20 MG Tab tablet Take 1 Tab by mouth with dinner. 30 Tab 0   • BREO ELLIPTA 200-25 MCG/INH AEROSOL POWDER, BREATH ACTIVATED INHALE ONE PUFF BY MOUTH EVERY DAY 1 Each 6   • atorvastatin (LIPITOR) 40 MG Tab Take 1 Tab by mouth every day. 90 Tab 3   • carvedilol (COREG) 25 MG Tab Take 1 Tab by mouth 2 times a day, with meals. 180 Tab 3   • losartan (COZAAR) 50 MG Tab Take 1 Tab by mouth 2 Times a Day. 180 Tab 3   • omeprazole (PRILOSEC) 20 MG delayed-release capsule Take 1 Cap by mouth every day. 90 Cap 3   • escitalopram  "(LEXAPRO) 10 MG Tab TAKE ONE (1) TABLET BY MOUTH EVERY DAY 90 Tab 3   • aspirin (ASA) 81 MG Chew Tab chewable tablet Take 81 mg by mouth every evening. 100 Tab 11   • rivaroxaban (XARELTO) 20 MG Tab tablet XARELTO 20 MG TABS     • oseltamivir (TAMIFLU) 75 MG Cap Take 1 Cap by mouth 2 Times a Day. (Patient not taking: Reported on 2/25/2020) 6 Cap 0     No facility-administered encounter medications on file as of 2/25/2020.      Review of Systems   Constitutional: Positive for malaise/fatigue. Negative for chills, fever and weight loss.   HENT: Negative.  Negative for hearing loss.    Eyes: Negative.  Negative for blurred vision and double vision.   Respiratory: Positive for shortness of breath. Negative for cough.    Cardiovascular: Negative.  Negative for chest pain, palpitations, claudication and leg swelling.   Gastrointestinal: Negative.  Negative for abdominal pain, nausea and vomiting.   Genitourinary: Negative.  Negative for dysuria and urgency.   Musculoskeletal: Negative.  Negative for joint pain and myalgias.   Skin: Negative.  Negative for itching and rash.   Neurological: Negative.  Negative for dizziness, focal weakness, weakness and headaches.   Endo/Heme/Allergies: Negative.  Does not bruise/bleed easily.   Psychiatric/Behavioral: Negative.  Negative for depression. The patient is not nervous/anxious.         Objective:   /74 (BP Location: Right arm, Patient Position: Sitting, BP Cuff Size: Large adult)   Pulse 80   Resp 14   Ht 1.93 m (6' 4\")   Wt 115.1 kg (253 lb 12 oz)   SpO2 94%   BMI 30.89 kg/m²     Physical Exam   Constitutional: He is oriented to person, place, and time. He appears well-developed and well-nourished.   HENT:   Head: Normocephalic and atraumatic.   Eyes: EOM are normal.   Neck: No JVD present.   Cardiovascular: Normal rate and normal heart sounds. An irregularly irregular rhythm present.   Pulmonary/Chest: Effort normal and breath sounds normal.   Abdominal: Soft. " Bowel sounds are normal.   No hepatosplenomegaly.   Musculoskeletal: Normal range of motion.   Lymphadenopathy:     He has no cervical adenopathy.   Neurological: He is alert and oriented to person, place, and time.   Skin: Skin is warm and dry.   Psychiatric: He has a normal mood and affect.     CARDIAC STUDIES/PROCEDURES:    CARDIAC CATHETERIZATION CONCLUSIONS by Miguel Barney (01/14/03))  Cardiac catheterization with placement of 4.0 x 23 and 3.5 x 22 mm Zeta bare metal stent to right coronary artery .  (study result reviewed)    ECHOCARDIOGRAM CONCLUSIONS (02/12/20)  Compared to the images of the study done 4/23/2014 - there has been   interval decline in estimated ejection fraction, previously  normal,   and increase in the estimated right ventricular systolic pressure,   previously 35 mmHg.    Left ventricular systolic function is mildly reduced.  Left ventricular ejection fraction is visually estimated to be 50%.  Diastolic function is difficult to assess with arrhythmia.  The right ventricle was normal in size and function.  Mild tricuspid regurgitation.  Estimated right ventricular systolic pressure  is 55 mmHg.  (study result reviewed)    EKG performed on (02/18/20) was reviewed: EKG personally interpreted shows atrial fibrillation.  EKG performed on (02/12/20) was reviewed: EKG personally interpreted shows atrial fibrillation.    Laboratory results of (11/07/19) were reviewed. Cholesterol profile of 101/64/40/48 noted.    Assessment:     1. Paroxysmal atrial fibrillation (HCC)     2. Chronic anticoagulation     3. Coronary artery disease due to lipid rich plaque     4. Stented coronary artery     5. Essential hypertension     6. Dyslipidemia       Medical Decision Making:  Today's Assessment / Status / Plan:     1. Atrial fibrillation on anticoagulation therapy (Xarelto): He is doing well on anticoagulation and the ventricular rate is well controlled. He has only been on full dose since his recent  hospitalization. We will refer to electrophysiologisy service.  2. Coronary artery disease with stent placement: He is clinically doing well without recurrence of his angina.  We will continue with current medical care including carvedilol, losartan and atorvastatin. We will repeat a myocardial perfusion imaging study.  3. Hypertension: Blood pressure is well controlled. We will continue with beta blockade therapy and angiotensin receptor blockade.  4. Hyperlipidemia: He is doing well on statin therapy without myalgia symptoms.  5. History of recurrent pulmonary embolism on chronic anticoagulation therapy.    We will follow up after his tests to reassess his symptoms.    CC Angel Lloyd

## 2020-02-26 ENCOUNTER — TELEPHONE (OUTPATIENT)
Dept: CARDIOLOGY | Facility: MEDICAL CENTER | Age: 75
End: 2020-02-26

## 2020-02-26 ENCOUNTER — OFFICE VISIT (OUTPATIENT)
Dept: CARDIOLOGY | Facility: MEDICAL CENTER | Age: 75
End: 2020-02-26
Payer: MEDICARE

## 2020-02-26 VITALS
HEIGHT: 76 IN | HEART RATE: 74 BPM | WEIGHT: 253 LBS | BODY MASS INDEX: 30.81 KG/M2 | OXYGEN SATURATION: 94 % | DIASTOLIC BLOOD PRESSURE: 72 MMHG | SYSTOLIC BLOOD PRESSURE: 114 MMHG

## 2020-02-26 DIAGNOSIS — I48.0 PAF (PAROXYSMAL ATRIAL FIBRILLATION) (HCC): ICD-10-CM

## 2020-02-26 LAB — EKG IMPRESSION: NORMAL

## 2020-02-26 PROCEDURE — 93000 ELECTROCARDIOGRAM COMPLETE: CPT | Performed by: INTERNAL MEDICINE

## 2020-02-26 PROCEDURE — 99215 OFFICE O/P EST HI 40 MIN: CPT | Performed by: INTERNAL MEDICINE

## 2020-02-26 NOTE — PROGRESS NOTES
Arrhythmia Clinic Note (New patient)     DOS: 2/26/2020    Referring physician: Dr Pichardo    Chief complaint/Reason for consult: Fatigue    HPI: 74-year-old man with a history of coronary disease status post PCI to RCA in 2003, hypertension, hyperlipidemia, pulmonary embolism on chronic Xarelto, presenting for evaluation.  Last month he presented to the emergency room with flulike symptoms.  He was diagnosed with influenza A.  He was treated with Tamiflu.  He received an EKG which showed atrial fibrillation, new diagnosis for him.  He was increased on Xarelto to 20 mg a day.  Today he feels much better.  On review of his symptoms, it seems like he has had atrial fibrillation in the past, as he notes that on his pulse ox he would have very fast heart rates this past fall when trying to exert himself.  He does not endorse palpitations however.  No syncope or presyncope.  On exertion he did have shortness of breath.  No chest pain.    ROS (+ highlighted in bold):  Constitutional: Fevers/chills/fatigue/weightloss  HEENT: Blurry vision/eye pain/sore throat/hearing loss  Respiratory: Shortness of breath/cough  Cardiovascular: Chest pain/palpitations/edema/orthopnea/syncope  GI: Nausea/vomitting/diarrhea  MSK: Arthralgias/myagias/muscle weakness  Skin: Rash/sores  Neurological: Numbness/tremors/vertigo  Endocrine: Excessive thirst/polyuria/cold intolerance/heat intolerance  Psych: Depression/anxiety    Past Medical History:   Diagnosis Date   • CAD (coronary artery disease) 2003    PCI/BMS x 3 to the RCA (Zeta 4.0 x 23mm, 3.5 x 23mm, 3.0 x 18mm).   • Central sleep apnea      ICD-10 transition   • Chronic anticoagulation    • Depression    • Depressive disorder, not elsewhere classified         • Dyslipidemia    • Hypertension    • Hypothyroidism    • Mixed hyperlipidemia    • Obesity    • Old myocardial infarction January 2003        • Peptic ulcer disease 8/4/2016   • Personal history of venous thrombosis and embolism      On Xarelto   • Pulmonary embolism (HCC)    • Sleep apnea     BiPAP with oxygen       Past Surgical History:   Procedure Laterality Date   • WRIST ORIF Left 2/23/2018    Procedure: WRIST ORIF;  Surgeon: Jasper Hammond M.D.;  Location: SURGERY [facility];  Service: Orthopedics   • INCISION AND DRAINAGE ORTHOPEDIC  8/29/2014    Performed by Wolfgang Merrill M.D. at SURGERY [facility]   • CARDIAC CATH, RIGHT/LEFT HEART  2003 01/2003 4.0x23mm Zeta stent to proximal RCA,3.5x22mm distal to first stent, 3.0x15mm at the point of original occlusion.   • BOWEL RESECTION     • PB ANESTH,LOWER LEG ARTERIES SURG         Social History     Socioeconomic History   • Marital status:      Spouse name: Not on file   • Number of children: Not on file   • Years of education: Not on file   • Highest education level: Not on file   Occupational History   • Not on file   Social Needs   • Financial resource strain: Not on file   • Food insecurity     Worry: Not on file     Inability: Not on file   • Transportation needs     Medical: Not on file     Non-medical: Not on file   Tobacco Use   • Smoking status: Never Smoker   • Smokeless tobacco: Never Used   Substance and Sexual Activity   • Alcohol use: Yes     Comment: 6 drinks a week    • Drug use: No   • Sexual activity: Not on file   Lifestyle   • Physical activity     Days per week: Not on file     Minutes per session: Not on file   • Stress: Not on file   Relationships   • Social connections     Talks on phone: Not on file     Gets together: Not on file     Attends Jainism service: Not on file     Active member of club or organization: Not on file     Attends meetings of clubs or organizations: Not on file     Relationship status: Not on file   • Intimate partner violence     Fear of current or ex partner: Not on file     Emotionally abused: Not on file     Physically abused: Not on file     Forced sexual activity: Not on file   Other Topics Concern   • Not  "on file   Social History Narrative   • Not on file       Family History   Problem Relation Age of Onset   • Other Mother         Passed at 98   • Heart Disease Father 60        CABG       Allergies   Allergen Reactions   • Lisinopril      cough       Current Outpatient Medications   Medication Sig Dispense Refill   • ferrous sulfate (FE TABS) 325 (65 Fe) MG EC tablet FE TABS 325 (65 Fe) MG TBEC     • rivaroxaban (XARELTO) 20 MG Tab tablet Take 1 Tab by mouth with dinner. 30 Tab 0   • BREO ELLIPTA 200-25 MCG/INH AEROSOL POWDER, BREATH ACTIVATED INHALE ONE PUFF BY MOUTH EVERY DAY 1 Each 6   • atorvastatin (LIPITOR) 40 MG Tab Take 1 Tab by mouth every day. 90 Tab 3   • carvedilol (COREG) 25 MG Tab Take 1 Tab by mouth 2 times a day, with meals. 180 Tab 3   • losartan (COZAAR) 50 MG Tab Take 1 Tab by mouth 2 Times a Day. 180 Tab 3   • omeprazole (PRILOSEC) 20 MG delayed-release capsule Take 1 Cap by mouth every day. 90 Cap 3   • escitalopram (LEXAPRO) 10 MG Tab TAKE ONE (1) TABLET BY MOUTH EVERY DAY 90 Tab 3   • aspirin (ASA) 81 MG Chew Tab chewable tablet Take 81 mg by mouth every evening. 100 Tab 11     No current facility-administered medications for this visit.        Physical Exam:  Vitals:    02/26/20 0937   BP: 114/72   BP Location: Left arm   Patient Position: Sitting   BP Cuff Size: Adult   Pulse: 74   SpO2: 94%   Weight: 114.8 kg (253 lb)   Height: 1.93 m (6' 4\")     General appearance: NAD, conversant   Eyes: anicteric sclerae, moist conjunctivae; no lid-lag; PERRLA  HENT: Atraumatic; oropharynx clear with moist mucous membranes and no mucosal ulcerations; normal hard and soft palate  Neck: Trachea midline; FROM, supple, no thyromegaly or lymphadenopathy  Lungs: CTA, with normal respiratory effort and no intercostal retractions  CV: Irregular, no MRGs, no JVD   Abdomen: Soft, non-tender; no masses or HSM  Extremities: No peripheral edema or extremity lymphadenopathy  Skin: Normal temperature, turgor and " texture; no rash, ulcers or subcutaneous nodules  Psych: Appropriate affect, alert and oriented to person, place and time    Data:  Lipids:   Lab Results   Component Value Date/Time    CHOLSTRLTOT 101 11/07/2019 07:14 AM    TRIGLYCERIDE 64 11/07/2019 07:14 AM    HDL 40 11/07/2019 07:14 AM    LDL 48 11/07/2019 07:14 AM        BMP:  Lab Results   Component Value Date/Time    SODIUM 136 02/13/2020 0220    POTASSIUM 4.2 02/13/2020 0220    CHLORIDE 108 02/13/2020 0220    CO2 24 02/13/2020 0220    GLUCOSE 94 02/13/2020 0220    BUN 21 02/13/2020 0220    CREATININE 0.99 02/13/2020 0220    CALCIUM 8.4 (L) 02/13/2020 0220    ANION 4.0 02/13/2020 0220        TSH:   Lab Results   Component Value Date/Time    TSHULTRASEN 0.840 02/12/2020 0525        THYROXINE (T4):   No results found for: HARRYIR     CBC:   Lab Results   Component Value Date/Time    WBC 2.9 (L) 02/13/2020 02:20 AM    RBC 3.43 (L) 02/13/2020 02:20 AM    HEMOGLOBIN 11.8 (L) 02/13/2020 02:20 AM    HEMATOCRIT 36.1 (L) 02/13/2020 02:20 AM    .2 (H) 02/13/2020 02:20 AM    MCH 34.4 (H) 02/13/2020 02:20 AM    MCHC 32.7 (L) 02/13/2020 02:20 AM    RDW 61.6 (H) 02/13/2020 02:20 AM    PLATELETCT 123 (L) 02/13/2020 02:20 AM    MPV 9.1 02/13/2020 02:20 AM    NEUTSPOLYS 56.00 02/13/2020 02:20 AM    LYMPHOCYTES 25.20 02/13/2020 02:20 AM    MONOCYTES 15.00 (H) 02/13/2020 02:20 AM    EOSINOPHILS 2.80 02/13/2020 02:20 AM    BASOPHILS 0.70 02/13/2020 02:20 AM    IMMGRAN 0.30 02/13/2020 02:20 AM    IMMGRAN 0 12/14/2018 07:20 AM    NRBC 0.00 02/13/2020 02:20 AM    NEUTS 1.60 (L) 02/13/2020 02:20 AM    NEUTS 3.2 12/14/2018 07:20 AM    LYMPHS 0.72 (L) 02/13/2020 02:20 AM    LYMPHS 1.1 12/14/2018 07:20 AM    MONOS 0.43 02/13/2020 02:20 AM    MONOS 0.7 12/14/2018 07:20 AM    EOS 0.08 02/13/2020 02:20 AM    EOS 0.1 12/14/2018 07:20 AM    BASO 0.02 02/13/2020 02:20 AM    BASO 0.0 12/14/2018 07:20 AM    IMMGRANAB 0.01 02/13/2020 02:20 AM    IMMGRANAB 0.0 12/14/2018 07:20 AM    NRBCAB  0.00 02/13/2020 02:20 AM        CBC w/o DIFF  Lab Results   Component Value Date/Time    WBC 2.9 (L) 02/13/2020 02:20 AM    RBC 3.43 (L) 02/13/2020 02:20 AM    HEMOGLOBIN 11.8 (L) 02/13/2020 02:20 AM    .2 (H) 02/13/2020 02:20 AM    MCH 34.4 (H) 02/13/2020 02:20 AM    MCHC 32.7 (L) 02/13/2020 02:20 AM    RDW 61.6 (H) 02/13/2020 02:20 AM    MPV 9.1 02/13/2020 02:20 AM       Prior echo/stress results reviewed: EF 50%    EKG interpreted by me: Atrial fibrillation, narrow complex    Impression/Plan:  1. PAF (paroxysmal atrial fibrillation) ([facility])  EKG     1.  Persistent atrial fibrillation.  Unclear when this started, probably at least since this past fall.  He is anticoagulated with Xarelto.  His ejection fraction has dipped slightly to 50%.  He has exertional symptoms.  We discussed treatment options for this including rhythm control, rate control, and more invasive procedures like ablation.  We did discuss atrial fibrillation ablation, which he is interested in.  However, we decided that we would see how he feels in sinus rhythm prior to proceeding with this.  As such, we will arrange for cardioversion next available.  He will follow-up in 3 months.  He thinks regardless that he would like to proceed with ablation, but I would like to assess how persistent his atrial fibrillation is, and how long he can sustain sinus rhythm for following his cardioversion.  We may need to arrange for drug load concomitant with ablation if he has early recurrence of persistent atrial fibrillation.    Schedule cardioversion without CHRIS.  Follow-up in 3 months.    Nilton Ramirez MD  Cardiac Electrophysiology

## 2020-03-03 ENCOUNTER — TELEPHONE (OUTPATIENT)
Dept: CARDIOLOGY | Facility: MEDICAL CENTER | Age: 75
End: 2020-03-03

## 2020-03-03 ENCOUNTER — HOSPITAL ENCOUNTER (OUTPATIENT)
Dept: RADIOLOGY | Facility: MEDICAL CENTER | Age: 75
End: 2020-03-03
Attending: INTERNAL MEDICINE
Payer: MEDICARE

## 2020-03-03 DIAGNOSIS — Z95.5 STENTED CORONARY ARTERY: ICD-10-CM

## 2020-03-03 DIAGNOSIS — I25.83 CORONARY ARTERY DISEASE DUE TO LIPID RICH PLAQUE: ICD-10-CM

## 2020-03-03 DIAGNOSIS — I25.10 CORONARY ARTERY DISEASE DUE TO LIPID RICH PLAQUE: ICD-10-CM

## 2020-03-03 PROCEDURE — 93018 CV STRESS TEST I&R ONLY: CPT | Performed by: INTERNAL MEDICINE

## 2020-03-03 PROCEDURE — A9502 TC99M TETROFOSMIN: HCPCS

## 2020-03-03 PROCEDURE — 78452 HT MUSCLE IMAGE SPECT MULT: CPT | Mod: 26 | Performed by: INTERNAL MEDICINE

## 2020-03-03 PROCEDURE — 700111 HCHG RX REV CODE 636 W/ 250 OVERRIDE (IP)

## 2020-03-03 RX ORDER — REGADENOSON 0.08 MG/ML
INJECTION, SOLUTION INTRAVENOUS
Status: COMPLETED
Start: 2020-03-03 | End: 2020-03-03

## 2020-03-03 RX ADMIN — REGADENOSON 0.4 MG: 0.08 INJECTION, SOLUTION INTRAVENOUS at 09:12

## 2020-03-03 NOTE — TELEPHONE ENCOUNTER
Result Notes for NM-CARDIAC STRESS TEST     Notes recorded by INDIRA Macias on 3/3/2020 at 2:42 PM PST  Old MI noted on stress imaging but no new ischemia noted. FU as planned with [facility]. SC     Spoke with patient and discussed results.  Patient verbalized understanding and was appreciative for the information and phone call.

## 2020-03-04 DIAGNOSIS — Z01.812 PRE-OPERATIVE LABORATORY EXAMINATION: ICD-10-CM

## 2020-03-04 DIAGNOSIS — Z01.810 PRE-OPERATIVE CARDIOVASCULAR EXAMINATION: ICD-10-CM

## 2020-03-04 LAB
ANION GAP SERPL CALC-SCNC: 10 MMOL/L (ref 0–11.9)
BUN SERPL-MCNC: 30 MG/DL (ref 8–22)
CALCIUM SERPL-MCNC: 8.9 MG/DL (ref 8.5–10.5)
CHLORIDE SERPL-SCNC: 106 MMOL/L (ref 96–112)
CO2 SERPL-SCNC: 24 MMOL/L (ref 20–33)
CREAT SERPL-MCNC: 1.16 MG/DL (ref 0.5–1.4)
EKG IMPRESSION: NORMAL
ERYTHROCYTE [DISTWIDTH] IN BLOOD BY AUTOMATED COUNT: 54.4 FL (ref 35.9–50)
GLUCOSE SERPL-MCNC: 121 MG/DL (ref 65–99)
HCT VFR BLD AUTO: 38 % (ref 42–52)
HGB BLD-MCNC: 12.7 G/DL (ref 14–18)
INR PPP: 1.57 (ref 0.87–1.13)
MCH RBC QN AUTO: 34.5 PG (ref 27–33)
MCHC RBC AUTO-ENTMCNC: 33.4 G/DL (ref 33.7–35.3)
MCV RBC AUTO: 103.3 FL (ref 81.4–97.8)
PLATELET # BLD AUTO: 171 K/UL (ref 164–446)
PMV BLD AUTO: 9.7 FL (ref 9–12.9)
POTASSIUM SERPL-SCNC: 4.2 MMOL/L (ref 3.6–5.5)
PROTHROMBIN TIME: 19.2 SEC (ref 12–14.6)
RBC # BLD AUTO: 3.68 M/UL (ref 4.7–6.1)
SODIUM SERPL-SCNC: 140 MMOL/L (ref 135–145)
WBC # BLD AUTO: 5.7 K/UL (ref 4.8–10.8)

## 2020-03-04 PROCEDURE — 80048 BASIC METABOLIC PNL TOTAL CA: CPT

## 2020-03-04 PROCEDURE — 85027 COMPLETE CBC AUTOMATED: CPT

## 2020-03-04 PROCEDURE — 36415 COLL VENOUS BLD VENIPUNCTURE: CPT

## 2020-03-04 PROCEDURE — 85610 PROTHROMBIN TIME: CPT

## 2020-03-04 PROCEDURE — 93010 ELECTROCARDIOGRAM REPORT: CPT | Performed by: INTERNAL MEDICINE

## 2020-03-04 PROCEDURE — 93005 ELECTROCARDIOGRAM TRACING: CPT

## 2020-03-04 ASSESSMENT — FIBROSIS 4 INDEX: FIB4 SCORE: 3.32

## 2020-03-05 ENCOUNTER — HOSPITAL ENCOUNTER (OUTPATIENT)
Facility: MEDICAL CENTER | Age: 75
End: 2020-03-05
Attending: INTERNAL MEDICINE | Admitting: INTERNAL MEDICINE
Payer: MEDICARE

## 2020-03-05 ENCOUNTER — APPOINTMENT (OUTPATIENT)
Dept: CARDIOLOGY | Facility: MEDICAL CENTER | Age: 75
End: 2020-03-05
Attending: INTERNAL MEDICINE
Payer: MEDICARE

## 2020-03-05 VITALS
OXYGEN SATURATION: 95 % | BODY MASS INDEX: 30.71 KG/M2 | RESPIRATION RATE: 18 BRPM | HEIGHT: 76 IN | TEMPERATURE: 96.8 F | WEIGHT: 252.21 LBS | DIASTOLIC BLOOD PRESSURE: 61 MMHG | HEART RATE: 54 BPM | SYSTOLIC BLOOD PRESSURE: 99 MMHG

## 2020-03-05 DIAGNOSIS — I48.0 PAF (PAROXYSMAL ATRIAL FIBRILLATION) (HCC): ICD-10-CM

## 2020-03-05 DIAGNOSIS — I48.19 PERSISTENT ATRIAL FIBRILLATION (HCC): ICD-10-CM

## 2020-03-05 LAB
EKG IMPRESSION: NORMAL
EKG IMPRESSION: NORMAL

## 2020-03-05 PROCEDURE — 92960 CARDIOVERSION ELECTRIC EXT: CPT

## 2020-03-05 PROCEDURE — 99152 MOD SED SAME PHYS/QHP 5/>YRS: CPT | Performed by: INTERNAL MEDICINE

## 2020-03-05 PROCEDURE — 93005 ELECTROCARDIOGRAM TRACING: CPT | Performed by: INTERNAL MEDICINE

## 2020-03-05 PROCEDURE — 700111 HCHG RX REV CODE 636 W/ 250 OVERRIDE (IP)

## 2020-03-05 PROCEDURE — 93010 ELECTROCARDIOGRAM REPORT: CPT | Performed by: INTERNAL MEDICINE

## 2020-03-05 PROCEDURE — 92960 CARDIOVERSION ELECTRIC EXT: CPT | Performed by: INTERNAL MEDICINE

## 2020-03-05 PROCEDURE — 93010 ELECTROCARDIOGRAM REPORT: CPT | Mod: 76 | Performed by: INTERNAL MEDICINE

## 2020-03-05 PROCEDURE — 700111 HCHG RX REV CODE 636 W/ 250 OVERRIDE (IP): Performed by: INTERNAL MEDICINE

## 2020-03-05 PROCEDURE — 160002 HCHG RECOVERY MINUTES (STAT)

## 2020-03-05 RX ORDER — SODIUM CHLORIDE 9 MG/ML
500 INJECTION, SOLUTION INTRAVENOUS
Status: DISCONTINUED | OUTPATIENT
Start: 2020-03-05 | End: 2020-03-05 | Stop reason: HOSPADM

## 2020-03-05 RX ORDER — MIDAZOLAM HYDROCHLORIDE 1 MG/ML
INJECTION INTRAMUSCULAR; INTRAVENOUS
Status: COMPLETED
Start: 2020-03-05 | End: 2020-03-05

## 2020-03-05 RX ORDER — MIDAZOLAM HYDROCHLORIDE 1 MG/ML
.5-2 INJECTION INTRAMUSCULAR; INTRAVENOUS PRN
Status: DISCONTINUED | OUTPATIENT
Start: 2020-03-05 | End: 2020-03-05 | Stop reason: HOSPADM

## 2020-03-05 RX ADMIN — MIDAZOLAM HYDROCHLORIDE 2 MG: 1 INJECTION, SOLUTION INTRAMUSCULAR; INTRAVENOUS at 10:01

## 2020-03-05 RX ADMIN — FENTANYL CITRATE 50 MCG: 50 INJECTION INTRAMUSCULAR; INTRAVENOUS at 09:57

## 2020-03-05 RX ADMIN — FENTANYL CITRATE 50 MCG: 50 INJECTION INTRAMUSCULAR; INTRAVENOUS at 10:01

## 2020-03-05 RX ADMIN — MIDAZOLAM HYDROCHLORIDE 2 MG: 1 INJECTION, SOLUTION INTRAMUSCULAR; INTRAVENOUS at 09:57

## 2020-03-05 ASSESSMENT — FIBROSIS 4 INDEX: FIB4 SCORE: 2.39

## 2020-03-05 NOTE — PROCEDURES
Moderate sedation was used by me (Epifanio Felix MD).    Agents: Fentanyl and Versed via intravenous injection (please see media tab for details of dosage).    Start time: 09:57 AM.    Stop time: 10:07 AM.    Complications: none.    Epifanio Felix M.D.

## 2020-03-05 NOTE — OR NURSING
1018 Patient arrived to unit, arouses to voice but is lethargic.  Patient in sinus rhythm.  Patient denies pain at this time.  Updated on plan of care, verbalized understanding.  1035 Daughter called and updated on patient status.  1100 Patient arouses to voice, updated on plan of care.  Tolerating oral intake.  1130 Daughter to bedside, updated on plan of care.  1150 Patient up to edge of bed.  Patient initially dizzy but then resolved.  All lines and monitors discontinued. Reviewed discharge paperwork with pt and daughter. Discussed diet, activity, medications, follow up care and worsening symptoms. No questions at this time.   1202 Pt discharged home with daughter via wheelchair by RN.

## 2020-03-05 NOTE — DISCHARGE INSTRUCTIONS
ACTIVITY: Rest and take it easy for the first 24 hours.  A responsible adult is recommended to remain with you during that time.  It is normal to feel sleepy.  We encourage you to not do anything that requires balance, judgment or coordination.    MILD FLU-LIKE SYMPTOMS ARE NORMAL. YOU MAY EXPERIENCE GENERALIZED MUSCLE ACHES, THROAT IRRITATION, HEADACHE AND/OR SOME NAUSEA.    FOR 24 HOURS DO NOT:  Drive, operate machinery or run household appliances.  Drink beer or alcoholic beverages.   Make important decisions or sign legal documents.    SPECIAL INSTRUCTIONS:     Electrical Cardioversion  Electrical cardioversion is the delivery of a jolt of electricity to restore a normal rhythm to the heart. A rhythm that is too fast or is not regular keeps the heart from pumping well. In this procedure, sticky patches or metal paddles are placed on the chest to deliver electricity to the heart from a device.  This procedure may be done in an emergency if:  · There is low or no blood pressure as a result of the heart rhythm.  · Normal rhythm must be restored as fast as possible to protect the brain and heart from further damage.  · It may save a life.  This procedure may also be done for irregular or fast heart rhythms that are not immediately life-threatening.  Tell a health care provider about:  · Any allergies you have.  · All medicines you are taking, including vitamins, herbs, eye drops, creams, and over-the-counter medicines.  · Any problems you or family members have had with anesthetic medicines.  · Any blood disorders you have.  · Any surgeries you have had.  · Any medical conditions you have.  · Whether you are pregnant or may be pregnant.  What are the risks?  Generally, this is a safe procedure. However, problems may occur, including:  · Allergic reactions to medicines.  · A blood clot that breaks free and travels to other parts of your body.  · The possible return of an abnormal heart rhythm within hours or days  after the procedure.  · Your heart stopping (cardiac arrest ). This is rare.  What happens before the procedure?  Medicines  · Your health care provider may have you start taking:  ¨ Blood-thinning medicines (anticoagulants) so your blood does not clot as easily.  ¨ Medicines may be given to help stabilize your heart rate and rhythm.  · Ask your health care provider about changing or stopping your regular medicines. This is especially important if you are taking diabetes medicines or blood thinners.  General instructions  · Plan to have someone take you home from the hospital or clinic.  · If you will be going home right after the procedure, plan to have someone with you for 24 hours.  · Follow instructions from your health care provider about eating or drinking restrictions.  What happens during the procedure?  · To lower your risk of infection:  ¨ Your health care team will wash or sanitize their hands.  ¨ Your skin will be washed with soap.  · An IV tube will be inserted into one of your veins.  · You will be given a medicine to help you relax (sedative).  · Sticky patches (electrodes) or metal paddles may be placed on your chest.  · An electrical shock will be delivered.  The procedure may vary among health care providers and hospitals.  What happens after the procedure?  · Your blood pressure, heart rate, breathing rate, and blood oxygen level will be monitored until the medicines you were given have worn off.  · Do not drive for 24 hours if you were given a sedative.  · Your heart rhythm will be watched to make sure it does not change.  This information is not intended to replace advice given to you by your health care provider. Make sure you discuss any questions you have with your health care provider.  Document Released: 12/08/2003 Document Revised: 08/16/2017 Document Reviewed: 06/23/2017  NPS Interactive Patient Education © 2017 [employer]      DIET: To avoid nausea, slowly advance diet as  tolerated, avoiding spicy or greasy foods for the first day.  Add more substantial food to your diet according to your physician's instructions.  Babies can be fed formula or breast milk as soon as they are hungry.  INCREASE FLUIDS AND FIBER TO AVOID CONSTIPATION.    FOLLOW-UP APPOINTMENT:  A follow-up appointment should be arranged with your cardiologist [phone]; call to schedule.    You should CALL YOUR PHYSICIAN if you develop:  Fever greater than 101 degrees F.  Pain not relieved by medication, or persistent nausea or vomiting.  Excessive bleeding (blood soaking through dressing) or unexpected drainage from the wound.  Extreme redness or swelling around the incision site, drainage of pus or foul smelling drainage.  Inability to urinate or empty your bladder within 8 hours.  Problems with breathing or chest pain.    You should call 911 if you develop problems with breathing or chest pain.  If you are unable to contact your doctor or surgical center, you should go to the nearest emergency room or urgent care center.  Physician's telephone #: [phone]    If any questions arise, call your doctor.  If your doctor is not available, please feel free to call the Surgical Center at [phone].  The Center is open Monday through Friday from 7AM to 7PM.  You can also call the [employer] HOTLINE open 24 hours/day, 7 days/week and speak to a nurse at [phone], or toll free at [phone].    A registered nurse may call you a few days after your surgery to see how you are doing after your procedure.    MEDICATIONS: Resume taking daily medication.  Take prescribed pain medication with food.  If no medication is prescribed, you may take non-aspirin pain medication if needed.  PAIN MEDICATION CAN BE VERY CONSTIPATING.  Take a stool softener or laxative such as senokot, pericolace, or milk of magnesia if needed.    If your physician has prescribed pain medication that includes Acetaminophen (Tylenol), do not take  additional Acetaminophen (Tylenol) while taking the prescribed medication.    Depression / Suicide Risk    As you are discharged from this [facility] facility, it is important to learn how to keep safe from harming yourself.    Recognize the warning signs:  · Abrupt changes in personality, positive or negative- including increase in energy   · Giving away possessions  · Change in eating patterns- significant weight changes-  positive or negative  · Change in sleeping patterns- unable to sleep or sleeping all the time   · Unwillingness or inability to communicate  · Depression  · Unusual sadness, discouragement and loneliness  · Talk of wanting to die  · Neglect of personal appearance   · Rebelliousness- reckless behavior  · Withdrawal from people/activities they love  · Confusion- inability to concentrate     If you or a loved one observes any of these behaviors or has concerns about self-harm, here's what you can do:  · Talk about it- your feelings and reasons for harming yourself  · Remove any means that you might use to hurt yourself (examples: pills, rope, extension cords, firearm)  · Get professional help from the community (Mental Health, Substance Abuse, psychological counseling)  · Do not be alone:Call your Safe Contact- someone whom you trust who will be there for you.  · Call your local CRISIS HOTLINE 648-6546 or 250-940-6954  · Call your local Children's Mobile Crisis Response Team [place] (553) 168-2187 or www.Docitt  · Call the toll free National Suicide Prevention Hotlines   · National Suicide Prevention Lifeline 958-852-JXQQ (9250)  · National Hope Line Network 800-SUICIDE (348-0103)

## 2020-03-05 NOTE — PROCEDURES
Electrical Cardioversion  Date/Time: 3/5/2020 10:04 AM  Performed by: Epifanio Felix M.D.  Authorized by: Epifanio Felix M.D.     Consent:     Consent obtained:  Written and verbal    Consent given by:  Patient    Risks discussed:  Death, cutaneous burn, induced arrhythmia and pain    Alternatives discussed:  No treatment, rate-control medication, alternative treatment and anti-coagulation medication  Sedation:     Patient sedated: Yes      Sedation type:  Moderate (conscious) sedation    Sedation:  Versed 4mg IV and Fentanyl 100mcg    Sedation start:  3/5/2020 9:57 AM    Sedation end:  3/5/2020 10:07 AM    Vital signs: Vital signs monitored during sedation    Pre-procedure details:     Cardioversion basis:  Elective    Rhythm:  Atrial fibrillation    Electrode placement:  Anterior-posterior  Attempt one:     Cardioversion mode:  Synchronous    Shock (Joules):  200    Shock outcome:  Conversion to normal sinus rhythm  Post-procedure details:     Patient status:  Awake    Patient tolerance of procedure:  Tolerated well, no immediate complications  Comments:      I personally gave orders for medications administration and supervised the moderate sedation process during the entire procedure.    Epifanio Felix M.D.

## 2020-03-18 ENCOUNTER — OFFICE VISIT (OUTPATIENT)
Dept: CARDIOLOGY | Facility: MEDICAL CENTER | Age: 75
End: 2020-03-18
Payer: MEDICARE

## 2020-03-18 ENCOUNTER — HOSPITAL ENCOUNTER (OUTPATIENT)
Dept: LAB | Facility: MEDICAL CENTER | Age: 75
End: 2020-03-18
Attending: INTERNAL MEDICINE
Payer: MEDICARE

## 2020-03-18 VITALS
DIASTOLIC BLOOD PRESSURE: 74 MMHG | WEIGHT: 256 LBS | BODY MASS INDEX: 31.17 KG/M2 | HEIGHT: 76 IN | OXYGEN SATURATION: 92 % | SYSTOLIC BLOOD PRESSURE: 122 MMHG | HEART RATE: 82 BPM

## 2020-03-18 DIAGNOSIS — I48.0 PAF (PAROXYSMAL ATRIAL FIBRILLATION) (HCC): ICD-10-CM

## 2020-03-18 DIAGNOSIS — Z79.01 CHRONIC ANTICOAGULATION: ICD-10-CM

## 2020-03-18 DIAGNOSIS — I25.10 CORONARY ARTERY DISEASE INVOLVING NATIVE CORONARY ARTERY OF NATIVE HEART WITHOUT ANGINA PECTORIS: ICD-10-CM

## 2020-03-18 DIAGNOSIS — I10 ESSENTIAL HYPERTENSION: ICD-10-CM

## 2020-03-18 LAB
BASOPHILS # BLD AUTO: 0.5 % (ref 0–1.8)
BASOPHILS # BLD: 0.03 K/UL (ref 0–0.12)
EOSINOPHIL # BLD AUTO: 0.11 K/UL (ref 0–0.51)
EOSINOPHIL NFR BLD: 2 % (ref 0–6.9)
ERYTHROCYTE [DISTWIDTH] IN BLOOD BY AUTOMATED COUNT: 53.2 FL (ref 35.9–50)
FERRITIN SERPL-MCNC: 108 NG/ML (ref 22–322)
HCT VFR BLD AUTO: 38.1 % (ref 42–52)
HGB BLD-MCNC: 13 G/DL (ref 14–18)
IMM GRANULOCYTES # BLD AUTO: 0.01 K/UL (ref 0–0.11)
IMM GRANULOCYTES NFR BLD AUTO: 0.2 % (ref 0–0.9)
IRON SATN MFR SERPL: 45 % (ref 15–55)
IRON SERPL-MCNC: 132 UG/DL (ref 50–180)
LYMPHOCYTES # BLD AUTO: 1.19 K/UL (ref 1–4.8)
LYMPHOCYTES NFR BLD: 21.7 % (ref 22–41)
MCH RBC QN AUTO: 34.6 PG (ref 27–33)
MCHC RBC AUTO-ENTMCNC: 34.1 G/DL (ref 33.7–35.3)
MCV RBC AUTO: 101.3 FL (ref 81.4–97.8)
MONOCYTES # BLD AUTO: 0.53 K/UL (ref 0–0.85)
MONOCYTES NFR BLD AUTO: 9.7 % (ref 0–13.4)
NEUTROPHILS # BLD AUTO: 3.62 K/UL (ref 1.82–7.42)
NEUTROPHILS NFR BLD: 65.9 % (ref 44–72)
NRBC # BLD AUTO: 0 K/UL
NRBC BLD-RTO: 0 /100 WBC
PLATELET # BLD AUTO: 166 K/UL (ref 164–446)
PMV BLD AUTO: 9.5 FL (ref 9–12.9)
RBC # BLD AUTO: 3.76 M/UL (ref 4.7–6.1)
TIBC SERPL-MCNC: 294 UG/DL (ref 250–450)
UIBC SERPL-MCNC: 162 UG/DL (ref 110–370)
WBC # BLD AUTO: 5.5 K/UL (ref 4.8–10.8)

## 2020-03-18 PROCEDURE — 93000 ELECTROCARDIOGRAM COMPLETE: CPT | Performed by: INTERNAL MEDICINE

## 2020-03-18 PROCEDURE — 83550 IRON BINDING TEST: CPT

## 2020-03-18 PROCEDURE — 85025 COMPLETE CBC W/AUTO DIFF WBC: CPT

## 2020-03-18 PROCEDURE — 99214 OFFICE O/P EST MOD 30 MIN: CPT | Performed by: NURSE PRACTITIONER

## 2020-03-18 PROCEDURE — 82728 ASSAY OF FERRITIN: CPT

## 2020-03-18 PROCEDURE — 83540 ASSAY OF IRON: CPT

## 2020-03-18 PROCEDURE — 36415 COLL VENOUS BLD VENIPUNCTURE: CPT

## 2020-03-18 ASSESSMENT — ENCOUNTER SYMPTOMS
STRIDOR: 0
CHILLS: 0
SENSORY CHANGE: 0
SORE THROAT: 0
FEVER: 0
HEARTBURN: 0
DIZZINESS: 0
NAUSEA: 0
SHORTNESS OF BREATH: 0
WEIGHT LOSS: 0
SPEECH CHANGE: 0
PALPITATIONS: 0
COUGH: 0
DIARRHEA: 0
LOSS OF CONSCIOUSNESS: 0
HEMOPTYSIS: 0
DOUBLE VISION: 0
ABDOMINAL PAIN: 0
BLURRED VISION: 0
PND: 0
TINGLING: 0
SPUTUM PRODUCTION: 0
TREMORS: 0
HEADACHES: 0
FOCAL WEAKNESS: 0
ORTHOPNEA: 0
BLOOD IN STOOL: 0
VOMITING: 0
WHEEZING: 0

## 2020-03-18 ASSESSMENT — FIBROSIS 4 INDEX: FIB4 SCORE: 2.39

## 2020-03-18 NOTE — PROGRESS NOTES
"Cardiology/Electrophysiology Follow-up Note      Subjective:   Chief Complaint:   Chief Complaint   Patient presents with   • Atrial Fibrillation     PP DX:PAF       Anthony Cloud is a 74 y.o. male who presents today for follow-up atrial fibrillation status post electrical cardioversion 3/5/2020.    He is followed by Dr. Pichardo and Dr. Ramirez for EP.  Past medical history also significant for coronary artery disease, status post PCI to the RCA in 2003, hypertension, hyperlipidemia, pulmonary embolism on chronic anticoagulation.  Diagnosed with atrial fibrillation when he was hospitalized for a severe case of influenza A.  Patient states that he had previously not realize that he had had a trouble with arrhythmia.  He was seen by Dr. Ramirez discuss treatment options decided to pursue cardioversion first to see if he is symptomatically improved in sinus rhythm.    Today in follow up he states that he believes he stayed in sinus rhythm for approximately 1 week post cardioversion and then noticed that his heart rate was a little more elevated and jumping all over the place on his pulse oximetry.  Patient reports concern for return of atrial fibrillation.  He currently states that he does not feel poorly, he does not have chest pain dyspnea palpitations or increased lower extremity edema.  He states once a while he feels lightheaded.  However, he states that he noticeably felt improved overall, and felt \"great\" for approximately 1 week after his cardioversion.  He reports that his heart rate is not elevating above 100 except for with exertion then may go up to about 120 bpm but returns back down with rest.     He currently denies chest pain, dizziness, palpitations, pre syncope or syncope, dyspnea, PND, orthopnea, or lower extremity edema.        Patient endorses medication compliance        Past Medical History:   Diagnosis Date   • Acute nasopharyngitis 01/2020   • Arrhythmia     a fib   • CAD (coronary artery " disease) 2003    PCI/BMS x 3 to the RCA (Zeta 4.0 x 23mm, 3.5 x 23mm, 3.0 x 18mm).   • Central sleep apnea      ICD-10 transition   • Chronic anticoagulation    • Depression    • Depressive disorder, not elsewhere classified         • Dyslipidemia    • Hypertension    • Hypothyroidism    • Mixed hyperlipidemia    • Obesity    • Old myocardial infarction January 2003        • Peptic ulcer disease 8/4/2016   • Personal history of venous thrombosis and embolism     On Xarelto   • Pulmonary embolism (HCC) 2003/2004   • Sleep apnea     BiPAP with oxygen     Past Surgical History:   Procedure Laterality Date   • WRIST ORIF Left 2/23/2018    Procedure: WRIST ORIF;  Surgeon: Jasper Hammond M.D.;  Location: SURGERY [facility];  Service: Orthopedics   • INCISION AND DRAINAGE ORTHOPEDIC  8/29/2014    Performed by Wolfgang Merrill M.D. at SURGERY [facility]   • CARDIAC CATH, RIGHT/LEFT HEART  2003 01/2003 4.0x23mm Zeta stent to proximal RCA,3.5x22mm distal to first stent, 3.0x15mm at the point of original occlusion.   • BOWEL RESECTION      2020 pt denies   • PB ANESTH,LOWER LEG ARTERIES SURG       Family History   Problem Relation Age of Onset   • Other Mother         Passed at 98   • Heart Disease Father 60        CABG     Social History     Socioeconomic History   • Marital status:      Spouse name: Not on file   • Number of children: Not on file   • Years of education: Not on file   • Highest education level: Not on file   Occupational History   • Not on file   Social Needs   • Financial resource strain: Not on file   • Food insecurity     Worry: Not on file     Inability: Not on file   • Transportation needs     Medical: Not on file     Non-medical: Not on file   Tobacco Use   • Smoking status: Never Smoker   • Smokeless tobacco: Never Used   Substance and Sexual Activity   • Alcohol use: Yes     Comment: 6 drinks a week    • Drug use: No   • Sexual activity: Not on file   Lifestyle   •  Physical activity     Days per week: Not on file     Minutes per session: Not on file   • Stress: Not on file   Relationships   • Social connections     Talks on phone: Not on file     Gets together: Not on file     Attends L[employer] service: Not on file     Active member of club or organization: Not on file     Attends meetings of clubs or organizations: Not on file     Relationship status: Not on file   • Intimate partner violence     Fear of current or ex partner: Not on file     Emotionally abused: Not on file     Physically abused: Not on file     Forced sexual activity: Not on file   Other Topics Concern   • Not on file   Social History Narrative   • Not on file     Allergies   Allergen Reactions   • Lisinopril      cough       Current Outpatient Medications   Medication Sig Dispense Refill   • ferrous sulfate (FE TABS) 325 (65 Fe) MG EC tablet FE TABS 325 (65 Fe) MG TBEC     • rivaroxaban (XARELTO) 20 MG Tab tablet Take 1 Tab by mouth with dinner. 30 Tab 0   • BREO ELLIPTA 200-25 MCG/INH AEROSOL POWDER, BREATH ACTIVATED INHALE ONE PUFF BY MOUTH EVERY DAY 1 Each 6   • atorvastatin (LIPITOR) 40 MG Tab Take 1 Tab by mouth every day. 90 Tab 3   • carvedilol (COREG) 25 MG Tab Take 1 Tab by mouth 2 times a day, with meals. 180 Tab 3   • losartan (COZAAR) 50 MG Tab Take 1 Tab by mouth 2 Times a Day. 180 Tab 3   • omeprazole (PRILOSEC) 20 MG delayed-release capsule Take 1 Cap by mouth every day. 90 Cap 3   • escitalopram (LEXAPRO) 10 MG Tab TAKE ONE (1) TABLET BY MOUTH EVERY DAY 90 Tab 3   • aspirin (ASA) 81 MG Chew Tab chewable tablet Take 81 mg by mouth every evening. 100 Tab 11     No current facility-administered medications for this visit.        Review of Systems   Constitutional: Positive for malaise/fatigue. Negative for chills, fever and weight loss.        Reports generally feeling 'not as well' overall    HENT: Negative for congestion, nosebleeds, sore throat and tinnitus.    Eyes: Negative for blurred  "vision and double vision.   Respiratory: Negative for cough, hemoptysis, sputum production, shortness of breath, wheezing and stridor.    Cardiovascular: Negative for chest pain, palpitations, orthopnea, leg swelling and PND.   Gastrointestinal: Negative for abdominal pain, blood in stool, diarrhea, heartburn, melena, nausea and vomiting.   Skin: Negative for rash.   Neurological: Negative for dizziness, tingling, tremors, sensory change, speech change, focal weakness, loss of consciousness and headaches.     All others systems reviewed and negative.     Objective:     /74 (BP Location: Left arm, Patient Position: Sitting, BP Cuff Size: Large adult)   Pulse 82   Ht 1.93 m (6' 4\")   Wt 116.1 kg (256 lb)   SpO2 92%  Body mass index is 31.16 kg/m².    Weight/BMI: Body mass index is 31.16 kg/m².  Wt Readings from Last 4 Encounters:   03/18/20 116.1 kg (256 lb)   03/05/20 114.4 kg (252 lb 3.3 oz)   02/26/20 114.8 kg (253 lb)   02/25/20 115.1 kg (253 lb 12 oz)       Physical Exam   Constitutional: He is oriented to person, place, and time and well-developed, well-nourished, and in no distress.   HENT:   Head: Normocephalic and atraumatic.   Eyes: Pupils are equal, round, and reactive to light. Conjunctivae and EOM are normal.   Neck: Normal range of motion. Neck supple. No JVD present. No tracheal deviation present.   Cardiovascular: Normal rate, normal heart sounds and intact distal pulses. An irregularly irregular rhythm present. Exam reveals no gallop and no friction rub.   No murmur heard.  Pulses:       Radial pulses are 2+ on the right side and 2+ on the left side.        Dorsalis pedis pulses are 2+ on the right side and 2+ on the left side.        Posterior tibial pulses are 2+ on the right side and 2+ on the left side.   Pulmonary/Chest: Effort normal and breath sounds normal. No respiratory distress. He has no wheezes. He has no rales. He exhibits no tenderness.   Abdominal: Soft. Bowel sounds are " normal.   Musculoskeletal: Normal range of motion.         General: No edema.   Neurological: He is alert and oriented to person, place, and time.   Skin: Skin is warm and dry.   Psychiatric: Mood, memory, affect and judgment normal.         Cardiac Imaging and Procedures Review:    EKG (3/18/20) reviewed by myself:   Atrial Fibrillation, rate 75.     Echo (2/12/20):   Left Ventricle  The left ventricle was normal in size and thickness. Left ventricular   systolic function is mildly reduced. Left ventricular ejection fraction   is visually estimated to be 50%. Abnormal septal motion.  Diastolic   function is difficult to assess with arrhythmia.     Right Ventricle  The right ventricle was normal in size and function.     Right Atrium  Enlarged right atrium. Dilated inferior vena cava without inspiratory   collapse.     Left Atrium  Normal left atrial size. Left atrial volume index is 30 mL/sq m.     Mitral Valve  Structurally normal mitral valve without significant stenosis. Trace   mitral regurgitation.     Aortic Valve  Tricuspid aortic valve. Aortic sclerosis without stenosis. No aortic   insufficiency.     Tricuspid Valve  Structurally normal tricuspid valve without significant stenosis. Mild   tricuspid regurgitation. Estimated right ventricular systolic pressure    is 55 mmHg.     Pulmonic Valve  Structurally normal pulmonic valve without significant stenosis or   regurgitation.     Pericardium  Normal pericardium without effusion.     Aorta  The aortic root is normal.  Ascending aorta diameter is 3.1 cm.     Nuclear Perfusion Imaging (3/3/20):   NUCLEAR IMAGING INTERPRETATION   LV ejection fraction = 48%.     There is decreased perfusion of the inferior wall consistent with a moderate    sized inferior infarct.  There is no emely-infarct ischemia.   ECG INTERPRETATION   Negative stress ECG for ischemia.      Labs (personally reviewed and notable for):   Lab Results   Component Value Date/Time    SODIUM 140  03/04/2020 03:19 PM    POTASSIUM 4.2 03/04/2020 03:19 PM    CHLORIDE 106 03/04/2020 03:19 PM    CO2 24 03/04/2020 03:19 PM    GLUCOSE 121 (H) 03/04/2020 03:19 PM    BUN 30 (H) 03/04/2020 03:19 PM    CREATININE 1.16 03/04/2020 03:19 PM    CREATININE 1.06 01/04/2013 08:01 AM    BUNCREATRAT 22 12/14/2018 07:20 AM    BUNCREATRAT 25 (H) 01/04/2013 08:01 AM      Lab Results   Component Value Date/Time    WBC 5.7 03/04/2020 03:19 PM    RBC 3.68 (L) 03/04/2020 03:19 PM    HEMOGLOBIN 12.7 (L) 03/04/2020 03:19 PM    HEMATOCRIT 38.0 (L) 03/04/2020 03:19 PM    .3 (H) 03/04/2020 03:19 PM    MCH 34.5 (H) 03/04/2020 03:19 PM    MCHC 33.4 (L) 03/04/2020 03:19 PM    MPV 9.7 03/04/2020 03:19 PM    NEUTSPOLYS 56.00 02/13/2020 02:20 AM    LYMPHOCYTES 25.20 02/13/2020 02:20 AM    MONOCYTES 15.00 (H) 02/13/2020 02:20 AM    EOSINOPHILS 2.80 02/13/2020 02:20 AM    BASOPHILS 0.70 02/13/2020 02:20 AM      PT/INR:   Lab Results   Component Value Date/Time    PROTHROMBTM 19.2 (H) 03/04/2020 03:19 PM    INR 1.57 (H) 03/04/2020 03:19 PM       Assessment:     1. PAF (paroxysmal atrial fibrillation) ([facility])  EKG   2. Essential hypertension     3. Coronary artery disease involving native coronary artery of native heart without angina pectoris     4. Chronic anticoagulation         Medical Decision Making:  Today's Assessment / Status / Plan:   1.  Paroxysmal atrial fibrillation:  -Status post DCCV 3/5/2020.  Twelve-lead EKG today confirms return of atrial fibrillation, ventricular rate is well controlled at this time.  Patient reports ventricular rate overall controlled at home as well.  -Given early recurrence of atrial fibrillation after cardioversion, I have discussed further treatment options with him.  He has previously discussed ablation with Dr. Ramirez and reports that he would like to pursue this if Dr. Ramirez is still agreeable.  Also discussed the possibility of antiarrhythmic drug loading surrounding this, he is agreeable to this as  well.  Given history of coronary artery disease and cardiomyopathy I think sotalol would likely be a good antiarrhythmic medication choice for him but will discuss with Dr. Ramirez.  -In the meantime he will continue his carvedilol and Xarelto.    2.  Hypertension:  -Blood pressure today is well controlled.  He will continue his current antihypertensive medication regimen.    3.  CAD:  -Status post PCI in 2003 to his RCA.  -Stable no chest pain/anginal symptoms.  He follows with Dr. Pichardo for this.    4.  Chronic anticoagulation:  -Xarelto 20 mg daily.  He reports no evidence of bleeding.  Recent CBC is stable from previous assessments..      Plan reviewed in detail with the patient and he verbalizes understanding and is in agreement.   **Discussed with patient that because of Kovic 19, we are unable to schedule elective procedures or hospital admissions at this time.  Discussed will likely need to wait until clearance is given by the hospital to schedule any further atrial fibrillation interventions as directed by Dr. Ramirez.    RTCin April as scheduled, sooner if clinical condition changes  Collaborating MD/ADD: Piter    Please note that this dictation was created using voice recognition software. I have made every reasonable attempt to correct obvious errors, but I expect that there are errors of grammar and possibly content that I did not discover before finalizing the note.    ABBY Pruitt.

## 2020-03-21 LAB — EKG IMPRESSION: NORMAL

## 2020-03-31 ENCOUNTER — TELEPHONE (OUTPATIENT)
Dept: CARDIOLOGY | Facility: MEDICAL CENTER | Age: 75
End: 2020-03-31

## 2020-03-31 NOTE — TELEPHONE ENCOUNTER
Discussed changing to Ondemand video visit, mychart instructions sent, daughter will be helping pt.

## 2020-03-31 NOTE — PROGRESS NOTES
CC:     HPI:  Mr. Anthony Patel    Significant comorbidities and modifying factors include history of pulmonary embolism, coronary artery disease, status post stenting x3, essential hypertension, chronic anticoagulation, dyslipidemia, restrictive lung disease, non-smoker, recent influenza, atrial fibrillation status post cardioversion, and obesity.  Patient Active Problem List    Diagnosis Date Noted   • Influenza 02/12/2020     Priority: High   • AF (atrial fibrillation) (formerly [facility]) 02/12/2020     Priority: High   • Coronary artery disease due to lipid rich plaque 12/07/2015     Priority: High   • Chronic anticoagulation 09/16/2015     Priority: High   • Old myocardial infarction      Priority: High   • Pancytopenia (formerly [facility]) 02/12/2020     Priority: Medium   • Chronic alcohol dependence (formerly [facility]) 02/12/2020     Priority: Medium   • Impaired fasting glucose 08/04/2016     Priority: Medium   • Restrictive lung disease 08/04/2016     Priority: Medium   • Sleep apnea 06/14/2016     Priority: Medium   • Essential hypertension 12/07/2015     Priority: Medium   • Dyslipidemia      Priority: Medium   • Personal history of venous thrombosis and embolism      Priority: Medium   • Peptic ulcer disease 08/04/2016     Priority: Low   • H/O: GI bleed 08/04/2016     Priority: Low   • Seasonal affective disorder (CMS-HCC)      Priority: Low   • Stented coronary artery 02/25/2020   • Heart failure with preserved ejection fraction (formerly [facility]) 02/12/2020   • Non-smoker 12/13/2018   • Acquired hypothyroidism 11/16/2018   • High risk medication use 11/16/2018   • Diastolic dysfunction 03/15/2018   • Pulmonary hypertension (formerly [facility]) 03/15/2018   • Obesity (BMI 30-39.9) 01/16/2018       Past Medical History:   Diagnosis Date   • Acute nasopharyngitis 01/2020   • Arrhythmia     a fib   • CAD (coronary artery disease) 2003    PCI/BMS x 3 to the RCA (Zeta 4.0 x 23mm, 3.5 x 23mm, 3.0 x 18mm).   • Central sleep apnea      ICD-10 transition   • Chronic  anticoagulation    • Depression    • Depressive disorder, not elsewhere classified         • Dyslipidemia    • Hypertension    • Hypothyroidism    • Mixed hyperlipidemia    • Obesity    • Old myocardial infarction January 2003        • Peptic ulcer disease 8/4/2016   • Personal history of venous thrombosis and embolism     On Xarelto   • Pulmonary embolism (HCC) 2003/2004   • Sleep apnea     BiPAP with oxygen        Past Surgical History:   Procedure Laterality Date   • WRIST ORIF Left 2/23/2018    Procedure: WRIST ORIF;  Surgeon: Jasper Hammond M.D.;  Location: SURGERY [facility];  Service: Orthopedics   • INCISION AND DRAINAGE ORTHOPEDIC  8/29/2014    Performed by Wolfgang Merrill M.D. at SURGERY [facility]   • CARDIAC CATH, RIGHT/LEFT HEART  2003 01/2003 4.0x23mm Zeta stent to proximal RCA,3.5x22mm distal to first stent, 3.0x15mm at the point of original occlusion.   • BOWEL RESECTION      2020 pt denies   • PB ANESTH,LOWER LEG ARTERIES SURG         Family History   Problem Relation Age of Onset   • Other Mother         Passed at 98   • Heart Disease Father 60        CABG       Social History     Socioeconomic History   • Marital status:      Spouse name: Not on file   • Number of children: Not on file   • Years of education: Not on file   • Highest education level: Not on file   Occupational History   • Not on file   Social Needs   • Financial resource strain: Not on file   • Food insecurity     Worry: Not on file     Inability: Not on file   • Transportation needs     Medical: Not on file     Non-medical: Not on file   Tobacco Use   • Smoking status: Never Smoker   • Smokeless tobacco: Never Used   Substance and Sexual Activity   • Alcohol use: Yes     Comment: 6 drinks a week    • Drug use: No   • Sexual activity: Not on file   Lifestyle   • Physical activity     Days per week: Not on file     Minutes per session: Not on file   • Stress: Not on file   Relationships   • Social  "connections     Talks on phone: Not on file     Gets together: Not on file     Attends Christian service: Not on file     Active member of club or organization: Not on file     Attends meetings of clubs or organizations: Not on file     Relationship status: Not on file   • Intimate partner violence     Fear of current or ex partner: Not on file     Emotionally abused: Not on file     Physically abused: Not on file     Forced sexual activity: Not on file   Other Topics Concern   • Not on file   Social History Narrative   • Not on file       Current Outpatient Medications   Medication Sig Dispense Refill   • ferrous sulfate (FE TABS) 325 (65 Fe) MG EC tablet FE TABS 325 (65 Fe) MG TBEC     • rivaroxaban (XARELTO) 20 MG Tab tablet Take 1 Tab by mouth with dinner. 30 Tab 0   • BREO ELLIPTA 200-25 MCG/INH AEROSOL POWDER, BREATH ACTIVATED INHALE ONE PUFF BY MOUTH EVERY DAY 1 Each 6   • atorvastatin (LIPITOR) 40 MG Tab Take 1 Tab by mouth every day. 90 Tab 3   • carvedilol (COREG) 25 MG Tab Take 1 Tab by mouth 2 times a day, with meals. 180 Tab 3   • losartan (COZAAR) 50 MG Tab Take 1 Tab by mouth 2 Times a Day. 180 Tab 3   • omeprazole (PRILOSEC) 20 MG delayed-release capsule Take 1 Cap by mouth every day. 90 Cap 3   • escitalopram (LEXAPRO) 10 MG Tab TAKE ONE (1) TABLET BY MOUTH EVERY DAY 90 Tab 3   • aspirin (ASA) 81 MG Chew Tab chewable tablet Take 81 mg by mouth every evening. 100 Tab 11     No current facility-administered medications for this visit.     \"CURRENT RX\"    ALLERGIES: Lisinopril    ROS  ***  Constitutional: Denies fever, chills, sweats,  weight loss, fatigue  Cardiovascular: Denies chest pain, tightness, palpitations, swelling in legs/feet, fainting, difficulty breathing when lying down but gets better when sitting up.   Respiratory: Denies shortness of breath, cough, sputum, wheezing, painful breathing, coughing up blood.   Sleep: per HPI  Gastrointestinal: Denies  difficulty swallowing, nausea, " abdominal pain, diarrhea, constipation, heartburn.  Musculoskeletal: Denies painful joints, sore muscles, back pain.        PHYSICAL EXAM  ***    There were no vitals taken for this visit.  Appearance: Well-nourished, well-developed, no acute distress  Eyes:  PERRLA, EOMI  ENMT: without lesions, deformities;hearing grossly intact; tongue normal, posterior pharynx without erythema or exudate;   Modified Mallampati (AKA Freidman) Score:  Neck: Supple, trachea midline, no masses  Respiratory effort:  No intercostal retractions or use of accessory muscles  Lung auscultation:  No wheezes rhonchi rubs or rales  Cardiac: No murmurs, rubs, or gallops; regular rhythm, normal rate; no edema  Abdomen:  No tenderness, no organomegaly  Musculoskeletal:  Grossly normal; gait and station normal; digits and nails normal  Skin:  No rashes, petechiae, cyanosis  Neurologic: without focal signs; oriented to person, time, place, and purpose; judgement intact  Psychiatric:  No depression, anxiety, agitation        PROBLEMS:  ***  1. Sleep apnea, unspecified type  ***    2. Obesity (BMI 30-39.9)  ***    3. Non-smoker  ***    4. Pulmonary hypertension (HCC)  ***    5. Chronic anticoagulation  ***    6. Persistent atrial fibrillation (HCC)  ***    7. Acquired hypothyroidism  ***    8. Dyslipidemia  ***    9. Influenza  ***      PLAN:     Has been advised to continue the current ***, clean equipment frequently, and get new mask and supplies as allowed by insurance and DME. Advised about potential problems including nasal obstruction/stuffiness, mask leak or discomfort , frequent awakenings, chill or dryness of the upper airway, noise, inconvenience, and lack of benefit. Recommend an earlier appointment, if significant treatment barriers develop.    Have advised the patient to follow up with the appropriate healthcare practitioners for all other medical problems and issues.    No follow-ups on file.

## 2020-04-02 ENCOUNTER — OFFICE VISIT (OUTPATIENT)
Dept: SLEEP MEDICINE | Facility: MEDICAL CENTER | Age: 75
End: 2020-04-02
Payer: MEDICARE

## 2020-04-02 ENCOUNTER — TELEPHONE (OUTPATIENT)
Dept: CARDIOLOGY | Facility: MEDICAL CENTER | Age: 75
End: 2020-04-02

## 2020-04-02 ENCOUNTER — OFFICE VISIT (OUTPATIENT)
Dept: CARDIOLOGY | Facility: MEDICAL CENTER | Age: 75
End: 2020-04-02
Payer: MEDICARE

## 2020-04-02 VITALS — HEIGHT: 76 IN | BODY MASS INDEX: 30.08 KG/M2 | OXYGEN SATURATION: 92 % | HEART RATE: 91 BPM | WEIGHT: 247 LBS

## 2020-04-02 VITALS — WEIGHT: 247 LBS | BODY MASS INDEX: 30.08 KG/M2 | OXYGEN SATURATION: 92 % | HEIGHT: 76 IN | HEART RATE: 90 BPM

## 2020-04-02 DIAGNOSIS — I27.20 PULMONARY HYPERTENSION (HCC): ICD-10-CM

## 2020-04-02 DIAGNOSIS — E03.9 ACQUIRED HYPOTHYROIDISM: ICD-10-CM

## 2020-04-02 DIAGNOSIS — I48.19 PERSISTENT ATRIAL FIBRILLATION (HCC): ICD-10-CM

## 2020-04-02 DIAGNOSIS — Z79.01 CHRONIC ANTICOAGULATION: ICD-10-CM

## 2020-04-02 DIAGNOSIS — Z78.9 NON-SMOKER: ICD-10-CM

## 2020-04-02 DIAGNOSIS — J11.1 INFLUENZA: ICD-10-CM

## 2020-04-02 DIAGNOSIS — E66.9 OBESITY (BMI 30-39.9): ICD-10-CM

## 2020-04-02 DIAGNOSIS — E78.5 DYSLIPIDEMIA: ICD-10-CM

## 2020-04-02 DIAGNOSIS — G47.30 SLEEP APNEA, UNSPECIFIED TYPE: ICD-10-CM

## 2020-04-02 PROCEDURE — 99215 OFFICE O/P EST HI 40 MIN: CPT | Mod: 95,CR | Performed by: INTERNAL MEDICINE

## 2020-04-02 PROCEDURE — 99214 OFFICE O/P EST MOD 30 MIN: CPT | Mod: CR,95 | Performed by: INTERNAL MEDICINE

## 2020-04-02 ASSESSMENT — FIBROSIS 4 INDEX
FIB4 SCORE: 2.46
FIB4 SCORE: 2.46

## 2020-04-02 NOTE — PROGRESS NOTES
Telemedicine Visit: Established Patient     This encounter was conducted via Zoom .   Verbal consent was obtained. Patient's identity was verified.    Subjective:   CC: Afib, fatigue  Anthony Cloud is a 74 y.o. male presenting for evaluation and management of:  Persistent Afib. We arranged for cardioversion and he notes he felt markedly better in sinus rhythm, more energy and less fatigue. After about a week he noticed that he had fatigue and low energy again. He had a follow up visit and was noted to be back in Afib. Same symptoms as prior without palpitations, chest pain, syncope, or syncope.    ROS   Denies any recent fevers or chills. No nausea or vomiting. No chest pains or shortness of breath.     Allergies   Allergen Reactions   • Lisinopril      cough       Current medicines (including changes today)  Current Outpatient Medications   Medication Sig Dispense Refill   • rivaroxaban (XARELTO) 20 MG Tab tablet Take 1 Tab by mouth with dinner. 30 Tab 0   • BREO ELLIPTA 200-25 MCG/INH AEROSOL POWDER, BREATH ACTIVATED INHALE ONE PUFF BY MOUTH EVERY DAY 1 Each 6   • atorvastatin (LIPITOR) 40 MG Tab Take 1 Tab by mouth every day. 90 Tab 3   • carvedilol (COREG) 25 MG Tab Take 1 Tab by mouth 2 times a day, with meals. 180 Tab 3   • losartan (COZAAR) 50 MG Tab Take 1 Tab by mouth 2 Times a Day. 180 Tab 3   • omeprazole (PRILOSEC) 20 MG delayed-release capsule Take 1 Cap by mouth every day. 90 Cap 3   • escitalopram (LEXAPRO) 10 MG Tab TAKE ONE (1) TABLET BY MOUTH EVERY DAY 90 Tab 3   • aspirin (ASA) 81 MG Chew Tab chewable tablet Take 81 mg by mouth every evening. 100 Tab 11   • ferrous sulfate (FE TABS) 325 (65 Fe) MG EC tablet FE TABS 325 (65 Fe) MG TBEC       No current facility-administered medications for this visit.        Patient Active Problem List    Diagnosis Date Noted   • Influenza 02/12/2020     Priority: High   • AF (atrial fibrillation) (HCC) 02/12/2020     Priority: High   • Coronary artery  disease due to lipid rich plaque 12/07/2015     Priority: High   • Chronic anticoagulation 09/16/2015     Priority: High   • Old myocardial infarction      Priority: High   • Pancytopenia ([facility]) 02/12/2020     Priority: Medium   • Chronic alcohol dependence ([facility]) 02/12/2020     Priority: Medium   • Impaired fasting glucose 08/04/2016     Priority: Medium   • Restrictive lung disease 08/04/2016     Priority: Medium   • Sleep apnea 06/14/2016     Priority: Medium   • Essential hypertension 12/07/2015     Priority: Medium   • Dyslipidemia      Priority: Medium   • Personal history of venous thrombosis and embolism      Priority: Medium   • Peptic ulcer disease 08/04/2016     Priority: Low   • H/O: GI bleed 08/04/2016     Priority: Low   • Seasonal affective disorder ([facility])      Priority: Low   • Stented coronary artery 02/25/2020   • Heart failure with preserved ejection fraction ([facility]) 02/12/2020   • Non-smoker 12/13/2018   • Acquired hypothyroidism 11/16/2018   • High risk medication use 11/16/2018   • Diastolic dysfunction 03/15/2018   • Pulmonary hypertension ([facility]) 03/15/2018   • Obesity (BMI 30-39.9) 01/16/2018       Family History   Problem Relation Age of Onset   • Other Mother         Passed at 98   • Heart Disease Father 60        CABG       He  has a past medical history of Acute nasopharyngitis (01/2020), Arrhythmia, CAD (coronary artery disease) (2003), Central sleep apnea, Chronic anticoagulation, Depression, Depressive disorder, not elsewhere classified, Dyslipidemia, Hypertension, Hypothyroidism, Mixed hyperlipidemia, Obesity, Old myocardial infarction (January 2003), Peptic ulcer disease (8/4/2016), Personal history of venous thrombosis and embolism, Pulmonary embolism (HCC) (2003/2004), and Sleep apnea. He also has no past medical history of GERD (gastroesophageal reflux disease).  He  has a past surgical history that includes cardiac cath, right/left heart (2003); pr anesth,lower leg arteries surg;  "wrist orif (Left, 2/23/2018); bowel resection; and incision and drainage orthopedic (8/29/2014).       Objective:   Vitals obtained by patient:  Pulse 90   Ht 1.93 m (6' 4\")   Wt 112 kg (247 lb) Comment: PT [place]  SpO2 92%   BMI 30.07 kg/m²     Physical Exam:  Constitutional: Alert, no distress, well-groomed.  Skin: No rashes in visible areas.  Eye: Round. Conjunctiva clear, lids normal. No icterus.   ENMT: Lips pink without lesions, good dentition, moist mucous membranes. Phonation normal.  Neck: No masses, no thyromegaly. Moves freely without pain.  CV: Pulse as reported by patient, irregular  Respiratory: Unlabored respiratory effort, no cough or audible wheeze  Psych: Alert and oriented x3, normal affect and mood.     Labs reviewed in Epic    Echo reviewed: EF 50%    Assessment and Plan:   The following treatment plan was discussed:     1. Persistent atrial fibrillation (HCC)  - EC-CHRIS W/O CONT; Future  - CL-EP ABLATION ATRIAL FIBRILLATION; Future    1. Persistent Afib. S/p DCCV with recurrence after 1 week. We discussed treatment options for rhythm control, including AADs, ablation, and ablation with AAD. He prefers the latter as it would give him the highest likelihood of success of maintaining sinus rhythm. We discussed pulmonary vein isolation for therapeutic management and continued rhythm control.  We discussed the risks and benefits of this procedure.  Risks include 1-3% risk of major cardiovascular event including stroke, myocardial infarction, phrenic nerve damage, esophageal injury and/or fistula formation, cardiac perforation, pericardial effusion, tamponade, major bleeding, or death.  I quoted a 70 to 80% chance free of atrial fibrillation at 12 months.  We discussed that he may also need a second procedure. He understands the risks and benefits and wishes to proceed.    - Plan CHRIS, PVI, PWI, when we are next able to schedule cases. Plan admission following ablation for sotalol load. Will plan " to cut back carvedilol to avoid bradycardia at that time.       Follow-up: After PVI

## 2020-04-02 NOTE — TELEPHONE ENCOUNTER
I will call this patient once we are able to schedule outpatient procedures.  Afib ablation w/CHRIS w/anesthesia, no medications to hold - Sotalol load post with Dr. James Bermudez notified I will call them back to schedule, once I am able to.

## 2020-04-02 NOTE — PROGRESS NOTES
Telemedicine Visit: Established Patient     This encounter was conducted via Zoom .   Verbal consent was obtained. Patient's identity was verified.    Subjective:     CC: Follow-up for SHYAM on bilevel 18/14 with O2 bleed-in    Anthony Corinne Cloud is a 74 y.o. male presenting for evaluation and management of SHYAM.  His diagnostic study from 2016 showed an AHI of 32.6, y[place]ka saturation 80%, and saturations less than 90% for 93.1% of the recording.  He was ultimately  placed on bilevel 18/14 with excellent response.  He had previously been on ASV for treatment emergent central apnea which was not apparent on that 2016 he has G.    History a compliance is 86.7% for 9 hours and 32 minutes.  He has an average residual apnea hypopnea index of 4.4 on bilevel 18/14 cm water.  The patient reports improved symptoms using positive airway pressure.  Has experienced no significant problems with mask fit, mask leak, pressure tolerance, excessive airway dryness, nasal congestion, aerophagia, or chest pain.    Significant comorbidities and modifying factors include history of pulmonary embolism, coronary artery disease, status post stenting x3, essential hypertension, chronic anticoagulation, dyslipidemia, restrictive lung disease, non-smoker, recent influenza, atrial fibrillation status post cardioversion, and obesity. Pending ablation once the pandemic wanes.    ROS   Denies any recent fevers or chills. No nausea or vomiting. No chest pains or shortness of breath.     Allergies   Allergen Reactions   • Lisinopril      cough       Current medicines (including changes today)  Current Outpatient Medications   Medication Sig Dispense Refill   • ferrous sulfate (FE TABS) 325 (65 Fe) MG EC tablet FE TABS 325 (65 Fe) MG TBEC     • rivaroxaban (XARELTO) 20 MG Tab tablet Take 1 Tab by mouth with dinner. 30 Tab 0   • BREO ELLIPTA 200-25 MCG/INH AEROSOL POWDER, BREATH ACTIVATED INHALE ONE PUFF BY MOUTH EVERY DAY 1 Each 6   • atorvastatin  (LIPITOR) 40 MG Tab Take 1 Tab by mouth every day. 90 Tab 3   • carvedilol (COREG) 25 MG Tab Take 1 Tab by mouth 2 times a day, with meals. 180 Tab 3   • losartan (COZAAR) 50 MG Tab Take 1 Tab by mouth 2 Times a Day. 180 Tab 3   • omeprazole (PRILOSEC) 20 MG delayed-release capsule Take 1 Cap by mouth every day. 90 Cap 3   • escitalopram (LEXAPRO) 10 MG Tab TAKE ONE (1) TABLET BY MOUTH EVERY DAY 90 Tab 3   • aspirin (ASA) 81 MG Chew Tab chewable tablet Take 81 mg by mouth every evening. 100 Tab 11     No current facility-administered medications for this visit.        Patient Active Problem List    Diagnosis Date Noted   • Influenza 02/12/2020     Priority: High   • AF (atrial fibrillation) ([facility]) 02/12/2020     Priority: High   • Coronary artery disease due to lipid rich plaque 12/07/2015     Priority: High   • Chronic anticoagulation 09/16/2015     Priority: High   • Old myocardial infarction      Priority: High   • Pancytopenia ([facility]) 02/12/2020     Priority: Medium   • Chronic alcohol dependence ([facility]) 02/12/2020     Priority: Medium   • Impaired fasting glucose 08/04/2016     Priority: Medium   • Restrictive lung disease 08/04/2016     Priority: Medium   • Sleep apnea 06/14/2016     Priority: Medium   • Essential hypertension 12/07/2015     Priority: Medium   • Dyslipidemia      Priority: Medium   • Personal history of venous thrombosis and embolism      Priority: Medium   • Peptic ulcer disease 08/04/2016     Priority: Low   • H/O: GI bleed 08/04/2016     Priority: Low   • Seasonal affective disorder (CMS-H[facility])      Priority: Low   • Stented coronary artery 02/25/2020   • Heart failure with preserved ejection fraction ([facility]) 02/12/2020   • Non-smoker 12/13/2018   • Acquired hypothyroidism 11/16/2018   • High risk medication use 11/16/2018   • Diastolic dysfunction 03/15/2018   • Pulmonary hypertension ([facility]) 03/15/2018   • Obesity (BMI 30-39.9) 01/16/2018       Family History   Problem Relation Age of Onset   •  "Other Mother         Passed at 98   • Heart Disease Father 60        CABG       He  has a past medical history of Acute nasopharyngitis (01/2020), Arrhythmia, CAD (coronary artery disease) (2003), Central sleep apnea, Chronic anticoagulation, Depression, Depressive disorder, not elsewhere classified, Dyslipidemia, Hypertension, Hypothyroidism, Mixed hyperlipidemia, Obesity, Old myocardial infarction (January 2003), Peptic ulcer disease (8/4/2016), Personal history of venous thrombosis and embolism, Pulmonary embolism (HCC) (2003/2004), and Sleep apnea. He also has no past medical history of GERD (gastroesophageal reflux disease).  He  has a past surgical history that includes cardiac cath, right/left heart (2003); pr anesth,lower leg arteries surg; wrist orif (Left, 2/23/2018); bowel resection; and incision and drainage orthopedic (8/29/2014).       Objective:   Vitals obtained by patient:   Pulse 91    Ht 1.93 m (6' 4\")    Wt 112 kg (247 lb)    SpO2 92%    BMI 30.07 kg/m²    BSA 2.45 m²       Physical Exam:  Constitutional: Alert, no distress, well-groomed.  Skin: No rashes in visible areas.  Eye: Round. Conjunctiva clear, lids normal. No icterus.   ENMT: Lips pink without lesions, good dentition, moist mucous membranes. Phonation normal.  Neck: No masses, no thyromegaly. Moves freely without pain.  CV: Pulse as reported by patient  Respiratory: Unlabored respiratory effort, no cough or audible wheeze  Psych: Alert and oriented x3, normal affect and mood.       Assessment and Plan:   The following treatment plan was discussed:     1. Sleep apnea, unspecified type  - DME Mask and Supplies    2. Obesity (BMI 30-39.9)    3. Non-smoker    4. Pulmonary hypertension (HCC)    5. Chronic anticoagulation    6. Persistent atrial fibrillation (HCC)    7. Acquired hypothyroidism    8. Dyslipidemia    9. Influenza        Follow-up: Return in about 1 year (around 4/2/2021).  Has been advised to continue the current bilevel 18/14 " with O2 bleed in, clean equipment frequently, and get new mask and supplies as allowed by insurance and DME. Advised about potential problems including nasal obstruction/stuffiness, mask leak or discomfort , frequent awakenings, chill or dryness of the upper airway, noise, inconvenience, and lack of benefit. Recommend an earlier appointment, if significant treatment barriers develop.    Have advised the patient to follow up with the appropriate healthcare practitioners for all other medical problems and issues.

## 2020-04-13 ENCOUNTER — ANTICOAGULATION VISIT (OUTPATIENT)
Dept: VASCULAR LAB | Facility: MEDICAL CENTER | Age: 75
End: 2020-04-13
Attending: INTERNAL MEDICINE
Payer: MEDICARE

## 2020-04-13 DIAGNOSIS — I48.19 PERSISTENT ATRIAL FIBRILLATION (HCC): ICD-10-CM

## 2020-04-13 DIAGNOSIS — Z86.718 PERSONAL HISTORY OF VENOUS THROMBOSIS AND EMBOLISM: ICD-10-CM

## 2020-04-13 LAB — INR PPP: 1.4 (ref 2–3.5)

## 2020-04-13 PROCEDURE — 85610 PROTHROMBIN TIME: CPT

## 2020-04-13 PROCEDURE — 99212 OFFICE O/P EST SF 10 MIN: CPT | Performed by: NURSE PRACTITIONER

## 2020-04-13 NOTE — PROGRESS NOTES
Diagnosis: DVT/PE hx, new AF  Drug: Xarelto 20 mg   LOT: Indefinite  CHADSVASC: 6  HAS-BLED: 3 (age, asa, hx of GIB)    Health Status Since Last Assessment  Pt diagnosed with AF last month. Now on Xarelto 20 mg daily (was on 10 mg). Has cardioversion. No problems with bleeding. No s/sx of CVA or VTEs.      Overall, doing well.     Adherence with DOAC Therapy  0 missed dose(s)  BLEEDING RISK ASSESSMENT NB:    Bleeding Risk Assessment  Severe epistaxis - no   Hemoptysis - no  Excessive or unusual bruising / hematomas - no  GIB/melena/BRBPR/hematemesis - no  Hematuria - no   Abnormal vaginal bleeding - n/a  Concerning daily headache or subdural hematoma symptoms - no  Decreasing hemoglobin or new anemia - no  Falls, presyncope, syncope, or seizures - no  Platelets: 166  Latest hemoglobin:     Lab Results   Component Value Date/Time    WBC 5.5 03/18/2020 12:10 PM    RBC 3.76 (L) 03/18/2020 12:10 PM    HEMOGLOBIN 13.0 (L) 03/18/2020 12:10 PM    HEMATOCRIT 38.1 (L) 03/18/2020 12:10 PM    .3 (H) 03/18/2020 12:10 PM    MCH 34.6 (H) 03/18/2020 12:10 PM    MCHC 34.1 03/18/2020 12:10 PM    MPV 9.5 03/18/2020 12:10 PM    NEUTSPOLYS 65.90 03/18/2020 12:10 PM    LYMPHOCYTES 21.70 (L) 03/18/2020 12:10 PM    MONOCYTES 9.70 03/18/2020 12:10 PM    EOSINOPHILS 2.00 03/18/2020 12:10 PM    BASOPHILS 0.50 03/18/2020 12:10 PM        HAS-BLED:  Hypertension (uncontrolled, >160 mmHg systolic) - no  Renal disease (dialysis, transplant, Cr >2.26 mg/dL or >200 µmol/L) - no  Liver disease (cirrhosis or bilirubin >2x normal with AST/ALT/AP >3x normal) - no  Stroke history - no  Prior major bleeding or predisposition to bleeding - yes  Labile INR Unstable/high INRs, time in therapeutic range <60% - no  Age >65 - 1  Medication usage predisposing to bleeding (aspirin, clopidogrel, NSAIDs) - 1 (ASA 81 mg for CAD)  Alcohol use  ?8 drinks/week - no      Creatinine Clearance/Renal Function  Latest eGFR / creatinine:  Lab Results   Component  Value Date/Time    SODIUM 140 03/04/2020 03:19 PM    POTASSIUM 4.2 03/04/2020 03:19 PM    CHLORIDE 106 03/04/2020 03:19 PM    CO2 24 03/04/2020 03:19 PM    GLUCOSE 121 (H) 03/04/2020 03:19 PM    BUN 30 (H) 03/04/2020 03:19 PM    CREATININE 1.16 03/04/2020 03:19 PM    CREATININE 1.06 01/04/2013 08:01 AM    BUNCREATRAT 22 12/14/2018 07:20 AM    BUNCREATRAT 25 (H) 01/04/2013 08:01 AM      • Is eGFR less than 50ml/min  -no  Cr cl 91.2 ml/min    If YES, calculate CrCl (see back)  Any recent dehydrating illness or medications added/changed? i.e. Diuretics      Drug Interactions  ASA/antiplatelets - baby ASA  NSAID - avoids  Other drug interactions - antiplatelet (Review med list / OTCs;)  Current Outpatient Medications on File Prior to Visit   Medication Sig Dispense Refill   • ferrous sulfate (FE TABS) 325 (65 Fe) MG EC tablet FE TABS 325 (65 Fe) MG TBEC     • rivaroxaban (XARELTO) 20 MG Tab tablet Take 1 Tab by mouth with dinner. 30 Tab 0   • BREO ELLIPTA 200-25 MCG/INH AEROSOL POWDER, BREATH ACTIVATED INHALE ONE PUFF BY MOUTH EVERY DAY 1 Each 6   • atorvastatin (LIPITOR) 40 MG Tab Take 1 Tab by mouth every day. 90 Tab 3   • carvedilol (COREG) 25 MG Tab Take 1 Tab by mouth 2 times a day, with meals. 180 Tab 3   • losartan (COZAAR) 50 MG Tab Take 1 Tab by mouth 2 Times a Day. 180 Tab 3   • omeprazole (PRILOSEC) 20 MG delayed-release capsule Take 1 Cap by mouth every day. 90 Cap 3   • escitalopram (LEXAPRO) 10 MG Tab TAKE ONE (1) TABLET BY MOUTH EVERY DAY 90 Tab 3   • aspirin (ASA) 81 MG Chew Tab chewable tablet Take 81 mg by mouth every evening. 100 Tab 11     No current facility-administered medications on file prior to visit.      Verified no anticonvulsant or azole therapy, education provided for future use.      Examination  Significant gait impairment / imbalance / high risk for falls - age is a risk    Final Assessment and Recommendations:  Patient appears stable from the anticoagulation standpoint  Benefits of  continued DOAC therapy outweigh risks for this patient  Recommend continue current DOAC at same dose      - continue taking Xarelto 20 mg daily with food  - go to the ER for shortness of breath, chest pain, pain with deep inhalation, worsening leg swelling and/or pain in calf or leg or for any s/sx of stroke  - avoid sedentary periods  - let all your providers know you take this medication  - don't stop this medication without permission from your doctor or our clinic  -  refills before you run out  - continue complete avoidance of tobacco products  - to avoid any additional Aspirin and anti-inflammatories (eg. Advil, ibuprofen, Aleve, naproxen, etc) while anticoagulated   - Avoid skiing or other dangerous activities to reduce risk of head injury and brain bleeds  - elevate legs as much as possible, use compression stockings/socks if directed by your provider  - if any bleeding lasting 30min without stopping, please seek care with your PCP, urgent care, or ED  - if having any invasive procedure, please make sure the doctor knows of your history of blood clots and current anticoagulation status      Other Actions:   The rationale for continued DOAC therapy  The potential for minor, major or life-threatening bleeding  Dosing instructions, adherence, risks of non-adherence, handling missed doses  Avoiding OTC ASA & NSAIDs & minimizing EtOH to reduce bleeding risks  Dosing around upcoming procedure / surgery if applicable (see back)    Follow up:  Will follow up with patient in 6 months with labs      Shannan SLAUGHTER

## 2020-04-28 ENCOUNTER — TELEMEDICINE (OUTPATIENT)
Dept: CARDIOLOGY | Facility: MEDICAL CENTER | Age: 75
End: 2020-04-28
Payer: MEDICARE

## 2020-04-28 VITALS — HEIGHT: 76 IN | BODY MASS INDEX: 29.83 KG/M2 | WEIGHT: 245 LBS

## 2020-04-28 DIAGNOSIS — I25.83 CORONARY ARTERY DISEASE DUE TO LIPID RICH PLAQUE: ICD-10-CM

## 2020-04-28 DIAGNOSIS — E78.5 DYSLIPIDEMIA: ICD-10-CM

## 2020-04-28 DIAGNOSIS — I25.10 CORONARY ARTERY DISEASE DUE TO LIPID RICH PLAQUE: ICD-10-CM

## 2020-04-28 DIAGNOSIS — I10 ESSENTIAL HYPERTENSION: ICD-10-CM

## 2020-04-28 DIAGNOSIS — I48.19 PERSISTENT ATRIAL FIBRILLATION (HCC): ICD-10-CM

## 2020-04-28 DIAGNOSIS — Z79.01 CHRONIC ANTICOAGULATION: ICD-10-CM

## 2020-04-28 DIAGNOSIS — Z95.5 STENTED CORONARY ARTERY: ICD-10-CM

## 2020-04-28 PROCEDURE — 99214 OFFICE O/P EST MOD 30 MIN: CPT | Mod: 95,CR | Performed by: INTERNAL MEDICINE

## 2020-04-28 ASSESSMENT — ENCOUNTER SYMPTOMS
WEAKNESS: 0
BRUISES/BLEEDS EASILY: 0
BLURRED VISION: 0
WEIGHT LOSS: 1
COUGH: 0
DOUBLE VISION: 0
PALPITATIONS: 1
MUSCULOSKELETAL NEGATIVE: 1
DIZZINESS: 0
FEVER: 0
VOMITING: 0
ABDOMINAL PAIN: 0
NERVOUS/ANXIOUS: 0
GASTROINTESTINAL NEGATIVE: 1
PSYCHIATRIC NEGATIVE: 1
FOCAL WEAKNESS: 0
DEPRESSION: 0
NEUROLOGICAL NEGATIVE: 1
SHORTNESS OF BREATH: 1
CHILLS: 0
HEADACHES: 0
EYES NEGATIVE: 1
CLAUDICATION: 0
MYALGIAS: 0
NAUSEA: 0

## 2020-04-28 ASSESSMENT — FIBROSIS 4 INDEX: FIB4 SCORE: 2.46

## 2020-04-28 NOTE — PROGRESS NOTES
Chief Complaint   Patient presents with   • Atrial Fibrillation     dx:PAF       Subjective:   Anthony Cloud is a 74 y.o. male who presents today for follow up of coronary artery disease with prior stent placement.    Since the patient's last visit on 02/25/20, he has been doing well clinically from coronary standpoint. He denies chest pain, nausea/vomiting or diaphoresis. He underwent cardioversion on 03/05/20, however, has reconverted back into sinus rhythm and is experiencing fatigue, shortness of breath and tachycardia.    Past Medical History:   Diagnosis Date   • Acute nasopharyngitis 01/2020   • Arrhythmia     a fib   • CAD (coronary artery disease) 2003    PCI/BMS x 3 to the RCA (Zeta 4.0 x 23mm, 3.5 x 23mm, 3.0 x 18mm).   • Central sleep apnea      ICD-10 transition   • Chronic anticoagulation    • Depression    • Depressive disorder, not elsewhere classified         • Dyslipidemia    • Hypertension    • Hypothyroidism    • Mixed hyperlipidemia    • Obesity    • Old myocardial infarction January 2003        • Peptic ulcer disease 8/4/2016   • Personal history of venous thrombosis and embolism     On Xarelto   • Pulmonary embolism (HCC) 2003/2004   • Sleep apnea     BiPAP with oxygen     Past Surgical History:   Procedure Laterality Date   • WRIST ORIF Left 2/23/2018    Procedure: WRIST ORIF;  Surgeon: Jasper Hammond M.D.;  Location: SURGERY [facility];  Service: Orthopedics   • INCISION AND DRAINAGE ORTHOPEDIC  8/29/2014    Performed by Wolfgang Merrill M.D. at SURGERY [facility]   • CARDIAC CATH, RIGHT/LEFT HEART  2003 01/2003 4.0x23mm Zeta stent to proximal RCA,3.5x22mm distal to first stent, 3.0x15mm at the point of original occlusion.   • BOWEL RESECTION      2020 pt denies   • PB ANESTH,LOWER LEG ARTERIES SURG       Family History   Problem Relation Age of Onset   • Other Mother         Passed at 98   • Heart Disease Father 60        CABG     Social History      Socioeconomic History   • Marital status:      Spouse name: Not on file   • Number of children: Not on file   • Years of education: Not on file   • Highest education level: Not on file   Occupational History   • Not on file   Social Needs   • Financial resource strain: Not on file   • Food insecurity     Worry: Not on file     Inability: Not on file   • Transportation needs     Medical: Not on file     Non-medical: Not on file   Tobacco Use   • Smoking status: Never Smoker   • Smokeless tobacco: Never Used   Substance and Sexual Activity   • Alcohol use: Yes     Comment: 6 drinks a week    • Drug use: No   • Sexual activity: Not on file   Lifestyle   • Physical activity     Days per week: Not on file     Minutes per session: Not on file   • Stress: Not on file   Relationships   • Social connections     Talks on phone: Not on file     Gets together: Not on file     Attends Bahai service: Not on file     Active member of club or organization: Not on file     Attends meetings of clubs or organizations: Not on file     Relationship status: Not on file   • Intimate partner violence     Fear of current or ex partner: Not on file     Emotionally abused: Not on file     Physically abused: Not on file     Forced sexual activity: Not on file   Other Topics Concern   • Not on file   Social History Narrative   • Not on file     Allergies   Allergen Reactions   • Lisinopril      cough   (Medications reviewed.)  Outpatient Encounter Medications as of 4/28/2020   Medication Sig Dispense Refill   • rivaroxaban (XARELTO) 20 MG Tab tablet Take 1 Tab by mouth with dinner. 30 Tab 3   • BREO ELLIPTA 200-25 MCG/INH AEROSOL POWDER, BREATH ACTIVATED INHALE ONE PUFF BY MOUTH EVERY DAY 1 Each 6   • atorvastatin (LIPITOR) 40 MG Tab Take 1 Tab by mouth every day. 90 Tab 3   • carvedilol (COREG) 25 MG Tab Take 1 Tab by mouth 2 times a day, with meals. 180 Tab 3   • losartan (COZAAR) 50 MG Tab Take 1 Tab by mouth 2 Times a Day.  "180 Tab 3   • omeprazole (PRILOSEC) 20 MG delayed-release capsule Take 1 Cap by mouth every day. 90 Cap 3   • escitalopram (LEXAPRO) 10 MG Tab TAKE ONE (1) TABLET BY MOUTH EVERY DAY 90 Tab 3   • aspirin (ASA) 81 MG Chew Tab chewable tablet Take 81 mg by mouth every evening. 100 Tab 11   • [DISCONTINUED] ferrous sulfate (FE TABS) 325 (65 Fe) MG EC tablet FE TABS 325 (65 Fe) MG TBEC       No facility-administered encounter medications on file as of 4/28/2020.      Review of Systems   Constitutional: Positive for weight loss. Negative for chills, fever and malaise/fatigue.   HENT: Negative.  Negative for hearing loss.    Eyes: Negative.  Negative for blurred vision and double vision.   Respiratory: Positive for shortness of breath. Negative for cough.    Cardiovascular: Positive for palpitations. Negative for chest pain, claudication and leg swelling.   Gastrointestinal: Negative.  Negative for abdominal pain, nausea and vomiting.   Genitourinary: Negative.  Negative for dysuria and urgency.   Musculoskeletal: Negative.  Negative for joint pain and myalgias.   Skin: Negative.  Negative for itching and rash.   Neurological: Negative.  Negative for dizziness, focal weakness, weakness and headaches.   Endo/Heme/Allergies: Negative.  Does not bruise/bleed easily.   Psychiatric/Behavioral: Negative.  Negative for depression. The patient is not nervous/anxious.         Objective:   Ht 1.93 m (6' 4\")   Wt 111.1 kg (245 lb)   BMI 29.82 kg/m²     Physical Exam   Constitutional: He is oriented to person, place, and time. He appears well-developed and well-nourished.   HENT:   Head: Normocephalic and atraumatic.   Eyes: Conjunctivae are normal.   Neck: Normal range of motion.   Musculoskeletal: Normal range of motion.   Neurological: He is alert and oriented to person, place, and time.   Psychiatric: He has a normal mood and affect.     CARDIAC STUDIES/PROCEDURES:     CARDIAC CATHETERIZATION CONCLUSIONS by Miguel Barney " (01/14/03)  Cardiac catheterization with placement of 4.0 x 23 and 3.5 x 22 mm Zeta bare metal stent to right coronary artery.     ECHOCARDIOGRAM CONCLUSIONS (02/12/20)  Compared to the images of the study done 4/23/2014 - there has been   interval decline in estimated ejection fraction, previously  normal,   and increase in the estimated right ventricular systolic pressure,   previously 35 mmHg.    Left ventricular systolic function is mildly reduced.  Left ventricular ejection fraction is visually estimated to be 50%.  Diastolic function is difficult to assess with arrhythmia.  The right ventricle was normal in size and function.  Mild tricuspid regurgitation.  Estimated right ventricular systolic pressure  is 55 mmHg.     EKG performed on (03/18/20) was reviewed: EKG personally interpreted shows atrial fibrillation.  EKG performed on (02/18/20) EKG personally interpreted shows atrial fibrillation.  EKG performed on (02/12/20) EKG personally interpreted shows atrial fibrillation.     Laboratory results of (11/07/19) Cholesterol profile of 101/64/40/48 noted.    MYOCARDIAL PERFUSION STUDY CONCLUSIONS (03/03/20)  NUCLEAR IMAGING INTERPRETATION  LV ejection fraction = 48%.  There is decreased perfusion of the inferior wall consistent with a moderate sized inferior infarct.    There is no emely-infarct ischemia.  ECG INTERPRETATION  Negative stress ECG for ischemia.  (study result reviewed)     Assessment:     1. Coronary artery disease due to lipid rich plaque     2. Stented coronary artery     3. Persistent atrial fibrillation (HCC)     4. Chronic anticoagulation     5. Essential hypertension     6. Dyslipidemia       Medical Decision Making:  Today's Assessment / Status / Plan:     1. Coronary artery disease with stent placement: He is clinically doing well without recurrence of his angina.  We will continue with current medical care including aspirin, carvedilol, losartan and atorvastatin.  2. Atrial fibrillation  on anticoagulation therapy (Xarelto): He is doing well on anticoagulation and the ventricular rate is well controlled. He is pending ablation by Vlad Mckinney.  3. Hypertension: Blood pressure is well controlled. We will continue with beta blockade therapy and angiotensin receptor blockade.  4. Hyperlipidemia: He is doing well on statin therapy without myalgia symptoms.    This encounter was conducted via Zoom  .   Verbal consent was obtained. Patient's identity was verified.    We will follow up the patient in one year.

## 2020-07-10 DIAGNOSIS — Z01.810 PRE-OPERATIVE CARDIOVASCULAR EXAMINATION: ICD-10-CM

## 2020-07-10 DIAGNOSIS — Z01.812 PRE-OPERATIVE LABORATORY EXAMINATION: ICD-10-CM

## 2020-07-10 LAB
ALBUMIN SERPL BCP-MCNC: 3.9 G/DL (ref 3.2–4.9)
ALBUMIN/GLOB SERPL: 1.2 G/DL
ALP SERPL-CCNC: 75 U/L (ref 30–99)
ALT SERPL-CCNC: 26 U/L (ref 2–50)
ANION GAP SERPL CALC-SCNC: 11 MMOL/L (ref 7–16)
AST SERPL-CCNC: 17 U/L (ref 12–45)
BASOPHILS # BLD AUTO: 0.5 % (ref 0–1.8)
BASOPHILS # BLD: 0.03 K/UL (ref 0–0.12)
BILIRUB SERPL-MCNC: 0.5 MG/DL (ref 0.1–1.5)
BUN SERPL-MCNC: 23 MG/DL (ref 8–22)
CALCIUM SERPL-MCNC: 8.8 MG/DL (ref 8.5–10.5)
CHLORIDE SERPL-SCNC: 105 MMOL/L (ref 96–112)
CO2 SERPL-SCNC: 22 MMOL/L (ref 20–33)
CREAT SERPL-MCNC: 1.03 MG/DL (ref 0.5–1.4)
EKG IMPRESSION: NORMAL
EOSINOPHIL # BLD AUTO: 0.08 K/UL (ref 0–0.51)
EOSINOPHIL NFR BLD: 1.3 % (ref 0–6.9)
ERYTHROCYTE [DISTWIDTH] IN BLOOD BY AUTOMATED COUNT: 53.1 FL (ref 35.9–50)
GLOBULIN SER CALC-MCNC: 3.3 G/DL (ref 1.9–3.5)
GLUCOSE SERPL-MCNC: 109 MG/DL (ref 65–99)
HCT VFR BLD AUTO: 39.6 % (ref 42–52)
HGB BLD-MCNC: 13.2 G/DL (ref 14–18)
IMM GRANULOCYTES # BLD AUTO: 0.02 K/UL (ref 0–0.11)
IMM GRANULOCYTES NFR BLD AUTO: 0.3 % (ref 0–0.9)
INR PPP: 1.94 (ref 0.87–1.13)
LYMPHOCYTES # BLD AUTO: 0.89 K/UL (ref 1–4.8)
LYMPHOCYTES NFR BLD: 14.3 % (ref 22–41)
MCH RBC QN AUTO: 35.3 PG (ref 27–33)
MCHC RBC AUTO-ENTMCNC: 33.3 G/DL (ref 33.7–35.3)
MCV RBC AUTO: 105.9 FL (ref 81.4–97.8)
MONOCYTES # BLD AUTO: 0.65 K/UL (ref 0–0.85)
MONOCYTES NFR BLD AUTO: 10.4 % (ref 0–13.4)
NEUTROPHILS # BLD AUTO: 4.56 K/UL (ref 1.82–7.42)
NEUTROPHILS NFR BLD: 73.2 % (ref 44–72)
NRBC # BLD AUTO: 0 K/UL
NRBC BLD-RTO: 0 /100 WBC
PLATELET # BLD AUTO: 151 K/UL (ref 164–446)
PMV BLD AUTO: 9.5 FL (ref 9–12.9)
POTASSIUM SERPL-SCNC: 4.6 MMOL/L (ref 3.6–5.5)
PROT SERPL-MCNC: 7.2 G/DL (ref 6–8.2)
PROTHROMBIN TIME: 22.8 SEC (ref 12–14.6)
RBC # BLD AUTO: 3.74 M/UL (ref 4.7–6.1)
SODIUM SERPL-SCNC: 138 MMOL/L (ref 135–145)
WBC # BLD AUTO: 6.2 K/UL (ref 4.8–10.8)

## 2020-07-10 PROCEDURE — 80053 COMPREHEN METABOLIC PANEL: CPT

## 2020-07-10 PROCEDURE — 85025 COMPLETE CBC W/AUTO DIFF WBC: CPT

## 2020-07-10 PROCEDURE — 85610 PROTHROMBIN TIME: CPT

## 2020-07-10 PROCEDURE — 93010 ELECTROCARDIOGRAM REPORT: CPT | Performed by: INTERNAL MEDICINE

## 2020-07-10 PROCEDURE — 36415 COLL VENOUS BLD VENIPUNCTURE: CPT

## 2020-07-10 PROCEDURE — 93005 ELECTROCARDIOGRAM TRACING: CPT

## 2020-07-10 ASSESSMENT — FIBROSIS 4 INDEX: FIB4 SCORE: 2.46

## 2020-07-13 ENCOUNTER — OFFICE VISIT (OUTPATIENT)
Dept: ADMISSIONS | Facility: MEDICAL CENTER | Age: 75
End: 2020-07-13
Attending: INTERNAL MEDICINE
Payer: MEDICARE

## 2020-07-13 DIAGNOSIS — Z01.812 PRE-OPERATIVE LABORATORY EXAMINATION: ICD-10-CM

## 2020-07-13 LAB — COVID ORDER STATUS COVID19: NORMAL

## 2020-07-13 PROCEDURE — U0003 INFECTIOUS AGENT DETECTION BY NUCLEIC ACID (DNA OR RNA); SEVERE ACUTE RESPIRATORY SYNDROME CORONAVIRUS 2 (SARS-COV-2) (CORONAVIRUS DISEASE [COVID-19]), AMPLIFIED PROBE TECHNIQUE, MAKING USE OF HIGH THROUGHPUT TECHNOLOGIES AS DESCRIBED BY CMS-2020-01-R: HCPCS

## 2020-07-14 LAB
SARS-COV-2 RNA RESP QL NAA+PROBE: NOTDETECTED
SPECIMEN SOURCE: NORMAL

## 2020-07-16 ENCOUNTER — ANESTHESIA (OUTPATIENT)
Dept: CARDIOLOGY | Facility: MEDICAL CENTER | Age: 75
End: 2020-07-16
Payer: MEDICARE

## 2020-07-16 ENCOUNTER — HOSPITAL ENCOUNTER (OUTPATIENT)
Facility: MEDICAL CENTER | Age: 75
End: 2020-07-19
Attending: INTERNAL MEDICINE | Admitting: INTERNAL MEDICINE
Payer: MEDICARE

## 2020-07-16 ENCOUNTER — APPOINTMENT (OUTPATIENT)
Dept: CARDIOLOGY | Facility: MEDICAL CENTER | Age: 75
End: 2020-07-16
Attending: INTERNAL MEDICINE
Payer: MEDICARE

## 2020-07-16 ENCOUNTER — ANESTHESIA EVENT (OUTPATIENT)
Dept: CARDIOLOGY | Facility: MEDICAL CENTER | Age: 75
End: 2020-07-16
Payer: MEDICARE

## 2020-07-16 DIAGNOSIS — I48.19 PERSISTENT ATRIAL FIBRILLATION (HCC): ICD-10-CM

## 2020-07-16 LAB
ACT BLD: 274 SEC (ref 74–137)
ACT BLD: 285 SEC (ref 74–137)
ACT BLD: 296 SEC (ref 74–137)
ACT BLD: 301 SEC (ref 74–137)
ALBUMIN SERPL BCP-MCNC: 4.1 G/DL (ref 3.2–4.9)
ALBUMIN/GLOB SERPL: 1.5 G/DL
ALP SERPL-CCNC: 76 U/L (ref 30–99)
ALT SERPL-CCNC: 33 U/L (ref 2–50)
ANION GAP SERPL CALC-SCNC: 15 MMOL/L (ref 7–16)
AST SERPL-CCNC: 36 U/L (ref 12–45)
BILIRUB SERPL-MCNC: 0.4 MG/DL (ref 0.1–1.5)
BUN SERPL-MCNC: 21 MG/DL (ref 8–22)
CALCIUM SERPL-MCNC: 8.5 MG/DL (ref 8.5–10.5)
CHLORIDE SERPL-SCNC: 103 MMOL/L (ref 96–112)
CO2 SERPL-SCNC: 21 MMOL/L (ref 20–33)
CREAT SERPL-MCNC: 1.1 MG/DL (ref 0.5–1.4)
EKG IMPRESSION: NORMAL
ERYTHROCYTE [DISTWIDTH] IN BLOOD BY AUTOMATED COUNT: 54.7 FL (ref 35.9–50)
GLOBULIN SER CALC-MCNC: 2.8 G/DL (ref 1.9–3.5)
GLUCOSE SERPL-MCNC: 194 MG/DL (ref 65–99)
HCT VFR BLD AUTO: 38.2 % (ref 42–52)
HGB BLD-MCNC: 12.5 G/DL (ref 14–18)
INR PPP: 0.98 (ref 0.87–1.13)
MAGNESIUM SERPL-MCNC: 1.9 MG/DL (ref 1.5–2.5)
MCH RBC QN AUTO: 35.4 PG (ref 27–33)
MCHC RBC AUTO-ENTMCNC: 32.7 G/DL (ref 33.7–35.3)
MCV RBC AUTO: 108.2 FL (ref 81.4–97.8)
PLATELET # BLD AUTO: 134 K/UL (ref 164–446)
PMV BLD AUTO: 9.8 FL (ref 9–12.9)
POTASSIUM SERPL-SCNC: 5 MMOL/L (ref 3.6–5.5)
PROT SERPL-MCNC: 6.9 G/DL (ref 6–8.2)
PROTHROMBIN TIME: 13.3 SEC (ref 12–14.6)
RBC # BLD AUTO: 3.53 M/UL (ref 4.7–6.1)
SODIUM SERPL-SCNC: 139 MMOL/L (ref 135–145)
WBC # BLD AUTO: 5.4 K/UL (ref 4.8–10.8)

## 2020-07-16 PROCEDURE — 93005 ELECTROCARDIOGRAM TRACING: CPT | Performed by: INTERNAL MEDICINE

## 2020-07-16 PROCEDURE — 93010 ELECTROCARDIOGRAM REPORT: CPT | Mod: 76 | Performed by: INTERNAL MEDICINE

## 2020-07-16 PROCEDURE — 36415 COLL VENOUS BLD VENIPUNCTURE: CPT

## 2020-07-16 PROCEDURE — 93312 ECHO TRANSESOPHAGEAL: CPT

## 2020-07-16 PROCEDURE — 80053 COMPREHEN METABOLIC PANEL: CPT

## 2020-07-16 PROCEDURE — 85027 COMPLETE CBC AUTOMATED: CPT

## 2020-07-16 PROCEDURE — 700111 HCHG RX REV CODE 636 W/ 250 OVERRIDE (IP): Performed by: ANESTHESIOLOGY

## 2020-07-16 PROCEDURE — 96365 THER/PROPH/DIAG IV INF INIT: CPT | Mod: XU

## 2020-07-16 PROCEDURE — 700102 HCHG RX REV CODE 250 W/ 637 OVERRIDE(OP): Performed by: INTERNAL MEDICINE

## 2020-07-16 PROCEDURE — 85610 PROTHROMBIN TIME: CPT

## 2020-07-16 PROCEDURE — 700105 HCHG RX REV CODE 258: Performed by: ANESTHESIOLOGY

## 2020-07-16 PROCEDURE — A9270 NON-COVERED ITEM OR SERVICE: HCPCS | Performed by: INTERNAL MEDICINE

## 2020-07-16 PROCEDURE — 700102 HCHG RX REV CODE 250 W/ 637 OVERRIDE(OP): Performed by: NURSE PRACTITIONER

## 2020-07-16 PROCEDURE — 93623 PRGRMD STIMJ&PACG IV RX NFS: CPT | Mod: 26 | Performed by: INTERNAL MEDICINE

## 2020-07-16 PROCEDURE — 85347 COAGULATION TIME ACTIVATED: CPT

## 2020-07-16 PROCEDURE — 93657 TX L/R ATRIAL FIB ADDL: CPT | Performed by: INTERNAL MEDICINE

## 2020-07-16 PROCEDURE — 93005 ELECTROCARDIOGRAM TRACING: CPT | Performed by: NURSE PRACTITIONER

## 2020-07-16 PROCEDURE — A9270 NON-COVERED ITEM OR SERVICE: HCPCS | Performed by: NURSE PRACTITIONER

## 2020-07-16 PROCEDURE — 93655 ICAR CATH ABLTJ DSCRT ARRHYT: CPT | Performed by: INTERNAL MEDICINE

## 2020-07-16 PROCEDURE — 93613 INTRACARDIAC EPHYS 3D MAPG: CPT

## 2020-07-16 PROCEDURE — 83735 ASSAY OF MAGNESIUM: CPT

## 2020-07-16 PROCEDURE — 93656 COMPRE EP EVAL ABLTJ ATR FIB: CPT | Performed by: INTERNAL MEDICINE

## 2020-07-16 PROCEDURE — 700111 HCHG RX REV CODE 636 W/ 250 OVERRIDE (IP): Performed by: NURSE PRACTITIONER

## 2020-07-16 PROCEDURE — 700101 HCHG RX REV CODE 250: Performed by: ANESTHESIOLOGY

## 2020-07-16 PROCEDURE — 93662 INTRACARDIAC ECG (ICE): CPT | Mod: 26 | Performed by: INTERNAL MEDICINE

## 2020-07-16 PROCEDURE — G0378 HOSPITAL OBSERVATION PER HR: HCPCS

## 2020-07-16 PROCEDURE — 93010 ELECTROCARDIOGRAM REPORT: CPT | Mod: 59,76 | Performed by: INTERNAL MEDICINE

## 2020-07-16 PROCEDURE — 700101 HCHG RX REV CODE 250

## 2020-07-16 PROCEDURE — 93010 ELECTROCARDIOGRAM REPORT: CPT | Performed by: INTERNAL MEDICINE

## 2020-07-16 PROCEDURE — 93613 INTRACARDIAC EPHYS 3D MAPG: CPT | Performed by: INTERNAL MEDICINE

## 2020-07-16 PROCEDURE — 700111 HCHG RX REV CODE 636 W/ 250 OVERRIDE (IP)

## 2020-07-16 PROCEDURE — 160002 HCHG RECOVERY MINUTES (STAT)

## 2020-07-16 PROCEDURE — 700105 HCHG RX REV CODE 258: Performed by: INTERNAL MEDICINE

## 2020-07-16 RX ORDER — SODIUM CHLORIDE, SODIUM LACTATE, POTASSIUM CHLORIDE, CALCIUM CHLORIDE 600; 310; 30; 20 MG/100ML; MG/100ML; MG/100ML; MG/100ML
INJECTION, SOLUTION INTRAVENOUS CONTINUOUS
Status: ACTIVE | OUTPATIENT
Start: 2020-07-16 | End: 2020-07-16

## 2020-07-16 RX ORDER — ONDANSETRON 2 MG/ML
INJECTION INTRAMUSCULAR; INTRAVENOUS PRN
Status: DISCONTINUED | OUTPATIENT
Start: 2020-07-16 | End: 2020-07-16 | Stop reason: SURG

## 2020-07-16 RX ORDER — LIDOCAINE HYDROCHLORIDE 20 MG/ML
INJECTION, SOLUTION INFILTRATION; PERINEURAL
Status: COMPLETED
Start: 2020-07-16 | End: 2020-07-16

## 2020-07-16 RX ORDER — MAGNESIUM SULFATE 1 G/100ML
1 INJECTION INTRAVENOUS ONCE
Status: COMPLETED | OUTPATIENT
Start: 2020-07-16 | End: 2020-07-16

## 2020-07-16 RX ORDER — MAGNESIUM SULFATE 1 G/100ML
1 INJECTION INTRAVENOUS
Status: ACTIVE | OUTPATIENT
Start: 2020-07-16 | End: 2020-07-17

## 2020-07-16 RX ORDER — POTASSIUM CHLORIDE 20 MEQ/1
40 TABLET, EXTENDED RELEASE ORAL
Status: ACTIVE | OUTPATIENT
Start: 2020-07-16 | End: 2020-07-17

## 2020-07-16 RX ORDER — HYDROMORPHONE HYDROCHLORIDE 2 MG/ML
0.1 INJECTION, SOLUTION INTRAMUSCULAR; INTRAVENOUS; SUBCUTANEOUS
Status: DISCONTINUED | OUTPATIENT
Start: 2020-07-16 | End: 2020-07-16 | Stop reason: HOSPADM

## 2020-07-16 RX ORDER — SOTALOL HYDROCHLORIDE 80 MG/1
120 TABLET ORAL TWICE DAILY
Status: DISCONTINUED | OUTPATIENT
Start: 2020-07-16 | End: 2020-07-19 | Stop reason: HOSPADM

## 2020-07-16 RX ORDER — MEPERIDINE HYDROCHLORIDE 25 MG/ML
12.5 INJECTION INTRAMUSCULAR; INTRAVENOUS; SUBCUTANEOUS
Status: DISCONTINUED | OUTPATIENT
Start: 2020-07-16 | End: 2020-07-16 | Stop reason: HOSPADM

## 2020-07-16 RX ORDER — PROTAMINE SULFATE 10 MG/ML
INJECTION, SOLUTION INTRAVENOUS
Status: COMPLETED
Start: 2020-07-16 | End: 2020-07-16

## 2020-07-16 RX ORDER — HYDROMORPHONE HYDROCHLORIDE 2 MG/ML
0.4 INJECTION, SOLUTION INTRAMUSCULAR; INTRAVENOUS; SUBCUTANEOUS
Status: DISCONTINUED | OUTPATIENT
Start: 2020-07-16 | End: 2020-07-16 | Stop reason: HOSPADM

## 2020-07-16 RX ORDER — ASPIRIN 81 MG/1
81 TABLET, CHEWABLE ORAL EVERY EVENING
Status: DISCONTINUED | OUTPATIENT
Start: 2020-07-16 | End: 2020-07-19 | Stop reason: HOSPADM

## 2020-07-16 RX ORDER — SODIUM CHLORIDE, SODIUM LACTATE, POTASSIUM CHLORIDE, CALCIUM CHLORIDE 600; 310; 30; 20 MG/100ML; MG/100ML; MG/100ML; MG/100ML
INJECTION, SOLUTION INTRAVENOUS
Status: DISCONTINUED | OUTPATIENT
Start: 2020-07-16 | End: 2020-07-16 | Stop reason: SURG

## 2020-07-16 RX ORDER — ONDANSETRON 2 MG/ML
4 INJECTION INTRAMUSCULAR; INTRAVENOUS
Status: DISCONTINUED | OUTPATIENT
Start: 2020-07-16 | End: 2020-07-16 | Stop reason: HOSPADM

## 2020-07-16 RX ORDER — DEXAMETHASONE SODIUM PHOSPHATE 4 MG/ML
INJECTION, SOLUTION INTRA-ARTICULAR; INTRALESIONAL; INTRAMUSCULAR; INTRAVENOUS; SOFT TISSUE PRN
Status: DISCONTINUED | OUTPATIENT
Start: 2020-07-16 | End: 2020-07-16 | Stop reason: SURG

## 2020-07-16 RX ORDER — HEPARIN SODIUM,PORCINE 1000/ML
VIAL (ML) INJECTION
Status: COMPLETED
Start: 2020-07-16 | End: 2020-07-16

## 2020-07-16 RX ORDER — LOSARTAN POTASSIUM 50 MG/1
50 TABLET ORAL 2 TIMES DAILY
Status: DISCONTINUED | OUTPATIENT
Start: 2020-07-16 | End: 2020-07-19 | Stop reason: HOSPADM

## 2020-07-16 RX ORDER — SODIUM CHLORIDE, SODIUM LACTATE, POTASSIUM CHLORIDE, CALCIUM CHLORIDE 600; 310; 30; 20 MG/100ML; MG/100ML; MG/100ML; MG/100ML
INJECTION, SOLUTION INTRAVENOUS CONTINUOUS
Status: DISCONTINUED | OUTPATIENT
Start: 2020-07-16 | End: 2020-07-17

## 2020-07-16 RX ORDER — LIDOCAINE HYDROCHLORIDE 20 MG/ML
INJECTION, SOLUTION EPIDURAL; INFILTRATION; INTRACAUDAL; PERINEURAL PRN
Status: DISCONTINUED | OUTPATIENT
Start: 2020-07-16 | End: 2020-07-16 | Stop reason: SURG

## 2020-07-16 RX ORDER — OMEPRAZOLE 20 MG/1
20 CAPSULE, DELAYED RELEASE ORAL
Status: DISCONTINUED | OUTPATIENT
Start: 2020-07-16 | End: 2020-07-19 | Stop reason: HOSPADM

## 2020-07-16 RX ORDER — SODIUM CHLORIDE 9 MG/ML
INJECTION, SOLUTION INTRAVENOUS
Status: COMPLETED
Start: 2020-07-16 | End: 2020-07-16

## 2020-07-16 RX ORDER — OXYCODONE HCL 5 MG/5 ML
5 SOLUTION, ORAL ORAL
Status: DISCONTINUED | OUTPATIENT
Start: 2020-07-16 | End: 2020-07-16 | Stop reason: HOSPADM

## 2020-07-16 RX ORDER — ESCITALOPRAM OXALATE 10 MG/1
10 TABLET ORAL
Status: DISCONTINUED | OUTPATIENT
Start: 2020-07-16 | End: 2020-07-19 | Stop reason: HOSPADM

## 2020-07-16 RX ORDER — ATORVASTATIN CALCIUM 40 MG/1
40 TABLET, FILM COATED ORAL EVERY EVENING
Status: DISCONTINUED | OUTPATIENT
Start: 2020-07-16 | End: 2020-07-19 | Stop reason: HOSPADM

## 2020-07-16 RX ORDER — HYDROMORPHONE HYDROCHLORIDE 2 MG/ML
0.2 INJECTION, SOLUTION INTRAMUSCULAR; INTRAVENOUS; SUBCUTANEOUS
Status: DISCONTINUED | OUTPATIENT
Start: 2020-07-16 | End: 2020-07-16 | Stop reason: HOSPADM

## 2020-07-16 RX ORDER — PHENYLEPHRINE HYDROCHLORIDE 10 MG/ML
INJECTION, SOLUTION INTRAMUSCULAR; INTRAVENOUS; SUBCUTANEOUS PRN
Status: DISCONTINUED | OUTPATIENT
Start: 2020-07-16 | End: 2020-07-16 | Stop reason: SURG

## 2020-07-16 RX ORDER — ISOPROTERENOL HYDROCHLORIDE 0.2 MG/ML
INJECTION, SOLUTION INTRAVENOUS
Status: COMPLETED
Start: 2020-07-16 | End: 2020-07-16

## 2020-07-16 RX ORDER — HYDRALAZINE HYDROCHLORIDE 20 MG/ML
5 INJECTION INTRAMUSCULAR; INTRAVENOUS
Status: DISCONTINUED | OUTPATIENT
Start: 2020-07-16 | End: 2020-07-16 | Stop reason: HOSPADM

## 2020-07-16 RX ORDER — HALOPERIDOL 5 MG/ML
1 INJECTION INTRAMUSCULAR
Status: DISCONTINUED | OUTPATIENT
Start: 2020-07-16 | End: 2020-07-16 | Stop reason: HOSPADM

## 2020-07-16 RX ORDER — OXYCODONE HCL 5 MG/5 ML
10 SOLUTION, ORAL ORAL
Status: DISCONTINUED | OUTPATIENT
Start: 2020-07-16 | End: 2020-07-16 | Stop reason: HOSPADM

## 2020-07-16 RX ORDER — CARVEDILOL 25 MG/1
25 TABLET ORAL 2 TIMES DAILY WITH MEALS
Status: DISCONTINUED | OUTPATIENT
Start: 2020-07-16 | End: 2020-07-16

## 2020-07-16 RX ORDER — HEPARIN SODIUM 200 [USP'U]/100ML
INJECTION, SOLUTION INTRAVENOUS
Status: COMPLETED
Start: 2020-07-16 | End: 2020-07-16

## 2020-07-16 RX ORDER — METOCLOPRAMIDE HYDROCHLORIDE 5 MG/ML
INJECTION INTRAMUSCULAR; INTRAVENOUS PRN
Status: DISCONTINUED | OUTPATIENT
Start: 2020-07-16 | End: 2020-07-16 | Stop reason: SURG

## 2020-07-16 RX ADMIN — ONDANSETRON 4 MG: 2 INJECTION INTRAMUSCULAR; INTRAVENOUS at 11:10

## 2020-07-16 RX ADMIN — HEPARIN SODIUM: 1000 INJECTION, SOLUTION INTRAVENOUS; SUBCUTANEOUS at 09:19

## 2020-07-16 RX ADMIN — ISOPROTERENOL HYDROCHLORIDE 200 MCG: 0.2 INJECTION, SOLUTION INTRAMUSCULAR; INTRAVENOUS at 11:01

## 2020-07-16 RX ADMIN — ROCURONIUM BROMIDE 100 MG: 10 INJECTION, SOLUTION INTRAVENOUS at 09:50

## 2020-07-16 RX ADMIN — SOTALOL HYDROCHLORIDE 120 MG: 80 TABLET ORAL at 19:01

## 2020-07-16 RX ADMIN — SODIUM CHLORIDE, POTASSIUM CHLORIDE, SODIUM LACTATE AND CALCIUM CHLORIDE: 600; 310; 30; 20 INJECTION, SOLUTION INTRAVENOUS at 20:25

## 2020-07-16 RX ADMIN — HEPARIN SODIUM 6000 UNITS: 200 INJECTION, SOLUTION INTRAVENOUS at 08:36

## 2020-07-16 RX ADMIN — PHENYLEPHRINE HYDROCHLORIDE 200 MCG: 10 INJECTION INTRAVENOUS at 10:57

## 2020-07-16 RX ADMIN — ROCURONIUM BROMIDE 100 MG: 10 INJECTION, SOLUTION INTRAVENOUS at 08:29

## 2020-07-16 RX ADMIN — HEPARIN SODIUM: 1000 INJECTION, SOLUTION INTRAVENOUS; SUBCUTANEOUS at 11:02

## 2020-07-16 RX ADMIN — METOCLOPRAMIDE 10 MG: 5 INJECTION, SOLUTION INTRAMUSCULAR; INTRAVENOUS at 11:10

## 2020-07-16 RX ADMIN — PROPOFOL 150 MG: 10 INJECTION, EMULSION INTRAVENOUS at 08:29

## 2020-07-16 RX ADMIN — LIDOCAINE HYDROCHLORIDE 40 MG: 20 INJECTION, SOLUTION EPIDURAL; INFILTRATION; INTRACAUDAL at 08:29

## 2020-07-16 RX ADMIN — PHENYLEPHRINE HYDROCHLORIDE 100 MCG: 10 INJECTION INTRAVENOUS at 10:32

## 2020-07-16 RX ADMIN — PROTAMINE SULFATE 50 MG: 10 INJECTION, SOLUTION INTRAVENOUS at 11:15

## 2020-07-16 RX ADMIN — LOSARTAN POTASSIUM 50 MG: 50 TABLET, FILM COATED ORAL at 18:01

## 2020-07-16 RX ADMIN — ASPIRIN 81 MG 81 MG: 81 TABLET ORAL at 18:01

## 2020-07-16 RX ADMIN — FENTANYL CITRATE 25 MCG: 50 INJECTION INTRAMUSCULAR; INTRAVENOUS at 11:35

## 2020-07-16 RX ADMIN — FENTANYL CITRATE 25 MCG: 50 INJECTION INTRAMUSCULAR; INTRAVENOUS at 11:07

## 2020-07-16 RX ADMIN — RIVAROXABAN 20 MG: 20 TABLET, FILM COATED ORAL at 18:01

## 2020-07-16 RX ADMIN — PHENYLEPHRINE HYDROCHLORIDE 200 MCG: 10 INJECTION INTRAVENOUS at 10:37

## 2020-07-16 RX ADMIN — ESCITALOPRAM OXALATE 10 MG: 10 TABLET ORAL at 20:19

## 2020-07-16 RX ADMIN — ATORVASTATIN CALCIUM 40 MG: 40 TABLET, FILM COATED ORAL at 18:01

## 2020-07-16 RX ADMIN — LIDOCAINE HYDROCHLORIDE: 20 INJECTION, SOLUTION INFILTRATION; PERINEURAL at 08:35

## 2020-07-16 RX ADMIN — MAGNESIUM SULFATE 1 G: 1 INJECTION INTRAVENOUS at 18:01

## 2020-07-16 RX ADMIN — ROCURONIUM BROMIDE 50 MG: 10 INJECTION, SOLUTION INTRAVENOUS at 10:40

## 2020-07-16 RX ADMIN — SODIUM CHLORIDE, POTASSIUM CHLORIDE, SODIUM LACTATE AND CALCIUM CHLORIDE: 600; 310; 30; 20 INJECTION, SOLUTION INTRAVENOUS at 07:15

## 2020-07-16 RX ADMIN — FENTANYL CITRATE 25 MCG: 50 INJECTION INTRAMUSCULAR; INTRAVENOUS at 09:27

## 2020-07-16 RX ADMIN — OMEPRAZOLE 20 MG: 20 CAPSULE, DELAYED RELEASE ORAL at 20:19

## 2020-07-16 RX ADMIN — PHENYLEPHRINE HYDROCHLORIDE 100 MCG: 10 INJECTION INTRAVENOUS at 10:17

## 2020-07-16 RX ADMIN — FENTANYL CITRATE 25 MCG: 50 INJECTION INTRAMUSCULAR; INTRAVENOUS at 09:52

## 2020-07-16 RX ADMIN — SUGAMMADEX 200 MG: 100 INJECTION, SOLUTION INTRAVENOUS at 11:31

## 2020-07-16 RX ADMIN — SODIUM CHLORIDE, POTASSIUM CHLORIDE, SODIUM LACTATE AND CALCIUM CHLORIDE: 600; 310; 30; 20 INJECTION, SOLUTION INTRAVENOUS at 08:15

## 2020-07-16 RX ADMIN — HEPARIN SODIUM: 1000 INJECTION, SOLUTION INTRAVENOUS; SUBCUTANEOUS at 09:22

## 2020-07-16 RX ADMIN — DEXAMETHASONE SODIUM PHOSPHATE 4 MG: 4 INJECTION, SOLUTION INTRA-ARTICULAR; INTRALESIONAL; INTRAMUSCULAR; INTRAVENOUS; SOFT TISSUE at 08:29

## 2020-07-16 ASSESSMENT — COGNITIVE AND FUNCTIONAL STATUS - GENERAL
SUGGESTED CMS G CODE MODIFIER DAILY ACTIVITY: CH
STANDING UP FROM CHAIR USING ARMS: A LITTLE
MOBILITY SCORE: 22
DAILY ACTIVITIY SCORE: 24
MOVING FROM LYING ON BACK TO SITTING ON SIDE OF FLAT BED: A LITTLE
SUGGESTED CMS G CODE MODIFIER MOBILITY: CJ

## 2020-07-16 ASSESSMENT — LIFESTYLE VARIABLES
EVER_SMOKED: NEVER
CONSUMPTION TOTAL: NEGATIVE
TOTAL SCORE: 0
HAVE PEOPLE ANNOYED YOU BY CRITICIZING YOUR DRINKING: NO
EVER HAD A DRINK FIRST THING IN THE MORNING TO STEADY YOUR NERVES TO GET RID OF A HANGOVER: NO
HAVE YOU EVER FELT YOU SHOULD CUT DOWN ON YOUR DRINKING: NO
ALCOHOL_USE: YES
DOES PATIENT WANT TO STOP DRINKING: NO
ON A TYPICAL DAY WHEN YOU DRINK ALCOHOL HOW MANY DRINKS DO YOU HAVE: 1
EVER FELT BAD OR GUILTY ABOUT YOUR DRINKING: NO
AVERAGE NUMBER OF DAYS PER WEEK YOU HAVE A DRINK CONTAINING ALCOHOL: 4
TOTAL SCORE: 0
TOTAL SCORE: 0
HOW MANY TIMES IN THE PAST YEAR HAVE YOU HAD 5 OR MORE DRINKS IN A DAY: 0

## 2020-07-16 ASSESSMENT — PATIENT HEALTH QUESTIONNAIRE - PHQ9
SUM OF ALL RESPONSES TO PHQ QUESTIONS 1-9: 3
8. MOVING OR SPEAKING SO SLOWLY THAT OTHER PEOPLE COULD HAVE NOTICED. OR THE OPPOSITE, BEING SO FIGETY OR RESTLESS THAT YOU HAVE BEEN MOVING AROUND A LOT MORE THAN USUAL: NOT AT ALL
5. POOR APPETITE OR OVEREATING: NOT AT ALL
6. FEELING BAD ABOUT YOURSELF - OR THAT YOU ARE A FAILURE OR HAVE LET YOURSELF OR YOUR FAMILY DOWN: NOT AL ALL
9. THOUGHTS THAT YOU WOULD BE BETTER OFF DEAD, OR OF HURTING YOURSELF: NOT AT ALL
4. FEELING TIRED OR HAVING LITTLE ENERGY: NOT AT ALL
2. FEELING DOWN, DEPRESSED, IRRITABLE, OR HOPELESS: SEVERAL DAYS
3. TROUBLE FALLING OR STAYING ASLEEP OR SLEEPING TOO MUCH: SEVERAL DAYS
SUM OF ALL RESPONSES TO PHQ9 QUESTIONS 1 AND 2: 2
1. LITTLE INTEREST OR PLEASURE IN DOING THINGS: SEVERAL DAYS
7. TROUBLE CONCENTRATING ON THINGS, SUCH AS READING THE NEWSPAPER OR WATCHING TELEVISION: NOT AT ALL

## 2020-07-16 ASSESSMENT — COPD QUESTIONNAIRES
HAVE YOU SMOKED AT LEAST 100 CIGARETTES IN YOUR ENTIRE LIFE: NO/DON'T KNOW
DO YOU EVER COUGH UP ANY MUCUS OR PHLEGM?: NO/ONLY WITH OCCASIONAL COLDS OR INFECTIONS
DURING THE PAST 4 WEEKS HOW MUCH DID YOU FEEL SHORT OF BREATH: SOME OF THE TIME
IN THE PAST 12 MONTHS DO YOU DO LESS THAN YOU USED TO BECAUSE OF YOUR BREATHING PROBLEMS: DISAGREE/UNSURE
COPD SCREENING SCORE: 3

## 2020-07-16 ASSESSMENT — FIBROSIS 4 INDEX
FIB4 SCORE: 1.61
FIB4 SCORE: 3.41

## 2020-07-16 NOTE — ANESTHESIA TIME REPORT
Anesthesia Start and Stop Event Times     Date Time Event    7/16/2020 0755 Ready for Procedure     0815 Anesthesia Start     1150 Anesthesia Stop        Responsible Staff  07/16/20    Name Role Begin End    Tee Sherman M.D. Anesth 0815 1150        Preop Diagnosis (Free Text):  Pre-op Diagnosis             Preop Diagnosis (Codes):    Post op Diagnosis  Atrial fibrillation (HCC)      Premium Reason  Non-Premium    Comments:

## 2020-07-16 NOTE — ANESTHESIA POSTPROCEDURE EVALUATION
Patient: Anthony Cloud    Procedure Summary     Date:  07/16/20 Room / Location:  [facility] CATH [facility]    Anesthesia Start:  0815 Anesthesia Stop:  1150    Procedure:  CL-EP ABLATION ATRIAL FIBRILLATION Diagnosis:       Persistent atrial fibrillation (HCC)                  (See Associated Dx)    Scheduled Providers:  Nilton Ramirez M.D.; Tee Sherman M.D. Responsible Provider:  Tee Sherman M.D.    Anesthesia Type:  general ASA Status:  3          Final Anesthesia Type: general  Last vitals  BP   Blood Pressure : (!) 97/61    Temp   36.3 °C (97.3 °F)    Pulse   Pulse: 63   Resp   14    SpO2   97 %      Anesthesia Post Evaluation    Patient location during evaluation: PACU  Patient participation: complete - patient participated  Level of consciousness: awake and alert    Airway patency: patent  Anesthetic complications: no  Cardiovascular status: hemodynamically stable  Respiratory status: acceptable  Hydration status: euvolemic    PONV: none           Nurse Pain Score: 0 (NPRS)

## 2020-07-16 NOTE — ANESTHESIA PREPROCEDURE EVALUATION
Relevant Problems   ANESTHESIA   (+) Sleep apnea      NEURO   (+) Personal history of venous thrombosis and embolism      CARDIAC   (+) AF (atrial fibrillation) (HCC)   (+) Coronary artery disease due to lipid rich plaque   (+) Essential hypertension   (+) Old myocardial infarction   (+) Pulmonary hypertension (HCC)   (+) Stented coronary artery      ENDO   (+) Acquired hypothyroidism      Other   (+) Chronic anticoagulation   (+) Dyslipidemia   (+) Obesity (BMI 30-39.9)   (+) Restrictive lung disease       Physical Exam    Airway   Mallampati: II  TM distance: >3 FB  Neck ROM: full       Cardiovascular - normal exam  Rhythm: irregular  Rate: normal  (-) murmur     Dental - normal exam           Pulmonary   (+) decreased breath sounds     Abdominal    Neurological - normal exam                 Anesthesia Plan    ASA 3   ASA physical status 3 criteria: COPD and CAD/stents (> 3 months)    Plan - general       Airway plan will be ETT        Induction: intravenous      Pertinent diagnostic labs and testing reviewed    Informed Consent:    Anesthetic plan and risks discussed with patient.

## 2020-07-16 NOTE — H&P
[facility]  Electrophysiology Pre-procedure H&P    DOS:7/16/2020    Planned Procedure: EPS/RFA and PVI    Chief complaint/Reason for Procedure: Fatigue    HPI: 76 y/o M with persistent AT s/p DCCV with recurrence, for ablation and admit for sotalol load.      Past Medical History:   Diagnosis Date   • Acute nasopharyngitis 01/2020   • Anemia    • Arrhythmia     a fib   • Bowel habit changes     constipation    • Breath shortness     since 11/2019, hx cardiac arrhythmia    • CAD (coronary artery disease) 2003    PCI/BMS x 3 to the RCA (Zeta 4.0 x 23mm, 3.5 x 23mm, 3.0 x 18mm).   • Cataract     alejandra IOL    • Central sleep apnea      ICD-10 transition   • Chronic anticoagulation    • COPD (chronic obstructive pulmonary disease) ([facility])    • Depression    • Depressive disorder, not elsewhere classified         • Dyslipidemia    • Hemorrhagic disorder (HCC)     takes xarelto    • High cholesterol    • Hx MRSA infection 2009, 2014    right ankle 2014, right thumb 2009   • Hypertension    • Hypothyroidism    • Mixed hyperlipidemia    • Obesity    • Old myocardial infarction January 2003    cardiac stents x3   • Peptic ulcer disease 8/4/2016   • Personal history of venous thrombosis and embolism     On Xarelto   • Pulmonary embolism (HCC) 2003/2004   • Sleep apnea     BiPAP with 3L oxygen       Past Surgical History:   Procedure Laterality Date   • WRIST ORIF Left 2/23/2018    Procedure: WRIST ORIF;  Surgeon: Jasper Hammond M.D.;  Location: SURGERY [facility];  Service: Orthopedics   • INCISION AND DRAINAGE ORTHOPEDIC  8/29/2014    Performed by Wolfgang Merrill M.D. at SURGERY [facility]   • CARDIAC CATH, RIGHT/LEFT HEART  2003 01/2003 4.0x23mm Zeta stent to proximal RCA,3.5x22mm distal to first stent, 3.0x15mm at the point of original occlusion.   • OTHER  2001    back sx    • BOWEL RESECTION      2020 pt denies   • PB ANESTH,LOWER LEG ARTERIES SURG         Social History      Socioeconomic History   • Marital status:      Spouse name: Not on file   • Number of children: Not on file   • Years of education: Not on file   • Highest education level: Not on file   Occupational History   • Not on file   Social Needs   • Financial resource strain: Not on file   • Food insecurity     Worry: Not on file     Inability: Not on file   • Transportation needs     Medical: Not on file     Non-medical: Not on file   Tobacco Use   • Smoking status: Never Smoker   • Smokeless tobacco: Never Used   Substance and Sexual Activity   • Alcohol use: Yes     Comment: 2 per week    • Drug use: No   • Sexual activity: Not on file   Lifestyle   • Physical activity     Days per week: Not on file     Minutes per session: Not on file   • Stress: Not on file   Relationships   • Social connections     Talks on phone: Not on file     Gets together: Not on file     Attends Sabianism service: Not on file     Active member of club or organization: Not on file     Attends meetings of clubs or organizations: Not on file     Relationship status: Not on file   • Intimate partner violence     Fear of current or ex partner: Not on file     Emotionally abused: Not on file     Physically abused: Not on file     Forced sexual activity: Not on file   Other Topics Concern   • Not on file   Social History Narrative   • Not on file       Family History   Problem Relation Age of Onset   • Other Mother         Passed at 98   • Heart Disease Father 60        CABG       Allergies   Allergen Reactions   • Lisinopril      cough       Current Facility-Administered Medications   Medication Dose Route Frequency Provider Last Rate Last Dose   • lactated ringers infusion   Intravenous Continuous Nilton Ramirez M.D. 10 mL/hr at 07/16/20 0715     • LIDOCAINE HCL 2 % INJ SOLN            • HEPARIN (PORCINE) IN NACL 2000-0.9 UNIT/L-% IV SOLN            • FENTANYL CITRATE (PF) 0.05 MG/ML INJ SOLN (WRAPPED)            • SUGAMMADEX SODIUM 200  "MG/2ML IV SOLN                Physical Exam:  Vitals:    07/10/20 1018 07/16/20 0643   BP:  122/71   Pulse:  78   Resp:  20   Temp:  36.7 °C (98 °F)   TempSrc:  Temporal   SpO2:  94%   Weight: 115.2 kg (253 lb 15.5 oz) 115 kg (253 lb 8.5 oz)   Height: 1.93 m (6' 4\") 1.93 m (6' 4\")     General appearance: NAD, conversant   Neck: Trachea midline; FROM, supple, no thyromegaly or lymphadenopathy  CV: Irregular, no MRGs, no JVD   Extremities: No peripheral edema or extremity lymphadenopathy  Skin: Normal temperature, turgor and texture; no rash, ulcers or subcutaneous nodules  Psych: Appropriate affect, alert and oriented to person, place and time    Data:  Lab Results   Component Value Date/Time    CHOLSTRLTOT 101 11/07/2019 07:14 AM    LDL 48 11/07/2019 07:14 AM    HDL 40 11/07/2019 07:14 AM    TRIGLYCERIDE 64 11/07/2019 07:14 AM       Lab Results   Component Value Date/Time    SODIUM 138 07/10/2020 11:15 AM    POTASSIUM 4.6 07/10/2020 11:15 AM    CHLORIDE 105 07/10/2020 11:15 AM    CO2 22 07/10/2020 11:15 AM    GLUCOSE 109 (H) 07/10/2020 11:15 AM    BUN 23 (H) 07/10/2020 11:15 AM    CREATININE 1.03 07/10/2020 11:15 AM    CREATININE 1.06 01/04/2013 08:01 AM    BUNCREATRAT 22 12/14/2018 07:20 AM    BUNCREATRAT 25 (H) 01/04/2013 08:01 AM     Lab Results   Component Value Date/Time    ALKPHOSPHAT 75 07/10/2020 11:15 AM    ASTSGOT 17 07/10/2020 11:15 AM    ALTSGPT 26 07/10/2020 11:15 AM    TBILIRUBIN 0.5 07/10/2020 11:15 AM      No results found for: BNPBTYPENAT            EKG interpreted by me: Afib    Impression/Plan:  1) Persistent Afib    - Plan PVI, PWI  - Plant to admit for sotalol load      Nilton Ramirez MD  Cardiac Electrophysiology    "

## 2020-07-16 NOTE — OR NURSING
1148 Patient arrived to unit, arouses to voice.  Report from anesthesia and RN.   clean, dry and soft.  Patient denies pain at this time.  Updated on plan of care, verbalized understanding.  1200 Patient arouses to voice.  Denies nausea or pain.  Right groin clean, dry and soft.  1215 Patient's daughter Lara called and updated on patient status.  1245 Access sites clean, dry and soft.  Patient tolerating oral intake.  1315 Patient arouses to voice.  Access sites clean, dry and soft.  1335 Report called to Eleni CRISTINA.  1345 Patient transferred to [place] [id number] via [facility] with RN and CNA, handoff report to Eleni CRISTINA.  Access sites clean, dry and soft.

## 2020-07-16 NOTE — ANESTHESIA PROCEDURE NOTES
Airway    Date/Time: 7/16/2020 8:30 AM  Performed by: Tee Sherman M.D.  Authorized by: Tee Sherman M.D.     Location:  OR  Urgency:  Elective  Difficult Airway: No    Indications for Airway Management:  Anesthesia      Spontaneous Ventilation: absent    Sedation Level:  Deep  Preoxygenated: Yes    Patient Position:  Sniffing  Final Airway Type:  Endotracheal airway  Final Endotracheal Airway:  ETT  Cuffed: Yes    Technique Used for Successful ETT Placement:  Direct laryngoscopy  Devices/Methods Used in Placement:  Intubating stylet and cricoid pressure    Insertion Site:  Oral  Blade Type:  Crow  Laryngoscope Blade/Videolaryngoscope Blade Size:  4  ETT Size (mm):  8.0  Measured from:  Teeth  ETT to Teeth (cm):  23  Placement Verified by: auscultation and capnometry    Cormack-Lehane Classification:  Grade IIb - view of arytenoids or posterior of glottis only  Number of Attempts at Approach:  1

## 2020-07-16 NOTE — PROGRESS NOTES
Report received from IBETH Fleming. Assumed care. Pt is A&Ox4. Pt denies pain at this time. Pt was updated on plan of care. Personal belongings, and bedside table within reach. Bed is in the lowest position. Will continue to monitor

## 2020-07-16 NOTE — OP REPORT
Electrophysiology Procedure Note    Procedures Performed:  Pulmonary Vein Isolation  Intracardiac Echocardiography  Three-dimensional intracardiac mapping  IV isoproterenol infusion with programmed stimulation    Electrophysiologist: Nilton Ramirez MD    Assistant(s): None    Anesthesia: General anesthesia was provided by Dr. Sherman of the Anesthesiology service.    Statement of Medical Necessity: This is a 75  year-old male with history of symptomatic persistent atrial  fibrillation     Description of Procedure:    Access and catheter placement: After obtaining informed written consent, the patient was  brought to the EP lab in the fasting, non-sedated stated. The patient was sedated and intubated  by the anesthesiologist. The patient was prepped and draped in the usual sterile fashion. Using  the modified Seldinger technique, access was obtained in bilateral  femoral veins. Guidewires were advanced into the IVC. In the right femoral vein, 2 sheaths of   8F were placed. In the left femoral vein 2 sheaths of 10F and 7F were placed. A deflectable  decapolar catheter was advanced through the LFV to the coronary sinus. A 10F intracardiac  echo catheter was advanced to the right atrium. Through one of the 8F short sheaths in the RFV,  a long wire was advanced under ICE guidance to the SVC. The short sheath was  changed out for an 8.5 F braided Tor-flex (KupiBonus) sheath which was advanced to the SVC. The wire was  removed and the dilator was flushed. A 71-cm transseptal needle (KupiBonus) was advanced to the tip of the  dilator, and the obturator was removed. The transseptal needle was attached to the manifold and  flushed. Under fluoroscopic and intracardiac echocardiographic guidance, the sheath/needle   system was withdrawn to the fossa ovalis. At this time, 16,000 units of heparin were administered. Additional heparin was given to keep ACT >300 during LA dwell time.  The needle was advanced out of the dilator, and RF energy  applied via the [place] needle.   ICE visualized the needle passage through the fossa ovalis into the left  atrium. Confirmation of left atrial location was confirmed by injecting saline and transducing  pressure. The dilator was advanced over the needle into the left atrium, and then the sheath was advanced over the dilator. The dilator and  needle were removed. This sheath was exchanged over a long wire for a medium-curl   Vizigo (Biosense Rutherford) sheath. The sheath was flushed and connected to a continuous heparinized   saline drip.  The transseptal catheterization  procedure was repeated through the second RFV access site using the same transseptal needle  and a Tor-flex sheath. Left atrial location was again confirmed by injecting saline  and transducing pressure. The needle and dilator were removed. The sheath was attached to  the drip line and flushed.     A 3.5-mm externally-irrigated ablation catheter (Biosense-Rutherford   Thermocool ST SF DF-curve) was advanced through the Vizigo sheath into the left atrium. A duodecapolar Penta-Ray catheter (Biosense-Rutherford) was  advanced through the Tor-flex sheath into the left atrium. A 3-dimensional electroanatomical  map was constructed using the CARTO system.    Attention was first turned to the left pulmonary veins. The Penta-Ray  catheter was placed in the left common pulmonary vein which showed conduction into the vein. A circumferential  ablation line using radiofrequency energy 30-40W was placed which resulted in Left Common isolation.  The catheters were then moved to the RSPV and RIPV which showed evidence of conduction into the veins,   and ablation 20-40W was performed circumferentially around these veins   resulting in RSPV and RIPV isolation. Power was reduced near the esophagus.  The Penta-Ray was advanced into all four pulmonary veins and persistent conduction block into the right and left pulmonary veins was demonstrated.   The patient remained in  atrial fibrillation so a single 200J external shock was applied, restoring sinus rhythm.   Independent firing was noted to occur from the posterior wall so this area was targeted for ablation for additional management of atrial fibrillation. Roof and floor lines were created using 30-35W to create lines of block resulting in posterior wall isolation.    Isuprel 4mcg/min was administered and burst pacing was performed in the left atrium without induction of sustained AF or SVT. Non-sustained AF was noted which organized into a non-sustained rhythm resembling atrial flutter which terminated spontaneously. CTI was then targeted to treat presumed typical atrial flutter. Ablation was performed using 35W from the tricuspid annulus to the IVC. Bidirectional block >200ms was confirmed following ablation. No further arrhthymias were inducible with burst pacing.    The left atrial sheaths and catheters were withdrawn to the right atrium. ICE visualization   of the pericardium confirmed no pericardial effusion at the end of the case. The  patient was awakened from anesthesia, catheters and sheaths were removed, and manual  pressure was held on bilateral groins until hemostasis was achieved.    Electrophysiological Findings:  Sinus cycle length 806 msec  Intervals   H-V 40 msec  QRS 90 msec   msec   msec  AV block  ms    Total RF time: 1368 sec  Fluoro time: 0 min    Arrhythmias:  Atrial fibrillation was present at baseline  Other tachycardias noted:  1) Non-sustained atrial flutter was noted presumed typical, prox-distal CS activation  Estimated blood loss - 30 mL    Complications: None    Impression:  1. Atrial fibrillation, persistent  2. Successful isolation of all four pulmonary veins  3. Successful isolation of the posterior wall with roof and floor lines  4. Successful ablation of presumed typical flutter with CTI block >200ms    Recommendations:  1. Bed rest for 4 hours  2. Telemetry monitoring   3.  Anticoagulant therapy indefinitely  4. Follow up in Arrhythmia clinic in 4 weeks  5. Admission for sotalol loading      Nilton Ramirez MD  Cardiac Electrophysiology

## 2020-07-17 PROBLEM — Z79.899 ENCOUNTER FOR MONITORING SOTALOL THERAPY: Status: ACTIVE | Noted: 2020-07-17

## 2020-07-17 PROBLEM — Z51.81 ENCOUNTER FOR MONITORING SOTALOL THERAPY: Status: ACTIVE | Noted: 2020-07-17

## 2020-07-17 PROBLEM — Z86.79 S/P ABLATION OF ATRIAL FIBRILLATION: Status: ACTIVE | Noted: 2020-07-17

## 2020-07-17 PROBLEM — Z98.890 S/P ABLATION OF ATRIAL FIBRILLATION: Status: ACTIVE | Noted: 2020-07-17

## 2020-07-17 LAB
ANION GAP SERPL CALC-SCNC: 11 MMOL/L (ref 7–16)
BUN SERPL-MCNC: 21 MG/DL (ref 8–22)
CALCIUM SERPL-MCNC: 8.6 MG/DL (ref 8.5–10.5)
CHLORIDE SERPL-SCNC: 102 MMOL/L (ref 96–112)
CO2 SERPL-SCNC: 24 MMOL/L (ref 20–33)
CREAT SERPL-MCNC: 0.96 MG/DL (ref 0.5–1.4)
EKG IMPRESSION: NORMAL
EKG IMPRESSION: NORMAL
ERYTHROCYTE [DISTWIDTH] IN BLOOD BY AUTOMATED COUNT: 53.8 FL (ref 35.9–50)
GLUCOSE SERPL-MCNC: 142 MG/DL (ref 65–99)
HCT VFR BLD AUTO: 36.8 % (ref 42–52)
HGB BLD-MCNC: 12.1 G/DL (ref 14–18)
MAGNESIUM SERPL-MCNC: 2 MG/DL (ref 1.5–2.5)
MCH RBC QN AUTO: 34.9 PG (ref 27–33)
MCHC RBC AUTO-ENTMCNC: 32.9 G/DL (ref 33.7–35.3)
MCV RBC AUTO: 106.1 FL (ref 81.4–97.8)
PLATELET # BLD AUTO: 139 K/UL (ref 164–446)
PMV BLD AUTO: 9.4 FL (ref 9–12.9)
POTASSIUM SERPL-SCNC: 4.9 MMOL/L (ref 3.6–5.5)
RBC # BLD AUTO: 3.47 M/UL (ref 4.7–6.1)
SODIUM SERPL-SCNC: 137 MMOL/L (ref 135–145)
WBC # BLD AUTO: 8.3 K/UL (ref 4.8–10.8)

## 2020-07-17 PROCEDURE — 93005 ELECTROCARDIOGRAM TRACING: CPT | Performed by: INTERNAL MEDICINE

## 2020-07-17 PROCEDURE — A9270 NON-COVERED ITEM OR SERVICE: HCPCS | Performed by: INTERNAL MEDICINE

## 2020-07-17 PROCEDURE — 93010 ELECTROCARDIOGRAM REPORT: CPT | Mod: 59 | Performed by: INTERNAL MEDICINE

## 2020-07-17 PROCEDURE — 83735 ASSAY OF MAGNESIUM: CPT

## 2020-07-17 PROCEDURE — 99214 OFFICE O/P EST MOD 30 MIN: CPT | Performed by: NURSE PRACTITIONER

## 2020-07-17 PROCEDURE — 93005 ELECTROCARDIOGRAM TRACING: CPT | Performed by: NURSE PRACTITIONER

## 2020-07-17 PROCEDURE — G0378 HOSPITAL OBSERVATION PER HR: HCPCS

## 2020-07-17 PROCEDURE — 700102 HCHG RX REV CODE 250 W/ 637 OVERRIDE(OP): Performed by: INTERNAL MEDICINE

## 2020-07-17 PROCEDURE — 80048 BASIC METABOLIC PNL TOTAL CA: CPT

## 2020-07-17 PROCEDURE — 85027 COMPLETE CBC AUTOMATED: CPT

## 2020-07-17 PROCEDURE — 36415 COLL VENOUS BLD VENIPUNCTURE: CPT

## 2020-07-17 PROCEDURE — 93010 ELECTROCARDIOGRAM REPORT: CPT | Mod: 77 | Performed by: INTERNAL MEDICINE

## 2020-07-17 PROCEDURE — A9270 NON-COVERED ITEM OR SERVICE: HCPCS | Performed by: NURSE PRACTITIONER

## 2020-07-17 PROCEDURE — 700102 HCHG RX REV CODE 250 W/ 637 OVERRIDE(OP): Performed by: NURSE PRACTITIONER

## 2020-07-17 RX ORDER — AMLODIPINE BESYLATE 5 MG/1
5 TABLET ORAL
Status: DISCONTINUED | OUTPATIENT
Start: 2020-07-17 | End: 2020-07-18

## 2020-07-17 RX ORDER — ACETAMINOPHEN 325 MG/1
650 TABLET ORAL EVERY 4 HOURS PRN
Status: DISCONTINUED | OUTPATIENT
Start: 2020-07-17 | End: 2020-07-19 | Stop reason: HOSPADM

## 2020-07-17 RX ORDER — SOTALOL HYDROCHLORIDE 120 MG/1
120 TABLET ORAL 2 TIMES DAILY
Qty: 60 TAB | Refills: 3 | Status: SHIPPED | OUTPATIENT
Start: 2020-07-17 | End: 2020-12-23 | Stop reason: SDUPTHER

## 2020-07-17 RX ORDER — OMEPRAZOLE 20 MG/1
20 CAPSULE, DELAYED RELEASE ORAL
Qty: 30 CAP | Refills: 0 | Status: SHIPPED | OUTPATIENT
Start: 2020-07-17 | End: 2022-11-15

## 2020-07-17 RX ADMIN — LOSARTAN POTASSIUM 50 MG: 50 TABLET, FILM COATED ORAL at 05:09

## 2020-07-17 RX ADMIN — SOTALOL HYDROCHLORIDE 120 MG: 80 TABLET ORAL at 05:09

## 2020-07-17 RX ADMIN — AMLODIPINE BESYLATE 5 MG: 5 TABLET ORAL at 12:21

## 2020-07-17 RX ADMIN — ESCITALOPRAM OXALATE 10 MG: 10 TABLET ORAL at 21:19

## 2020-07-17 RX ADMIN — ATORVASTATIN CALCIUM 40 MG: 40 TABLET, FILM COATED ORAL at 18:11

## 2020-07-17 RX ADMIN — LOSARTAN POTASSIUM 50 MG: 50 TABLET, FILM COATED ORAL at 18:19

## 2020-07-17 RX ADMIN — ACETAMINOPHEN 650 MG: 325 TABLET, FILM COATED ORAL at 11:47

## 2020-07-17 RX ADMIN — OMEPRAZOLE 20 MG: 20 CAPSULE, DELAYED RELEASE ORAL at 21:19

## 2020-07-17 RX ADMIN — SOTALOL HYDROCHLORIDE 120 MG: 80 TABLET ORAL at 18:02

## 2020-07-17 RX ADMIN — ASPIRIN 81 MG 81 MG: 81 TABLET ORAL at 18:11

## 2020-07-17 RX ADMIN — RIVAROXABAN 20 MG: 20 TABLET, FILM COATED ORAL at 18:11

## 2020-07-17 ASSESSMENT — ENCOUNTER SYMPTOMS
STRIDOR: 0
FATIGUE: 0
COLOR CHANGE: 0
CHILLS: 0
CHEST TIGHTNESS: 0
WHEEZING: 0
COUGH: 0
FEVER: 0
ABDOMINAL PAIN: 0
DIAPHORESIS: 0
LIGHT-HEADEDNESS: 0
SHORTNESS OF BREATH: 0
PALPITATIONS: 0
DIZZINESS: 0

## 2020-07-17 ASSESSMENT — FIBROSIS 4 INDEX: FIB4 SCORE: 3.29

## 2020-07-17 NOTE — PROGRESS NOTES
Cardiology Follow Up Progress Note    Date of Service  7/17/2020    Attending Physician  Nilton Ramirez M.D.    Chief Complaint   Elective admission for RF ablation and Sotalol initiation for Persistent Atrial Fibrillation.    HPI  Anthony Cloud is a 75 y.o. male admitted 7/16/2020 for elective admission for RF ablation and Sotalol initiation post procedure for Persistent Atrial Fibrillation. He is followed by Dr. Pichardo and Dr. Ramirez. History of persistent AF s/p DCCV with recurrence, for ablation and admit for sotalol load. S/p AF ablation by Dr. Ramirez on 07/16/20 with Sotalol initiation post procedure.     S/P successful RF ablation with PVI with posterior wall roof and floor lines, and presumed typical flutter with CTI block >200ms by Dr. Ramirez on 07/16/2020. Sotalol initiation post procedure with Sotalol 120 mg twice daily.     Medical history significant for Persistent AF, hx PE on chronic anticoauglation with xarelto, HTN, CAD s/p PCI RCA (2003), HDL.     Interim Events  07/17/2020: Seen in EP Rounds  S/P ablation by Dr. Ramirez on 07/17/20, Sotalol start post procedure.   Denies Cardiac complaints at this time.  Baseline QTc: 467.2 ms.   2-hours post:       - 1st Sotalol dose QTc: 423.6 ms, stable from prior       - 2-hours post 2nd Sotalol dose QTc: 462.7 ms  Vital Signs/Labs: Were reviewed. Afebrile, no leukocytosis, BP stable/mildly elevated. Renal function stable, CrCl: 113.80mL/min  Telemetry monitor: SB-SR 57-66; R PVC/PAC, up to 140s.  No significant events overnight.     Review of Systems  Review of Systems   Constitutional: Negative for chills, diaphoresis, fatigue and fever.   Respiratory: Negative for cough, chest tightness, shortness of breath, wheezing and stridor.    Cardiovascular: Negative for chest pain, palpitations and leg swelling.   Gastrointestinal: Negative for abdominal pain.   Genitourinary: Negative for difficulty urinating and hematuria.   Skin: Negative for color change.    Neurological: Negative for dizziness and light-headedness.     Vital signs in last 24 hours  Temp:  [35.7 °C (96.2 °F)-36.7 °C (98 °F)] 35.9 °C (96.7 °F)  Pulse:  [58-81] 60  Resp:  [12-20] 16  BP: ()/(60-91) 135/91  SpO2:  [92 %-99 %] 94 %    Physical Exam  Physical Exam   Constitutional: He is oriented to person, place, and time. No distress.   Neck: No JVD present.   Cardiovascular: Normal rate and regular rhythm.   No murmur heard.  Pulses:       Radial pulses are 2+ on the right side and 2+ on the left side.        Dorsalis pedis pulses are 1+ on the right side and 1+ on the left side.   Pulmonary/Chest: Effort normal and breath sounds normal. No respiratory distress. He has no wheezes. He has no rales. He exhibits no tenderness.   Musculoskeletal:         General: No edema.   Neurological: He is alert and oriented to person, place, and time.   Skin: Skin is warm and dry. He is not diaphoretic. No erythema.   Bilateral femoral access sites CDI with tegaderm/gauze, no evidence of active bleeding, edema, erythema, or hematoma.    Psychiatric: He has a normal mood and affect. His behavior is normal. Judgment and thought content normal.   Nursing note reviewed.    Lab Review  Lab Results   Component Value Date/Time    WBC 8.3 07/17/2020 02:31 AM    RBC 3.47 (L) 07/17/2020 02:31 AM    HEMOGLOBIN 12.1 (L) 07/17/2020 02:31 AM    HEMATOCRIT 36.8 (L) 07/17/2020 02:31 AM    .1 (H) 07/17/2020 02:31 AM    MCH 34.9 (H) 07/17/2020 02:31 AM    MCHC 32.9 (L) 07/17/2020 02:31 AM    MPV 9.4 07/17/2020 02:31 AM      Lab Results   Component Value Date/Time    SODIUM 137 07/17/2020 02:31 AM    POTASSIUM 4.9 07/17/2020 02:31 AM    CHLORIDE 102 07/17/2020 02:31 AM    CO2 24 07/17/2020 02:31 AM    GLUCOSE 142 (H) 07/17/2020 02:31 AM    BUN 21 07/17/2020 02:31 AM    CREATININE 0.96 07/17/2020 02:31 AM    CREATININE 1.06 01/04/2013 08:01 AM    BUNCREATRAT 22 12/14/2018 07:20 AM    BUNCREATRAT 25 (H) 01/04/2013 08:01 AM       Lab Results   Component Value Date/Time    ASTSGOT 36 07/16/2020 03:15 PM    ALTSGPT 33 07/16/2020 03:15 PM     Lab Results   Component Value Date/Time    CHOLSTRLTOT 101 11/07/2019 07:14 AM    LDL 48 11/07/2019 07:14 AM    HDL 40 11/07/2019 07:14 AM    TRIGLYCERIDE 64 11/07/2019 07:14 AM           Cardiac Imaging and Procedures Review  EKG 07/17/2020:  Sinus bradycardia    Echocardiogram (02/12/2020):    Compared to the images of the study done 4/23/2014 - there has been   interval decline in estimated ejection fraction, previously  normal,   and increase in the estimated right ventricular systolic pressure,   previously 35 mmHg.      Left ventricular systolic function is mildly reduced.  Left ventricular ejection fraction is visually estimated to be 50%.  Diastolic function is difficult to assess with arrhythmia.  The right ventricle was normal in size and function.  Mild tricuspid regurgitation.  Estimated right ventricular systolic pressure  is 55 mmHg.    Assessment/Plan  1. Persistent Atrial Fibrillation S/P Ablation/Sotalol Initiation  - S/P RF ablation with PVI with posterior wall roof and floor lines, and presumed typical flutter with CTI block >200ms by Dr. Ramirez on 07/16/20.  - Sotalol initiation started post procedure by Dr. Ramirez.  - Last ECHO (02/12/20) shows LVEF 50%. Mild TR. RVSP 55 mmHg.   - Asymptomatic, remains in sinus rhythm, no significant arrhythmias overnight. Mg+ 2, K+ 4.9, mild HA- no neurological deficits, acetaminophen PRN.   - EKG today show sinus bradycardia, continue EKGs per protocol.   - Basline QTc: 467.2 ms, 2 hours post 2nd dose QTc 462.7 ms, stable compared to prior. CrCl: 113.80 mL/min.  - On OAC with xarelto, continue.  - Home medication coreg discontinued. BP stable/mildly elevated, will monitor, may need to adjust medication for HTN. Will add amlodipine 5 mg daily for BP.   - Continue Sotalol 120 mg BID, will continue to monitor.  - Electronic prescription will be sent to  pharmacy once appropriate dose verified; CM to verify/coordinate outpatient copay and medication availability.   - Discussed Sotalol education and plan, all pt questions/concerns were answered, pt verbalized understanding.   - Plan for d/c after 5th dose Sotalol if tolerating well.   - Post ablation education instructions discussed, will need reinforcement prior to discharge.     Sotalol Medication Education Instructions:   Please take Sotalol as prescribed and do not miss any doses.  If you miss a dose, do NOT double dose. Do not take medication less than 11-12 hours apart between doses. Please seek emergency medical attention or go to your nearest ER if you have symptoms of severe dizziness or palpitations, shortness of breath unreleaved by rest, and syncope or near syncope. Please notify us if you have any side effects from the medication or questions/concerns. Please do not stop taking the medication until you contact your provider.     Post Ablation Patient Instructions:  No lifting > 10 lbs x 1 week.  No baths or hot tubs x 1 week.  May shower on 07/18/2020 and take off groin dressings.  Continue to monitor sites daily for warmth, redness, discolored drainage    Please take the esophageal protection medication that is prescribed to you for one month post procedure without missed doses.  Please do not miss any doses of your blood thinner.      Please walk and take deep breaths after discharge.  After discharge, if  experiences chest pain, shortness of breath, hemoptysis, neurological changes, high fever, pre syncope/syncope, trouble with catheter sites needs to be seen in the emergency dept.     Thank you for allowing me to participate in the care of this patient.  I will continue to follow this patient    Please contact me with any questions.    ABBY Alvarado.   [facility]  [phone]

## 2020-07-17 NOTE — PROGRESS NOTES
2 RN skin check complete with Agnes RN   Devices in place o2.  Skin assessed under devices red and blanching.  Confirmed pressure ulcers found on n/a.  New potential pressure ulcers noted on n/a. Wound consult placed n/a.  The following interventions in place *pillows in use for support/positoning.    Bilateral ears red and blanching foam pads in place.  Sacrum red and blanching.  Bilateral heels dry red and blanching.

## 2020-07-17 NOTE — RESPIRATORY CARE
" COPD EDUCATION by COPD CLINICAL EDUCATOR  7/17/2020 at 9:03 AM by Pattie Momin, RRT     Patient reviewed by COPD education team. Patient denies a history or diagnosis of COPD and is a non-smoker, therefore does not qualify for the COPD program. He takes Breo Ellipta as prescribed by his MD for for \"lung scarring\".   "

## 2020-07-17 NOTE — PROGRESS NOTES
Report received from IBETH Nunez. Patient is resting with no complaints of pain. Bed is locked in lowest position with call light within reach. POC discussed and white board updated. Orders reviewed. RN will continue to monitor.

## 2020-07-17 NOTE — CARE PLAN
Problem: Communication  Goal: The ability to communicate needs accurately and effectively will improve  Outcome: PROGRESSING AS EXPECTED  Intervention: [place] patient and significant other/support system to call light to alert staff of needs  Flowsheets (Taken 7/16/2020 2302)  Oriented to:: All of the Following : Location of Bathroom, Visiting Policy, Unit Routine, Call Light and Bedside Controls, Bedside Rail Policy, Smoking Policy, Rights and Responsibilities, Bedside Report, and Patient Education Notebook     Problem: Safety  Goal: Will remain free from falls  Outcome: PROGRESSING AS EXPECTED  Intervention: Implement fall precautions  Flowsheets (Taken 7/16/2020 2302)  Environmental Precautions:   Treaded Slipper Socks on Patient   Personal Belongings, Wastebasket, Call Bell etc. in Easy Reach   Transferred to Stronger Side   Report Given to Other Health Care Providers Regarding Fall Risk   Bed in Low Position   Communication Sign for Patients & Families   Mobility Assessed & Appropriate Sign Placed  IV Pole on Same Side of Bed as Bathroom: Yes

## 2020-07-18 LAB
ANION GAP SERPL CALC-SCNC: 10 MMOL/L (ref 7–16)
BUN SERPL-MCNC: 25 MG/DL (ref 8–22)
CALCIUM SERPL-MCNC: 8.9 MG/DL (ref 8.5–10.5)
CHLORIDE SERPL-SCNC: 100 MMOL/L (ref 96–112)
CO2 SERPL-SCNC: 26 MMOL/L (ref 20–33)
CREAT SERPL-MCNC: 1.13 MG/DL (ref 0.5–1.4)
EKG IMPRESSION: NORMAL
ERYTHROCYTE [DISTWIDTH] IN BLOOD BY AUTOMATED COUNT: 55.8 FL (ref 35.9–50)
GLUCOSE SERPL-MCNC: 106 MG/DL (ref 65–99)
HCT VFR BLD AUTO: 38.9 % (ref 42–52)
HGB BLD-MCNC: 12.6 G/DL (ref 14–18)
MAGNESIUM SERPL-MCNC: 1.9 MG/DL (ref 1.5–2.5)
MCH RBC QN AUTO: 34.9 PG (ref 27–33)
MCHC RBC AUTO-ENTMCNC: 32.4 G/DL (ref 33.7–35.3)
MCV RBC AUTO: 107.8 FL (ref 81.4–97.8)
PLATELET # BLD AUTO: 137 K/UL (ref 164–446)
PMV BLD AUTO: 9.2 FL (ref 9–12.9)
POTASSIUM SERPL-SCNC: 4.9 MMOL/L (ref 3.6–5.5)
RBC # BLD AUTO: 3.61 M/UL (ref 4.7–6.1)
SODIUM SERPL-SCNC: 136 MMOL/L (ref 135–145)
WBC # BLD AUTO: 8.8 K/UL (ref 4.8–10.8)

## 2020-07-18 PROCEDURE — 93005 ELECTROCARDIOGRAM TRACING: CPT | Performed by: INTERNAL MEDICINE

## 2020-07-18 PROCEDURE — 85027 COMPLETE CBC AUTOMATED: CPT

## 2020-07-18 PROCEDURE — 700101 HCHG RX REV CODE 250: Performed by: INTERNAL MEDICINE

## 2020-07-18 PROCEDURE — 36415 COLL VENOUS BLD VENIPUNCTURE: CPT

## 2020-07-18 PROCEDURE — 700102 HCHG RX REV CODE 250 W/ 637 OVERRIDE(OP): Performed by: INTERNAL MEDICINE

## 2020-07-18 PROCEDURE — A9270 NON-COVERED ITEM OR SERVICE: HCPCS | Performed by: NURSE PRACTITIONER

## 2020-07-18 PROCEDURE — 83735 ASSAY OF MAGNESIUM: CPT

## 2020-07-18 PROCEDURE — 99214 OFFICE O/P EST MOD 30 MIN: CPT | Performed by: INTERNAL MEDICINE

## 2020-07-18 PROCEDURE — 80048 BASIC METABOLIC PNL TOTAL CA: CPT

## 2020-07-18 PROCEDURE — 700102 HCHG RX REV CODE 250 W/ 637 OVERRIDE(OP): Performed by: NURSE PRACTITIONER

## 2020-07-18 PROCEDURE — A9270 NON-COVERED ITEM OR SERVICE: HCPCS | Performed by: INTERNAL MEDICINE

## 2020-07-18 PROCEDURE — 93010 ELECTROCARDIOGRAM REPORT: CPT | Mod: 76 | Performed by: INTERNAL MEDICINE

## 2020-07-18 PROCEDURE — 93010 ELECTROCARDIOGRAM REPORT: CPT | Performed by: INTERNAL MEDICINE

## 2020-07-18 PROCEDURE — G0378 HOSPITAL OBSERVATION PER HR: HCPCS

## 2020-07-18 RX ORDER — POLYETHYLENE GLYCOL 3350 17 G/17G
1 POWDER, FOR SOLUTION ORAL
Status: DISCONTINUED | OUTPATIENT
Start: 2020-07-18 | End: 2020-07-19 | Stop reason: HOSPADM

## 2020-07-18 RX ORDER — BISACODYL 10 MG
10 SUPPOSITORY, RECTAL RECTAL
Status: DISCONTINUED | OUTPATIENT
Start: 2020-07-18 | End: 2020-07-19 | Stop reason: HOSPADM

## 2020-07-18 RX ORDER — AMOXICILLIN 250 MG
2 CAPSULE ORAL 2 TIMES DAILY
Status: DISCONTINUED | OUTPATIENT
Start: 2020-07-18 | End: 2020-07-18

## 2020-07-18 RX ORDER — AMOXICILLIN 250 MG
1 CAPSULE ORAL DAILY
Status: DISCONTINUED | OUTPATIENT
Start: 2020-07-18 | End: 2020-07-19 | Stop reason: HOSPADM

## 2020-07-18 RX ADMIN — SOTALOL HYDROCHLORIDE 120 MG: 80 TABLET ORAL at 05:36

## 2020-07-18 RX ADMIN — POLYETHYLENE GLYCOL 3350 1 PACKET: 17 POWDER, FOR SOLUTION ORAL at 12:05

## 2020-07-18 RX ADMIN — ESCITALOPRAM OXALATE 10 MG: 10 TABLET ORAL at 20:22

## 2020-07-18 RX ADMIN — OMEPRAZOLE 20 MG: 20 CAPSULE, DELAYED RELEASE ORAL at 20:22

## 2020-07-18 RX ADMIN — LOSARTAN POTASSIUM 50 MG: 50 TABLET, FILM COATED ORAL at 05:42

## 2020-07-18 RX ADMIN — SOTALOL HYDROCHLORIDE 120 MG: 80 TABLET ORAL at 17:59

## 2020-07-18 RX ADMIN — AMLODIPINE BESYLATE 5 MG: 5 TABLET ORAL at 05:36

## 2020-07-18 RX ADMIN — ATORVASTATIN CALCIUM 40 MG: 40 TABLET, FILM COATED ORAL at 18:00

## 2020-07-18 RX ADMIN — ASPIRIN 81 MG 81 MG: 81 TABLET ORAL at 18:00

## 2020-07-18 RX ADMIN — DOCUSATE SODIUM 50 MG AND SENNOSIDES 8.6 MG 2 TABLET: 8.6; 5 TABLET, FILM COATED ORAL at 06:13

## 2020-07-18 RX ADMIN — RIVAROXABAN 20 MG: 20 TABLET, FILM COATED ORAL at 17:59

## 2020-07-18 RX ADMIN — LOSARTAN POTASSIUM 50 MG: 50 TABLET, FILM COATED ORAL at 18:00

## 2020-07-18 ASSESSMENT — FIBROSIS 4 INDEX: FIB4 SCORE: 3.34

## 2020-07-18 ASSESSMENT — CHA2DS2 SCORE
SEX: MALE
DIABETES: NO
CHF OR LEFT VENTRICULAR DYSFUNCTION: NO
AGE 75 OR GREATER: NO
CHA2DS2 VASC SCORE: 3
PRIOR STROKE OR TIA OR THROMBOEMBOLISM: NO
AGE 65 TO 74: YES
HYPERTENSION: YES
VASCULAR DISEASE: YES

## 2020-07-18 ASSESSMENT — ENCOUNTER SYMPTOMS
COUGH: 0
WHEEZING: 0
CONSTIPATION: 1
SHORTNESS OF BREATH: 0
STRIDOR: 0
APNEA: 0
CHEST TIGHTNESS: 0
CHOKING: 0

## 2020-07-18 NOTE — CARE PLAN
Problem: Communication  Goal: The ability to communicate needs accurately and effectively will improve  Outcome: PROGRESSING AS EXPECTED   POC discussed with patient. Education given with new medication. No questions at this time.   Problem: Safety  Goal: Will remain free from injury  Outcome: PROGRESSING AS EXPECTED   Hourly rounding in place. Bed is locked in lowest position with call light within reach.   Problem: Safety  Goal: Will remain free from falls  Outcome: PROGRESSING AS EXPECTED     Problem: Infection  Goal: Will remain free from infection  Outcome: PROGRESSING AS EXPECTED     Problem: Venous Thromboembolism (VTW)/Deep Vein Thrombosis (DVT) Prevention:  Goal: Patient will participate in Venous Thrombosis (VTE)/Deep Vein Thrombosis (DVT)Prevention Measures  Outcome: PROGRESSING AS EXPECTED   Patient ambulating in the lopez.

## 2020-07-18 NOTE — CARE PLAN
Problem: Communication  Goal: The ability to communicate needs accurately and effectively will improve  Outcome: PROGRESSING AS EXPECTED  Intervention: [place] patient and significant other/support system to call light to alert staff of needs  Flowsheets (Taken 7/17/2020 2309)  Oriented to:: All of the Following : Location of Bathroom, Visiting Policy, Unit Routine, Call Light and Bedside Controls, Bedside Rail Policy, Smoking Policy, Rights and Responsibilities, Bedside Report, and Patient Education Notebook     Problem: Safety  Goal: Will remain free from falls  Outcome: PROGRESSING AS EXPECTED  Intervention: Implement fall precautions  Flowsheets (Taken 7/17/2020 2308)  Environmental Precautions:   Treaded Slipper Socks on Patient   Personal Belongings, Wastebasket, Call Bell etc. in Easy Reach   Transferred to Stronger Side   Report Given to Other Health Care Providers Regarding Fall Risk   Bed in Low Position   Communication Sign for Patients & Families   Mobility Assessed & Appropriate Sign Placed

## 2020-07-18 NOTE — PROGRESS NOTES
Cardiology Follow Up Progress Note    Date of Service  7/18/2020    Attending Physician  Nilton Ramirez M.D.    Chief Complaint     Elective admission for RF ablation on sotalol initiation secondary to persistent atrial fibrillation      HPI  Anthony Cloud is a 75 y.o. male admitted 7/16/20 for elective admission for RF ablation sotalol initiation secondary to persistent atrial fibrillation.      Past medical history significant for persistent atrial fibrillation s/p direct-current cardioversion with recurrence of A. Fib, PE on chronic anticoagulation with Xarelto, hypertension, CAD status post PCI RCA (2003), hyperlipidemia    Interim Events  No overnight cardiac issues  Maintaining sinus rhythm on sotalol  QTC stable ~450 M/S  Blood pressure 106/60  Potassium 4.9  Magnesium 1.9  Complains of constipation, will start bowel protocol  Plan for discharge in a.m.      Review of Systems  Review of Systems   Respiratory: Negative for apnea, cough, choking, chest tightness, shortness of breath, wheezing and stridor.    Gastrointestinal: Positive for constipation.       Vital signs in last 24 hours  Temp:  [36.4 °C (97.5 °F)-37.3 °C (99.1 °F)] 36.9 °C (98.4 °F)  Pulse:  [55-70] 68  Resp:  [16-18] 18  BP: (106-134)/(60-81) 106/60  SpO2:  [89 %-95 %] 94 %    Physical Exam  Physical Exam  Cardiovascular:      Rate and Rhythm: Normal rate and regular rhythm.      Pulses: Normal pulses.   Pulmonary:      Effort: Pulmonary effort is normal.   Skin:     General: Skin is warm.      Comments: Bilateral groin site without evidence of hematoma   Neurological:      General: No focal deficit present.      Mental Status: He is alert.   Psychiatric:         Mood and Affect: Mood normal.         Lab Review  Lab Results   Component Value Date/Time    WBC 8.8 07/18/2020 02:32 AM    RBC 3.61 (L) 07/18/2020 02:32 AM    HEMOGLOBIN 12.6 (L) 07/18/2020 02:32 AM    HEMATOCRIT 38.9 (L) 07/18/2020 02:32 AM    .8 (H) 07/18/2020  02:32 AM    MCH 34.9 (H) 2020 02:32 AM    MCHC 32.4 (L) 2020 02:32 AM    MPV 9.2 2020 02:32 AM      Lab Results   Component Value Date/Time    SODIUM 136 2020 02:32 AM    POTASSIUM 4.9 2020 02:32 AM    CHLORIDE 100 2020 02:32 AM    CO2 26 2020 02:32 AM    GLUCOSE 106 (H) 2020 02:32 AM    BUN 25 (H) 2020 02:32 AM    CREATININE 1.13 2020 02:32 AM    CREATININE 1.06 2013 08:01 AM    BUNCREATRAT 22 2018 07:20 AM    BUNCREATRAT 25 (H) 2013 08:01 AM      Lab Results   Component Value Date/Time    ASTSGOT 36 2020 03:15 PM    ALTSGPT 33 2020 03:15 PM     Lab Results   Component Value Date/Time    CHOLSTRLTOT 101 2019 07:14 AM    LDL 48 2019 07:14 AM    HDL 40 2019 07:14 AM    TRIGLYCERIDE 64 2019 07:14 AM    TROPONINT 9 2020 06:32 PM       No results for input(s): NTPROBNP in the last 72 hours.    Cardiac Imaging and Procedures Review  EK2020 sinus rhythm    Echocardiogram: 2020  LVEF 50%  RVSP 55 mmHg    EP procedures performed: 2020  1.Atrial fibrillation, persistent  2. Successful isolation of all four pulmonary veins  3. Successful isolation of the posterior wall with roof and floor lines  4. Successful ablation of presumed typical flutter with CTI block >200ms    Imaging      Stress Test: Not applicable    Assessment/Plan    Persistent atrial fibrillation  -S/p successful ablation/sotalol initiation 2020  -Sotalol initiation started 2020 post procedure  -Maintaining sinus rhythm  -Keep potassium>4, Mg>2  -QTC ~ 450 m/s 2 hours post Sotalol this am    BP low post Amlodipine, will DC Amlodipine and monitor BP x 24 hours   Plan for DC in am  Appointment with our cardiology office 2020 at 2 PM with Anabell Nielsen.          Sotalol Medication Education Instructions:   Please take Sotalol as prescribed and do not miss any doses.  If you miss a dose, do NOT double dose. Do not  take medication less than 11-12 hours apart between doses. Please seek emergency medical attention or go to your nearest ER if you have symptoms of severe dizziness or palpitations, shortness of breath unreleaved by rest, and syncope or near syncope. Please notify us if you have any side effects from the medication or questions/concerns. Please do not stop taking the medication until you contact your provider.      Post Ablation Patient Instructions:  No lifting > 10 lbs x 1 week.  No baths or hot tubs x 1 week.  May shower on 07/18/2020 and take off groin dressings.  Continue to monitor sites daily for warmth, redness, discolored drainage     Please take the esophageal protection medication that is prescribed to you for one month post procedure without missed doses.  Please do not miss any doses of your blood thinner.       Please walk and take deep breaths after discharge.  After discharge, if  experiences chest pain, shortness of breath, hemoptysis, neurological changes, high fever, pre syncope/syncope, trouble with catheter sites needs to be seen in the emergency dept.                     Please contact me with any questions.    FREDO Marin.   Cardiologist, [facility]  [phone]

## 2020-07-18 NOTE — PROGRESS NOTES
Report received from IBETH Nunez. Patient is resting with no complaints of pain. POC discussed and white board updated. Bed is locked in lowest position with call light within reach. Orders reviewed. RN will continue to monitor.

## 2020-07-18 NOTE — CARE PLAN
Problem: Communication  Goal: The ability to communicate needs accurately and effectively will improve  7/18/2020 1558 by Kisha Amador R.N.  Outcome: PROGRESSING AS EXPECTED  POC discussed with patient. White board updated. Spoke with daughter per patient request to update on plan of care.      Problem: Safety  Goal: Will remain free from injury  7/18/2020 1558 by Kisha Amador R.N.  Outcome: PROGRESSING AS EXPECTED       Problem: Safety  Goal: Will remain free from falls  7/18/2020 1558 by AIDEN YatesNAdore  Outcome: PROGRESSING AS EXPECTED  Bed is locked in lowest position with call light within reach. Patient pages as needed.      Problem: Infection  Goal: Will remain free from infection  7/18/2020 1558 by Kisha Amador R.N.  Outcome: PROGRESSING AS EXPECTED       Problem: Venous Thromboembolism (VTW)/Deep Vein Thrombosis (DVT) Prevention:  Goal: Patient will participate in Venous Thrombosis (VTE)/Deep Vein Thrombosis (DVT)Prevention Measures  7/18/2020 1558 by Kisha Amador R.N.  Outcome: PROGRESSING AS EXPECTED  Patient ambulating in the lopez frequently. Patient on Xarelto.

## 2020-07-19 VITALS
DIASTOLIC BLOOD PRESSURE: 77 MMHG | OXYGEN SATURATION: 96 % | BODY MASS INDEX: 32.2 KG/M2 | HEIGHT: 76 IN | SYSTOLIC BLOOD PRESSURE: 132 MMHG | TEMPERATURE: 98.6 F | HEART RATE: 59 BPM | WEIGHT: 264.4 LBS | RESPIRATION RATE: 18 BRPM

## 2020-07-19 LAB
ANION GAP SERPL CALC-SCNC: 10 MMOL/L (ref 7–16)
BUN SERPL-MCNC: 20 MG/DL (ref 8–22)
CALCIUM SERPL-MCNC: 8.7 MG/DL (ref 8.5–10.5)
CHLORIDE SERPL-SCNC: 101 MMOL/L (ref 96–112)
CO2 SERPL-SCNC: 25 MMOL/L (ref 20–33)
CREAT SERPL-MCNC: 1.04 MG/DL (ref 0.5–1.4)
GLUCOSE SERPL-MCNC: 102 MG/DL (ref 65–99)
MAGNESIUM SERPL-MCNC: 1.9 MG/DL (ref 1.5–2.5)
POTASSIUM SERPL-SCNC: 4.7 MMOL/L (ref 3.6–5.5)
SODIUM SERPL-SCNC: 136 MMOL/L (ref 135–145)

## 2020-07-19 PROCEDURE — 99217 PR OBSERVATION CARE DISCHARGE: CPT | Performed by: INTERNAL MEDICINE

## 2020-07-19 PROCEDURE — G0378 HOSPITAL OBSERVATION PER HR: HCPCS

## 2020-07-19 PROCEDURE — 700102 HCHG RX REV CODE 250 W/ 637 OVERRIDE(OP): Performed by: NURSE PRACTITIONER

## 2020-07-19 PROCEDURE — 700102 HCHG RX REV CODE 250 W/ 637 OVERRIDE(OP): Performed by: INTERNAL MEDICINE

## 2020-07-19 PROCEDURE — A9270 NON-COVERED ITEM OR SERVICE: HCPCS | Performed by: NURSE PRACTITIONER

## 2020-07-19 PROCEDURE — 93005 ELECTROCARDIOGRAM TRACING: CPT | Performed by: INTERNAL MEDICINE

## 2020-07-19 PROCEDURE — A9270 NON-COVERED ITEM OR SERVICE: HCPCS | Performed by: INTERNAL MEDICINE

## 2020-07-19 PROCEDURE — 83735 ASSAY OF MAGNESIUM: CPT

## 2020-07-19 PROCEDURE — 80048 BASIC METABOLIC PNL TOTAL CA: CPT

## 2020-07-19 PROCEDURE — 36415 COLL VENOUS BLD VENIPUNCTURE: CPT

## 2020-07-19 RX ORDER — AMOXICILLIN 250 MG
1 CAPSULE ORAL
Qty: 30 TAB | Refills: 0 | Status: SHIPPED
Start: 2020-07-19 | End: 2020-08-28

## 2020-07-19 RX ORDER — AMLODIPINE BESYLATE 2.5 MG/1
2.5 TABLET ORAL DAILY
Qty: 30 TAB | Refills: 11 | Status: SHIPPED | OUTPATIENT
Start: 2020-07-19 | End: 2021-06-29

## 2020-07-19 RX ORDER — AMLODIPINE BESYLATE 5 MG/1
2.5 TABLET ORAL
Status: DISCONTINUED | OUTPATIENT
Start: 2020-07-19 | End: 2020-07-19 | Stop reason: HOSPADM

## 2020-07-19 RX ADMIN — AMLODIPINE BESYLATE 2.5 MG: 5 TABLET ORAL at 08:20

## 2020-07-19 RX ADMIN — DOCUSATE SODIUM 50 MG AND SENNOSIDES 8.6 MG 1 TABLET: 8.6; 5 TABLET, FILM COATED ORAL at 06:00

## 2020-07-19 RX ADMIN — MAGNESIUM 64 MG (MAGNESIUM CHLORIDE) TABLET,DELAYED RELEASE 64 MG: at 09:29

## 2020-07-19 RX ADMIN — SOTALOL HYDROCHLORIDE 120 MG: 80 TABLET ORAL at 05:06

## 2020-07-19 RX ADMIN — LOSARTAN POTASSIUM 50 MG: 50 TABLET, FILM COATED ORAL at 05:06

## 2020-07-19 RX ADMIN — BISACODYL 10 MG: 10 SUPPOSITORY RECTAL at 05:07

## 2020-07-19 NOTE — DISCHARGE INSTRUCTIONS
Sotalol Medication Education Instructions:   Please take Sotalol as prescribed and do not miss any doses.  If you miss a dose, do NOT double dose. Do not take medication less than 11-12 hours apart between doses. Please seek emergency medical attention or go to your nearest ER if you have symptoms of severe dizziness or palpitations, shortness of breath unreleaved by rest, and syncope or near syncope. Please notify us if you have any side effects from the medication or questions/concerns. Please do not stop taking the medication until you contact your provider.     Post Ablation Patient Instructions:  No lifting > 10 lbs x 1 week.  No baths or hot tubs x 1 week.  May shower on 07/19/2020 and take off groin dressings.  Continue to monitor sites daily for warmth, redness, discolored drainage     Please take the esophageal protection medication that is prescribed to you for one month post procedure without missed doses.  Please do not miss any doses of your blood thinner.       Please walk and take deep breaths after discharge.  After discharge, if  experiences chest pain, shortness of breath, hemoptysis, neurological changes, high fever, pre syncope/syncope, trouble with catheter sites needs to be seen in the emergency dept.     Discharge Instructions    Discharged to home by car with relative. Discharged via wheelchair, hospital escort: Yes.  Special equipment needed: Not Applicable    Be sure to schedule a follow-up appointment with your primary care doctor or any specialists as instructed.     Discharge Plan:   Diet Plan: Discussed  Activity Level: Discussed  Confirmed Follow up Appointment: Appointment Scheduled  Confirmed Symptoms Management: Discussed  Medication Reconciliation Updated: Yes    I understand that a diet low in cholesterol, fat, and sodium is recommended for good health. Unless I have been given specific instructions below for another diet, I accept this instruction as my diet prescription.   Other  diet: Cardiac    Special Instructions: None    · Is patient discharged on Warfarin / Coumadin?   No     Depression / Suicide Risk    As you are discharged from this [facility] facility, it is important to learn how to keep safe from harming yourself.    Recognize the warning signs:  · Abrupt changes in personality, positive or negative- including increase in energy   · Giving away possessions  · Change in eating patterns- significant weight changes-  positive or negative  · Change in sleeping patterns- unable to sleep or sleeping all the time   · Unwillingness or inability to communicate  · Depression  · Unusual sadness, discouragement and loneliness  · Talk of wanting to die  · Neglect of personal appearance   · Rebelliousness- reckless behavior  · Withdrawal from people/activities they love  · Confusion- inability to concentrate     If you or a loved one observes any of these behaviors or has concerns about self-harm, here's what you can do:  · Talk about it- your feelings and reasons for harming yourself  · Remove any means that you might use to hurt yourself (examples: pills, rope, extension cords, firearm)  · Get professional help from the community (Mental Health, Substance Abuse, psychological counseling)  · Do not be alone:Call your Safe Contact- someone whom you trust who will be there for you.  · Call your local CRISIS HOTLINE 247-1964 or 496-863-6295  · Call your local Children's Mobile Crisis Response Team [place] (222) 529-5711 or www.[employer]  · Call the toll free National Suicide Prevention Hotlines   · National Suicide Prevention Lifeline 120-620-ADHU (9398)  · National Hope Line Network 800-SUICIDE (866-2123)        Cardiac Ablation  Cardiac ablation is a procedure to disable (ablate) a small amount of heart tissue in very specific places. The heart has many electrical connections. Sometimes these connections are abnormal and can cause the heart to beat very fast or irregularly.  Ablating some of the problem areas can improve the heart rhythm or return it to normal. Ablation may be done for people who:  · Have Kaitlyn-Parkinson-White syndrome.  · Have fast heart rhythms (tachycardia).  · Have taken medicines for an abnormal heart rhythm (arrhythmia) that were not effective or caused side effects.  · Have a high-risk heartbeat that may be life-threatening.  During the procedure, a small incision is made in the neck or the groin, and a long, thin, flexible tube (catheter) is inserted into the incision and moved to the heart. Small devices (electrodes) on the tip of the catheter will send out electrical currents. A type of X-ray (fluoroscopy) will be used to help guide the catheter and to provide images of the heart.  Tell a health care provider about:  · Any allergies you have.  · All medicines you are taking, including vitamins, herbs, eye drops, creams, and over-the-counter medicines.  · Any problems you or family members have had with anesthetic medicines.  · Any blood disorders you have.  · Any surgeries you have had.  · Any medical conditions you have, such as kidney failure.  · Whether you are pregnant or may be pregnant.  What are the risks?  Generally, this is a safe procedure. However, problems may occur, including:  · Infection.  · Bruising and bleeding at the catheter insertion site.  · Bleeding into the chest, especially into the sac that surrounds the heart. This is a serious complication.  · Stroke or blood clots.  · Damage to other structures or organs.  · Allergic reaction to medicines or dyes.  · Need for a permanent pacemaker if the normal electrical system is damaged. A pacemaker is a small computer that sends electrical signals to the heart and helps your heart beat normally.  · The procedure not being fully effective. This may not be recognized until months later. Repeat ablation procedures are sometimes required.  What happens before the procedure?  · Follow instructions  from your health care provider about eating or drinking restrictions.  · Ask your health care provider about:  ? Changing or stopping your regular medicines. This is especially important if you are taking diabetes medicines or blood thinners.  ? Taking medicines such as aspirin and ibuprofen. These medicines can thin your blood. Do not take these medicines before your procedure if your health care provider instructs you not to.  · Plan to have someone take you home from the hospital or clinic.  · If you will be going home right after the procedure, plan to have someone with you for 24 hours.  What happens during the procedure?  · To lower your risk of infection:  ? Your health care team will wash or sanitize their hands.  ? Your skin will be washed with soap.  ? Hair may be removed from the incision area.  · An IV tube will be inserted into one of your veins.  · You will be given a medicine to help you relax (sedative).  · The skin on your neck or groin will be numbed.  · An incision will be made in your neck or your groin.  · A needle will be inserted through the incision and into a large vein in your neck or groin.  · A catheter will be inserted into the needle and moved to your heart.  · Dye may be injected through the catheter to help your surgeon see the area of the heart that needs treatment.  · Electrical currents will be sent from the catheter to ablate heart tissue in desired areas. There are three types of energy that may be used to ablate heart tissue:  ? Heat (radiofrequency energy).  ? Laser energy.  ? Extreme cold (cryoablation).  · When the necessary tissue has been ablated, the catheter will be removed.  · Pressure will be held on the catheter insertion area to prevent excessive bleeding.  · A bandage (dressing) will be placed over the catheter insertion area.  The procedure may vary among health care providers and hospitals.  What happens after the procedure?  · Your blood pressure, heart rate,  breathing rate, and blood oxygen level will be monitored until the medicines you were given have worn off.  · Your catheter insertion area will be monitored for bleeding. You will need to lie still for a few hours to ensure that you do not bleed from the catheter insertion area.  · Do not drive for 24 hours or as long as directed by your health care provider.  Summary  · Cardiac ablation is a procedure to disable (ablate) a small amount of heart tissue in very specific places. Ablating some of the problem areas can improve the heart rhythm or return it to normal.  · During the procedure, electrical currents will be sent from the catheter to ablate heart tissue in desired areas.  This information is not intended to replace advice given to you by your health care provider. Make sure you discuss any questions you have with your health care provider.  Document Released: 05/06/2010 Document Revised: 06/10/2019 Document Reviewed: 11/06/2017  Elsevier Patient Education © 2020 [employer]

## 2020-07-19 NOTE — DISCHARGE SUMMARY
Discharge Summary    CHIEF COMPLAINT ON ADMISSION  No chief complaint on file.      Reason for Admission  Elective admission for RF atrial fibrillation ablation.     Admission Date  7/16/2020    CODE STATUS  Full Code    HPI & HOSPITAL COURSE  This is a 75 y.o. male with a past medical history significant for persistent atrial fibrillation s/p direct-current cardioversion with recurrence of A. Fib, PE on chronic anticoagulation with Xarelto, hypertension, CAD status post PCI RCA (2003) and hyperlipidemia.     Patient was admitted for elective PVI RF atrial fibrillation ablation which was completed succesfully on 7/16/2020 by Dr. Nilton Ramirez. Post procedure patient was initiated on sotalol therapy and completed a total of 6 doses. He maintained NSR and a stable QTC interval of approximately ~ 450 M/S.     Post procedure patient recovered well with no complications. Bilateral femoral cath sites remained clean, dry, and intact with no signs of hematoma, bleeding, or infection. Patient was able to ambulate the halls without any difficulty. Vitals and laboratory workup remained unremarkable. Patient did complain of constipation and was started on senna-docusate. Magnesium remained at 1.9, patient was started on Slo-mag BID.      Therefore, he is discharged in good and stable condition to home with close outpatient follow-up.    The patient met 2-midnight criteria for an inpatient stay at the time of discharge.    Discharge Date  7/19/2020    Sotalol Medication Education Instructions:   Please take Sotalol as prescribed and do not miss any doses.  If you miss a dose, do NOT double dose. Do not take medication less than 11-12 hours apart between doses. Please seek emergency medical attention or go to your nearest ER if you have symptoms of severe dizziness or palpitations, shortness of breath unreleaved by rest, and syncope or near syncope. Please notify us if you have any side effects from the medication or  questions/concerns. Please do not stop taking the medication until you contact your provider.      Post Ablation Patient Instructions:  No lifting > 10 lbs x 1 week.  No baths or hot tubs x 1 week.  May shower on 07/19/2020 and take off groin dressings.  Continue to monitor sites daily for warmth, redness, discolored drainage     Please take the esophageal protection medication that is prescribed to you for one month post procedure without missed doses.  Please do not miss any doses of your blood thinner.       Please walk and take deep breaths after discharge.  After discharge, if  experiences chest pain, shortness of breath, hemoptysis, neurological changes, high fever, pre syncope/syncope, trouble with catheter sites needs to be seen in the emergency dept.         FOLLOW UP  Future Appointments   Date Time Provider Department Center   8/14/2020  2:00 PM INDIRA Alvarado [facility] None   10/12/2020 10:00 AM [facility] EXAM 4 [facility] None     No follow-up provider specified.    MEDICATIONS ON DISCHARGE     Medication List      START taking these medications      Instructions   amLODIPine 2.5 MG Tabs  Commonly known as:  NORVASC   Take 1 Tab by mouth every day.  Dose:  2.5 mg     magnesium chloride 64 MG Tbec  Commonly known as:  MAG-64   Take 1 Tab by mouth 2 times a day, with meals.  Dose:  64 mg     senna-docusate 8.6-50 MG Tabs  Commonly known as:  PERICOLACE or SENOKOT S   Take 1 Tab by mouth 1 time daily as needed for Constipation.  Dose:  1 Tab     sotalol 120 MG tablet  Commonly known as:  BETAPACE   Take 1 Tab by mouth 2 Times a Day.  Dose:  120 mg        CHANGE how you take these medications      Instructions   atorvastatin 40 MG Tabs  What changed:  when to take this  Commonly known as:  LIPITOR   Take 1 Tab by mouth every day.  Dose:  40 mg     Breo Ellipta 200-25 MCG/INH Aepb  What changed:  when to take this  Generic drug:  Fluticasone Furoate-Vilanterol   Doctor's comments:  With mouth rinse  INHALE ONE PUFF  BY MOUTH EVERY DAY     escitalopram 10 MG Tabs  What changed:    · how much to take  · how to take this  · when to take this  Commonly known as:  LEXAPRO   TAKE ONE (1) TABLET BY MOUTH EVERY DAY        CONTINUE taking these medications      Instructions   aspirin 81 MG Chew chewable tablet  Commonly known as:  ASA   Take 81 mg by mouth every evening.  Dose:  81 mg     GLUCOSAMINE PO   Take 1 Tab by mouth every morning.  Dose:  1 Tab     losartan 50 MG Tabs  Commonly known as:  COZAAR   Take 1 Tab by mouth 2 Times a Day.  Dose:  50 mg     MULTIVITAMIN ADULT PO   Take 1 Tab by mouth every morning.  Dose:  1 Tab     omeprazole 20 MG delayed-release capsule  Commonly known as:  PRILOSEC   Doctor's comments:  Take daily x 1 month post procedure  Take 1 Cap by mouth every bedtime.  Dose:  20 mg     rivaroxaban 20 MG Tabs tablet  Commonly known as:  XARELTO   Take 1 Tab by mouth with dinner.  Dose:  20 mg        STOP taking these medications    carvedilol 25 MG Tabs  Commonly known as:  COREG            Allergies  Allergies   Allergen Reactions   • Lisinopril      cough       DIET  Orders Placed This Encounter   Procedures   • Diet Order Cardiac     Standing Status:   Standing     Number of Occurrences:   1     Order Specific Question:   Diet:     Answer:   Cardiac [6]       LABORATORY  Lab Results   Component Value Date    SODIUM 136 07/19/2020    POTASSIUM 4.7 07/19/2020    CHLORIDE 101 07/19/2020    CO2 25 07/19/2020    GLUCOSE 102 (H) 07/19/2020    BUN 20 07/19/2020    CREATININE 1.04 07/19/2020    CREATININE 1.06 01/04/2013        Lab Results   Component Value Date    WBC 8.8 07/18/2020    HEMOGLOBIN 12.6 (L) 07/18/2020    HEMATOCRIT 38.9 (L) 07/18/2020    PLATELETCT 137 (L) 07/18/2020

## 2020-07-19 NOTE — CARE PLAN
Problem: Communication  Goal: The ability to communicate needs accurately and effectively will improve  7/19/2020 0940 by Brie Sinclair R.N.  Outcome: MET  7/19/2020 0747 by Brie Sinclair R.N.  Outcome: PROGRESSING AS EXPECTED     Problem: Safety  Goal: Will remain free from injury  Outcome: MET  Goal: Will remain free from falls  Outcome: MET     Problem: Infection  Goal: Will remain free from infection  Outcome: MET     Problem: Venous Thromboembolism (VTW)/Deep Vein Thrombosis (DVT) Prevention:  Goal: Patient will participate in Venous Thrombosis (VTE)/Deep Vein Thrombosis (DVT)Prevention Measures  Outcome: MET     Problem: Bowel/Gastric:  Goal: Normal bowel function is maintained or improved  Outcome: MET  Goal: Will not experience complications related to bowel motility  Outcome: MET     Problem: Knowledge Deficit  Goal: Knowledge of disease process/condition, treatment plan, diagnostic tests, and medications will improve  7/19/2020 0940 by Brie Sinclair R.N.  Outcome: MET  7/19/2020 0747 by Brie Sinclair R.N.  Outcome: PROGRESSING AS EXPECTED  Note: Pt educated regarding plan of care and medications. All questions answered.     Goal: Knowledge of the prescribed therapeutic regimen will improve  7/19/2020 0940 by Brie Sinclair R.N.  Outcome: MET  7/19/2020 0747 by Brie Sinclair R.N.  Outcome: PROGRESSING AS EXPECTED     Problem: Discharge Barriers/Planning  Goal: Patient's continuum of care needs will be met  7/19/2020 0940 by Brie Sinclair R.N.  Outcome: MET  7/19/2020 0747 by Brie Sinclair R.N.  Outcome: PROGRESSING AS EXPECTED     Problem: Fluid Volume:  Goal: Will maintain balanced intake and output  Outcome: MET

## 2020-07-19 NOTE — PROGRESS NOTES
Bedside report received from previous nurse regarding prior 12 hours.  POC reviewed with pt.  White board updated.  Pt verbalizes understanding.  Call light within reach.  Pt tolerated morning meds. Pt lying in bed resting this AM, denies pain. All needs currently within reach. Cardiac monitor in placed. Bed locked and lowered. No additional requests at this time.

## 2020-07-19 NOTE — CARE PLAN
Problem: Communication  Goal: The ability to communicate needs accurately and effectively will improve  Outcome: PROGRESSING AS EXPECTED     Problem: Knowledge Deficit  Goal: Knowledge of disease process/condition, treatment plan, diagnostic tests, and medications will improve  Outcome: PROGRESSING AS EXPECTED  Note: Pt educated regarding plan of care and medications. All questions answered.     Goal: Knowledge of the prescribed therapeutic regimen will improve  Outcome: PROGRESSING AS EXPECTED     Problem: Discharge Barriers/Planning  Goal: Patient's continuum of care needs will be met  Outcome: PROGRESSING AS EXPECTED

## 2020-07-19 NOTE — CARE PLAN
Problem: Communication  Goal: The ability to communicate needs accurately and effectively will improve  Outcome: PROGRESSING AS EXPECTED  Intervention: Wake patient and significant other/support system to call light to alert staff of needs  Flowsheets (Taken 7/18/2020 2106)  Oriented to:: All of the Following : Location of Bathroom, Visiting Policy, Unit Routine, Call Light and Bedside Controls, Bedside Rail Policy, Smoking Policy, Rights and Responsibilities, Bedside Report, and Patient Education Notebook     Problem: Safety  Goal: Will remain free from falls  Outcome: PROGRESSING AS EXPECTED  Intervention: Implement fall precautions  Flowsheets (Taken 7/18/2020 2106)  Environmental Precautions:   Treaded Slipper Socks on Patient   Personal Belongings, Wastebasket, Call Bell etc. in Easy Reach   Transferred to Stronger Side   Report Given to Other Health Care Providers Regarding Fall Risk   Bed in Low Position   Communication Sign for Patients & Families   Mobility Assessed & Appropriate Sign Placed

## 2020-07-19 NOTE — PROGRESS NOTES
Assumed care of pt, bedside report received from IBETH Beard. Call light within reach. Addressed POC with pt, no additional questions at this time.

## 2020-07-19 NOTE — PROGRESS NOTES
Pt dc'd. IV and monitor removed; monitor room notified. Pt left unit via wheelchair with RN. Personal belongings with pt when leaving unit. Pt given discharge instructions prior to leaving unit including prescription and when to visit with physician; verbalizes understanding. Copy of discharge instructions with pt and in the chart.

## 2020-07-20 LAB — EKG IMPRESSION: NORMAL

## 2020-07-20 PROCEDURE — 93010 ELECTROCARDIOGRAM REPORT: CPT | Performed by: INTERNAL MEDICINE

## 2020-08-09 LAB — LV EJECT FRACT  99904: 35

## 2020-08-13 DIAGNOSIS — J98.4 RESTRICTIVE LUNG DISEASE: ICD-10-CM

## 2020-08-13 NOTE — TELEPHONE ENCOUNTER
Have we ever prescribed this med? Yes.  If yes, what date? 12/26/2019    Last OV: 04/02/2020 - Dr. Schreiber     Next OV: none scheduled - was to follow up in 1 year. I do not see any pulmonary appointments in chart.    DX: Restrictive lung disease    Medications: Breo

## 2020-08-14 ENCOUNTER — OFFICE VISIT (OUTPATIENT)
Dept: CARDIOLOGY | Facility: MEDICAL CENTER | Age: 75
End: 2020-08-14
Payer: MEDICARE

## 2020-08-14 ENCOUNTER — HOSPITAL ENCOUNTER (OUTPATIENT)
Facility: MEDICAL CENTER | Age: 75
End: 2020-08-14
Attending: INTERNAL MEDICINE | Admitting: INTERNAL MEDICINE
Payer: MEDICARE

## 2020-08-14 VITALS
HEIGHT: 76 IN | OXYGEN SATURATION: 92 % | DIASTOLIC BLOOD PRESSURE: 76 MMHG | WEIGHT: 253 LBS | BODY MASS INDEX: 30.81 KG/M2 | SYSTOLIC BLOOD PRESSURE: 112 MMHG | HEART RATE: 66 BPM

## 2020-08-14 DIAGNOSIS — Z86.79 S/P ABLATION OF ATRIAL FIBRILLATION: ICD-10-CM

## 2020-08-14 DIAGNOSIS — Z98.890 S/P ABLATION OF ATRIAL FIBRILLATION: ICD-10-CM

## 2020-08-14 DIAGNOSIS — I27.20 PULMONARY HYPERTENSION (HCC): ICD-10-CM

## 2020-08-14 DIAGNOSIS — I25.10 CORONARY ARTERY DISEASE DUE TO LIPID RICH PLAQUE: ICD-10-CM

## 2020-08-14 DIAGNOSIS — I51.89 DIASTOLIC DYSFUNCTION: ICD-10-CM

## 2020-08-14 DIAGNOSIS — I10 ESSENTIAL HYPERTENSION: ICD-10-CM

## 2020-08-14 DIAGNOSIS — Z51.81 ENCOUNTER FOR MONITORING SOTALOL THERAPY: ICD-10-CM

## 2020-08-14 DIAGNOSIS — Z79.01 CHRONIC ANTICOAGULATION: ICD-10-CM

## 2020-08-14 DIAGNOSIS — Z79.899 ENCOUNTER FOR MONITORING SOTALOL THERAPY: ICD-10-CM

## 2020-08-14 DIAGNOSIS — I48.19 PERSISTENT ATRIAL FIBRILLATION (HCC): ICD-10-CM

## 2020-08-14 DIAGNOSIS — I25.83 CORONARY ARTERY DISEASE DUE TO LIPID RICH PLAQUE: ICD-10-CM

## 2020-08-14 PROCEDURE — 99214 OFFICE O/P EST MOD 30 MIN: CPT | Performed by: NURSE PRACTITIONER

## 2020-08-14 PROCEDURE — 93000 ELECTROCARDIOGRAM COMPLETE: CPT | Performed by: INTERNAL MEDICINE

## 2020-08-14 ASSESSMENT — ENCOUNTER SYMPTOMS
DIAPHORESIS: 0
SHORTNESS OF BREATH: 0
PND: 0
COUGH: 0
ABDOMINAL PAIN: 0
LOSS OF CONSCIOUSNESS: 0
ORTHOPNEA: 0
WHEEZING: 0
PALPITATIONS: 0
CHILLS: 0
CLAUDICATION: 0
FEVER: 0
DIZZINESS: 0
BLOOD IN STOOL: 0

## 2020-08-14 ASSESSMENT — FIBROSIS 4 INDEX: FIB4 SCORE: 3.34

## 2020-08-14 NOTE — PROGRESS NOTES
Cardiology/Electrophysiology Follow-up Note    Subjective:   Chief Complaint:   Chief Complaint   Patient presents with   • Atrial Fibrillation     HFU DX:PAF     Anthony Cloud is a 75 y.o. male who presents today for follow up for Persistent atrial fibrillation s/p RF ablation and Sotalol initiation by Dr. Ramirez on 07/16/20.     He is followed by Dr. Pichardo and Dr. Ramirez. History of persistent AF s/p DCCV with recurrence. Therefore he decided to undergo RF ablation with PVI with posterior wall roof and floor lines, and presumed typical flutter with CTI block >200ms by Dr. Ramirez on 07/16/20 with Sotalol initiation post procedure. On OAC with xarelto and Sotalol 120 mg twice daily.      Medical history significant for Persistent AF, s/p RF ablation by Dr. Ramirez on 07/16/20, hx PE on chronic anticoauglation with xarelto, HTN, CAD s/p PCI RCA (2003), HDL.     Today in follow up, he states he is feeling great.  Reports more energy. He denies chest pain, dizziness, palpitations, pre syncope or syncope, dyspnea, PND, orthopnea, or lower extremity edema.  Denies any signs or symptoms of bleeding or bleeding issues. Patient endorses medication compliance. He states uses [employer], no arrhythmias detected.    Past Medical History:   Diagnosis Date   • Acute nasopharyngitis 01/2020   • Anemia    • Arrhythmia     a fib   • Bowel habit changes     constipation    • Breath shortness     since 11/2019, hx cardiac arrhythmia    • CAD (coronary artery disease) 2003    PCI/BMS x 3 to the RCA (Zeta 4.0 x 23mm, 3.5 x 23mm, 3.0 x 18mm).   • Cataract     alejandra IOL    • Central sleep apnea      ICD-10 transition   • Chronic anticoagulation    • COPD (chronic obstructive pulmonary disease) (HCC)    • Depression    • Depressive disorder, not elsewhere classified         • Dyslipidemia    • Hemorrhagic disorder (HCC)     takes xarelto    • High cholesterol    • Hx MRSA infection 2009, 2014    right ankle 2014, right thumb 2009   •  Hypertension    • Hypothyroidism    • Mixed hyperlipidemia    • Obesity    • Old myocardial infarction January 2003    cardiac stents x3   • Peptic ulcer disease 8/4/2016   • Personal history of venous thrombosis and embolism     On Xarelto   • Pulmonary embolism (HCC) 2003/2004   • Sleep apnea     BiPAP with 3L oxygen     Past Surgical History:   Procedure Laterality Date   • WRIST ORIF Left 2/23/2018    Procedure: WRIST ORIF;  Surgeon: Jasper Hammond M.D.;  Location: SURGERY [facility];  Service: Orthopedics   • INCISION AND DRAINAGE ORTHOPEDIC  8/29/2014    Performed by Wolfgang Merrill M.D. at SURGERY [facility]   • CARDIAC CATH, RIGHT/LEFT HEART  2003 01/2003 4.0x23mm Zeta stent to proximal RCA,3.5x22mm distal to first stent, 3.0x15mm at the point of original occlusion.   • OTHER  2001    back sx    • BOWEL RESECTION      2020 pt denies   • PB ANESTH,LOWER LEG ARTERIES SURG       Family History   Problem Relation Age of Onset   • Other Mother         Passed at 98   • Heart Disease Father 60        CABG     Social History     Socioeconomic History   • Marital status:      Spouse name: Not on file   • Number of children: Not on file   • Years of education: Not on file   • Highest education level: Not on file   Occupational History   • Not on file   Social Needs   • Financial resource strain: Not on file   • Food insecurity     Worry: Not on file     Inability: Not on file   • Transportation needs     Medical: Not on file     Non-medical: Not on file   Tobacco Use   • Smoking status: Never Smoker   • Smokeless tobacco: Never Used   Substance and Sexual Activity   • Alcohol use: Yes     Comment: 2 per week    • Drug use: No   • Sexual activity: Not on file   Lifestyle   • Physical activity     Days per week: Not on file     Minutes per session: Not on file   • Stress: Not on file   Relationships   • Social connections     Talks on phone: Not on file     Gets together: Not on file      Attends Jewish service: Not on file     Active member of club or organization: Not on file     Attends meetings of clubs or organizations: Not on file     Relationship status: Not on file   • Intimate partner violence     Fear of current or ex partner: Not on file     Emotionally abused: Not on file     Physically abused: Not on file     Forced sexual activity: Not on file   Other Topics Concern   • Not on file   Social History Narrative   • Not on file     Allergies   Allergen Reactions   • Lisinopril      cough       Current Outpatient Medications   Medication Sig Dispense Refill   • amLODIPine (NORVASC) 2.5 MG Tab Take 1 Tab by mouth every day. 30 Tab 11   • magnesium chloride (MAG-64) 64 MG Tablet Delayed Response Take 1 Tab by mouth 2 times a day, with meals. 60 Tab 11   • senna-docusate (PERICOLACE OR SENOKOT S) 8.6-50 MG Tab Take 1 Tab by mouth 1 time daily as needed for Constipation. 30 Tab 0   • omeprazole (PRILOSEC) 20 MG delayed-release capsule Take 1 Cap by mouth every bedtime. 30 Cap 0   • sotalol (BETAPACE) 120 MG tablet Take 1 Tab by mouth 2 Times a Day. 60 Tab 3   • Multiple Vitamins-Minerals (MULTIVITAMIN ADULT PO) Take 1 Tab by mouth every morning.     • Glucosamine HCl (GLUCOSAMINE PO) Take 1 Tab by mouth every morning.     • rivaroxaban (XARELTO) 20 MG Tab tablet Take 1 Tab by mouth with dinner. 30 Tab 3   • BREO ELLIPTA 200-25 MCG/INH AEROSOL POWDER, BREATH ACTIVATED INHALE ONE PUFF BY MOUTH EVERY DAY (Patient taking differently: Inhale 1 Puff by mouth every morning.) 1 Each 6   • atorvastatin (LIPITOR) 40 MG Tab Take 1 Tab by mouth every day. (Patient taking differently: Take 40 mg by mouth every bedtime.) 90 Tab 3   • losartan (COZAAR) 50 MG Tab Take 1 Tab by mouth 2 Times a Day. 180 Tab 3   • escitalopram (LEXAPRO) 10 MG Tab TAKE ONE (1) TABLET BY MOUTH EVERY DAY (Patient taking differently: Take 10 mg by mouth every bedtime. TAKE ONE (1) TABLET BY MOUTH EVERY DAY) 90 Tab 3   • aspirin  "(ASA) 81 MG Chew Tab chewable tablet Take 81 mg by mouth every evening. 100 Tab 11     No current facility-administered medications for this visit.      Review of Systems   Constitutional: Negative for chills, diaphoresis and fever.   Respiratory: Negative for cough, shortness of breath and wheezing.    Cardiovascular: Negative for chest pain, palpitations, orthopnea, claudication, leg swelling and PND.   Gastrointestinal: Negative for abdominal pain and blood in stool.   Genitourinary: Negative for hematuria.   Neurological: Negative for dizziness and loss of consciousness.     All others systems reviewed and negative.   Objective:     /76 (BP Location: Left arm, Patient Position: Sitting, BP Cuff Size: Large adult)   Pulse 66   Ht 1.93 m (6' 4\")   Wt 114.8 kg (253 lb)   SpO2 92%  Body mass index is 30.8 kg/m².    Physical Exam   Constitutional: He is oriented to person, place, and time. No distress.   Neck: No JVD present.   Cardiovascular: Normal rate and regular rhythm.   No murmur heard.  Pulses:       Radial pulses are 2+ on the right side and 2+ on the left side.   Pulmonary/Chest: Effort normal and breath sounds normal. No respiratory distress. He has no wheezes. He has no rales. He exhibits no tenderness.   Musculoskeletal:         General: No edema.   Neurological: He is alert and oriented to person, place, and time.   Skin: Skin is warm and dry. He is not diaphoretic. No erythema.   Psychiatric: Mood, memory, affect and judgment normal.   Nursing note and vitals reviewed.    Cardiac Imaging and Procedures Review:    EKG 08/14/2020: Sinus rhythm     Echocardiogram (02/12/2020):    Compared to the images of the study done 4/23/2014 - there has been interval decline in estimated ejection fraction, previously  normal, and increase in the estimated right ventricular systolic pressure, previously 35 mmHg. Left ventricular systolic function is mildly reduced. Left ventricular ejection fraction is " visually estimated to be 50%. Diastolic function is difficult to assess with arrhythmia. The right ventricle was normal in size and function.  Mild tricuspid regurgitation. Estimated right ventricular systolic pressure  is 55 mmHg.    Procedural Conclusions per Dr. Ramirez's Op Note (07/16/2020):  Electrophysiology Procedure Note  Procedures Performed:  Pulmonary Vein Isolation  Intracardiac Echocardiography  Three-dimensional intracardiac mapping  IV isoproterenol infusion with programmed stimulation  Electrophysiologist: Nilton Ramirez MD  Statement of Medical Necessity: This is a 75  year-old male with history of symptomatic persistent atrial  fibrillation   Electrophysiological Findings:  Sinus cycle length 806 msec  Intervals   H-V 40 msec  QRS 90 msec   msec   msec  AV block  ms  Total RF time: 1368 sec  Fluoro time: 0 min  Arrhythmias:  Atrial fibrillation was present at baseline  Other tachycardias noted:  1) Non-sustained atrial flutter was noted presumed typical, prox-distal CS activation  Complications: None  Impression:  1. Atrial fibrillation, persistent  2. Successful isolation of all four pulmonary veins  3. Successful isolation of the posterior wall with roof and floor lines  4. Successful ablation of presumed typical flutter with CTI block >200ms    Labs (personally reviewed and notable for):   Lab Results   Component Value Date/Time    SODIUM 136 07/19/2020 02:47 AM    POTASSIUM 4.7 07/19/2020 02:47 AM    CHLORIDE 101 07/19/2020 02:47 AM    CO2 25 07/19/2020 02:47 AM    GLUCOSE 102 (H) 07/19/2020 02:47 AM    BUN 20 07/19/2020 02:47 AM    CREATININE 1.04 07/19/2020 02:47 AM    CREATININE 1.06 01/04/2013 08:01 AM    BUNCREATRAT 22 12/14/2018 07:20 AM    BUNCREATRAT 25 (H) 01/04/2013 08:01 AM      Lab Results   Component Value Date/Time    WBC 8.8 07/18/2020 02:32 AM    RBC 3.61 (L) 07/18/2020 02:32 AM    HEMOGLOBIN 12.6 (L) 07/18/2020 02:32 AM    HEMATOCRIT 38.9 (L) 07/18/2020 02:32 AM     .8 (H) 07/18/2020 02:32 AM    MCH 34.9 (H) 07/18/2020 02:32 AM    MCHC 32.4 (L) 07/18/2020 02:32 AM    MPV 9.2 07/18/2020 02:32 AM    NEUTSPOLYS 73.20 (H) 07/10/2020 11:15 AM    LYMPHOCYTES 14.30 (L) 07/10/2020 11:15 AM    MONOCYTES 10.40 07/10/2020 11:15 AM    EOSINOPHILS 1.30 07/10/2020 11:15 AM    BASOPHILS 0.50 07/10/2020 11:15 AM      PT/INR:   Lab Results   Component Value Date/Time    PROTHROMBTM 13.3 07/16/2020 07:25 AM    INR 0.98 07/16/2020 07:25 AM   ]  Assessment:     1. Persistent atrial fibrillation (HCC)     2. S/P ablation of atrial fibrillation/Atrial Flutter  EKG   3. Encounter for monitoring sotalol therapy     4. Diastolic dysfunction  EKG   5. Pulmonary hypertension (HCC)  EKG   6. Essential hypertension  EKG   7. Coronary artery disease due to lipid rich plaque  EKG   8. Chronic anticoagulation  EKG     Medical Decision Making:  Today's Assessment / Status / Plan:   1. Persistent Atrial Fibrillation  - S/P RFA with PVI with posterior wall roof and floor lines, and presumed typical flutter with CTI block >200ms by Dr. Ramirez on 07/16/20.  - Sotalol initiation started post procedure by Dr. Ramirez.  - Last ECHO (02/12/20) shows LVEF 50%. Mild TR. RVSP 55 mmHg.   - Asymptomatic, denies recurrence, BP stable.   - Remains in sinus rhythm, EKG today shows sinus.  - On OAC with Xarelto, no s/sx bleeding, continue.  - Continue Sotalol 120 mg BID, QTc: 444.2 ms. stable from prior. CrCl:102.72mL/min.  - Overall he is doing great. Continue current medication management.   - Encouraged to monitor BP and Hrs, and call office if abnormal.     2. Hypertension  - BP stable. .   - Continue current medication management.    3. High Risk Medication: Sotalol    4. Chronic Anticoagulation: Xarelto      Plan reviewed in detail with the patient and he verbalizes understanding and is in agreement. RTC 6-8 weeks and 3-4 months with Dr. Ramirez, sooner if clinical condition changes.     Thank you for allowing me to  participate in this patients care.  Please contact me with any questions or concerns.     INDIRA Alvarado   [facility]  [phone]    Collaborating MD/ADD: Dr. Isidro MD.

## 2020-08-17 LAB — EKG IMPRESSION: NORMAL

## 2020-08-22 DIAGNOSIS — J98.4 RESTRICTIVE LUNG DISEASE: ICD-10-CM

## 2020-08-24 NOTE — TELEPHONE ENCOUNTER
Have we ever prescribed this med? Yes.  If yes, what date? 12/26/2019    Last OV: 04/02/2020 with Dr Schreiber    Next OV: No Pending Appt.     DX: Restrictive lung disease (J98.4)    Medications:   Requested Prescriptions     Pending Prescriptions Disp Refills   • BREO ELLIPTA 200-25 MCG/INH AEROSOL POWDER, BREATH ACTIVATED [Pharmacy Med Name: BREO ELLIPTA 200-25MCG/INH AEPB]  6     Sig: INHALE ONE PUFF BY MOUTH EVERY DAY

## 2020-08-26 NOTE — TELEPHONE ENCOUNTER
The patient's wife called to ask why we weren't sending the patient's Breo to the pharmacy.  I called her back and left her a mssg telling her that it went to the pharmacy on 8/24.

## 2020-08-28 ENCOUNTER — OFFICE VISIT (OUTPATIENT)
Dept: PULMONOLOGY | Facility: HOSPICE | Age: 75
End: 2020-08-28
Payer: MEDICARE

## 2020-08-28 VITALS
SYSTOLIC BLOOD PRESSURE: 124 MMHG | HEIGHT: 76 IN | BODY MASS INDEX: 31.29 KG/M2 | WEIGHT: 257 LBS | OXYGEN SATURATION: 95 % | DIASTOLIC BLOOD PRESSURE: 76 MMHG | HEART RATE: 55 BPM

## 2020-08-28 DIAGNOSIS — R06.02 SHORTNESS OF BREATH: ICD-10-CM

## 2020-08-28 DIAGNOSIS — J98.4 RESTRICTIVE LUNG DISEASE: ICD-10-CM

## 2020-08-28 PROCEDURE — 99213 OFFICE O/P EST LOW 20 MIN: CPT | Performed by: INTERNAL MEDICINE

## 2020-08-28 ASSESSMENT — FIBROSIS 4 INDEX: FIB4 SCORE: 3.34

## 2020-08-28 NOTE — PROGRESS NOTES
Chief Complaint   Patient presents with   • Establish Care     Referred by Marisel SLAUGHTER for Asthma.    • Medication Refill     Breo       Problems:   1. Shortness of breath     Assessment/Plan:   # Shortness of breath   -- Unclear etiology. Differentials include decondition vs airway reactive disease vs pulmonary vascular disorder.   -- Check PFTs    -- Cont breo once daily  -- If all workup are negative then may benefit from a CPET   -- Encourage patient to maintain at least 30 minutes a day of moderate to high intensity activity for a minimum of five days a week and to avoid two consecutive days without activity.     # Self reported COPD   -- Question the diagnosis of COPD given no PFTs to confirm airflow obstruction   -- Check PFTs      RTC 3 months     HPI:  Anthony Cloud is a 75 year old male with history of atrial fibrillation s/p abalation on sotalol and self reported COPD presents today asking for breo refill. His shortness of breath started 10 years ago and he was told that he has mild COPD. He was started on breo about and noted modest improvement with his shortness of breath. He recently underwent an elective PVI atrial fibrillation ablation on 07/19/2020. Since then, he report modest improvement with his shortness of breath. He is able to walk about 50 yards before he has to stop and rest. Associate with intermittent wheezing. Denies chest pain, N/V, fever/chills, nasal congestion. Rarely uses xopenex over the past few months.      Patient is a never smoker. He state he has had PFTs in the past but not sure of the results. No PFTs found in chart.       Past Medical History:   Diagnosis Date   • Acute nasopharyngitis 01/2020   • Anemia    • Arrhythmia     a fib   • Bowel habit changes     constipation    • Breath shortness     since 11/2019, hx cardiac arrhythmia    • CAD (coronary artery disease) 2003    PCI/BMS x 3 to the RCA (Zeta 4.0 x 23mm, 3.5 x 23mm, 3.0 x 18mm).   • Cataract     alejandra  IOL    • Central sleep apnea      ICD-10 transition   • Chronic anticoagulation    • COPD (chronic obstructive pulmonary disease) (HCC)    • Depression    • Depressive disorder, not elsewhere classified         • Dyslipidemia    • Hemorrhagic disorder (HCC)     takes xarelto    • High cholesterol    • Hx MRSA infection 2009, 2014    right ankle 2014, right thumb 2009   • Hypertension    • Hypothyroidism    • Mixed hyperlipidemia    • Obesity    • Old myocardial infarction January 2003    cardiac stents x3   • Peptic ulcer disease 8/4/2016   • Personal history of venous thrombosis and embolism     On Xarelto   • Pulmonary embolism (HCC) 2003/2004   • Sleep apnea     BiPAP with 3L oxygen       Past Surgical History:   Procedure Laterality Date   • WRIST ORIF Left 2/23/2018    Procedure: WRIST ORIF;  Surgeon: Jasper Hammond M.D.;  Location: SURGERY [facility];  Service: Orthopedics   • INCISION AND DRAINAGE ORTHOPEDIC  8/29/2014    Performed by Wolfgang Merrill M.D. at SURGERY [facility]   • CARDIAC CATH, RIGHT/LEFT HEART  2003 01/2003 4.0x23mm Zeta stent to proximal RCA,3.5x22mm distal to first stent, 3.0x15mm at the point of original occlusion.   • OTHER  2001    back sx    • BOWEL RESECTION      2020 pt denies   • PB ANESTH,LOWER LEG ARTERIES SURG         ROS:   Constitutional: Denies fevers, chills, night sweats, fatigue or weight loss  Eyes: Denies vision loss, pain, drainage, double vision  Ears, Nose, Throat: Denies earache, tinnitus, hoarseness  Cardiovascular: Denies chest pain, tightness, palpitations  Respiratory: See HPI  GI: Denies abdominal pain, nausea, vomiting, diarrhea  : Denies frequent urination, hematuria, painful urination  Musculoskeletal: Denies back pain, painful joints, sore muscles  Neurological: Denies headaches, seizures  Skin: Denies rashes, color changes  Psychiatric: Denies depression or thoughts of suicide  Hematologic: Denies bleeding tendency or clotting  tendency  Allergic/Immunologic: Denies rhinitis, skin sensitivity    Social History     Socioeconomic History   • Marital status:      Spouse name: Not on file   • Number of children: Not on file   • Years of education: Not on file   • Highest education level: Not on file   Occupational History   • Not on file   Social Needs   • Financial resource strain: Not on file   • Food insecurity     Worry: Not on file     Inability: Not on file   • Transportation needs     Medical: Not on file     Non-medical: Not on file   Tobacco Use   • Smoking status: Never Smoker   • Smokeless tobacco: Never Used   Substance and Sexual Activity   • Alcohol use: Yes     Comment: 2 per week    • Drug use: No   • Sexual activity: Not on file   Lifestyle   • Physical activity     Days per week: Not on file     Minutes per session: Not on file   • Stress: Not on file   Relationships   • Social connections     Talks on phone: Not on file     Gets together: Not on file     Attends Anabaptist service: Not on file     Active member of club or organization: Not on file     Attends meetings of clubs or organizations: Not on file     Relationship status: Not on file   • Intimate partner violence     Fear of current or ex partner: Not on file     Emotionally abused: Not on file     Physically abused: Not on file     Forced sexual activity: Not on file   Other Topics Concern   • Not on file   Social History Narrative   • Not on file     Lisinopril  Current Outpatient Medications on File Prior to Visit   Medication Sig Dispense Refill   • BREO ELLIPTA 200-25 MCG/INH AEROSOL POWDER, BREATH ACTIVATED INHALE ONE PUFF BY MOUTH EVERY DAY 60 Each 2   • amLODIPine (NORVASC) 2.5 MG Tab Take 1 Tab by mouth every day. 30 Tab 11   • magnesium chloride (MAG-64) 64 MG Tablet Delayed Response Take 1 Tab by mouth 2 times a day, with meals. (Patient taking differently: Take 64 mg by mouth every day.) 60 Tab 11   • omeprazole (PRILOSEC) 20 MG delayed-release  "capsule Take 1 Cap by mouth every bedtime. 30 Cap 0   • sotalol (BETAPACE) 120 MG tablet Take 1 Tab by mouth 2 Times a Day. 60 Tab 3   • Multiple Vitamins-Minerals (MULTIVITAMIN ADULT PO) Take 1 Tab by mouth every morning.     • Glucosamine HCl (GLUCOSAMINE PO) Take 1 Tab by mouth every morning.     • rivaroxaban (XARELTO) 20 MG Tab tablet Take 1 Tab by mouth with dinner. 30 Tab 3   • atorvastatin (LIPITOR) 40 MG Tab Take 1 Tab by mouth every day. (Patient taking differently: Take 40 mg by mouth every bedtime.) 90 Tab 3   • losartan (COZAAR) 50 MG Tab Take 1 Tab by mouth 2 Times a Day. 180 Tab 3   • escitalopram (LEXAPRO) 10 MG Tab TAKE ONE (1) TABLET BY MOUTH EVERY DAY (Patient taking differently: Take 10 mg by mouth every bedtime. TAKE ONE (1) TABLET BY MOUTH EVERY DAY) 90 Tab 3   • aspirin (ASA) 81 MG Chew Tab chewable tablet Take 81 mg by mouth every evening. 100 Tab 11   • senna-docusate (PERICOLACE OR SENOKOT S) 8.6-50 MG Tab Take 1 Tab by mouth 1 time daily as needed for Constipation. (Patient not taking: Reported on 8/28/2020) 30 Tab 0     No current facility-administered medications on file prior to visit.      Pulse (!) 55   Ht 1.93 m (6' 4\")   Wt 116.6 kg (257 lb)   SpO2 95%   Family History   Problem Relation Age of Onset   • Other Mother         Passed at 98   • Heart Disease Father 60        CABG     Immunization History   Administered Date(s) Administered   • Influenza (IM) Preservative Free 09/24/2015   • Influenza Seasonal Injectable - Historical Data 10/10/2011, 12/03/2012   • Influenza Vaccine Adult HD 12/08/2016, 01/16/2018, 02/13/2020   • Influenza, Unspecified - HISTORICAL DATA 11/20/2018   • Pneumococcal Conjugate Vaccine (Prevnar/PCV-13) 01/19/2017   • Pneumococcal polysaccharide vaccine (PPSV-23) 07/17/2018   • Tdap Vaccine 02/21/2018   • Zoster Vaccine Live (ZVL) (Zostavax) - HISTORICAL DATA 01/16/2013         Physical Exam:  General: Obese, NAD, speaking in full sentence.   HEENT: " PERRLA, EOMI, no scleral icterus, no nasal or oral lesions  Neck: No thyromegaly, no adenopathy, no bruits  Mallampatti: Grade II  Lungs: Equal breath sounds, no wheezes or crackles  Heart: Regular rate and rhythm, no gallops or murmurs  Abdomen: Soft, benign, no organomegaly  Extremities: No clubbing, cyanosis, or edema  Neurologic: Cranial nerve, motor, and sensory exam are normal    Reji Li MD   Pulmonary Critical Care   [facility]

## 2020-08-31 DIAGNOSIS — I25.83 CORONARY ARTERY DISEASE DUE TO LIPID RICH PLAQUE: ICD-10-CM

## 2020-08-31 DIAGNOSIS — Z95.5 STENTED CORONARY ARTERY: ICD-10-CM

## 2020-08-31 DIAGNOSIS — E78.2 MIXED HYPERLIPIDEMIA: ICD-10-CM

## 2020-08-31 DIAGNOSIS — I25.10 CORONARY ARTERY DISEASE DUE TO LIPID RICH PLAQUE: ICD-10-CM

## 2020-08-31 RX ORDER — ATORVASTATIN CALCIUM 40 MG/1
40 TABLET, FILM COATED ORAL DAILY
Qty: 90 TAB | Refills: 0 | Status: SHIPPED | OUTPATIENT
Start: 2020-08-31 | End: 2020-12-04

## 2020-09-10 DIAGNOSIS — I25.83 CORONARY ARTERY DISEASE DUE TO LIPID RICH PLAQUE: ICD-10-CM

## 2020-09-10 DIAGNOSIS — I25.10 CORONARY ARTERY DISEASE DUE TO LIPID RICH PLAQUE: ICD-10-CM

## 2020-09-10 DIAGNOSIS — I10 HTN (HYPERTENSION), MALIGNANT: ICD-10-CM

## 2020-09-10 RX ORDER — LOSARTAN POTASSIUM 50 MG/1
50 TABLET ORAL 2 TIMES DAILY
Qty: 180 TAB | Refills: 3 | Status: SHIPPED | OUTPATIENT
Start: 2020-09-10 | End: 2021-10-18

## 2020-09-15 DIAGNOSIS — I48.19 PERSISTENT ATRIAL FIBRILLATION (HCC): ICD-10-CM

## 2020-09-15 DIAGNOSIS — Z86.718 PERSONAL HISTORY OF VENOUS THROMBOSIS AND EMBOLISM: ICD-10-CM

## 2020-09-15 RX ORDER — RIVAROXABAN 20 MG/1
TABLET, FILM COATED ORAL
Qty: 90 TAB | Refills: 0 | Status: SHIPPED
Start: 2020-09-15 | End: 2021-02-02

## 2020-10-01 ENCOUNTER — OFFICE VISIT (OUTPATIENT)
Dept: CARDIOLOGY | Facility: MEDICAL CENTER | Age: 75
End: 2020-10-01
Payer: MEDICARE

## 2020-10-01 VITALS
HEIGHT: 76 IN | OXYGEN SATURATION: 91 % | RESPIRATION RATE: 18 BRPM | HEART RATE: 65 BPM | WEIGHT: 253 LBS | BODY MASS INDEX: 30.81 KG/M2 | DIASTOLIC BLOOD PRESSURE: 64 MMHG | SYSTOLIC BLOOD PRESSURE: 128 MMHG

## 2020-10-01 DIAGNOSIS — Z79.899 ENCOUNTER FOR MONITORING SOTALOL THERAPY: ICD-10-CM

## 2020-10-01 DIAGNOSIS — I48.19 PERSISTENT ATRIAL FIBRILLATION (HCC): ICD-10-CM

## 2020-10-01 DIAGNOSIS — Z86.79 S/P ABLATION OF ATRIAL FIBRILLATION: ICD-10-CM

## 2020-10-01 DIAGNOSIS — Z79.01 CHRONIC ANTICOAGULATION: ICD-10-CM

## 2020-10-01 DIAGNOSIS — Z98.890 S/P ABLATION OF ATRIAL FIBRILLATION: ICD-10-CM

## 2020-10-01 DIAGNOSIS — Z51.81 ENCOUNTER FOR MONITORING SOTALOL THERAPY: ICD-10-CM

## 2020-10-01 DIAGNOSIS — I10 ESSENTIAL HYPERTENSION: ICD-10-CM

## 2020-10-01 LAB — EKG IMPRESSION: NORMAL

## 2020-10-01 PROCEDURE — 99214 OFFICE O/P EST MOD 30 MIN: CPT | Performed by: NURSE PRACTITIONER

## 2020-10-01 PROCEDURE — 93000 ELECTROCARDIOGRAM COMPLETE: CPT | Performed by: INTERNAL MEDICINE

## 2020-10-01 RX ORDER — POLYETHYLENE GLYCOL 3350, SODIUM CHLORIDE, SODIUM BICARBONATE, POTASSIUM CHLORIDE 420; 11.2; 5.72; 1.48 G/4L; G/4L; G/4L; G/4L
POWDER, FOR SOLUTION ORAL
COMMUNITY
Start: 2020-08-14 | End: 2021-04-08

## 2020-10-01 ASSESSMENT — ENCOUNTER SYMPTOMS
WHEEZING: 0
BLOOD IN STOOL: 0
PND: 0
LOSS OF CONSCIOUSNESS: 0
ABDOMINAL PAIN: 0
CHILLS: 0
DIAPHORESIS: 0
SHORTNESS OF BREATH: 0
ORTHOPNEA: 0
FEVER: 0
COUGH: 0
CLAUDICATION: 0
VOMITING: 0
PALPITATIONS: 0
DIZZINESS: 0

## 2020-10-01 ASSESSMENT — FIBROSIS 4 INDEX: FIB4 SCORE: 3.34

## 2020-10-01 NOTE — PROGRESS NOTES
Cardiology/Electrophysiology Follow-up Note    Subjective:   Chief Complaint:   Chief Complaint   Patient presents with   • Atrial Fibrillation     paroxysmal     Anthony Corinne Cloud is a 75 y.o. male who presents today for follow up for Persistent atrial fibrillation s/p RF ablation and Sotalol initiation by Dr. Ramirez on 07/16/20.     He is followed by Dr. Pichardo and Dr. Ramirez. Last seen by myself on 08/14/20 for persistent AF s/p RF ablation with PVI with posterior wall roof and floor lines, and presumed typical flutter with CTI block >200 ms by Dr. Ramirez on 07/16/20 with Sotalol initiation post procedure. On OAC with xarelto and Sotalol 120 mg twice daily.     Medical history significant for Persistent AF, s/p RF ablation by Dr. Ramirez on 07/16/20, prior DCCV, hx PE on chronic anticoauglation with xarelto, HTN, CAD s/p PCI RCA (2003), HDL.     Today in follow up,he he states he is doing well.  Denies arrhythmias. He denies chest pain, dizziness, palpitations, pre syncope or syncope, dyspnea, PND, orthopnea, or lower extremity edema.  Reports nosebleeds x3 days, now resolved.  Denies any signs or symptoms of bleeding or bleeding issues. Patient endorses medication compliance. Denies arrhythmias seen on Kardia. He does daily physical activity, tolerating well.     Past Medical History:   Diagnosis Date   • Acute nasopharyngitis 01/2020   • Anemia    • Arrhythmia     a fib   • Bowel habit changes     constipation    • Breath shortness     since 11/2019, hx cardiac arrhythmia    • CAD (coronary artery disease) 2003    PCI/BMS x 3 to the RCA (Zeta 4.0 x 23mm, 3.5 x 23mm, 3.0 x 18mm).   • Cataract     alejandra IOL    • Central sleep apnea      ICD-10 transition   • Chronic anticoagulation    • COPD (chronic obstructive pulmonary disease) (HCC)    • Depression    • Depressive disorder, not elsewhere classified         • Dyslipidemia    • Hemorrhagic disorder (HCC)     takes xarelto    • High cholesterol    • Hx MRSA infection  2009, 2014    right ankle 2014, right thumb 2009   • Hypertension    • Hypothyroidism    • Mixed hyperlipidemia    • Obesity    • Old myocardial infarction January 2003    cardiac stents x3   • Peptic ulcer disease 8/4/2016   • Personal history of venous thrombosis and embolism     On Xarelto   • Pulmonary embolism (HCC) 2003/2004   • Sleep apnea     BiPAP with 3L oxygen     Past Surgical History:   Procedure Laterality Date   • WRIST ORIF Left 2/23/2018    Procedure: WRIST ORIF;  Surgeon: Jasper Hammond M.D.;  Location: SURGERY [facility];  Service: Orthopedics   • INCISION AND DRAINAGE ORTHOPEDIC  8/29/2014    Performed by Wolfgang Merrill M.D. at SURGERY [facility]   • CARDIAC CATH, RIGHT/LEFT HEART  2003 01/2003 4.0x23mm Zeta stent to proximal RCA,3.5x22mm distal to first stent, 3.0x15mm at the point of original occlusion.   • OTHER  2001    back sx    • BOWEL RESECTION      2020 pt denies   • PB ANESTH,LOWER LEG ARTERIES SURG       Family History   Problem Relation Age of Onset   • Other Mother         Passed at 98   • Heart Disease Father 60        CABG     Social History     Socioeconomic History   • Marital status:      Spouse name: Not on file   • Number of children: Not on file   • Years of education: Not on file   • Highest education level: Not on file   Occupational History   • Not on file   Social Needs   • Financial resource strain: Not on file   • Food insecurity     Worry: Not on file     Inability: Not on file   • Transportation needs     Medical: Not on file     Non-medical: Not on file   Tobacco Use   • Smoking status: Never Smoker   • Smokeless tobacco: Never Used   Substance and Sexual Activity   • Alcohol use: Yes     Comment: 2 per week    • Drug use: No   • Sexual activity: Not on file   Lifestyle   • Physical activity     Days per week: Not on file     Minutes per session: Not on file   • Stress: Not on file   Relationships   • Social connections     Talks on  phone: Not on file     Gets together: Not on file     Attends Zoroastrianism service: Not on file     Active member of club or organization: Not on file     Attends meetings of clubs or organizations: Not on file     Relationship status: Not on file   • Intimate partner violence     Fear of current or ex partner: Not on file     Emotionally abused: Not on file     Physically abused: Not on file     Forced sexual activity: Not on file   Other Topics Concern   • Not on file   Social History Narrative   • Not on file     Allergies   Allergen Reactions   • Lisinopril      cough       Current Outpatient Medications   Medication Sig Dispense Refill   • polyethylene glycol-electrolytes (NULYTELY) 420 GM solution      • XARELTO 20 MG Tab tablet TAKE 1 TABLET BY MOUTH WITH DINNER 90 Tab 0   • losartan (COZAAR) 50 MG Tab Take 1 Tab by mouth 2 Times a Day. 180 Tab 3   • atorvastatin (LIPITOR) 40 MG Tab Take 1 Tab by mouth every day. 90 Tab 0   • BREO ELLIPTA 200-25 MCG/INH AEROSOL POWDER, BREATH ACTIVATED INHALE ONE PUFF BY MOUTH EVERY DAY 60 Each 2   • amLODIPine (NORVASC) 2.5 MG Tab Take 1 Tab by mouth every day. 30 Tab 11   • magnesium chloride (MAG-64) 64 MG Tablet Delayed Response Take 1 Tab by mouth 2 times a day, with meals. (Patient taking differently: Take 64 mg by mouth every day.) 60 Tab 11   • omeprazole (PRILOSEC) 20 MG delayed-release capsule Take 1 Cap by mouth every bedtime. 30 Cap 0   • sotalol (BETAPACE) 120 MG tablet Take 1 Tab by mouth 2 Times a Day. 60 Tab 3   • Multiple Vitamins-Minerals (MULTIVITAMIN ADULT PO) Take 1 Tab by mouth every morning.     • Glucosamine HCl (GLUCOSAMINE PO) Take 1 Tab by mouth every morning.     • escitalopram (LEXAPRO) 10 MG Tab TAKE ONE (1) TABLET BY MOUTH EVERY DAY (Patient taking differently: Take 10 mg by mouth every bedtime. TAKE ONE (1) TABLET BY MOUTH EVERY DAY) 90 Tab 3   • aspirin (ASA) 81 MG Chew Tab chewable tablet Take 81 mg by mouth every evening. 100 Tab 11     No  "current facility-administered medications for this visit.      Review of Systems   Constitutional: Negative for chills, diaphoresis and fever.   HENT: Positive for nosebleeds.    Respiratory: Negative for cough, shortness of breath and wheezing.    Cardiovascular: Negative for chest pain, palpitations, orthopnea, claudication, leg swelling and PND.   Gastrointestinal: Negative for abdominal pain, blood in stool and vomiting.   Genitourinary: Negative for hematuria.   Neurological: Negative for dizziness and loss of consciousness.     All others systems reviewed and negative.   Objective:     /64 (BP Location: Left arm, Patient Position: Sitting, BP Cuff Size: Adult)   Pulse 65   Resp 18   Ht 1.93 m (6' 4\")   Wt 114.8 kg (253 lb)   SpO2 91%  Body mass index is 30.8 kg/m².    Physical Exam   Constitutional: He is oriented to person, place, and time. No distress.   Neck: No JVD present.   Cardiovascular: Normal rate and regular rhythm.   No murmur heard.  Pulses:       Radial pulses are 2+ on the right side and 2+ on the left side.   Pulmonary/Chest: Effort normal and breath sounds normal. No respiratory distress. He has no wheezes. He has no rales. He exhibits no tenderness.   Musculoskeletal:         General: No edema.   Neurological: He is alert and oriented to person, place, and time.   Skin: Skin is warm and dry. He is not diaphoretic. No erythema.   Psychiatric: Mood, memory, affect and judgment normal.   Nursing note and vitals reviewed.    Cardiac Imaging and Procedures Review:    EKG 10/01/2020: Sinus rhythm     Echocardiogram (02/12/2020):    Compared to the images of the study done 4/23/2014 - there has been interval decline in estimated ejection fraction, previously  normal, and increase in the estimated right ventricular systolic pressure, previously 35 mmHg. Left ventricular systolic function is mildly reduced. Left ventricular ejection fraction is visually estimated to be 50%. Diastolic " function is difficult to assess with arrhythmia. The right ventricle was normal in size and function.  Mild tricuspid regurgitation. Estimated right ventricular systolic pressure  is 55 mmHg.    Procedural Conclusions per Dr. Ramirez's Op Note (07/16/2020):  Electrophysiology Procedure Note  Procedures Performed:  Pulmonary Vein Isolation  Intracardiac Echocardiography  Three-dimensional intracardiac mapping  IV isoproterenol infusion with programmed stimulation  Electrophysiologist: Nilton Ramirez MD  Statement of Medical Necessity: This is a 75  year-old male with history of symptomatic persistent atrial  fibrillation   Electrophysiological Findings:  Sinus cycle length 806 msec  Intervals   H-V 40 msec  QRS 90 msec   msec   msec  AV block  ms  Total RF time: 1368 sec  Fluoro time: 0 min  Arrhythmias:  Atrial fibrillation was present at baseline  Other tachycardias noted:  1) Non-sustained atrial flutter was noted presumed typical, prox-distal CS activation  Complications: None  Impression:  1. Atrial fibrillation, persistent  2. Successful isolation of all four pulmonary veins  3. Successful isolation of the posterior wall with roof and floor lines  4. Successful ablation of presumed typical flutter with CTI block >200ms    Labs (personally reviewed and notable for):   Lab Results   Component Value Date/Time    SODIUM 136 07/19/2020 02:47 AM    POTASSIUM 4.7 07/19/2020 02:47 AM    CHLORIDE 101 07/19/2020 02:47 AM    CO2 25 07/19/2020 02:47 AM    GLUCOSE 102 (H) 07/19/2020 02:47 AM    BUN 20 07/19/2020 02:47 AM    CREATININE 1.04 07/19/2020 02:47 AM    CREATININE 1.06 01/04/2013 08:01 AM    BUNCREATRAT 22 12/14/2018 07:20 AM    BUNCREATRAT 25 (H) 01/04/2013 08:01 AM      Lab Results   Component Value Date/Time    WBC 8.8 07/18/2020 02:32 AM    RBC 3.61 (L) 07/18/2020 02:32 AM    HEMOGLOBIN 12.6 (L) 07/18/2020 02:32 AM    HEMATOCRIT 38.9 (L) 07/18/2020 02:32 AM    .8 (H) 07/18/2020 02:32 AM     MCH 34.9 (H) 07/18/2020 02:32 AM    MCHC 32.4 (L) 07/18/2020 02:32 AM    MPV 9.2 07/18/2020 02:32 AM    NEUTSPOLYS 73.20 (H) 07/10/2020 11:15 AM    LYMPHOCYTES 14.30 (L) 07/10/2020 11:15 AM    MONOCYTES 10.40 07/10/2020 11:15 AM    EOSINOPHILS 1.30 07/10/2020 11:15 AM    BASOPHILS 0.50 07/10/2020 11:15 AM      PT/INR:   Lab Results   Component Value Date/Time    PROTHROMBTM 13.3 07/16/2020 07:25 AM    INR 0.98 07/16/2020 07:25 AM   ]  Assessment:     1. Persistent atrial fibrillation (HCC)     2. S/P ablation of atrial fibrillation/Atrial Flutter     3. Encounter for monitoring sotalol therapy     4. Essential hypertension     5. Chronic anticoagulation       Medical Decision Making:  Today's Assessment / Status / Plan:   1. Persistent Atrial Fibrillation  - S/P RFA with PVI with posterior wall roof and floor lines, and presumed typical flutter with CTI block >200ms by Dr. Ramirez on 07/16/20, Sotalol initiation started post procedure.  - Last ECHO (02/12/20) shows LVEF 50%. Mild TR. RVSP 55 mmHg.   - Remains asymptomatic, denies arrhythmia recurrence. BP Stable. TSH 0.84 (02/12/20).   - EKG today shows sinus rhythm, no acute findings. QTc: 455.3 ms. stable from prior. CrCl:102.72 mL/min.  - On OAC with Xarelto, no s/sx bleeding, continue.  - Continue Sotalol 120 mg BID.   - Ccontinue current medication management.   - Encouraged to monitor BP and Hrs, and call office if abnormal.     2. High Risk Medication: Sotalol    3. Chronic Anticoagulation: Xarelto      Plan reviewed in detail with the patient and he verbalizes understanding and is in agreement. RTC 2-3 months with Dr. Ramirez, sooner if clinical condition changes.     Thank you for allowing me to participate in this patients care.  Please contact me with any questions or concerns.     ABBY Alvarado.   [facility]  [phone]    Collaborating MD/ADD: Dr. Tomy MD.

## 2020-10-12 ENCOUNTER — ANTICOAGULATION VISIT (OUTPATIENT)
Dept: VASCULAR LAB | Facility: MEDICAL CENTER | Age: 75
End: 2020-10-12
Attending: INTERNAL MEDICINE
Payer: MEDICARE

## 2020-10-12 VITALS — DIASTOLIC BLOOD PRESSURE: 73 MMHG | SYSTOLIC BLOOD PRESSURE: 146 MMHG | HEART RATE: 59 BPM

## 2020-10-12 DIAGNOSIS — Z86.718 PERSONAL HISTORY OF VENOUS THROMBOSIS AND EMBOLISM: ICD-10-CM

## 2020-10-12 DIAGNOSIS — I48.0 PAROXYSMAL ATRIAL FIBRILLATION (HCC): ICD-10-CM

## 2020-10-12 LAB
INR BLD: 1.3 (ref 0.9–1.2)
INR PPP: 1.3 (ref 2–3.5)

## 2020-10-12 PROCEDURE — 85610 PROTHROMBIN TIME: CPT

## 2020-10-12 PROCEDURE — 99212 OFFICE O/P EST SF 10 MIN: CPT | Performed by: NURSE PRACTITIONER

## 2020-10-12 NOTE — PROGRESS NOTES
Diagnosis: DVT/PE hx, new AF  Drug: Xarelto 20 mg (but taking 10 mg due to epistaxis)  LOT: Indefinite  CHADSVASC: 6  HAS-BLED: 3 (age, asa, hx of GIB)    Health Status Since Last Assessment  Patient states he was having nose bleeds that lasted > 30 minutes over a few day span about 1 month ago. States he reduced his Xarelto to 10 mg daily which is the dose he was taking prior to being diagnosed with AF. Reports the nose bleeds have subsided. He had an ablation in July. He is unaware of any further AF episodes since. CHADSVASC score = 5 (age, htn, prior MI, VTE hx). He plans to discuss further with Dr Ramirez next month.  I let him know we do not recommend he cut Xarelto in half as this can alter the absorption.     Otherwise, no other s/sx of bleeding or excessive bruising. No s/sx of CVA/TIA or recurrent VTE.    Adherence with DOAC Therapy  0 missed dose(s)  BLEEDING RISK ASSESSMENT NB:    Bleeding Risk Assessment  Severe epistaxis - see above   Hemoptysis - no  Excessive or unusual bruising / hematomas - no  GIB/melena/BRBPR/hematemesis - no  Hematuria - no   Abnormal vaginal bleeding - n/a  Concerning daily headache or subdural hematoma symptoms - no  Decreasing hemoglobin or new anemia - no  Falls, presyncope, syncope, or seizures - no  Platelets: 137 (139, 134 prior)  Latest hemoglobin:     Lab Results   Component Value Date/Time    WBC 8.8 07/18/2020 02:32 AM    RBC 3.61 (L) 07/18/2020 02:32 AM    HEMOGLOBIN 12.6 (L) 07/18/2020 02:32 AM    HEMATOCRIT 38.9 (L) 07/18/2020 02:32 AM    .8 (H) 07/18/2020 02:32 AM    MCH 34.9 (H) 07/18/2020 02:32 AM    MCHC 32.4 (L) 07/18/2020 02:32 AM    MPV 9.2 07/18/2020 02:32 AM    NEUTSPOLYS 73.20 (H) 07/10/2020 11:15 AM    LYMPHOCYTES 14.30 (L) 07/10/2020 11:15 AM    MONOCYTES 10.40 07/10/2020 11:15 AM    EOSINOPHILS 1.30 07/10/2020 11:15 AM    BASOPHILS 0.50 07/10/2020 11:15 AM        HAS-BLED:  Hypertension (uncontrolled, >160 mmHg systolic) - no  Renal disease  (dialysis, transplant, Cr >2.26 mg/dL or >200 µmol/L) - no  Liver disease (cirrhosis or bilirubin >2x normal with AST/ALT/AP >3x normal) - no  Stroke history - no  Prior major bleeding or predisposition to bleeding - yes, GIB  Labile INR Unstable/high INRs, time in therapeutic range <60% - n/a  Age >65 - yes  Medication usage predisposing to bleeding (aspirin, clopidogrel, NSAIDs) - ASA 81 mg  Alcohol use  ?8 drinks/week - no      Creatinine Clearance/Renal Function  Latest eGFR / creatinine:  Lab Results   Component Value Date/Time    SODIUM 136 07/19/2020 02:47 AM    POTASSIUM 4.7 07/19/2020 02:47 AM    CHLORIDE 101 07/19/2020 02:47 AM    CO2 25 07/19/2020 02:47 AM    GLUCOSE 102 (H) 07/19/2020 02:47 AM    BUN 20 07/19/2020 02:47 AM    CREATININE 1.04 07/19/2020 02:47 AM    CREATININE 1.06 01/04/2013 08:01 AM    BUNCREATRAT 22 12/14/2018 07:20 AM    BUNCREATRAT 25 (H) 01/04/2013 08:01 AM      • Is eGFR less than 50ml/min  - no  Cr cl 102 ml/min    If YES, calculate CrCl (see back)  Any recent dehydrating illness or medications added/changed? i.e. Diuretics      Drug Interactions  ASA/antiplatelets - baby ASA  NSAID - to avoid  Other drug interactions - concurrent ASA use (Review med list / OTCs;)  Current Outpatient Medications on File Prior to Visit   Medication Sig Dispense Refill   • polyethylene glycol-electrolytes (NULYTELY) 420 GM solution      • XARELTO 20 MG Tab tablet TAKE 1 TABLET BY MOUTH WITH DINNER 90 Tab 0   • losartan (COZAAR) 50 MG Tab Take 1 Tab by mouth 2 Times a Day. 180 Tab 3   • atorvastatin (LIPITOR) 40 MG Tab Take 1 Tab by mouth every day. 90 Tab 0   • BREO ELLIPTA 200-25 MCG/INH AEROSOL POWDER, BREATH ACTIVATED INHALE ONE PUFF BY MOUTH EVERY DAY 60 Each 2   • amLODIPine (NORVASC) 2.5 MG Tab Take 1 Tab by mouth every day. 30 Tab 11   • magnesium chloride (MAG-64) 64 MG Tablet Delayed Response Take 1 Tab by mouth 2 times a day, with meals. (Patient taking differently: Take 64 mg by mouth  every day.) 60 Tab 11   • omeprazole (PRILOSEC) 20 MG delayed-release capsule Take 1 Cap by mouth every bedtime. 30 Cap 0   • sotalol (BETAPACE) 120 MG tablet Take 1 Tab by mouth 2 Times a Day. 60 Tab 3   • Multiple Vitamins-Minerals (MULTIVITAMIN ADULT PO) Take 1 Tab by mouth every morning.     • Glucosamine HCl (GLUCOSAMINE PO) Take 1 Tab by mouth every morning.     • escitalopram (LEXAPRO) 10 MG Tab TAKE ONE (1) TABLET BY MOUTH EVERY DAY (Patient taking differently: Take 10 mg by mouth every bedtime. TAKE ONE (1) TABLET BY MOUTH EVERY DAY) 90 Tab 3   • aspirin (ASA) 81 MG Chew Tab chewable tablet Take 81 mg by mouth every evening. 100 Tab 11     No current facility-administered medications on file prior to visit.      Verified no anticonvulsant or azole therapy, education provided for future use.      Examination  Blood pressure:  146/73  • Elevated BP - no (sBP greater than 160 mmHg)  • Symptomatic hypotension - no  Significant gait impairment / imbalance / high risk for falls - age is a risk    Final Assessment and Recommendations:  Patient appears stable from the anticoagulation standpoint  Benefits of continued DOAC therapy outweigh risks for this patient    Long discussion with patient regarding the risks and benefits of staying on 10 mg dose. He wants to continue 10 mg daily and discuss with Dr Ramirez next month. I offered to send rx for 10 mg tablets. He declines and wants to split his 20 mg tabs in half though not recommended.  He will continue to monitor for bleeding. Let him know he should seek immediate medical attention for bleeding lasting > 30 min without stop. Encouraged him to use vaseline with a q-tip to inside of nares, to use a humidifier and saline nasal spray PRN.      Other Actions:   CBC, CMP prior to next visit    Follow up:  Will follow up with patient in 3 months    Shannan SLAUGHTER

## 2020-10-13 ENCOUNTER — ANTICOAGULATION MONITORING (OUTPATIENT)
Dept: VASCULAR LAB | Facility: MEDICAL CENTER | Age: 75
End: 2020-10-13

## 2020-10-13 DIAGNOSIS — Z86.718 PERSONAL HISTORY OF VENOUS THROMBOSIS AND EMBOLISM: ICD-10-CM

## 2020-10-13 NOTE — PROGRESS NOTES
He has a colonoscopy scheduled on 11/17/2020.  [facility] sent as a clearance form.  Based on his laboratory results I would recommend him holding Xarelto for 2 days prior to the procedure.  He is also on aspirin and will need to hold this for 7 days prior to the procedure. I talked with his daughter, who help him with his medication, she will reiterate this information to him.    I will also forward this message to cardiology to determine if they are comfortable with him holding aspirin for 7 days.    CC  Malathi Cabral, PharmD, MS, BCACP, LCC    [facility]  Phone [phone] fax [phone]    This note was created using voice recognition software (Dragon). The accuracy of the dictation is limited by the abilities of the software. I have reviewed the note prior to signing, however some errors in grammar and context are still possible. If you have any questions related to this note please do not hesitate to contact our office.

## 2020-10-13 NOTE — Clinical Note
Malathi,  He has a colonoscopy scheduled 11/17/2020.  I am going to have him hold Xarelto for 2 days and aspirin for 7 days.  If you disagree with my plan please contact me and I will address with the patient.   Thanks  Rogelio

## 2020-10-23 ENCOUNTER — APPOINTMENT (OUTPATIENT)
Dept: ADMISSIONS | Facility: MEDICAL CENTER | Age: 75
End: 2020-10-23
Payer: MEDICARE

## 2020-11-09 ENCOUNTER — NON-PROVIDER VISIT (OUTPATIENT)
Dept: SLEEP MEDICINE | Facility: MEDICAL CENTER | Age: 75
End: 2020-11-09
Attending: INTERNAL MEDICINE
Payer: MEDICARE

## 2020-11-09 VITALS — HEIGHT: 74 IN | WEIGHT: 254 LBS | BODY MASS INDEX: 32.6 KG/M2

## 2020-11-09 DIAGNOSIS — R06.02 SHORTNESS OF BREATH: ICD-10-CM

## 2020-11-09 PROCEDURE — 94729 DIFFUSING CAPACITY: CPT | Performed by: INTERNAL MEDICINE

## 2020-11-09 PROCEDURE — 94726 PLETHYSMOGRAPHY LUNG VOLUMES: CPT | Performed by: INTERNAL MEDICINE

## 2020-11-09 PROCEDURE — 94060 EVALUATION OF WHEEZING: CPT | Performed by: INTERNAL MEDICINE

## 2020-11-09 ASSESSMENT — PULMONARY FUNCTION TESTS
FVC_PERCENT_PREDICTED: 62
FEV1/FVC_PERCENT_CHANGE: 50
FEV1_PERCENT_PREDICTED: 75
FEV1: 2.65
FEV1_LLN: 2.94
FEV1/FVC_PERCENT_LLN: 63
FEV1/FVC: 83.33
FEV1_PERCENT_PREDICTED: 73
FEV1_PERCENT_CHANGE: 3
FVC_LLN: 3.95
FEV1_PREDICTED: 3.52
FEV1/FVC_PREDICTED: 75
FVC: 2.98
FEV1/FVC_PERCENT_PREDICTED: 111
FEV1/FVC_PERCENT_PREDICTED: 112
FEV1/FVC: 83
FEV1/FVC: 86
FEV1/FVC: 86
FEV1_PERCENT_CHANGE: 6
FEV1/FVC_PERCENT_PREDICTED: 118
FEV1/FVC_PERCENT_PREDICTED: 115
FEV1/FVC_PERCENT_CHANGE: -3
FVC_PREDICTED: 4.73
FEV1/FVC_PERCENT_PREDICTED: 74
FEV1: 2.57
FVC_PERCENT_PREDICTED: 67
FVC: 3.18

## 2020-11-09 ASSESSMENT — FIBROSIS 4 INDEX: FIB4 SCORE: 3.34

## 2020-11-09 NOTE — PROCEDURES
Tech: Elizabeth Rivera, RRT  Tech notes: Good patient effort & cooperation.  The results of this test meet the ATS/ERS standards for acceptability & reproducibility.  Test was performed on the [employer] Body Plethysmograph-Elite DX system.  Predicted values were GLI-2012 for spirometry, GLI- 2017 for DLCO, ITS for Lung Volumes.  The DLCO was uncorrected for Hgb.  A bronchodilator of Xopenex HFA -2puffs via spacer administered.  DLCO performed during dilation period.    Interpretation:    Lung function testing was completed on November 9, 2020.  Mid flows are normal.  Mild restriction is present with total lung capacity 75% predicted.  FEV1 is reduced to a similar range, forced vital capacity is slightly lower at 62% predicted.  No change after bronchodilators.  FEV1/FVC ratio is 86%.  Oxygen transfer is low normal at 90% flow volume loop confirms the mild restrictive process with good effort noted

## 2020-11-16 ENCOUNTER — OFFICE VISIT (OUTPATIENT)
Dept: CARDIOLOGY | Facility: MEDICAL CENTER | Age: 75
End: 2020-11-16
Payer: MEDICARE

## 2020-11-16 VITALS
SYSTOLIC BLOOD PRESSURE: 140 MMHG | DIASTOLIC BLOOD PRESSURE: 70 MMHG | WEIGHT: 256 LBS | OXYGEN SATURATION: 95 % | RESPIRATION RATE: 16 BRPM | BODY MASS INDEX: 32.85 KG/M2 | HEART RATE: 55 BPM | HEIGHT: 74 IN

## 2020-11-16 DIAGNOSIS — I48.91 ATRIAL FIBRILLATION, UNSPECIFIED TYPE (HCC): ICD-10-CM

## 2020-11-16 LAB — EKG IMPRESSION: NORMAL

## 2020-11-16 PROCEDURE — 93000 ELECTROCARDIOGRAM COMPLETE: CPT | Performed by: INTERNAL MEDICINE

## 2020-11-16 PROCEDURE — 99214 OFFICE O/P EST MOD 30 MIN: CPT | Performed by: INTERNAL MEDICINE

## 2020-11-16 ASSESSMENT — FIBROSIS 4 INDEX: FIB4 SCORE: 3.34

## 2020-11-16 NOTE — PROGRESS NOTES
Arrhythmia Clinic Note (Established patient)    DOS: 11/16/2020    Chief complaint/Reason for consult: Afib    Interval History: Prior persistent atrial fibrillation, status post atrial fibrillation and flutter ablation and sotalol load, here for follow-up.  Has done really well over the past 3 months.  Much more energy and less shortness of breath.  Denies clinical recurrence of atrial fibrillation.  No syncope or presyncope.  No chest pain.    ROS (+ highlighted in bold):  Constitutional: Fevers/chills/fatigue/weightloss  HEENT: Blurry vision/eye pain/sore throat/hearing loss  Respiratory: Shortness of breath/cough  Cardiovascular: Chest pain/palpitations/edema/orthopnea/syncope  GI: Nausea/vomitting/diarrhea  MSK: Arthralgias/myagias/muscle weakness  Skin: Rash/sores  Neurological: Numbness/tremors/vertigo  Endocrine: Excessive thirst/polyuria/cold intolerance/heat intolerance  Psych: Depression/anxiety    Past Medical History:   Diagnosis Date   • Acute nasopharyngitis 01/2020   • Anemia    • Arrhythmia     a fib   • Bowel habit changes     constipation    • Breath shortness     since 11/2019, hx cardiac arrhythmia    • CAD (coronary artery disease) 2003    PCI/BMS x 3 to the RCA (Zeta 4.0 x 23mm, 3.5 x 23mm, 3.0 x 18mm).   • Cataract     alejandra IOL    • Central sleep apnea      ICD-10 transition   • Chronic anticoagulation    • COPD (chronic obstructive pulmonary disease) (HCC)    • Depression    • Depressive disorder, not elsewhere classified         • Dyslipidemia    • Hemorrhagic disorder (HCC)     takes xarelto    • High cholesterol    • Hx MRSA infection 2009, 2014    right ankle 2014, right thumb 2009   • Hypertension    • Hypothyroidism    • Mixed hyperlipidemia    • Obesity    • Old myocardial infarction January 2003    cardiac stents x3   • Peptic ulcer disease 8/4/2016   • Personal history of venous thrombosis and embolism     On Xarelto   • Pulmonary embolism (HCC) 2003/2004   • Sleep apnea     BiPAP  with 3L oxygen       Past Surgical History:   Procedure Laterality Date   • WRIST ORIF Left 2/23/2018    Procedure: WRIST ORIF;  Surgeon: Jasper Hammond M.D.;  Location: [facility];  Service: Orthopedics   • INCISION AND DRAINAGE ORTHOPEDIC  8/29/2014    Performed by Wolfgang Merrill M.D. at [facility]   • CARDIAC CATH, RIGHT/LEFT HEART  2003 01/2003 4.0x23mm Zeta stent to proximal RCA,3.5x22mm distal to first stent, 3.0x15mm at the point of original occlusion.   • OTHER  2001    back sx    • BOWEL RESECTION      2020 pt denies   • PB ANESTH,LOWER LEG ARTERIES SURG         Social History     Socioeconomic History   • Marital status:      Spouse name: Not on file   • Number of children: Not on file   • Years of education: Not on file   • Highest education level: Not on file   Occupational History   • Not on file   Social Needs   • Financial resource strain: Not on file   • Food insecurity     Worry: Not on file     Inability: Not on file   • Transportation needs     Medical: Not on file     Non-medical: Not on file   Tobacco Use   • Smoking status: Never Smoker   • Smokeless tobacco: Never Used   Substance and Sexual Activity   • Alcohol use: Yes     Comment: 2 per week    • Drug use: No   • Sexual activity: Not on file   Lifestyle   • Physical activity     Days per week: Not on file     Minutes per session: Not on file   • Stress: Not on file   Relationships   • Social connections     Talks on phone: Not on file     Gets together: Not on file     Attends [employer] service: Not on file     Active member of club or organization: Not on file     Attends meetings of clubs or organizations: Not on file     Relationship status: Not on file   • Intimate partner violence     Fear of current or ex partner: Not on file     Emotionally abused: Not on file     Physically abused: Not on file     Forced sexual activity: Not on file   Other Topics Concern   • Not on file   Social  "History Narrative   • Not on file       Family History   Problem Relation Age of Onset   • Other Mother         Passed at 98   • Heart Disease Father 60        CABG       Allergies   Allergen Reactions   • Lisinopril      cough       Current Outpatient Medications   Medication Sig Dispense Refill   • polyethylene glycol-electrolytes (NULYTELY) 420 GM solution      • XARELTO 20 MG Tab tablet TAKE 1 TABLET BY MOUTH WITH DINNER 90 Tab 0   • losartan (COZAAR) 50 MG Tab Take 1 Tab by mouth 2 Times a Day. 180 Tab 3   • atorvastatin (LIPITOR) 40 MG Tab Take 1 Tab by mouth every day. 90 Tab 0   • BREO ELLIPTA 200-25 MCG/INH AEROSOL POWDER, BREATH ACTIVATED INHALE ONE PUFF BY MOUTH EVERY DAY 60 Each 2   • amLODIPine (NORVASC) 2.5 MG Tab Take 1 Tab by mouth every day. 30 Tab 11   • magnesium chloride (MAG-64) 64 MG Tablet Delayed Response Take 1 Tab by mouth 2 times a day, with meals. (Patient taking differently: Take 64 mg by mouth every day.) 60 Tab 11   • omeprazole (PRILOSEC) 20 MG delayed-release capsule Take 1 Cap by mouth every bedtime. 30 Cap 0   • sotalol (BETAPACE) 120 MG tablet Take 1 Tab by mouth 2 Times a Day. 60 Tab 3   • Multiple Vitamins-Minerals (MULTIVITAMIN ADULT PO) Take 1 Tab by mouth every morning.     • Glucosamine HCl (GLUCOSAMINE PO) Take 1 Tab by mouth every morning.     • escitalopram (LEXAPRO) 10 MG Tab TAKE ONE (1) TABLET BY MOUTH EVERY DAY (Patient taking differently: Take 10 mg by mouth every bedtime. TAKE ONE (1) TABLET BY MOUTH EVERY DAY) 90 Tab 3   • aspirin (ASA) 81 MG Chew Tab chewable tablet Take 81 mg by mouth every evening. 100 Tab 11     No current facility-administered medications for this visit.        Physical Exam:  Vitals:    11/16/20 0959   BP: 140/70   BP Location: Left arm   Patient Position: Sitting   BP Cuff Size: Adult   Pulse: (!) 55   Resp: 16   SpO2: 95%   Weight: 116.1 kg (256 lb)   Height: 1.88 m (6' 2\")     General appearance: NAD, conversant   Eyes: anicteric sclerae, " moist conjunctivae; no lid-lag; PERRLA  HENT: Atraumatic; oropharynx clear with moist mucous membranes and no mucosal ulcerations; normal hard and soft palate  Neck: Trachea midline; FROM, supple, no thyromegaly or lymphadenopathy  Lungs: CTA, with normal respiratory effort and no intercostal retractions  CV: RRR, no MRGs, no JVD  Abdomen: Soft, non-tender; no masses or HSM  Extremities: No peripheral edema or extremity lymphadenopathy  Skin: Normal temperature, turgor and texture; no rash, ulcers or subcutaneous nodules  Psych: Appropriate affect, alert and oriented to person, place and time    Data:  Lipids:   Lab Results   Component Value Date/Time    CHOLSTRLTOT 101 11/07/2019 07:14 AM    TRIGLYCERIDE 64 11/07/2019 07:14 AM    HDL 40 11/07/2019 07:14 AM    LDL 48 11/07/2019 07:14 AM        BMP:  Lab Results   Component Value Date/Time    SODIUM 136 07/19/2020 0247    POTASSIUM 4.7 07/19/2020 0247    CHLORIDE 101 07/19/2020 0247    CO2 25 07/19/2020 0247    GLUCOSE 102 (H) 07/19/2020 0247    BUN 20 07/19/2020 0247    CREATININE 1.04 07/19/2020 0247    CALCIUM 8.7 07/19/2020 0247    ANION 10.0 07/19/2020 0247        TSH:   Lab Results   Component Value Date/Time    TSHULTRASEN 0.840 02/12/2020 0525        THYROXINE (T4):   No results found for: GISELE     CBC:   Lab Results   Component Value Date/Time    WBC 8.8 07/18/2020 02:32 AM    RBC 3.61 (L) 07/18/2020 02:32 AM    HEMOGLOBIN 12.6 (L) 07/18/2020 02:32 AM    HEMATOCRIT 38.9 (L) 07/18/2020 02:32 AM    .8 (H) 07/18/2020 02:32 AM    MCH 34.9 (H) 07/18/2020 02:32 AM    MCHC 32.4 (L) 07/18/2020 02:32 AM    RDW 55.8 (H) 07/18/2020 02:32 AM    PLATELETCT 137 (L) 07/18/2020 02:32 AM    MPV 9.2 07/18/2020 02:32 AM    NEUTSPOLYS 73.20 (H) 07/10/2020 11:15 AM    LYMPHOCYTES 14.30 (L) 07/10/2020 11:15 AM    MONOCYTES 10.40 07/10/2020 11:15 AM    EOSINOPHILS 1.30 07/10/2020 11:15 AM    BASOPHILS 0.50 07/10/2020 11:15 AM    IMMGRAN 0.30 07/10/2020 11:15 AM     IMMGRAN 0 12/14/2018 07:20 AM    NRBC 0.00 07/10/2020 11:15 AM    NEUTS 4.56 07/10/2020 11:15 AM    NEUTS 3.2 12/14/2018 07:20 AM    LYMPHS 0.89 (L) 07/10/2020 11:15 AM    LYMPHS 1.1 12/14/2018 07:20 AM    MONOS 0.65 07/10/2020 11:15 AM    MONOS 0.7 12/14/2018 07:20 AM    EOS 0.08 07/10/2020 11:15 AM    EOS 0.1 12/14/2018 07:20 AM    BASO 0.03 07/10/2020 11:15 AM    BASO 0.0 12/14/2018 07:20 AM    IMMGRANAB 0.02 07/10/2020 11:15 AM    IMMGRANAB 0.0 12/14/2018 07:20 AM    NRBCAB 0.00 07/10/2020 11:15 AM        CBC w/o DIFF  Lab Results   Component Value Date/Time    WBC 8.8 07/18/2020 02:32 AM    RBC 3.61 (L) 07/18/2020 02:32 AM    HEMOGLOBIN 12.6 (L) 07/18/2020 02:32 AM    .8 (H) 07/18/2020 02:32 AM    MCH 34.9 (H) 07/18/2020 02:32 AM    MCHC 32.4 (L) 07/18/2020 02:32 AM    RDW 55.8 (H) 07/18/2020 02:32 AM    MPV 9.2 07/18/2020 02:32 AM       Prior echo/stress reviewed: Ejection fraction 50% in February 2020    EKG interpreted by me: Sinus rhythm QTc <450ms    Impression/Plan:  1. Atrial fibrillation, unspecified type (HCC)  EKG     1.  Persistent atrial fibrillation  2.  Status post ablation for atrial fibrillation and atrial flutter  3.  Sotalol use, high risk drug monitoring    -He is doing well without recurrence.  He will use Universal [employer] to submit strips if he has any further palpitations in the future.  -Continue indefinite anticoagulation  -Continue sotalol, QTC is within range, will check labs and ECG every 6 months    Follow-up in 6 months    Nilton Ramirez MD  Cardiac Electrophysiology

## 2020-11-18 DIAGNOSIS — Z79.01 CHRONIC ANTICOAGULATION: ICD-10-CM

## 2020-11-23 DIAGNOSIS — J98.4 RESTRICTIVE LUNG DISEASE: ICD-10-CM

## 2020-11-23 NOTE — TELEPHONE ENCOUNTER
Have we ever prescribed this med? Yes.  If yes, what date? 08/24/20 Dr. Schreiber    Last OV: 08/28/20 Dr. Li   Return in about 3 months (around 11/28/2020) for PFT    Next OV: No pending Follow up Scheduled    DX: Restrictive lung disease     Medications: BREO ELLIPTA 200-25 MCG/INH AEROSOL POWDER, BREATH ACTIVATED

## 2020-11-30 DIAGNOSIS — E78.2 MIXED HYPERLIPIDEMIA: ICD-10-CM

## 2020-11-30 DIAGNOSIS — I25.10 CORONARY ARTERY DISEASE DUE TO LIPID RICH PLAQUE: ICD-10-CM

## 2020-11-30 DIAGNOSIS — I25.83 CORONARY ARTERY DISEASE DUE TO LIPID RICH PLAQUE: ICD-10-CM

## 2020-11-30 DIAGNOSIS — Z95.5 STENTED CORONARY ARTERY: ICD-10-CM

## 2020-12-04 ENCOUNTER — TELEPHONE (OUTPATIENT)
Dept: CARDIOLOGY | Facility: MEDICAL CENTER | Age: 75
End: 2020-12-04

## 2020-12-04 RX ORDER — ATORVASTATIN CALCIUM 40 MG/1
TABLET, FILM COATED ORAL
Qty: 90 TAB | Refills: 1 | Status: SHIPPED
Start: 2020-12-04 | End: 2021-09-01

## 2020-12-04 NOTE — TELEPHONE ENCOUNTER
Received message via [employer]  Lisa Beal, Rodriguez Ass't  P Rihv Ma/Justino             AW   TO: 1p/Thurs/Ofc   NM: Tracy [facility]/ [facility]     PH: [phone]    PT NM: Anthony Cloud     : 49    REG DR: Anabell Nielsen Aprn    RE:  Please call needing a refill on   Omeprazole 20 Mg    DISP HIST: 2020 12:39P TO P/disp   2020 12:40P DSC chkd      Returned patient's call  LVM on preferred number on chart. Notified pt. Medication is non-cardiac. It was originally prescribed post-procedure, with instructions to take for 1 month post-procedure. If pt. Would like to continue taking he will need to contact pcp for refills.

## 2020-12-07 ENCOUNTER — TELEPHONE (OUTPATIENT)
Dept: CARDIOLOGY | Facility: MEDICAL CENTER | Age: 75
End: 2020-12-07

## 2020-12-08 NOTE — TELEPHONE ENCOUNTER
Tracy Cullen, Med Ass't  [facility] Ma/Justino                  Pts daughter Lara calling for refill of atorvastatin (LIPITOR) 40 MG Tab.Pt is currently out of medication. Chart shows refill was set to [facility] pharmacy on 12/4/20, however the pharmacy is stating that the medication has been cancelled.      Called pharmacy regarding message received from pt. Pharmacy tech stated they had the Rx ready and someone called and cancelled, she is unsure of who/why it was cancelled. She did say they assumed he was using another pharmacy, from they way in which it was cancelled.     Pharmacy tech re-activated as it was approved in Epic 90-day supply w/ 1 refill. She states she will call the pt. to notify him when Rx is filled.

## 2020-12-23 ENCOUNTER — TELEPHONE (OUTPATIENT)
Dept: CARDIOLOGY | Facility: MEDICAL CENTER | Age: 75
End: 2020-12-23

## 2020-12-23 DIAGNOSIS — I48.19 PERSISTENT ATRIAL FIBRILLATION (HCC): ICD-10-CM

## 2020-12-23 RX ORDER — SOTALOL HYDROCHLORIDE 120 MG/1
120 TABLET ORAL 2 TIMES DAILY
Qty: 60 TAB | Refills: 5 | Status: SHIPPED | OUTPATIENT
Start: 2020-12-23 | End: 2021-06-21

## 2020-12-23 NOTE — TELEPHONE ENCOUNTER
PH: [phone]   PT NM: Anthony Cloud    : 1945   RE: Please send a new rx for Fotalol  HCL 120mg tablets, out of refills and  just changed Pharmacies .Please call  [facility] on [address] @  [phone]     --------------------------------------  Message History  Account: [id number]  Taken:  Wed 23-Dec-2020  1:19p TO  Serial#: [id number]

## 2020-12-23 NOTE — TELEPHONE ENCOUNTER
TO: /Wed/Ofc  NM: Lara Cloud   PH: [phone]   PT NM: Gilberto Cloud    : 49   REG DR: Dr Ramirez    RE: 2nd call needs a new rx for  Fotalol HCL 120mg tablets, out of  refills and just changed Pharmacies  .Please call [facility] on  [address] @ [phone]   DISP HIST: 2020 02:42P TO P/disp  2020 02:47P DAF checked

## 2021-01-15 DIAGNOSIS — Z23 NEED FOR VACCINATION: ICD-10-CM

## 2021-01-28 ENCOUNTER — IMMUNIZATION (OUTPATIENT)
Dept: FAMILY PLANNING/WOMEN'S HEALTH CLINIC | Facility: IMMUNIZATION CENTER | Age: 76
End: 2021-01-28
Attending: INTERNAL MEDICINE
Payer: MEDICARE

## 2021-01-28 DIAGNOSIS — Z23 NEED FOR VACCINATION: ICD-10-CM

## 2021-01-28 DIAGNOSIS — Z23 ENCOUNTER FOR VACCINATION: Primary | ICD-10-CM

## 2021-01-28 PROCEDURE — 0011A MODERNA SARS-COV-2 VACCINE: CPT | Performed by: PHYSICIAN ASSISTANT

## 2021-01-28 PROCEDURE — 91301 MODERNA SARS-COV-2 VACCINE: CPT | Performed by: PHYSICIAN ASSISTANT

## 2021-01-29 ENCOUNTER — HOSPITAL ENCOUNTER (OUTPATIENT)
Dept: LAB | Facility: MEDICAL CENTER | Age: 76
End: 2021-01-29
Attending: NURSE PRACTITIONER
Payer: MEDICARE

## 2021-01-29 ENCOUNTER — ANTICOAGULATION VISIT (OUTPATIENT)
Dept: VASCULAR LAB | Facility: MEDICAL CENTER | Age: 76
End: 2021-01-29
Attending: INTERNAL MEDICINE
Payer: MEDICARE

## 2021-01-29 DIAGNOSIS — Z86.718 PERSONAL HISTORY OF VENOUS THROMBOSIS AND EMBOLISM: ICD-10-CM

## 2021-01-29 DIAGNOSIS — I48.0 PAROXYSMAL ATRIAL FIBRILLATION (HCC): ICD-10-CM

## 2021-01-29 DIAGNOSIS — I48.91 ATRIAL FIBRILLATION, UNSPECIFIED TYPE (HCC): ICD-10-CM

## 2021-01-29 LAB
ERYTHROCYTE [DISTWIDTH] IN BLOOD BY AUTOMATED COUNT: 51.8 FL (ref 35.9–50)
HCT VFR BLD AUTO: 39.8 % (ref 42–52)
HGB BLD-MCNC: 13.2 G/DL (ref 14–18)
MCH RBC QN AUTO: 34.4 PG (ref 27–33)
MCHC RBC AUTO-ENTMCNC: 33.2 G/DL (ref 33.7–35.3)
MCV RBC AUTO: 103.6 FL (ref 81.4–97.8)
PLATELET # BLD AUTO: 208 K/UL (ref 164–446)
PMV BLD AUTO: 9.2 FL (ref 9–12.9)
RBC # BLD AUTO: 3.84 M/UL (ref 4.7–6.1)
WBC # BLD AUTO: 6.1 K/UL (ref 4.8–10.8)

## 2021-01-29 PROCEDURE — 99212 OFFICE O/P EST SF 10 MIN: CPT

## 2021-01-29 PROCEDURE — 36415 COLL VENOUS BLD VENIPUNCTURE: CPT

## 2021-01-29 PROCEDURE — 85027 COMPLETE CBC AUTOMATED: CPT

## 2021-01-29 PROCEDURE — 80053 COMPREHEN METABOLIC PANEL: CPT

## 2021-01-29 ASSESSMENT — CHA2DS2 SCORE
CHA2DS2 VASC SCORE: 6
PRIOR STROKE OR TIA OR THROMBOEMBOLISM: YES
AGE 75 OR GREATER: YES
DIABETES: NO
VASCULAR DISEASE: YES
HYPERTENSION: YES
CHF OR LEFT VENTRICULAR DYSFUNCTION: NO

## 2021-01-29 NOTE — Clinical Note
Asked Maged about this and wanted to forward to you if you think we need to address this.    Pt on 10mg Xarelto for AF, had nosebleeds on 20 and reduced dose. Seen by APRN at last visit cardiology since last visit with directions to continue Xarelto - Shannan said to continue at 10mg and notified cards; unclear if continuing 10mg or 20mg per cards visit. Pt denies any signs/symptoms of clots or stroke despite lowered dose.     Please let me know if I should forward this or maintain current plan. Thank you.

## 2021-01-29 NOTE — PROGRESS NOTES
Target end date:Indefinite     Indication: AF; h/o DVT/PE     Drug: Xarelto 20mg (pt taking 10mg d/t epistaxis)     CHADsVASC = 6    Health Status Since Last Assessment   Patient denies any new relevant medical problems, ED visits or hospitalizations   Patient denies any embolic events (stroke/tia/systemic embolism)     Pt seen by cardiology since last visit with directions to continue Xarelto. Pt denies  any signs/symptoms of clots or stroke despite lowered dose.    Adherence with DOAC Therapy   Pt has ZERO missed any doses in the average week    Bleeding Risk Assessment     DENIES Epistaxis - pt reports that this has stopped since reducing dose   Pt denies any excessive or unusual bleeding/hematomas.  Pt denies any GI bleeds or hematemesis.  Pt denies any concerning daily headache or sub dural hematoma symptoms.     Pt denies any hematuria    Latest Hemoglobin Low, but stable with slight increase to 13.2   ETOH overuse Denies - drinks 2-3 cocktails 2-3 times per week     Creatinine Clearance/Renal Function     Latest ClCr ~104 mL/min (7/2020) pt had labs drawn after visit with no results posted    Hepatic function    Latest LFTs WNL (7/2020) pt had labs drawn after visit with no results posted   Pt denies any history of liver dysfunction      Drug Interactions   Platelets: WNL - increased from 137(7/2020) to 208 (1/29/2020)   ASA/other antiplatelets ASA    NSAID None   Other drug interactions  SSRIs have risk of increased bleeding - pt denies any s/sx of bleeding   Verified no anticonvulsant or azole therapy, education provided for future use.     Examination   Blood Pressure Pt declines vitals due to Covid transmission concerns.    Symptomatic hypotension Denies   Significant gait impairment/imbalance/high risk for falls? Denies    Final Assessment and Recommendations:   Patient appears stable from the anticoagulation standpoint.     Benefits of continued DOAC therapy outweigh risks for this  patient   Recommend pt continue with current DOAC therapy.  pt seen by cardio in the interim and to continue current regimen.      Other Actions: cmp/ cbc hemogram ordered prior to next visit    Follow up:   Will follow up with patient 3  months.

## 2021-01-30 LAB
ALBUMIN SERPL BCP-MCNC: 4.3 G/DL (ref 3.2–4.9)
ALBUMIN/GLOB SERPL: 1.3 G/DL
ALP SERPL-CCNC: 66 U/L (ref 30–99)
ALT SERPL-CCNC: 22 U/L (ref 2–50)
ANION GAP SERPL CALC-SCNC: 8 MMOL/L (ref 7–16)
AST SERPL-CCNC: 22 U/L (ref 12–45)
BILIRUB SERPL-MCNC: 0.4 MG/DL (ref 0.1–1.5)
BUN SERPL-MCNC: 25 MG/DL (ref 8–22)
CALCIUM SERPL-MCNC: 9 MG/DL (ref 8.5–10.5)
CHLORIDE SERPL-SCNC: 102 MMOL/L (ref 96–112)
CO2 SERPL-SCNC: 25 MMOL/L (ref 20–33)
CREAT SERPL-MCNC: 0.97 MG/DL (ref 0.5–1.4)
FASTING STATUS PATIENT QL REPORTED: NORMAL
GLOBULIN SER CALC-MCNC: 3.2 G/DL (ref 1.9–3.5)
GLUCOSE SERPL-MCNC: 101 MG/DL (ref 65–99)
POTASSIUM SERPL-SCNC: 4.6 MMOL/L (ref 3.6–5.5)
PROT SERPL-MCNC: 7.5 G/DL (ref 6–8.2)
SODIUM SERPL-SCNC: 135 MMOL/L (ref 135–145)

## 2021-02-02 DIAGNOSIS — I48.91 ATRIAL FIBRILLATION, UNSPECIFIED TYPE (HCC): ICD-10-CM

## 2021-02-02 NOTE — PROGRESS NOTES
[facility]    Spoke with pt's daughter who manages this patients medications.  Discussed Xarelto 10 mg dose has not proven efficacy in hx of AFib or PE.  Pt and daughter willing to transition to Eliquis 5 mg BID starting tomorrow.  They will monitor for s/s of bleeding and report any findings to our clinic.      Encouraged pt and daughter to discuss LOT for ASA use.  I will also reach out to cardiology to see if d/c of ASA is appropriate given no stents in the past year and pt has hx of GI bleeding.     Ramakrsihna Kilgore, PharmD

## 2021-02-11 ENCOUNTER — TELEPHONE (OUTPATIENT)
Dept: VASCULAR LAB | Facility: MEDICAL CENTER | Age: 76
End: 2021-02-11

## 2021-02-11 DIAGNOSIS — Z79.01 CHRONIC ANTICOAGULATION: ICD-10-CM

## 2021-02-11 NOTE — TELEPHONE ENCOUNTER
[facility]    Spoke with pt's daughter.  Eliquis was going to cost $700 at their pharmacy, they would like to go back to Xarelto.  New Rx for Xarelto 20 mg sent as pt has now been allowed to D/C ASA by cardiology.  The Xarelto 20 mg dose is more appropriate given pt's indication and laboratory findings.    Follow up at next visit in 2 months.    Ramakrishna Kilgore, PharmD

## 2021-02-25 ENCOUNTER — IMMUNIZATION (OUTPATIENT)
Dept: FAMILY PLANNING/WOMEN'S HEALTH CLINIC | Facility: IMMUNIZATION CENTER | Age: 76
End: 2021-02-25
Payer: MEDICARE

## 2021-02-25 DIAGNOSIS — Z23 ENCOUNTER FOR VACCINATION: Primary | ICD-10-CM

## 2021-02-25 PROCEDURE — 0012A MODERNA SARS-COV-2 VACCINE: CPT | Performed by: NURSE PRACTITIONER

## 2021-02-25 PROCEDURE — 91301 MODERNA SARS-COV-2 VACCINE: CPT | Performed by: NURSE PRACTITIONER

## 2021-03-17 DIAGNOSIS — J98.4 RESTRICTIVE LUNG DISEASE: ICD-10-CM

## 2021-03-17 NOTE — TELEPHONE ENCOUNTER
Have we ever prescribed this med? Yes.  If yes, what date? 11/23/2020    Last OV: 08/28/2020 - Dr. Li    Next OV: was due 11/2020    DX: Restrictive lung disease     Medications: Breo

## 2021-03-20 ENCOUNTER — APPOINTMENT (OUTPATIENT)
Dept: RADIOLOGY | Facility: MEDICAL CENTER | Age: 76
End: 2021-03-20
Attending: EMERGENCY MEDICINE
Payer: MEDICARE

## 2021-03-20 ENCOUNTER — HOSPITAL ENCOUNTER (EMERGENCY)
Facility: MEDICAL CENTER | Age: 76
End: 2021-03-20
Attending: EMERGENCY MEDICINE | Admitting: EMERGENCY MEDICINE
Payer: MEDICARE

## 2021-03-20 VITALS
RESPIRATION RATE: 16 BRPM | OXYGEN SATURATION: 97 % | HEIGHT: 76 IN | WEIGHT: 266.1 LBS | SYSTOLIC BLOOD PRESSURE: 149 MMHG | DIASTOLIC BLOOD PRESSURE: 65 MMHG | TEMPERATURE: 96.8 F | HEART RATE: 56 BPM | BODY MASS INDEX: 32.4 KG/M2

## 2021-03-20 DIAGNOSIS — S49.91XA INJURY OF RIGHT SHOULDER, INITIAL ENCOUNTER: ICD-10-CM
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
## 2021-03-20 PROCEDURE — 73030 X-RAY EXAM OF SHOULDER: CPT | Mod: RT

## 2021-03-20 PROCEDURE — 99283 EMERGENCY DEPT VISIT LOW MDM: CPT

## 2021-03-20 ASSESSMENT — FIBROSIS 4 INDEX: FIB4 SCORE: 1.65

## 2021-03-20 NOTE — ED PROVIDER NOTES
ED Provider Note    Scribed for Sonja Scott M.D. by Kadi Finley. 3/20/2021, 6:01 AM.    Primary care provider: Angel Cameron M.D.  Means of arrival: Walk-in  History obtained from: Patient  History limited by: None    CHIEF COMPLAINT  Chief Complaint   Patient presents with    T-5000 GLF     walking dogs last night and slipped landing on right shoulder     HPI  Anthony Cloud is a 75 y.o. male who presents to the Emergency Department after falling on his outstretched right arm yesterday evening. The patient reports that he was walking his dogs when he tripped and fell on his arm. He reports that he definitely tripped, causing his fall. He reports having worsening right shoulder pain. He is having trouble moving his arm away from his body. The patient is having pain from the deltoid insertion, radiating to the clavicle region. He has not taken any medication for his symptoms. The patient has never injured his right shoulder before. He denies any other extremity pain or neck pain. The patient is anticoagulated on Xarelto for atrial fibrillation. The patient denies any headaches or dizziness prior to the fall. Denies any associated loss of consciousness, abdominal pain, or chest pain.  He denies any current headaches, nausea or dizziness or hitting his head when he fell.    REVIEW OF SYSTEMS  CARDIAC: No chest pain.   GI: No abdominal pain.   Neuro: No headaches, dizziness, or loss of consciousness.   Musculoskeletal: Right shoulder pain. No other extremity pain. No neck pain.     See history of present illness. E.     PAST MEDICAL HISTORY   has a past medical history of Acute nasopharyngitis (01/2020), Anemia, Arrhythmia, Bowel habit changes, Breath shortness, CAD (coronary artery disease) (2003), Cataract, Central sleep apnea, Chronic anticoagulation, COPD (chronic obstructive pulmonary disease) (HCC), Depression, Depressive disorder, not elsewhere classified, Dyslipidemia, Hemorrhagic  disorder (HCC), High cholesterol, MRSA infection (2009, 2014), Hypertension, Hypothyroidism, Mixed hyperlipidemia, Obesity, Old myocardial infarction (January 2003), Peptic ulcer disease (8/4/2016), Personal history of venous thrombosis and embolism, Pulmonary embolism (HCC) (2003/2004), and Sleep apnea.    SURGICAL HISTORY   has a past surgical history that includes cardiac cath, right/left heart (2003); anesth,lower leg arteries surg; wrist orif (Left, 2/23/2018); bowel resection; incision and drainage orthopedic (8/29/2014); and other (2001).    SOCIAL HISTORY  Social History     Tobacco Use    Smoking status: Never Smoker    Smokeless tobacco: Never Used   Substance Use Topics    Alcohol use: Yes     Comment: 2 per week     Drug use: No      Social History     Substance and Sexual Activity   Drug Use No     FAMILY HISTORY  Family History   Problem Relation Age of Onset    Other Mother         Passed at 98    Heart Disease Father 60        CABG     CURRENT MEDICATIONS  Home Medications       Reviewed by Nancie Aguilar R.N. (Registered Nurse) on 03/20/21 at 05[employer]  Med List Status: Partial     Medication Last Dose Status   amLODIPine (NORVASC) 2.5 MG Tab  Active   atorvastatin (LIPITOR) 40 MG Tab  Active   BREO ELLIPTA 200-25 MCG/INH AEROSOL POWDER, BREATH ACTIVATED  Active   escitalopram (LEXAPRO) 10 MG Tab  Active   Glucosamine HCl (GLUCOSAMINE PO)  Active   losartan (COZAAR) 50 MG Tab  Active   magnesium chloride (MAG-64) 64 MG Tablet Delayed Response  Active   Multiple Vitamins-Minerals (MULTIVITAMIN ADULT PO)  Active   omeprazole (PRILOSEC) 20 MG delayed-release capsule  Active   polyethylene glycol-electrolytes (NULYTELY) 420 GM solution  Active   rivaroxaban (XARELTO) 20 MG Tab tablet  Active   sotalol (BETAPACE) 120 MG tablet  Active                  ALLERGIES  Allergies   Allergen Reactions    Lisinopril      cough     PHYSICAL EXAM  VITAL SIGNS: /65   Pulse (!) 54   Temp 36 °C (96.8 °F)  "(Temporal)   Resp 16   Ht 1.93 m (6' 4\")   Wt 121 kg (266 lb 1.5 oz)   SpO2 94%   BMI 32.39 kg/m²     Constitutional: GCS 15, ABC's intact   HENT: Normal cephalic, atraumatic, no contusions or abrasions to the head.   Eyes: PERRLA, EOMI, Conjunctiva normal, No discharge.   Cardiovascular: Bradycardic, Normal rhythm, No murmurs, No rubs, No gallops.   Thorax & Lungs: Normal breath sounds, No respiratory distress, No wheezing, No chest tenderness. No subcutaneous emphysema or paradoxical chest wall movements  Abdomen: Bowel sounds normal, Soft, No tenderness, No masses, No pulsatile masses, no abdominal wall contusions  Skin: Warm, Dry, No erythema, No rash no contusions or abrasions   Extremities: Intact distal pulses, No edema, No tenderness, No cyanosis, No clubbing.   Musculoskeletal: FROM except for the right shoulder. Patient is having trouble abducting the right shoulder. Can move elbow distally. No abrasion or bruising to the right shoulder.   Neurologic: Alert & oriented x 3, Normal motor function, Normal sensory function, No focal deficits noted. Normal  strengths.      RADIOLOGY  DX-SHOULDER 2+ RIGHT   Final Result         1.  No acute traumatic bony injury.           The radiologist's interpretation of all radiological studies have been reviewed by me.    COURSE & MEDICAL DECISION MAKING  Pertinent Labs & Imaging studies reviewed. (See chart for details)    6:01 AM - Patient seen and examined in the trauma bay. Ordered DX-Shoulder-right to evaluate his symptoms. Differential diagnoses include but are not limited to: humeral head fracture, shoulder dislocation, clavicle fracture, contusion, torn rotator cuff.     7:10 AM - The patient's shoulder does not appear to be dislocated.     7:11 AM - I informed the patient that he has not fractured or dislocated his right shoulder. I informed the patient that it is possible he torn his rotator cuff. The patient will be placed in a sling. He was instructed " to perform gentle range of motion three times a day. The patient was instructed to use tylenol and motrin for the pain. He was also instructed to ice and elevate his shoulder. The patient will follow up with Ortho. I gave him some information of rotator cuff tears. I discussed plan of discharge and strict return precautions for any new or worsening symptoms. The patient verbalizes agreement and understands.    7:14 DOUGLAS - Ordered a sling for the patient.     The patient will return for new or worsening symptoms and is stable at the time of discharge.    The patient is referred to a primary physician for blood pressure management, diabetic screening, and for all other preventative health concerns.    MDM:  I will place the patient in a sling and have him follow-up with [facility].  He is to rest ice and elevate her shoulder and perform gentle range of motion exercises.    DISPOSITION:  Patient will be discharged home in stable condition.    FOLLOW UP:  Eusebia Kinney M.D.  [address] [facility] [id number] [phone]    Call in 1 day  to establish care, for recheck    OUTPATIENT MEDICATIONS:  Discharge Medication List as of 3/20/2021  7:26 AM        FINAL IMPRESSION  1. Fall, initial encounter    2. Injury of right shoulder, initial encounter        IKadi (Peggy), am scribing for, and in the presence of, Sonja Scott M.D..    Electronically signed by: Kadi Palma), 3/20/2021    Sonja MAYFIELD M.D. personally performed the services described in this documentation, as scribed by Kadi Finley in my presence, and it is both accurate and complete.    The note accurately reflects work and decisions made by me.  Sonja Scott M.D.  3/20/2021  12:53 PM

## 2021-03-20 NOTE — ED TRIAGE NOTES
Vitals:    03/20/21 0514   BP: 129/65   Pulse: (!) 54   Resp: 16   Temp: 36 °C (96.8 °F)   SpO2: 94%     Chief Complaint   Patient presents with   • T-5000 GLF     walking dogs last night and slipped landing on right shoulder     Pt ambulatory to triage with steady gait. States he was walking outside and slipped because it was wet. GLF are not normal for him. He c/o right shoulder and arm pain. CMS intact but significant pain with movement. Pt denies hitting head or any other trauma to other areas.

## 2021-03-20 NOTE — ED NOTES
Sling placed,  VSS.  Discharge instructions given to pt and family memeber- verbalizes understanding.   Ambulatory to lobby with steady gait.

## 2021-03-26 ENCOUNTER — TELEPHONE (OUTPATIENT)
Dept: CARDIOLOGY | Facility: MEDICAL CENTER | Age: 76
End: 2021-03-26

## 2021-03-26 NOTE — TELEPHONE ENCOUNTER
Please advise surgical RISK for Right shoulder Rotator Cuff Repair with Dr. Eusebia Kinney.    Also advise if patient can be off of Xarelto for 2-3 days prior to surgery if necessary.

## 2021-03-26 NOTE — TELEPHONE ENCOUNTER
Clearance letter faxed to Dr. Charisma Kinney, [phone].      Deangelo Zuniga M.D.  Steph Holder R.N.; Laura Robles R.N. 36 minutes ago (3:16 PM)     Low to moderate risk. Can hold OAC x 2 days prior.

## 2021-04-08 ENCOUNTER — PRE-ADMISSION TESTING (OUTPATIENT)
Dept: ADMISSIONS | Facility: MEDICAL CENTER | Age: 76
End: 2021-04-08
Attending: ORTHOPAEDIC SURGERY
Payer: MEDICARE

## 2021-04-08 DIAGNOSIS — Z01.812 PRE-OPERATIVE LABORATORY EXAMINATION: ICD-10-CM

## 2021-04-08 LAB
ANION GAP SERPL CALC-SCNC: 11 MMOL/L (ref 7–16)
BUN SERPL-MCNC: 25 MG/DL (ref 8–22)
CALCIUM SERPL-MCNC: 8.9 MG/DL (ref 8.4–10.2)
CHLORIDE SERPL-SCNC: 106 MMOL/L (ref 96–112)
CO2 SERPL-SCNC: 22 MMOL/L (ref 20–33)
CREAT SERPL-MCNC: 1.08 MG/DL (ref 0.5–1.4)
ERYTHROCYTE [DISTWIDTH] IN BLOOD BY AUTOMATED COUNT: 50.8 FL (ref 35.9–50)
GLUCOSE SERPL-MCNC: 91 MG/DL (ref 65–99)
HCT VFR BLD AUTO: 40.6 % (ref 42–52)
HGB BLD-MCNC: 13.4 G/DL (ref 14–18)
MCH RBC QN AUTO: 34.1 PG (ref 27–33)
MCHC RBC AUTO-ENTMCNC: 33 G/DL (ref 33.7–35.3)
MCV RBC AUTO: 103.3 FL (ref 81.4–97.8)
PLATELET # BLD AUTO: 186 K/UL (ref 164–446)
PMV BLD AUTO: 9.4 FL (ref 9–12.9)
POTASSIUM SERPL-SCNC: 4.6 MMOL/L (ref 3.6–5.5)
RBC # BLD AUTO: 3.93 M/UL (ref 4.7–6.1)
SARS-COV-2 RNA RESP QL NAA+PROBE: NOTDETECTED
SODIUM SERPL-SCNC: 139 MMOL/L (ref 135–145)
SPECIMEN SOURCE: NORMAL
WBC # BLD AUTO: 5.3 K/UL (ref 4.8–10.8)

## 2021-04-08 PROCEDURE — U0005 INFEC AGEN DETEC AMPLI PROBE: HCPCS

## 2021-04-08 PROCEDURE — 36415 COLL VENOUS BLD VENIPUNCTURE: CPT

## 2021-04-08 PROCEDURE — 85027 COMPLETE CBC AUTOMATED: CPT

## 2021-04-08 PROCEDURE — 80048 BASIC METABOLIC PNL TOTAL CA: CPT

## 2021-04-08 PROCEDURE — C9803 HOPD COVID-19 SPEC COLLECT: HCPCS

## 2021-04-08 PROCEDURE — U0003 INFECTIOUS AGENT DETECTION BY NUCLEIC ACID (DNA OR RNA); SEVERE ACUTE RESPIRATORY SYNDROME CORONAVIRUS 2 (SARS-COV-2) (CORONAVIRUS DISEASE [COVID-19]), AMPLIFIED PROBE TECHNIQUE, MAKING USE OF HIGH THROUGHPUT TECHNOLOGIES AS DESCRIBED BY CMS-2020-01-R: HCPCS

## 2021-04-08 RX ORDER — TRAMADOL HYDROCHLORIDE 50 MG/1
50 TABLET ORAL DAILY
COMMUNITY
Start: 2021-04-07 | End: 2021-09-01

## 2021-04-08 RX ORDER — ASPIRIN 81 MG/1
81 TABLET, CHEWABLE ORAL DAILY
COMMUNITY
End: 2021-11-17

## 2021-04-08 RX ORDER — MELOXICAM 15 MG/1
TABLET ORAL
Status: ON HOLD | COMMUNITY
Start: 2021-03-22 | End: 2021-04-12

## 2021-04-08 ASSESSMENT — FIBROSIS 4 INDEX: FIB4 SCORE: 1.65

## 2021-04-08 NOTE — PREPROCEDURE INSTRUCTIONS
Noted.  Filed under 16 for chart prep.    Preadmit instructions and anesthesia fasting instructions given. Any patient questions addressed. Pt instructed to take these medications on day of surgery: Norvasc, Breo (instructed to bring inhaler on DOS), Lexapro, Sotalol. Pt instructed to self-isolate after covid test today.

## 2021-04-11 ENCOUNTER — ANESTHESIA EVENT (OUTPATIENT)
Dept: SURGERY | Facility: MEDICAL CENTER | Age: 76
End: 2021-04-11
Payer: MEDICARE

## 2021-04-12 ENCOUNTER — HOSPITAL ENCOUNTER (OUTPATIENT)
Facility: MEDICAL CENTER | Age: 76
End: 2021-04-12
Attending: ORTHOPAEDIC SURGERY | Admitting: ORTHOPAEDIC SURGERY
Payer: MEDICARE

## 2021-04-12 ENCOUNTER — ANESTHESIA (OUTPATIENT)
Dept: SURGERY | Facility: MEDICAL CENTER | Age: 76
End: 2021-04-12
Payer: MEDICARE

## 2021-04-12 VITALS
DIASTOLIC BLOOD PRESSURE: 78 MMHG | RESPIRATION RATE: 16 BRPM | HEART RATE: 55 BPM | SYSTOLIC BLOOD PRESSURE: 122 MMHG | WEIGHT: 261.25 LBS | HEIGHT: 76 IN | TEMPERATURE: 97.2 F | BODY MASS INDEX: 31.81 KG/M2 | OXYGEN SATURATION: 90 %

## 2021-04-12 PROCEDURE — 160009 HCHG ANES TIME/MIN: Performed by: ORTHOPAEDIC SURGERY

## 2021-04-12 PROCEDURE — 160041 HCHG SURGERY MINUTES - EA ADDL 1 MIN LEVEL 4: Performed by: ORTHOPAEDIC SURGERY

## 2021-04-12 PROCEDURE — 502581 HCHG PACK, SHOULDER ARTHROSCOPY: Performed by: ORTHOPAEDIC SURGERY

## 2021-04-12 PROCEDURE — 501838 HCHG SUTURE GENERAL: Performed by: ORTHOPAEDIC SURGERY

## 2021-04-12 PROCEDURE — 160029 HCHG SURGERY MINUTES - 1ST 30 MINS LEVEL 4: Performed by: ORTHOPAEDIC SURGERY

## 2021-04-12 PROCEDURE — 64415 NJX AA&/STRD BRCH PLXS IMG: CPT | Performed by: ORTHOPAEDIC SURGERY

## 2021-04-12 PROCEDURE — 700101 HCHG RX REV CODE 250: Performed by: ANESTHESIOLOGY

## 2021-04-12 PROCEDURE — 160025 RECOVERY II MINUTES (STATS): Performed by: ORTHOPAEDIC SURGERY

## 2021-04-12 PROCEDURE — 500151 HCHG CANNULA, THRDED 8.4: Performed by: ORTHOPAEDIC SURGERY

## 2021-04-12 PROCEDURE — A9270 NON-COVERED ITEM OR SERVICE: HCPCS | Performed by: ANESTHESIOLOGY

## 2021-04-12 PROCEDURE — 700105 HCHG RX REV CODE 258: Performed by: ORTHOPAEDIC SURGERY

## 2021-04-12 PROCEDURE — 160046 HCHG PACU - 1ST 60 MINS PHASE II: Performed by: ORTHOPAEDIC SURGERY

## 2021-04-12 PROCEDURE — 700111 HCHG RX REV CODE 636 W/ 250 OVERRIDE (IP): Performed by: ORTHOPAEDIC SURGERY

## 2021-04-12 PROCEDURE — 160048 HCHG OR STATISTICAL LEVEL 1-5: Performed by: ORTHOPAEDIC SURGERY

## 2021-04-12 PROCEDURE — 700111 HCHG RX REV CODE 636 W/ 250 OVERRIDE (IP): Performed by: ANESTHESIOLOGY

## 2021-04-12 PROCEDURE — 160002 HCHG RECOVERY MINUTES (STAT): Performed by: ORTHOPAEDIC SURGERY

## 2021-04-12 PROCEDURE — 160047 HCHG PACU  - EA ADDL 30 MINS PHASE II: Performed by: ORTHOPAEDIC SURGERY

## 2021-04-12 PROCEDURE — C1713 ANCHOR/SCREW BN/BN,TIS/BN: HCPCS | Performed by: ORTHOPAEDIC SURGERY

## 2021-04-12 PROCEDURE — 160035 HCHG PACU - 1ST 60 MINS PHASE I: Performed by: ORTHOPAEDIC SURGERY

## 2021-04-12 PROCEDURE — 700101 HCHG RX REV CODE 250: Performed by: ORTHOPAEDIC SURGERY

## 2021-04-12 PROCEDURE — 700102 HCHG RX REV CODE 250 W/ 637 OVERRIDE(OP): Performed by: ANESTHESIOLOGY

## 2021-04-12 DEVICE — SUTURE ANCHOR 2.8MM: Type: IMPLANTABLE DEVICE | Site: SHOULDER | Status: FUNCTIONAL

## 2021-04-12 DEVICE — SUTURE ANCHOR HEALICOIL REGENESORB 5.5MM: Type: IMPLANTABLE DEVICE | Site: SHOULDER | Status: FUNCTIONAL

## 2021-04-12 RX ORDER — ONDANSETRON 2 MG/ML
4 INJECTION INTRAMUSCULAR; INTRAVENOUS
Status: DISCONTINUED | OUTPATIENT
Start: 2021-04-12 | End: 2021-04-12 | Stop reason: HOSPADM

## 2021-04-12 RX ORDER — LIDOCAINE HYDROCHLORIDE 20 MG/ML
INJECTION, SOLUTION EPIDURAL; INFILTRATION; INTRACAUDAL; PERINEURAL PRN
Status: DISCONTINUED | OUTPATIENT
Start: 2021-04-12 | End: 2021-04-12 | Stop reason: SURG

## 2021-04-12 RX ORDER — DEXAMETHASONE SODIUM PHOSPHATE 4 MG/ML
INJECTION, SOLUTION INTRA-ARTICULAR; INTRALESIONAL; INTRAMUSCULAR; INTRAVENOUS; SOFT TISSUE PRN
Status: DISCONTINUED | OUTPATIENT
Start: 2021-04-12 | End: 2021-04-12 | Stop reason: SURG

## 2021-04-12 RX ORDER — SODIUM CHLORIDE, SODIUM LACTATE, POTASSIUM CHLORIDE, CALCIUM CHLORIDE 600; 310; 30; 20 MG/100ML; MG/100ML; MG/100ML; MG/100ML
INJECTION, SOLUTION INTRAVENOUS CONTINUOUS
Status: ACTIVE | OUTPATIENT
Start: 2021-04-12 | End: 2021-04-12

## 2021-04-12 RX ORDER — EPINEPHRINE 1 MG/ML(1)
AMPUL (ML) INJECTION
Status: DISCONTINUED | OUTPATIENT
Start: 2021-04-12 | End: 2021-04-12 | Stop reason: HOSPADM

## 2021-04-12 RX ORDER — HYDROMORPHONE HYDROCHLORIDE 1 MG/ML
0.1 INJECTION, SOLUTION INTRAMUSCULAR; INTRAVENOUS; SUBCUTANEOUS
Status: DISCONTINUED | OUTPATIENT
Start: 2021-04-12 | End: 2021-04-12 | Stop reason: HOSPADM

## 2021-04-12 RX ORDER — MIDAZOLAM HYDROCHLORIDE 1 MG/ML
INJECTION INTRAMUSCULAR; INTRAVENOUS PRN
Status: DISCONTINUED | OUTPATIENT
Start: 2021-04-12 | End: 2021-04-12 | Stop reason: SURG

## 2021-04-12 RX ORDER — DIPHENHYDRAMINE HYDROCHLORIDE 50 MG/ML
12.5 INJECTION INTRAMUSCULAR; INTRAVENOUS
Status: DISCONTINUED | OUTPATIENT
Start: 2021-04-12 | End: 2021-04-12 | Stop reason: HOSPADM

## 2021-04-12 RX ORDER — ONDANSETRON 2 MG/ML
INJECTION INTRAMUSCULAR; INTRAVENOUS PRN
Status: DISCONTINUED | OUTPATIENT
Start: 2021-04-12 | End: 2021-04-12 | Stop reason: SURG

## 2021-04-12 RX ORDER — CLINDAMYCIN PHOSPHATE 150 MG/ML
INJECTION, SOLUTION INTRAVENOUS PRN
Status: DISCONTINUED | OUTPATIENT
Start: 2021-04-12 | End: 2021-04-12 | Stop reason: SURG

## 2021-04-12 RX ORDER — ROPIVACAINE HYDROCHLORIDE 5 MG/ML
INJECTION, SOLUTION EPIDURAL; INFILTRATION; PERINEURAL
Status: COMPLETED | OUTPATIENT
Start: 2021-04-12 | End: 2021-04-12

## 2021-04-12 RX ORDER — HYDROMORPHONE HYDROCHLORIDE 1 MG/ML
0.2 INJECTION, SOLUTION INTRAMUSCULAR; INTRAVENOUS; SUBCUTANEOUS
Status: DISCONTINUED | OUTPATIENT
Start: 2021-04-12 | End: 2021-04-12 | Stop reason: HOSPADM

## 2021-04-12 RX ORDER — GLYCOPYRROLATE 0.2 MG/ML
INJECTION INTRAMUSCULAR; INTRAVENOUS PRN
Status: DISCONTINUED | OUTPATIENT
Start: 2021-04-12 | End: 2021-04-12 | Stop reason: SURG

## 2021-04-12 RX ORDER — OXYCODONE HCL 5 MG/5 ML
10 SOLUTION, ORAL ORAL
Status: DISCONTINUED | OUTPATIENT
Start: 2021-04-12 | End: 2021-04-12 | Stop reason: HOSPADM

## 2021-04-12 RX ORDER — HYDRALAZINE HYDROCHLORIDE 20 MG/ML
5 INJECTION INTRAMUSCULAR; INTRAVENOUS
Status: DISCONTINUED | OUTPATIENT
Start: 2021-04-12 | End: 2021-04-12 | Stop reason: HOSPADM

## 2021-04-12 RX ORDER — CEFAZOLIN SODIUM 1 G/3ML
INJECTION, POWDER, FOR SOLUTION INTRAMUSCULAR; INTRAVENOUS PRN
Status: DISCONTINUED | OUTPATIENT
Start: 2021-04-12 | End: 2021-04-12 | Stop reason: SURG

## 2021-04-12 RX ORDER — ACETAMINOPHEN 500 MG
1000 TABLET ORAL ONCE
Status: COMPLETED | OUTPATIENT
Start: 2021-04-12 | End: 2021-04-12

## 2021-04-12 RX ORDER — HYDROMORPHONE HYDROCHLORIDE 1 MG/ML
0.4 INJECTION, SOLUTION INTRAMUSCULAR; INTRAVENOUS; SUBCUTANEOUS
Status: DISCONTINUED | OUTPATIENT
Start: 2021-04-12 | End: 2021-04-12 | Stop reason: HOSPADM

## 2021-04-12 RX ORDER — OXYCODONE HCL 5 MG/5 ML
5 SOLUTION, ORAL ORAL
Status: DISCONTINUED | OUTPATIENT
Start: 2021-04-12 | End: 2021-04-12 | Stop reason: HOSPADM

## 2021-04-12 RX ADMIN — WATER 15 ML: 100 IRRIGANT IRRIGATION at 09:52

## 2021-04-12 RX ADMIN — FENTANYL CITRATE 100 MCG: 50 INJECTION, SOLUTION INTRAMUSCULAR; INTRAVENOUS at 10:57

## 2021-04-12 RX ADMIN — ACETAMINOPHEN 1000 MG: 500 TABLET, FILM COATED ORAL at 09:51

## 2021-04-12 RX ADMIN — MIDAZOLAM HYDROCHLORIDE 1 MG: 1 INJECTION, SOLUTION INTRAMUSCULAR; INTRAVENOUS at 10:51

## 2021-04-12 RX ADMIN — SODIUM CHLORIDE, POTASSIUM CHLORIDE, SODIUM LACTATE AND CALCIUM CHLORIDE: 600; 310; 30; 20 INJECTION, SOLUTION INTRAVENOUS at 09:51

## 2021-04-12 RX ADMIN — CLINDAMYCIN PHOSPHATE 900 MG: 150 INJECTION, SOLUTION INTRAMUSCULAR; INTRAVENOUS at 10:57

## 2021-04-12 RX ADMIN — LIDOCAINE HYDROCHLORIDE 0.5 ML: 10 INJECTION, SOLUTION INFILTRATION; PERINEURAL at 09:52

## 2021-04-12 RX ADMIN — ONDANSETRON 4 MG: 2 INJECTION INTRAMUSCULAR; INTRAVENOUS at 11:05

## 2021-04-12 RX ADMIN — ROPIVACAINE HYDROCHLORIDE 30 ML: 5 INJECTION, SOLUTION EPIDURAL; INFILTRATION; PERINEURAL at 10:40

## 2021-04-12 RX ADMIN — LIDOCAINE HYDROCHLORIDE 100 MG: 20 INJECTION, SOLUTION EPIDURAL; INFILTRATION; INTRACAUDAL; PERINEURAL at 10:57

## 2021-04-12 RX ADMIN — CEFAZOLIN 2 G: 1 INJECTION, POWDER, FOR SOLUTION INTRAVENOUS at 10:57

## 2021-04-12 RX ADMIN — PROPOFOL 200 MG: 10 INJECTION, EMULSION INTRAVENOUS at 10:57

## 2021-04-12 RX ADMIN — DEXAMETHASONE SODIUM PHOSPHATE 4 MG: 4 INJECTION, SOLUTION INTRAMUSCULAR; INTRAVENOUS at 11:05

## 2021-04-12 RX ADMIN — GLYCOPYRROLATE 0.3 MG: 0.2 INJECTION, SOLUTION INTRAMUSCULAR; INTRAVENOUS at 11:03

## 2021-04-12 RX ADMIN — EPHEDRINE SULFATE 10 MG: 50 INJECTION INTRAMUSCULAR; INTRAVENOUS; SUBCUTANEOUS at 11:03

## 2021-04-12 ASSESSMENT — FIBROSIS 4 INDEX: FIB4 SCORE: 1.84

## 2021-04-12 ASSESSMENT — PAIN SCALES - GENERAL: PAIN_LEVEL: 0

## 2021-04-12 NOTE — ANESTHESIA PREPROCEDURE EVALUATION
"    Relevant Problems   ANESTHESIA   (+) Sleep apnea      NEURO   (+) H/O: GI bleed   (+) Personal history of venous thrombosis and embolism   (+) S/P ablation of atrial fibrillation/Atrial Flutter      CARDIAC   (+) AF (atrial fibrillation) (HCC)   (+) CAD (coronary artery disease)   (+) Essential hypertension   (+) Old myocardial infarction   (+) Pulmonary hypertension (HCC)   (+) Stented coronary artery      GI   (+) Peptic ulcer disease      ENDO   (+) Acquired hypothyroidism      Other   (+) Chronic anticoagulation   (+) Depression   (+) Diastolic dysfunction   (+) Heart failure with preserved ejection fraction (HCC)   (+) Obesity (BMI 30-39.9)   STOP BANG Total Score - 6 (Known Central Sleep Apnea by hx)    Past Surgical History    Past Surgical History Laterality Date Comments   CARDIAC CATH, RIGHT/LEFT HEART [[id number]] Not specified 2003 01/2003 4.0x23mm Zeta stent to proximal RCA,3.5x22mm distal to first stent, 3.0x15mm at the point of original occlusion.   [place] ANESTH,LOWER LEG ARTERIES SURG [06869] Not specified Not specified None   WRIST ORIF [79.83] Left 2/23/2018 Procedure: WRIST ORIF;  Surgeon: Jasper Hammond M.D.;  Location: SURGERY [facility];  Service: Orthopedics   INCISION AND DRAINAGE ORTHOPEDIC [86.04B] Not specified 8/29/2014 Performed by Wolfgang Merrill M.D. at SURGERY [facility]   OTHER [[id number]] Not specified 2001 back sx    OTHER CARDIAC SURGERY [[id number]] Not specified 07/2020 Cardiac ablasion    COLONOSCOPY [45.23] Not specified 2020 None     Allergies: Lisinopril    HT: 1.93 m (6' 4\")   WT: 118 kg (261 lb 0.4 oz)   BMI: 31.77 kg/m 2      Physical Exam    Airway   Mallampati: III  TM distance: >3 FB  Neck ROM: full       Cardiovascular - normal exam  Rhythm: regular  Rate: normal  (-) murmur     Dental - normal exam           Pulmonary - normal exam  Breath sounds clear to auscultation     Abdominal    Neurological - normal exam             Anesthesia Plan    ASA 3   ASA " physical status 3 criteria: MI or angina - history (> 3 months) and CAD/stents (> 3 months)    Plan - general and peripheral nerve block     Peripheral nerve block will be post-op pain control  Airway plan will be ETT        Plan Factors:   Patient was not previously instructed to abstain from smoking on day of procedure.  Patient did not smoke on day of procedure.      Induction: intravenous    Postoperative Plan: Postoperative administration of opioids is intended.    Pertinent diagnostic labs and testing reviewed    Informed Consent:    Anesthetic plan and risks discussed with patient.

## 2021-04-12 NOTE — OR NURSING
1303-pt arrived from OR via [facility] to PACU following a ritht shoulder arthroscopy. Patient has oral airway in place with o2 mask at 6L via NC. Dressing to RUE CDI, cold pack placed on area. Shoulder immobilizer in place.     1315- no changes, oral airway in place    1330-pt waking, oral airway removed. Pt sleepy, denies any pain. Reports numbness in the RUE but able to move fingers.     1345- O2 removed and pt on RA given incentive spirometer and instructed on use. Called and updated patients daughter Lara at this time     1403- pt meets criteria for discharge to stage II at this time     1406-report given to [facility] CRISTINA.     1414- pt transferred via [facility] to stage II.

## 2021-04-12 NOTE — ANESTHESIA PROCEDURE NOTES
Peripheral Block    Date/Time: 4/12/2021 10:40 AM  Performed by: Bess Caro M.D.  Authorized by: Bess Caro M.D.     Patient Location:  Pre-op  Start Time:  4/12/2021 10:40 AM  End Time:  4/12/2021 10:47 AM  Reason for Block: at surgeon's request and post-op pain management ONLY    patient identified, IV checked, site marked, risks and benefits discussed, surgical consent, monitors and equipment checked, pre-op evaluation and timeout performed    Patient Position:  Supine  Prep: ChloraPrep    Monitoring:  Heart rate, continuous pulse ox and cardiac monitor  Block Region:  Upper Extremity  Upper Extremity - Block Type:  BRACHIAL PLEXUS block, Supraclavicular approach    Laterality:  Right  Procedures: ultrasound guided  Image captured, interpreted and electronically stored.  Local Infiltration:  Lidocaine  Strength:  2 %  Dose:  5 ml  Block Type:  Single-shot  Needle Length:  100mm  Needle Gauge:  21 G  Needle Localization:  Ultrasound guidance  Injection Assessment:  Negative aspiration for heme, no paresthesia on injection, incremental injection and local visualized surrounding nerve on ultrasound  Evidence of intravascular injection: No     US Guided Right Supraclavicular Brachial Plexus Block    US transducer placed cephalad and parallel to clavicle in angle to view the subclavian artery with the brachial plexus lateral and superficial to the artery. Needle inserted lateral to the transducer in-plane and advanced with the needle tip visualized continually into the perineural position. After negative aspiration, 30 ml of LA injected without resistance.

## 2021-04-12 NOTE — OR NURSING
Pt allergies and NPO status verified, home meds reconcilled. Belongings secured. Pt verbalizes understanding of the pain scale, expected course of stay, and plan of care.  Surgical site verified with pt.  IV access established.  Sequentials/ gerald hose placed on legs.

## 2021-04-12 NOTE — OR SURGEON
Immediate Post OP Note    PreOp Diagnosis: R shoulder RTC tear, biceps tendonitis, labral tearing, impingement    PostOp Diagnosis: same    Procedure(s):  ARTHROSCOPY, SHOULDER, WITH ROTATOR CUFF REPAIR - Wound Class: Clean  ARTHROSCOPY, SHOULDER, WITH BICEPS TENOTOMY - Wound Class: Clean  DECOMPRESSION, SHOULDER, ARTHROSCOPIC - SUBACROMIAL, LABRAL DEBRIDEMENT - Wound Class: Clean    Surgeon(s):  Eusebia Kinney M.D.    Anesthesiologist/Type of Anesthesia:  Anesthesiologist: Bess Caro M.D./General    Surgical Staff:  Circulator: Jarrod Jaime R.N.; Ashley Son R.N.  Scrub Person: Lynnette Hansen; Fatoumata Estrella    Specimens removed if any:  None    Estimated Blood Loss: <5 mL    Findings: RTC tear including subscapularis tear    Complications: none        4/12/2021 12:57 PM Eusebia Kinney M.D.

## 2021-04-12 NOTE — DISCHARGE INSTRUCTIONS
ACTIVITY: Rest and take it easy for the first 24 hours.  A responsible adult is recommended to remain with you during that time.  It is normal to feel sleepy.  We encourage you to not do anything that requires balance, judgment or coordination.    MILD FLU-LIKE SYMPTOMS ARE NORMAL. YOU MAY EXPERIENCE GENERALIZED MUSCLE ACHES, THROAT IRRITATION, HEADACHE AND/OR SOME NAUSEA.    FOR 24 HOURS DO NOT:  Drive, operate machinery or run household appliances.  Drink beer or alcoholic beverages.   Make important decisions or sign legal documents.    SPECIAL INSTRUCTIONS:     Non weight bearing to right upper arm   Keep your dressing clean and dry until you follow up with your doctor   Take pain medication as prescribed   Wears the immobilizer at all times     DIET: To avoid nausea, slowly advance diet as tolerated, avoiding spicy or greasy foods for the first day.  Add more substantial food to your diet according to your physician's instructions.    INCREASE FLUIDS AND FIBER TO AVOID CONSTIPATION.      FOLLOW-UP APPOINTMENT:  A follow-up appointment should be arranged with your doctor; call to schedule.    You should CALL YOUR PHYSICIAN if you develop:  Fever greater than 101 degrees F.  Pain not relieved by medication, or persistent nausea or vomiting.  Excessive bleeding (blood soaking through dressing) or unexpected drainage from the wound.  Extreme redness or swelling around the incision site, drainage of pus or foul smelling drainage.  Inability to urinate or empty your bladder within 8 hours.  Problems with breathing or chest pain.    You should call 911 if you develop problems with breathing or chest pain.  If you are unable to contact your doctor or surgical center, you should go to the nearest emergency room or urgent care center.  Physician's telephone (Dr. Kinney) #: [phone]    If any questions arise, call your doctor.  If your doctor is not available, please feel free to call the Surgical Center at  [phone]. The Contact Center is open Monday through Friday 7AM to 5PM and may speak to a nurse at [phone], or toll free at [phone].     A registered nurse may call you a few days after your surgery to see how you are doing after your procedure.    MEDICATIONS: Resume taking daily medication.  Take prescribed pain medication with food.  If no medication is prescribed, you may take non-aspirin pain medication if needed.  PAIN MEDICATION CAN BE VERY CONSTIPATING.  Take a stool softener or laxative such as senokot, pericolace, or milk of magnesia if needed.       If your physician has prescribed pain medication that includes Acetaminophen (Tylenol), do not take additional Acetaminophen (Tylenol) while taking the prescribed medication.    Depression / Suicide Risk    As you are discharged from this [facility] facility, it is important to learn how to keep safe from harming yourself.    Recognize the warning signs:  · Abrupt changes in personality, positive or negative- including increase in energy   · Giving away possessions  · Change in eating patterns- significant weight changes-  positive or negative  · Change in sleeping patterns- unable to sleep or sleeping all the time   · Unwillingness or inability to communicate  · Depression  · Unusual sadness, discouragement and loneliness  · Talk of wanting to die  · Neglect of personal appearance   · Rebelliousness- reckless behavior  · Withdrawal from people/activities they love  · Confusion- inability to concentrate     If you or a loved one observes any of these behaviors or has concerns about self-harm, here's what you can do:  · Talk about it- your feelings and reasons for harming yourself  · Remove any means that you might use to hurt yourself (examples: pills, rope, extension cords, firearm)  · Get professional help from the community (Mental Health, Substance Abuse, psychological counseling)  · Do not be alone:Call your Safe Contact- someone whom  "you trust who will be there for you.  · Call your local CRISIS HOTLINE 790-7502 or 106-593-8736  · Call your local Children's Mobile Crisis Response Team [place] (179) 557-8717 or www.HomeCon  · Call the toll free National Suicide Prevention Hotlines   · National Suicide Prevention Lifeline 726-194-PDEN (9282)  [place] [employer] Line Network 800-SUICIDE (031-9506)    Peripheral Nerve Block Discharge Instructions from Same Day Surgery and Inpatient :  What to Expect - Upper Extremity  · You may experience numbness and weakness in shoulder, arm and hand  on the same side as your surgery  · This is normal. For some people, this may be an unpleasant sensation. Be very careful with your numb limb  · Ask for help when you need it  Shoulder Surgery Side Effects  · In addition to numbness and weakness you may experience other symptoms  · Other nerves that are close to those nerves injected can also be affected by local anesthesia  · You may experience a hoarseness in your voice  · Your breathing may feel different  · You may also notice drooping of your eyelid, pupil constriction, and decreased sweating, on the side of your surgery  · All of these side effects are normal and will resolve when the local anesthetic wears off   Prevent Injury  · Protect the limb like a baby  · Beware of exposing your limb to extreme heat or cold or trauma  · The limb may be injured without you noticing because it is numb  · Keep the limb elevated whenever possible  · Do not sleep on the limb  · Change the position of the limb regularly  · Avoid putting pressure on your surgical limb  Pain Control  · The initial block on the day of surgery will make your extremity feel \"numb\"  · Any consecutive injection including prior to discharge from the hospital will make your extremity feel \"numb\"  · You may feel an aching or burning when the local anesthesia starts to wear off  · Take pain pills as prescribed by your surgeon  · Call your " surgeon or anesthesiologist if you do not have adequate pain control    Discharge Education for patients on SHYAM (Obstructive Sleep Apnea) Protocol    We recommend that you should be with an adult observer for at least 24 hours after your sedation/anesthesia.  If you have a CPAP machine, you should wear it during any sleep period (day or night) for the week following your procedure.  We encourage you to sleep on your side or in a sitting position, even with napping.  Lying flat on your back increases the risk of apnea and airway obstruction during your post procedure recovery period.    It is important to prevent over-sedation that could increase your risk for apnea.  Please take all pain medication as directed by your physician.  If you are not getting pain relief, please contact your physician to discuss possible approaches to relieving pain while minimizing medications that can affect your breathing and oxygen levels.

## 2021-04-12 NOTE — ANESTHESIA POSTPROCEDURE EVALUATION
Patient: Anthony Cloud    Procedure Summary     Date: 04/12/21 Room / Location:  OR  / SURGERY [facility]    Anesthesia Start: 1051 Anesthesia Stop: 1306    Procedures:       ARTHROSCOPY, SHOULDER, WITH ROTATOR CUFF REPAIR (Right Shoulder)      ARTHROSCOPY, SHOULDER, WITH BICEPS TENODESIS - FOR TENOTOMY (Right Shoulder)      DECOMPRESSION, SHOULDER, ARTHROSCOPIC - SUBACROMIAL, LABRAL DEBRIDEMENT (Right Shoulder) Diagnosis: (ROTATOR CUFF TEAR, TENDONITIS OF LONG HEAD OF BICEPS BRACHII OF RIGHT SHOULDER, IMPINGEMENT SYNDROME OF RIGHT SHOULDER)    Surgeons: Eusebia Kinney M.D. Responsible Provider: Bess Caro M.D.    Anesthesia Type: general, peripheral nerve block ASA Status: 3          Final Anesthesia Type: general, peripheral nerve block  Last vitals  BP   Blood Pressure : 122/78    Temp   36.2 °C (97.2 °F)    Pulse   (Abnormal) 55   Resp   16    SpO2   90 %      Anesthesia Post Evaluation    Patient location during evaluation: PACU  Patient participation: complete - patient participated  Level of consciousness: awake and alert  Pain score: 0    Airway patency: patent  Anesthetic complications: no  Cardiovascular status: hemodynamically stable  Respiratory status: acceptable  Hydration status: euvolemic    PONV: none    patient able to participate, but full recovery from regional anesthesia has not occurred and is not expected within the stipulated timeframe for the completion of the evaluation      No complications documented.     Nurse Pain Score: 0 (NPRS)

## 2021-04-12 NOTE — OP REPORT
DATE OF SERVICE:  4/12/2021     PREOPERATIVE DIAGNOSES:  1. Right shoulder rotator cuff tear including subscapularis tear.  2. Right shoulder biceps tendonitis.  3. Right shoulder subacromial impingement      POSTOPERATIVE DIAGNOSES:  1. Right shoulder rotator cuff tear including subscapularis tear.  2. Right shoulder biceps tendonitis.  3. Right shoulder subacromial impingement and bursitis  4. Right shoulder extensive labral tearing, glenohumeral synovitis, and glenohumeral chondromalacia     PROCEDURE PERFORMED:  1. Right shoulder diagnostic arthroscopy.  2. Right shoulder arthroscopic rotator cuff repair including subscapularis repair.  3. Right shoulder biceps tenotomy  4. Right shoulder subacromial decompression.  5. Right shoulder extensive debridement of labrum, glenohumeral synovitis, bursitis, and glenohumeral chondromalacia    ATTENDING SURGEON:  Eusebia Kinney MD     ASSISTANT: first nany Harris     ANESTHESIA:  General with an interscalene nerve block.     ANESTHESIOLOGIST:  Bess Caro M.D.     SPECIMENS:  None.     ESTIMATED BLOOD LOSS:  10 mL     FINDINGS:  There was a large U-shaped rotator cuff tear of the supraspinatus with minimal retraction.  There was also an almost complete tear of the upper border of the subscapularis with minimal retraction.  There was biceps tendinitis with a biceps anchor tearing, circumferential labral tearing, glenohumeral synovitis and grade II chondromalacia of the glenoid and humeral head.  There was significant subacromial impingement and bursitis.     COMPLICATIONS:  None.     IMPLANTS:   and Nephew Healicoil triple loaded suture anchors x 2 for rotator cuff repair, S&N 2.8mm Q fix suture anchor for subscapularis repair.     INDICATIONS:  The patient is a very nice 75 year-old right hand dominant male who injured his right shoulder when he fell while walking his dog and has since had pain and dysfunction of the right arm. The patient  had significant weakness after falling as well as pain.  An MRI revealed a large rotator cuff tear.  I had a long discussion with the patient regarding the risks and benefits of surgery including but not limited to bleeding, infection, risk to surrounding structures such as blood vessels and nerves, pain, scar, reoperation, hardware failure, risk with anesthesia such as stroke, heart attack, deep venous thrombosis, pulmonary embolism and death.  The patient understood the risks and benefits and elected to proceed with surgery.     PROCEDURE IN DETAIL:  The patient was met in the preoperative hold area where the correct right upper extremity was marked with my initials. The patient then underwent an interscalene nerve block under ultrasound guidance by the   anesthesia team. The patient was transferred to the operating room where he was placed supine on the operating room table with all bony prominences well padded.  The patient received 2 g of preoperative Ancef and 900mg Clindamycin. The patient was intubated by the anesthesia team without complications. The patient was then placed in the beach chair position again with all bony prominences well padded.  Preoperative exam under   anesthesia revealed full range of motion of the right shoulder forward flexion   to 180 degrees, abduction to 180 degrees, external rotation to 45 degrees.    The patient's right upper extremity was then prepped and draped in the usual   sterile fashion.  A preoperative timeout was held where all those in the room   agreed upon the correct patient, the correct site of surgery and the correct   surgery to be performed.     I began the procedure by using an #11 blade to make my posterior portal and inserted the scope into the glenohumeral joint.  Upon entering the glenohumeral joint, there was significant glenohumeral synovitis.  I then made my anterior portal under direct visualization using a spinal needle.   The patient had tearing at the  biceps anchor on the labrum as well as significant biceps tendinitis with a positive lipstick sign when the biceps was pulled into the joint.  The biceps was also subluxated medially secondary to the subscapularis tear.  I used the arthroscopic biter to perform my biceps tenotomy.  I used the 4.0 mm sucker shaver to debride the biceps tendon stump. There was tearing of the labrum circumferentially.  I then used the 4.0mm sucker shaver to perform an extensive labral debridement.  There were no loose bodies in the axillary recess.  The subscapularis had an almost complete tear of the upper border with minimal retraction.  The cartilage of the glenohumeral joint did show grade II chondromalacia on the humeral head and the glenoid.  There was also extensive synovitis throughout the glenohumeral joint.  I used the 4.0mm sucker shaver to perform a chondroplasty of the glenohumeral joint.  The 4.0mm sucker shaver and arthrocare wand was used to perform the glenohumeral synovectomy. Given the almost complete tear of the upper border of the subscapularis, I elected to proceed with a subscapularis repair.  The footprint on the lesser tuberosity was debrided with the 4.0mm sucker shaver and a ball rasp to get down to nice good bleeding bone.  Next, I placed one Smith and Nephew 2.8mm Q fix suture anchor on the lesser tuberosity without complications.  I then used the FirstPass suture passer to pass the sutures in a simple fashion through the subscapularis.  The sutures were then tied and I had excellent reduction of the tendon back to the footprint.  I then copiously irrigated out the glenohumeral joint and removed all debris with the 4.0mm sucker shaver.     The scope was then inserted into the subacromial space and I established my   lateral portal under direct visualization with a spinal needle.  There was extensive bursitis that was very thickened, so I used the 4.0 mm sucker shaver  to perform a bursectomy.   There was a  large anterior osteophyte on the undersurface of the acromion causing impingement.  I then performed a subacromial decompression using the 5.5 mm resector and Arthrocare wand.  Visualizing the rotator cuff from the bursal side again showed a large, full thickness tear of the supraspinatus with minimal retraction.  I then used the 4.0 mm sucker shaver to bur the bone at the greater tuberosity to allow for bleeding after I placed my anchors.  I then placed 2 Smith and Nephew 5.5mm healicoil suture anchors percutaneously both anteriorly and posteriorly on the greater tuberosity.  The anchors were inserted under direct visualization, so they did not penetrate the humeral head.  I then used the Smith and Nephew self capture FirstPass suture passer to pass my sutures from anterior to posterior and these were tied in a simple fashion.  This reduced the tendon back down to the greater tuberosity well with no significant tension on the tendon.  I then copiously irrigated out the shoulder with arthroscopic fluid and the scope was removed from the shoulder.    The wounds were copiously irrigated and a 3-0 Prolene was used to suture the portals and a sterile dressing was applied.  The patient's operative extremity was placed in a shoulder abduction sling and awoken from anesthesia without complications.  Please note that all counts were correct at the end of the case and first nany Harris, was necessary and critical for all portions of the procedure.        Eusebia Kinney MD

## 2021-04-12 NOTE — ANESTHESIA TIME REPORT
Anesthesia Start and Stop Event Times     Date Time Event    4/12/2021 10:48 AM Ready for Procedure    4/12/2021 10:51 AM Anesthesia Start    4/12/2021 01:06 PM Anesthesia Stop        Responsible Staff  04/12/21    Name Role Begin End    Bess Caro M.D. Anesthesiologist 04/12/21 10:51 AM 04/12/21 01:06 PM        Preop Diagnosis (Free Text):  Pre-op Diagnosis     ROTATOR CUFF TEAR, TENDONITIS OF LONG HEAD OF BICEPS BRACHII OF RIGHT SHOULDER, IMPINGEMENT SYNDROME OF RIGHT SHOULDER        Preop Diagnosis (Codes):    Post op Diagnosis  Impingement syndrome of right shoulder      Premium Reason  Non-Premium    Comments: Right shoulder arthroscopoy, SAD, DCR, RCR, Debridement, Biceps tenotomy

## 2021-04-12 NOTE — OR NURSING
1413: To stage ll. Up to chair and dressed w/ CNA assist. Pt denies pain or nausea. Awaiting daughter.  1509: Home care instructions reviewed w/ pt and daughter. No questions, meets criteria for discharge.

## 2021-04-29 ENCOUNTER — ANTICOAGULATION VISIT (OUTPATIENT)
Dept: VASCULAR LAB | Facility: MEDICAL CENTER | Age: 76
End: 2021-04-29
Attending: INTERNAL MEDICINE
Payer: MEDICARE

## 2021-04-29 VITALS — SYSTOLIC BLOOD PRESSURE: 124 MMHG | DIASTOLIC BLOOD PRESSURE: 74 MMHG | HEART RATE: 56 BPM

## 2021-04-29 DIAGNOSIS — Z86.718 PERSONAL HISTORY OF VENOUS THROMBOSIS AND EMBOLISM: ICD-10-CM

## 2021-04-29 DIAGNOSIS — I48.91 ATRIAL FIBRILLATION, UNSPECIFIED TYPE (HCC): ICD-10-CM

## 2021-04-29 DIAGNOSIS — J98.4 RESTRICTIVE LUNG DISEASE: ICD-10-CM

## 2021-04-29 PROCEDURE — 99212 OFFICE O/P EST SF 10 MIN: CPT | Performed by: NURSE PRACTITIONER

## 2021-04-29 NOTE — PROGRESS NOTES
Diagnosis: DVT/PE, AF  Drug: Xarelto 20 mg (pt taking 10 mg due to epistaxis)   LOT: Indefinite  CHADSVASC: 6  HAS-BLED: 3 (age, asa 81 mg for CAD with stent, etoh)    Health Status Since Last Assessment  Pt doing well on Xarelto. He is breaking his 20 mg tab in half. States he knows the recommended dose is 20 mg daily, however, he has frequent nose bleeds when taking 20 mg. He wants to continue 10 mg and is aware of the risks. I discussed sending a rx for 10 mg tabs rather than having him break in half since the absorption can be affected. He prefers to finish his current supply first (has ~ 6 month supply).    He went to the ER in March after falling while walking his dogs. Underwent right should arthroscopy with repair on 4/12/21. Stopped Xarelto 2 days prior. No problems with bleeding. No s/sx of recurrent VTE or stroke.    Adherence with DOAC Therapy  0 missed dose(s)  BLEEDING RISK ASSESSMENT NB:    Bleeding Risk Assessment  Severe epistaxis - none since taking 10 mg   Hemoptysis - no  Excessive or unusual bruising / hematomas - no  GIB/melena/BRBPR/hematemesis - no  Hematuria - no   Abnormal vaginal bleeding - n/a  Concerning daily headache or subdural hematoma symptoms - no  Decreasing hemoglobin or new anemia - no  Falls, presyncope, syncope, or seizures - no  Platelets: 186  Latest hemoglobin:     Lab Results   Component Value Date/Time    WBC 5.3 04/08/2021 01:48 PM    RBC 3.93 (L) 04/08/2021 01:48 PM    HEMOGLOBIN 13.4 (L) 04/08/2021 01:48 PM    HEMATOCRIT 40.6 (L) 04/08/2021 01:48 PM    .3 (H) 04/08/2021 01:48 PM    MCH 34.1 (H) 04/08/2021 01:48 PM    MCHC 33.0 (L) 04/08/2021 01:48 PM    MPV 9.4 04/08/2021 01:48 PM    NEUTSPOLYS 73.20 (H) 07/10/2020 11:15 AM    LYMPHOCYTES 14.30 (L) 07/10/2020 11:15 AM    MONOCYTES 10.40 07/10/2020 11:15 AM    EOSINOPHILS 1.30 07/10/2020 11:15 AM    BASOPHILS 0.50 07/10/2020 11:15 AM        HAS-BLED:  Hypertension (uncontrolled, >160 mmHg systolic) - no  Renal  disease (dialysis, transplant, Cr >2.26 mg/dL or >200 µmol/L) - no  Liver disease (cirrhosis or bilirubin >2x normal with AST/ALT/AP >3x normal) - no  Stroke history - no  Prior major bleeding or predisposition to bleeding - no  Labile INR Unstable/high INRs, time in therapeutic range <60% - n/a  Age >65 - yes  Medication usage predisposing to bleeding (aspirin, clopidogrel, NSAIDs) - baby asa for CAD with stent. To be reviewed on 5/4/21 with cardiology.  Alcohol use  ?8 drinks/week - 2-3 drinks 2-3 times per week      Creatinine Clearance/Renal Function  Latest eGFR / creatinine:  Lab Results   Component Value Date/Time    SODIUM 139 04/08/2021 01:48 PM    POTASSIUM 4.6 04/08/2021 01:48 PM    CHLORIDE 106 04/08/2021 01:48 PM    CO2 22 04/08/2021 01:48 PM    GLUCOSE 91 04/08/2021 01:48 PM    BUN 25 (H) 04/08/2021 01:48 PM    CREATININE 1.08 04/08/2021 01:48 PM    CREATININE 1.06 01/04/2013 08:01 AM    BUNCREATRAT 22 12/14/2018 07:20 AM    BUNCREATRAT 25 (H) 01/04/2013 08:01 AM      • Is eGFR less than 50ml/min  no  Cr cl 100 ml/min    If YES, calculate CrCl (see back)  Any recent dehydrating illness or medications added/changed? i.e. Diuretics      Drug Interactions  ASA/antiplatelets - baby aspirin  NSAID - none  Other drug interactions -  SSRI, ASA(Review med list / OTCs;)  Current Outpatient Medications on File Prior to Visit   Medication Sig Dispense Refill   • traMADol (ULTRAM) 50 MG Tab      • aspirin (ASA) 81 MG Chew Tab chewable tablet Chew 81 mg every day.     • rivaroxaban (XARELTO) 20 MG Tab tablet Take 1 tablet by mouth with dinner. 90 tablet 1   • sotalol (BETAPACE) 120 MG tablet Take 1 Tab by mouth 2 Times a Day. 60 Tab 5   • atorvastatin (LIPITOR) 40 MG Tab TAKE ONE TABLET BY MOUTH EVERY DAY 90 Tab 1   • losartan (COZAAR) 50 MG Tab Take 1 Tab by mouth 2 Times a Day. 180 Tab 3   • amLODIPine (NORVASC) 2.5 MG Tab Take 1 Tab by mouth every day. 30 Tab 11   • magnesium chloride (MAG-64) 64 MG Tablet  Delayed Response Take 1 Tab by mouth 2 times a day, with meals. (Patient taking differently: Take 64 mg by mouth every day.) 60 Tab 11   • omeprazole (PRILOSEC) 20 MG delayed-release capsule Take 1 Cap by mouth every bedtime. 30 Cap 0   • Multiple Vitamins-Minerals (MULTIVITAMIN ADULT PO) Take 1 Tab by mouth every morning.     • Glucosamine HCl (GLUCOSAMINE PO) Take 1 Tab by mouth every morning.     • escitalopram (LEXAPRO) 10 MG Tab TAKE ONE (1) TABLET BY MOUTH EVERY DAY (Patient taking differently: Take 10 mg by mouth every bedtime. TAKE ONE (1) TABLET BY MOUTH EVERY DAY) 90 Tab 3     No current facility-administered medications on file prior to visit.     Verified no anticonvulsant or azole therapy, education provided for future use.      Examination  Blood pressure:  124/74  • Elevated BP - no (sBP greater than 160 mmHg)  • Symptomatic hypotension - no  Significant gait impairment / imbalance / high risk for falls - age is a risk factor    Final Assessment and Recommendations:  Patient appears stable from the anticoagulation standpoint  Benefits of continued DOAC therapy outweigh risks for this patient  Recommend continue current DOAC at same dose    Continue Xarelto 10 mg daily. He will discuss his current dose with Dr Ramirez on Tuesday.   If he remains on 10 mg, will send rx for 10 mg tabs     Other Actions:   CBC, CMP prior to next visit.    Follow up:  Will follow up with patient in 6 months      Shannan SLAUGHTER

## 2021-04-30 NOTE — TELEPHONE ENCOUNTER
I called and left voice message appointment needed before further refills on Breo. Last seen 8/28/20. One month RX sent to [facility] on [address] yesterday. I was responding to Voice message. Request completed BUT appointment a must.

## 2021-05-25 ENCOUNTER — OFFICE VISIT (OUTPATIENT)
Dept: CARDIOLOGY | Facility: MEDICAL CENTER | Age: 76
End: 2021-05-25
Payer: MEDICARE

## 2021-05-25 VITALS
DIASTOLIC BLOOD PRESSURE: 68 MMHG | HEART RATE: 56 BPM | OXYGEN SATURATION: 96 % | HEIGHT: 76 IN | BODY MASS INDEX: 32.63 KG/M2 | WEIGHT: 268 LBS | SYSTOLIC BLOOD PRESSURE: 128 MMHG

## 2021-05-25 DIAGNOSIS — I48.91 ATRIAL FIBRILLATION, UNSPECIFIED TYPE (HCC): ICD-10-CM

## 2021-05-25 LAB — EKG IMPRESSION: NORMAL

## 2021-05-25 PROCEDURE — 93000 ELECTROCARDIOGRAM COMPLETE: CPT | Performed by: INTERNAL MEDICINE

## 2021-05-25 PROCEDURE — 99214 OFFICE O/P EST MOD 30 MIN: CPT | Performed by: INTERNAL MEDICINE

## 2021-05-25 ASSESSMENT — FIBROSIS 4 INDEX: FIB4 SCORE: 1.84

## 2021-05-25 NOTE — PROGRESS NOTES
Arrhythmia Clinic Note (Established patient)    DOS: 5/25/2021    Chief complaint/Reason for consult: Afib    Interval History: Doing well on sotalol. H/o AF ablation for persistent Afib. Very pleased his heart rate is so steady without recurrence of Afib. Had recent rotator cuff surgery due to mechanical fall.    ROS (+ highlighted in bold):  Constitutional: Fevers/chills/fatigue/weightloss  HEENT: Blurry vision/eye pain/sore throat/hearing loss  Respiratory: Shortness of breath/cough  Cardiovascular: Chest pain/palpitations/edema/orthopnea/syncope  GI: Nausea/vomitting/diarrhea  MSK: Arthralgias/myagias/muscle weakness  Skin: Rash/sores  Neurological: Numbness/tremors/vertigo  Endocrine: Excessive thirst/polyuria/cold intolerance/heat intolerance  Psych: Depression/anxiety    Past Medical History:   Diagnosis Date   • Acute nasopharyngitis 01/2020   • Anemia    • Arrhythmia     a fib   • Bowel habit changes     constipation    • Breath shortness     since 11/2019, hx cardiac arrhythmia    • CAD (coronary artery disease) 2003    PCI/BMS x 3 to the RCA (Zeta 4.0 x 23mm, 3.5 x 23mm, 3.0 x 18mm).   • Cataract     alejandra IOL    • Central sleep apnea      ICD-10 transition   • Chronic anticoagulation    • COPD (chronic obstructive pulmonary disease) (HCC)    • Depression    • Depressive disorder, not elsewhere classified         • Dyslipidemia    • Hemorrhagic disorder (HCC)     takes xarelto    • High cholesterol    • Hx MRSA infection 2009, 2014    right ankle 2014, right thumb 2009   • Hypertension    • Hypothyroidism    • Mixed hyperlipidemia    • Obesity    • Old myocardial infarction January 2003    cardiac stents x3   • Peptic ulcer disease 8/4/2016   • Personal history of venous thrombosis and embolism     On Xarelto   • Pulmonary embolism (HCC) 2003/2004   • Sleep apnea     BiPAP with 3L oxygen       Past Surgical History:   Procedure Laterality Date   • PB SHLDR ARTHROSCOP,SURG,W/ROTAT CUFF REPB Right 4/12/2021     Procedure: ARTHROSCOPY, SHOULDER, WITH ROTATOR CUFF REPAIR;  Surgeon: Eusebia Kinney M.D.;  Location: SURGERY [facility];  Service: Orthopedics   • PB ARTHROSCOPY SHOULDER SURGICAL BICEPS TENODES* Right 4/12/2021    Procedure: ARTHROSCOPY, SHOULDER, WITH BICEPS TENODESIS - FOR TENOTOMY;  Surgeon: Eusebia Kinney M.D.;  Location: SURGERY [facility];  Service: Orthopedics   • PB SHLDR ARTHROSCOP,PART ACROMIOPLAS Right 4/12/2021    Procedure: DECOMPRESSION, SHOULDER, ARTHROSCOPIC - SUBACROMIAL, LABRAL DEBRIDEMENT;  Surgeon: Eusebia Kinney M.D.;  Location: SURGERY [facility];  Service: Orthopedics   • OTHER CARDIAC SURGERY  07/2020    Cardiac ablasion    • COLONOSCOPY  2020   • WRIST ORIF Left 2/23/2018    Procedure: WRIST ORIF;  Surgeon: Jasper Hammond M.D.;  Location: SURGERY [facility];  Service: Orthopedics   • INCISION AND DRAINAGE ORTHOPEDIC  8/29/2014    Performed by Wolfgang Merrill M.D. at SURGERY [facility]   • CARDIAC CATH, RIGHT/LEFT HEART  2003 01/2003 4.0x23mm Zeta stent to proximal RCA,3.5x22mm distal to first stent, 3.0x15mm at the point of original occlusion.   • OTHER  2001    back sx    • PB ANESTH,LOWER LEG ARTERIES SURG         Social History     Socioeconomic History   • Marital status:      Spouse name: Not on file   • Number of children: Not on file   • Years of education: Not on file   • Highest education level: Not on file   Occupational History   • Not on file   Tobacco Use   • Smoking status: Never Smoker   • Smokeless tobacco: Never Used   Vaping Use   • Vaping Use: Never used   Substance and Sexual Activity   • Alcohol use: Yes     Comment: 8 drinks per week   • Drug use: No   • Sexual activity: Not on file   Other Topics Concern   • Not on file   Social History Narrative   • Not on file     Social Determinants of Health     Financial Resource Strain:    • Difficulty of Paying Living Expenses:    Food Insecurity:    • Worried About  Running Out of Food in the Last Year:    • Ran Out of Food in the Last Year:    Transportation Needs:    • Lack of Transportation (Medical):    • Lack of Transportation (Non-Medical):    Physical Activity:    • Days of Exercise per Week:    • Minutes of Exercise per Session:    Stress:    • Feeling of Stress :    Social Connections:    • Frequency of Communication with Friends and Family:    • Frequency of Social Gatherings with Friends and Family:    • Attends [employer] Services:    • Active Member of Clubs or Organizations:    • Attends Club or Organization Meetings:    • Marital Status:    Intimate Partner Violence:    • Fear of Current or Ex-Partner:    • Emotionally Abused:    • Physically Abused:    • Sexually Abused:        Family History   Problem Relation Age of Onset   • Other Mother         Passed at 98   • Heart Disease Father 60        CABG       Allergies   Allergen Reactions   • Lisinopril      cough       Current Outpatient Medications   Medication Sig Dispense Refill   • Fluticasone Furoate-Vilanterol (BREO ELLIPTA) 200-25 MCG/INH AEROSOL POWDER, BREATH ACTIVATED Inhale 1 Puff every day. 1 Each 0   • traMADol (ULTRAM) 50 MG Tab Take 50 mg by mouth every day.     • aspirin (ASA) 81 MG Chew Tab chewable tablet Chew 81 mg every day.     • rivaroxaban (XARELTO) 20 MG Tab tablet Take 1 tablet by mouth with dinner. (Patient taking differently: Take 10 mg by mouth with dinner.) 90 tablet 1   • sotalol (BETAPACE) 120 MG tablet Take 1 Tab by mouth 2 Times a Day. 60 Tab 5   • atorvastatin (LIPITOR) 40 MG Tab TAKE ONE TABLET BY MOUTH EVERY DAY 90 Tab 1   • losartan (COZAAR) 50 MG Tab Take 1 Tab by mouth 2 Times a Day. 180 Tab 3   • amLODIPine (NORVASC) 2.5 MG Tab Take 1 Tab by mouth every day. 30 Tab 11   • magnesium chloride (MAG-64) 64 MG Tablet Delayed Response Take 1 Tab by mouth 2 times a day, with meals. (Patient taking differently: Take 64 mg by mouth every day.) 60 Tab 11   • omeprazole (PRILOSEC) 20  "MG delayed-release capsule Take 1 Cap by mouth every bedtime. 30 Cap 0   • Multiple Vitamins-Minerals (MULTIVITAMIN ADULT PO) Take 1 Tab by mouth every morning.     • Glucosamine HCl (GLUCOSAMINE PO) Take 1 Tab by mouth every morning.     • escitalopram (LEXAPRO) 10 MG Tab TAKE ONE (1) TABLET BY MOUTH EVERY DAY (Patient taking differently: Take 10 mg by mouth every bedtime. TAKE ONE (1) TABLET BY MOUTH EVERY DAY) 90 Tab 3     No current facility-administered medications for this visit.       Physical Exam:  Vitals:    05/25/21 0903   BP: 128/68   BP Location: Left arm   Patient Position: Sitting   BP Cuff Size: Adult   Pulse: (!) 56   SpO2: 96%   Weight: 122 kg (268 lb)   Height: 1.93 m (6' 4\")     General appearance: NAD, conversant   Eyes: anicteric sclerae, moist conjunctivae; no lid-lag; PERRLA  HENT: Atraumatic; oropharynx clear with moist mucous membranes and no mucosal ulcerations; normal hard and soft palate  Neck: Trachea midline; FROM, supple, no thyromegaly or lymphadenopathy  Lungs: CTA, with normal respiratory effort and no intercostal retractions  CV: RRR, no MRGs, no JVD  Abdomen: Soft, non-tender; no masses or HSM  Extremities: No peripheral edema or extremity lymphadenopathy  Skin: Normal temperature, turgor and texture; no rash, ulcers or subcutaneous nodules  Psych: Appropriate affect, alert and oriented to person, place and time    Data:  Lipids:   Lab Results   Component Value Date/Time    CHOLSTRLTOT 101 11/07/2019 07:14 AM    TRIGLYCERIDE 64 11/07/2019 07:14 AM    HDL 40 11/07/2019 07:14 AM    LDL 48 11/07/2019 07:14 AM        BMP:  Lab Results   Component Value Date/Time    SODIUM 139 04/08/2021 1348    POTASSIUM 4.6 04/08/2021 1348    CHLORIDE 106 04/08/2021 1348    CO2 22 04/08/2021 1348    GLUCOSE 91 04/08/2021 1348    BUN 25 (H) 04/08/2021 1348    CREATININE 1.08 04/08/2021 1348    CALCIUM 8.9 04/08/2021 1348    ANION 11.0 04/08/2021 1348        TSH:   Lab Results   Component Value " Date/Time    TSHULTRASEN 0.840 02/12/2020 0525        THYROXINE (T4):   No results found for: FREEDIR     CBC:   Lab Results   Component Value Date/Time    WBC 5.3 04/08/2021 01:48 PM    RBC 3.93 (L) 04/08/2021 01:48 PM    HEMOGLOBIN 13.4 (L) 04/08/2021 01:48 PM    HEMATOCRIT 40.6 (L) 04/08/2021 01:48 PM    .3 (H) 04/08/2021 01:48 PM    MCH 34.1 (H) 04/08/2021 01:48 PM    MCHC 33.0 (L) 04/08/2021 01:48 PM    RDW 50.8 (H) 04/08/2021 01:48 PM    PLATELETCT 186 04/08/2021 01:48 PM    MPV 9.4 04/08/2021 01:48 PM    NEUTSPOLYS 73.20 (H) 07/10/2020 11:15 AM    LYMPHOCYTES 14.30 (L) 07/10/2020 11:15 AM    MONOCYTES 10.40 07/10/2020 11:15 AM    EOSINOPHILS 1.30 07/10/2020 11:15 AM    BASOPHILS 0.50 07/10/2020 11:15 AM    IMMGRAN 0.30 07/10/2020 11:15 AM    IMMGRAN 0 12/14/2018 07:20 AM    NRBC 0.00 07/10/2020 11:15 AM    NEUTS 4.56 07/10/2020 11:15 AM    NEUTS 3.2 12/14/2018 07:20 AM    LYMPHS 0.89 (L) 07/10/2020 11:15 AM    LYMPHS 1.1 12/14/2018 07:20 AM    MONOS 0.65 07/10/2020 11:15 AM    MONOS 0.7 12/14/2018 07:20 AM    EOS 0.08 07/10/2020 11:15 AM    EOS 0.1 12/14/2018 07:20 AM    BASO 0.03 07/10/2020 11:15 AM    BASO 0.0 12/14/2018 07:20 AM    IMMGRANAB 0.02 07/10/2020 11:15 AM    IMMGRANAB 0.0 12/14/2018 07:20 AM    NRBCAB 0.00 07/10/2020 11:15 AM        CBC w/o DIFF  Lab Results   Component Value Date/Time    WBC 5.3 04/08/2021 01:48 PM    RBC 3.93 (L) 04/08/2021 01:48 PM    HEMOGLOBIN 13.4 (L) 04/08/2021 01:48 PM    .3 (H) 04/08/2021 01:48 PM    MCH 34.1 (H) 04/08/2021 01:48 PM    MCHC 33.0 (L) 04/08/2021 01:48 PM    RDW 50.8 (H) 04/08/2021 01:48 PM    MPV 9.4 04/08/2021 01:48 PM       Prior echo/stress reviewed: Prior depressed EF 50%    EKG interpreted by me: SR with QT 436ms    Impression/Plan:  1. Atrial fibrillation, unspecified type (HCC)  EKG     1. Persistent Afib s/p ablation  2. Sotalol use, high risk med management  3. Oral anticoagulation monitoring    - Maintaining SR  - Continue sotalol.  QTc <440ms  - He is on Xarelto 10mg daily, could not tolerate 20mg dose. We discussed that low dose may not protect him from CVA, he understands this  - FV every 6 months    Nilton aRmirez MD  Cardiac Electrophysiology

## 2021-06-08 DIAGNOSIS — J98.4 RESTRICTIVE LUNG DISEASE: ICD-10-CM

## 2021-06-08 NOTE — TELEPHONE ENCOUNTER
Have we ever prescribed this med? Yes.  If yes, what date? 04/29/2021    Last OV: 08/28/2020 - Dr. Li    Next OV: 08/04/2021 - Dr. Harrison     DX: Restrictive lung disease    Medications: Breo

## 2021-06-28 DIAGNOSIS — I10 HTN (HYPERTENSION), MALIGNANT: ICD-10-CM

## 2021-06-30 RX ORDER — AMLODIPINE BESYLATE 2.5 MG/1
TABLET ORAL
Qty: 90 TABLET | Refills: 3 | Status: SHIPPED | OUTPATIENT
Start: 2021-06-30 | End: 2021-09-01

## 2021-08-04 ENCOUNTER — SLEEP CENTER VISIT (OUTPATIENT)
Dept: SLEEP MEDICINE | Facility: MEDICAL CENTER | Age: 76
End: 2021-08-04
Payer: MEDICARE

## 2021-08-04 VITALS
HEART RATE: 52 BPM | SYSTOLIC BLOOD PRESSURE: 116 MMHG | WEIGHT: 245 LBS | OXYGEN SATURATION: 98 % | TEMPERATURE: 97.5 F | BODY MASS INDEX: 29.83 KG/M2 | DIASTOLIC BLOOD PRESSURE: 68 MMHG | HEIGHT: 76 IN

## 2021-08-04 DIAGNOSIS — I27.20 PULMONARY HYPERTENSION (HCC): ICD-10-CM

## 2021-08-04 DIAGNOSIS — J45.30 MILD PERSISTENT ASTHMA WITHOUT COMPLICATION: ICD-10-CM

## 2021-08-04 DIAGNOSIS — G47.30 SLEEP APNEA, UNSPECIFIED TYPE: ICD-10-CM

## 2021-08-04 DIAGNOSIS — R06.02 SHORTNESS OF BREATH: ICD-10-CM

## 2021-08-04 DIAGNOSIS — J98.4 RESTRICTIVE LUNG DISEASE: ICD-10-CM

## 2021-08-04 PROCEDURE — 99214 OFFICE O/P EST MOD 30 MIN: CPT | Performed by: INTERNAL MEDICINE

## 2021-08-04 ASSESSMENT — FIBROSIS 4 INDEX: FIB4 SCORE: 1.87

## 2021-08-04 ASSESSMENT — PAIN SCALES - GENERAL: PAINLEVEL: NO PAIN

## 2021-08-04 NOTE — PROGRESS NOTES
Chief Complaint   Patient presents with   • Follow-Up     Asthma, Last seen 8/28/20   • Other     PFT 11/2020       HPI:  Anthony Cloud is a 76 year old male with history of atrial fibrillation s/p abalation on sotalol and self reported COPD on Breo . His shortness of breath started 10 years ago and he was told that he has mild COPD. He was started on Breo  and noted modest improvement with his shortness of breath. He underwent an elective PVI atrial fibrillation ablation on 07/19/2020. Since then, he report modest improvement with his shortness of breath. He is able to walk about 50 yards before he has to stop and rest. Associate with intermittent wheezing. Denies chest pain, N/V, fever/chills, nasal congestion. Rarely uses xopenex over the past few months.      Pulmonary function testing in November 2020 demonstrated an FEV1 of 2.57 L which is 73% of predicted with an FEV1 FVC ratio of 86%.  There is no significant response to an inhaled bronchodilator.  Total lung capacity is 75% predicted.  DLCO was 90% of predicted.  The study is more consistent with a restrictive process.  The patient does feel that he gains benefit from the use of Breo.  He is walking 2 miles a day and feels that his breathing is very stable and has improved significantly over the past 6 months.    Past Medical History:   Diagnosis Date   • Acute nasopharyngitis 01/2020   • Anemia    • Arrhythmia     a fib   • Bowel habit changes     constipation    • Breath shortness     since 11/2019, hx cardiac arrhythmia    • CAD (coronary artery disease) 2003    PCI/BMS x 3 to the RCA (Zeta 4.0 x 23mm, 3.5 x 23mm, 3.0 x 18mm).   • Cataract     alejandra IOL    • Central sleep apnea      ICD-10 transition   • Chronic anticoagulation    • COPD (chronic obstructive pulmonary disease) (HCC)    • Depression    • Depressive disorder, not elsewhere classified         • Dyslipidemia    • Hemorrhagic disorder (HCC)     takes xarelto    • High cholesterol    •  Hx MRSA infection 2009, 2014    right ankle 2014, right thumb 2009   • Hypertension    • Hypothyroidism    • Mixed hyperlipidemia    • Obesity    • Old myocardial infarction January 2003    cardiac stents x3   • Peptic ulcer disease 8/4/2016   • Personal history of venous thrombosis and embolism     On Xarelto   • Pulmonary embolism (HCC) 2003/2004   • Sleep apnea     BiPAP with 3L oxygen       ROS:   Constitutional: Denies fevers, chills, night sweats, fatigue or weight loss  Eyes: Denies vision loss, pain, drainage, double vision  Ears, Nose, Throat: Denies earache, tinnitus, hoarseness  Cardiovascular: Denies chest pain, tightness, palpitations at this time  Respiratory: See HPI  Sleep: Denies, snoring, apnea  GI: Denies abdominal pain, nausea, vomiting, diarrhea  : Denies frequent urination, hematuria, painful urination  Musculoskeletal: Denies back pain, painful joints, sore muscles  Neurological: Denies headaches, seizures  Skin: Denies rashes, color changes  Psychiatric: Denies depression or thoughts of suicide  Hematologic: Chronic anticoagulation  Allergic/Immunologic: Denies rhinitis, skin sensitivity    Social History     Socioeconomic History   • Marital status:      Spouse name: Not on file   • Number of children: Not on file   • Years of education: Not on file   • Highest education level: Not on file   Occupational History   • Not on file   Tobacco Use   • Smoking status: Never Smoker   • Smokeless tobacco: Never Used   Vaping Use   • Vaping Use: Never used   Substance and Sexual Activity   • Alcohol use: Yes     Comment: 8 drinks per week   • Drug use: No   • Sexual activity: Not on file   Other Topics Concern   • Not on file   Social History Narrative   • Not on file     Social Determinants of Health     Financial Resource Strain:    • Difficulty of Paying Living Expenses:    Food Insecurity:    • Worried About Running Out of Food in the Last Year:    • Ran Out of Food in the Last Year:     Transportation Needs:    • Lack of Transportation (Medical):    • Lack of Transportation (Non-Medical):    Physical Activity:    • Days of Exercise per Week:    • Minutes of Exercise per Session:    Stress:    • Feeling of Stress :    Social Connections:    • Frequency of Communication with Friends and Family:    • Frequency of Social Gatherings with Friends and Family:    • Attends [employer] Services:    • Active Member of Clubs or Organizations:    • Attends Club or Organization Meetings:    • Marital Status:    Intimate Partner Violence:    • Fear of Current or Ex-Partner:    • Emotionally Abused:    • Physically Abused:    • Sexually Abused:      Lisinopril  Current Outpatient Medications on File Prior to Visit   Medication Sig Dispense Refill   • amLODIPine (NORVASC) 2.5 MG Tab TAKE 1 TABLET BY MOUTH EVERY DAY 90 tablet 3   • sotalol (BETAPACE) 120 MG tablet TAKE 1 TABLET BY MOUTH TWICE DAILY 180 tablet 3   • traMADol (ULTRAM) 50 MG Tab Take 50 mg by mouth every day.     • aspirin (ASA) 81 MG Chew Tab chewable tablet Chew 81 mg every day.     • rivaroxaban (XARELTO) 20 MG Tab tablet Take 1 tablet by mouth with dinner. (Patient taking differently: Take 10 mg by mouth with dinner.) 90 tablet 1   • atorvastatin (LIPITOR) 40 MG Tab TAKE ONE TABLET BY MOUTH EVERY DAY 90 Tab 1   • losartan (COZAAR) 50 MG Tab Take 1 Tab by mouth 2 Times a Day. 180 Tab 3   • magnesium chloride (MAG-64) 64 MG Tablet Delayed Response Take 1 Tab by mouth 2 times a day, with meals. (Patient taking differently: Take 64 mg by mouth every day.) 60 Tab 11   • omeprazole (PRILOSEC) 20 MG delayed-release capsule Take 1 Cap by mouth every bedtime. 30 Cap 0   • Multiple Vitamins-Minerals (MULTIVITAMIN ADULT PO) Take 1 Tab by mouth every morning.     • Glucosamine HCl (GLUCOSAMINE PO) Take 1 Tab by mouth every morning.     • escitalopram (LEXAPRO) 10 MG Tab TAKE ONE (1) TABLET BY MOUTH EVERY DAY (Patient taking differently: Take 10 mg by mouth  "every bedtime. TAKE ONE (1) TABLET BY MOUTH EVERY DAY) 90 Tab 3     No current facility-administered medications on file prior to visit.     /68 (BP Location: Right arm, Patient Position: Sitting, BP Cuff Size: Large adult)   Pulse (!) 52   Temp 36.4 °C (97.5 °F) (Temporal)   Ht 1.93 m (6' 4\")   Wt 111 kg (245 lb)   SpO2 98%   Family History   Problem Relation Age of Onset   • Other Mother         Passed at 98   • Heart Disease Father 60        CABG       Physical Exam:  No distress on room air at rest  HEENT: PERRLA, EOMI, no scleral icterus, no nasal or oral lesions  Neck: No thyromegaly, no adenopathy, no bruits  Mallampatti: Grade II  Lungs: Equal breath sounds, no wheezes or crackles  Heart: Regular rate and rhythm, no gallops or murmurs  Abdomen: Soft, benign, no organomegaly  Extremities: No clubbing, cyanosis, or edema  Neurologic: Cranial nerve, motor, and sensory exam are normal    1. Restrictive lung disease    2. Shortness of breath    3. Sleep apnea, unspecified type    4. Pulmonary hypertension (HCC)    5. Mild persistent asthma without complication      His cardiac situation seems well controlled at this time.  He is in sinus rhythm.  His exercise capacity has improved.  He has not had any episodes of exacerbation of his breathing difficulties.  He may or may not have obstructive lung disease but he is doing well on his current medications and we will make no changes.  We will see him back in 1 year or sooner if he has issues.  He will continue to follow-up with cardiology.    "

## 2021-09-01 ENCOUNTER — APPOINTMENT (OUTPATIENT)
Dept: RADIOLOGY | Facility: MEDICAL CENTER | Age: 76
End: 2021-09-01
Attending: EMERGENCY MEDICINE
Payer: MEDICARE

## 2021-09-01 ENCOUNTER — APPOINTMENT (OUTPATIENT)
Dept: RADIOLOGY | Facility: IMAGING CENTER | Age: 76
End: 2021-09-01
Attending: PHYSICIAN ASSISTANT
Payer: MEDICARE

## 2021-09-01 ENCOUNTER — HOSPITAL ENCOUNTER (OUTPATIENT)
Facility: MEDICAL CENTER | Age: 76
End: 2021-09-04
Attending: EMERGENCY MEDICINE | Admitting: STUDENT IN AN ORGANIZED HEALTH CARE EDUCATION/TRAINING PROGRAM
Payer: MEDICARE

## 2021-09-01 ENCOUNTER — OFFICE VISIT (OUTPATIENT)
Dept: URGENT CARE | Facility: CLINIC | Age: 76
End: 2021-09-01
Payer: MEDICARE

## 2021-09-01 VITALS
SYSTOLIC BLOOD PRESSURE: 130 MMHG | TEMPERATURE: 97.6 F | BODY MASS INDEX: 32.03 KG/M2 | WEIGHT: 263 LBS | DIASTOLIC BLOOD PRESSURE: 90 MMHG | RESPIRATION RATE: 18 BRPM | HEART RATE: 55 BPM | OXYGEN SATURATION: 92 % | HEIGHT: 76 IN

## 2021-09-01 DIAGNOSIS — R07.81 RIB PAIN ON RIGHT SIDE: ICD-10-CM

## 2021-09-01 DIAGNOSIS — R10.9 ABDOMINAL PAIN, UNSPECIFIED ABDOMINAL LOCATION: ICD-10-CM

## 2021-09-01 DIAGNOSIS — R10.11 RIGHT UPPER QUADRANT ABDOMINAL PAIN: ICD-10-CM

## 2021-09-01 DIAGNOSIS — K59.00 CONSTIPATION, UNSPECIFIED CONSTIPATION TYPE: ICD-10-CM

## 2021-09-01 LAB
ALBUMIN SERPL BCP-MCNC: 4.3 G/DL (ref 3.2–4.9)
ALBUMIN/GLOB SERPL: 1.2 G/DL
ALP SERPL-CCNC: 82 U/L (ref 30–99)
ALT SERPL-CCNC: 22 U/L (ref 2–50)
ANION GAP SERPL CALC-SCNC: 9 MMOL/L (ref 7–16)
APPEARANCE UR: CLEAR
AST SERPL-CCNC: 26 U/L (ref 12–45)
BASOPHILS # BLD AUTO: 0.8 % (ref 0–1.8)
BASOPHILS # BLD: 0.05 K/UL (ref 0–0.12)
BILIRUB SERPL-MCNC: 0.6 MG/DL (ref 0.1–1.5)
BILIRUB UR QL STRIP.AUTO: NEGATIVE
BUN SERPL-MCNC: 21 MG/DL (ref 8–22)
CALCIUM SERPL-MCNC: 9.3 MG/DL (ref 8.5–10.5)
CHLORIDE SERPL-SCNC: 103 MMOL/L (ref 96–112)
CO2 SERPL-SCNC: 24 MMOL/L (ref 20–33)
COLOR UR: YELLOW
CREAT SERPL-MCNC: 1.1 MG/DL (ref 0.5–1.4)
EOSINOPHIL # BLD AUTO: 0.2 K/UL (ref 0–0.51)
EOSINOPHIL NFR BLD: 3.3 % (ref 0–6.9)
ERYTHROCYTE [DISTWIDTH] IN BLOOD BY AUTOMATED COUNT: 49.2 FL (ref 35.9–50)
GLOBULIN SER CALC-MCNC: 3.5 G/DL (ref 1.9–3.5)
GLUCOSE SERPL-MCNC: 107 MG/DL (ref 65–99)
GLUCOSE UR STRIP.AUTO-MCNC: NEGATIVE MG/DL
HCT VFR BLD AUTO: 42 % (ref 42–52)
HGB BLD-MCNC: 14.1 G/DL (ref 14–18)
IMM GRANULOCYTES # BLD AUTO: 0.02 K/UL (ref 0–0.11)
IMM GRANULOCYTES NFR BLD AUTO: 0.3 % (ref 0–0.9)
KETONES UR STRIP.AUTO-MCNC: NEGATIVE MG/DL
LACTATE BLD-SCNC: 1.2 MMOL/L (ref 0.5–2)
LEUKOCYTE ESTERASE UR QL STRIP.AUTO: NEGATIVE
LIPASE SERPL-CCNC: 26 U/L (ref 11–82)
LYMPHOCYTES # BLD AUTO: 1.08 K/UL (ref 1–4.8)
LYMPHOCYTES NFR BLD: 17.9 % (ref 22–41)
MCH RBC QN AUTO: 34 PG (ref 27–33)
MCHC RBC AUTO-ENTMCNC: 33.6 G/DL (ref 33.7–35.3)
MCV RBC AUTO: 101.2 FL (ref 81.4–97.8)
MICRO URNS: NORMAL
MONOCYTES # BLD AUTO: 0.58 K/UL (ref 0–0.85)
MONOCYTES NFR BLD AUTO: 9.6 % (ref 0–13.4)
NEUTROPHILS # BLD AUTO: 4.09 K/UL (ref 1.82–7.42)
NEUTROPHILS NFR BLD: 68.1 % (ref 44–72)
NITRITE UR QL STRIP.AUTO: NEGATIVE
NRBC # BLD AUTO: 0 K/UL
NRBC BLD-RTO: 0 /100 WBC
PH UR STRIP.AUTO: 5.5 [PH] (ref 5–8)
PLATELET # BLD AUTO: 173 K/UL (ref 164–446)
PMV BLD AUTO: 9 FL (ref 9–12.9)
POTASSIUM SERPL-SCNC: 4.6 MMOL/L (ref 3.6–5.5)
PROT SERPL-MCNC: 7.8 G/DL (ref 6–8.2)
PROT UR QL STRIP: NEGATIVE MG/DL
RBC # BLD AUTO: 4.15 M/UL (ref 4.7–6.1)
RBC UR QL AUTO: NEGATIVE
SODIUM SERPL-SCNC: 136 MMOL/L (ref 135–145)
SP GR UR STRIP.AUTO: 1.02
UROBILINOGEN UR STRIP.AUTO-MCNC: 0.2 MG/DL
WBC # BLD AUTO: 6 K/UL (ref 4.8–10.8)

## 2021-09-01 PROCEDURE — 96375 TX/PRO/DX INJ NEW DRUG ADDON: CPT

## 2021-09-01 PROCEDURE — 99220 PR INITIAL OBSERVATION CARE,LEVL III: CPT | Performed by: STUDENT IN AN ORGANIZED HEALTH CARE EDUCATION/TRAINING PROGRAM

## 2021-09-01 PROCEDURE — 85025 COMPLETE CBC W/AUTO DIFF WBC: CPT

## 2021-09-01 PROCEDURE — 96374 THER/PROPH/DIAG INJ IV PUSH: CPT | Mod: XU

## 2021-09-01 PROCEDURE — 81003 URINALYSIS AUTO W/O SCOPE: CPT

## 2021-09-01 PROCEDURE — G0378 HOSPITAL OBSERVATION PER HR: HCPCS

## 2021-09-01 PROCEDURE — 83690 ASSAY OF LIPASE: CPT

## 2021-09-01 PROCEDURE — 80053 COMPREHEN METABOLIC PANEL: CPT

## 2021-09-01 PROCEDURE — 71101 X-RAY EXAM UNILAT RIBS/CHEST: CPT | Mod: TC,RT | Performed by: PHYSICIAN ASSISTANT

## 2021-09-01 PROCEDURE — 76705 ECHO EXAM OF ABDOMEN: CPT

## 2021-09-01 PROCEDURE — 99215 OFFICE O/P EST HI 40 MIN: CPT | Performed by: PHYSICIAN ASSISTANT

## 2021-09-01 PROCEDURE — 700111 HCHG RX REV CODE 636 W/ 250 OVERRIDE (IP): Performed by: EMERGENCY MEDICINE

## 2021-09-01 PROCEDURE — 83605 ASSAY OF LACTIC ACID: CPT

## 2021-09-01 PROCEDURE — 700101 HCHG RX REV CODE 250: Performed by: EMERGENCY MEDICINE

## 2021-09-01 PROCEDURE — 700117 HCHG RX CONTRAST REV CODE 255: Performed by: EMERGENCY MEDICINE

## 2021-09-01 PROCEDURE — A9270 NON-COVERED ITEM OR SERVICE: HCPCS | Performed by: STUDENT IN AN ORGANIZED HEALTH CARE EDUCATION/TRAINING PROGRAM

## 2021-09-01 PROCEDURE — 74019 RADEX ABDOMEN 2 VIEWS: CPT | Mod: TC | Performed by: PHYSICIAN ASSISTANT

## 2021-09-01 PROCEDURE — G1004 CDSM NDSC: HCPCS

## 2021-09-01 PROCEDURE — 99285 EMERGENCY DEPT VISIT HI MDM: CPT

## 2021-09-01 PROCEDURE — 700102 HCHG RX REV CODE 250 W/ 637 OVERRIDE(OP): Performed by: STUDENT IN AN ORGANIZED HEALTH CARE EDUCATION/TRAINING PROGRAM

## 2021-09-01 RX ORDER — BISACODYL 10 MG
10 SUPPOSITORY, RECTAL RECTAL
Status: DISCONTINUED | OUTPATIENT
Start: 2021-09-01 | End: 2021-09-04 | Stop reason: HOSPADM

## 2021-09-01 RX ORDER — MORPHINE SULFATE 4 MG/ML
4 INJECTION, SOLUTION INTRAMUSCULAR; INTRAVENOUS ONCE
Status: COMPLETED | OUTPATIENT
Start: 2021-09-01 | End: 2021-09-01

## 2021-09-01 RX ORDER — AMLODIPINE BESYLATE 5 MG/1
2.5 TABLET ORAL DAILY
Status: DISCONTINUED | OUTPATIENT
Start: 2021-09-02 | End: 2021-09-04 | Stop reason: HOSPADM

## 2021-09-01 RX ORDER — SOTALOL HYDROCHLORIDE 120 MG/1
120 TABLET ORAL 2 TIMES DAILY
Status: SHIPPED | COMMUNITY
End: 2021-09-24 | Stop reason: SDUPTHER

## 2021-09-01 RX ORDER — ATORVASTATIN CALCIUM 40 MG/1
40 TABLET, FILM COATED ORAL NIGHTLY
Status: DISCONTINUED | OUTPATIENT
Start: 2021-09-01 | End: 2021-09-04 | Stop reason: HOSPADM

## 2021-09-01 RX ORDER — ENEMA 19; 7 G/133ML; G/133ML
1 ENEMA RECTAL ONCE
Status: COMPLETED | OUTPATIENT
Start: 2021-09-01 | End: 2021-09-01

## 2021-09-01 RX ORDER — METHOCARBAMOL 500 MG/1
500 TABLET, FILM COATED ORAL 4 TIMES DAILY PRN
Status: DISCONTINUED | OUTPATIENT
Start: 2021-09-01 | End: 2021-09-04 | Stop reason: HOSPADM

## 2021-09-01 RX ORDER — LOSARTAN POTASSIUM 50 MG/1
50 TABLET ORAL 2 TIMES DAILY
Status: DISCONTINUED | OUTPATIENT
Start: 2021-09-01 | End: 2021-09-04 | Stop reason: HOSPADM

## 2021-09-01 RX ORDER — AMOXICILLIN 250 MG
2 CAPSULE ORAL 2 TIMES DAILY
Status: DISCONTINUED | OUTPATIENT
Start: 2021-09-01 | End: 2021-09-04 | Stop reason: HOSPADM

## 2021-09-01 RX ORDER — M-VIT,TX,IRON,MINS/CALC/FOLIC 27MG-0.4MG
1 TABLET ORAL EVERY MORNING
Status: DISCONTINUED | OUTPATIENT
Start: 2021-09-02 | End: 2021-09-04 | Stop reason: HOSPADM

## 2021-09-01 RX ORDER — ASPIRIN 81 MG/1
81 TABLET, CHEWABLE ORAL DAILY
Status: DISCONTINUED | OUTPATIENT
Start: 2021-09-02 | End: 2021-09-04 | Stop reason: HOSPADM

## 2021-09-01 RX ORDER — LIDOCAINE 50 MG/G
1 PATCH TOPICAL EVERY 24 HOURS
Status: DISCONTINUED | OUTPATIENT
Start: 2021-09-01 | End: 2021-09-04 | Stop reason: HOSPADM

## 2021-09-01 RX ORDER — ATORVASTATIN CALCIUM 40 MG/1
40 TABLET, FILM COATED ORAL NIGHTLY
Status: SHIPPED | COMMUNITY
End: 2022-02-01 | Stop reason: SDUPTHER

## 2021-09-01 RX ORDER — ESCITALOPRAM OXALATE 10 MG/1
10 TABLET ORAL DAILY
Status: SHIPPED | COMMUNITY
End: 2021-10-05 | Stop reason: SDUPTHER

## 2021-09-01 RX ORDER — BUDESONIDE AND FORMOTEROL FUMARATE DIHYDRATE 160; 4.5 UG/1; UG/1
2 AEROSOL RESPIRATORY (INHALATION) 2 TIMES DAILY
Status: DISCONTINUED | OUTPATIENT
Start: 2021-09-02 | End: 2021-09-04 | Stop reason: HOSPADM

## 2021-09-01 RX ORDER — SOTALOL HYDROCHLORIDE 120 MG/1
120 TABLET ORAL 2 TIMES DAILY
Status: DISCONTINUED | OUTPATIENT
Start: 2021-09-01 | End: 2021-09-04 | Stop reason: HOSPADM

## 2021-09-01 RX ORDER — ACETAMINOPHEN 325 MG/1
650 TABLET ORAL EVERY 6 HOURS PRN
Status: DISCONTINUED | OUTPATIENT
Start: 2021-09-01 | End: 2021-09-04 | Stop reason: HOSPADM

## 2021-09-01 RX ORDER — OMEPRAZOLE 20 MG/1
20 CAPSULE, DELAYED RELEASE ORAL
Status: DISCONTINUED | OUTPATIENT
Start: 2021-09-01 | End: 2021-09-04 | Stop reason: HOSPADM

## 2021-09-01 RX ORDER — AMLODIPINE BESYLATE 2.5 MG/1
2.5 TABLET ORAL DAILY
Status: SHIPPED | COMMUNITY
End: 2021-11-10 | Stop reason: SDUPTHER

## 2021-09-01 RX ORDER — ESCITALOPRAM OXALATE 10 MG/1
10 TABLET ORAL DAILY
Status: DISCONTINUED | OUTPATIENT
Start: 2021-09-02 | End: 2021-09-04 | Stop reason: HOSPADM

## 2021-09-01 RX ORDER — ONDANSETRON 2 MG/ML
4 INJECTION INTRAMUSCULAR; INTRAVENOUS ONCE
Status: COMPLETED | OUTPATIENT
Start: 2021-09-01 | End: 2021-09-01

## 2021-09-01 RX ORDER — POLYETHYLENE GLYCOL 3350 17 G/17G
1 POWDER, FOR SOLUTION ORAL
Status: DISCONTINUED | OUTPATIENT
Start: 2021-09-01 | End: 2021-09-04 | Stop reason: HOSPADM

## 2021-09-01 RX ADMIN — LOSARTAN POTASSIUM 50 MG: 50 TABLET, FILM COATED ORAL at 22:49

## 2021-09-01 RX ADMIN — MAGNESIUM CITRATE 296 ML: 1.75 LIQUID ORAL at 22:50

## 2021-09-01 RX ADMIN — OMEPRAZOLE 20 MG: 20 CAPSULE, DELAYED RELEASE ORAL at 22:05

## 2021-09-01 RX ADMIN — ATORVASTATIN CALCIUM 40 MG: 40 TABLET, FILM COATED ORAL at 22:05

## 2021-09-01 RX ADMIN — LIDOCAINE 1 PATCH: 50 PATCH TOPICAL at 19:25

## 2021-09-01 RX ADMIN — SODIUM PHOSPHATE 133 ML: 7; 19 ENEMA RECTAL at 19:25

## 2021-09-01 RX ADMIN — IOHEXOL 100 ML: 350 INJECTION, SOLUTION INTRAVENOUS at 16:56

## 2021-09-01 RX ADMIN — MORPHINE SULFATE 4 MG: 4 INJECTION INTRAVENOUS at 17:40

## 2021-09-01 RX ADMIN — ONDANSETRON 4 MG: 2 INJECTION INTRAMUSCULAR; INTRAVENOUS at 17:40

## 2021-09-01 RX ADMIN — METHOCARBAMOL 500 MG: 500 TABLET ORAL at 23:58

## 2021-09-01 ASSESSMENT — ENCOUNTER SYMPTOMS
VOMITING: 0
HEADACHES: 0
FEVER: 0
CHILLS: 0
FEVER: 0
NAUSEA: 0
VOMITING: 0
BLURRED VISION: 0
ABDOMINAL PAIN: 1
ABDOMINAL PAIN: 1
CHILLS: 0
MEMORY LOSS: 0
WEAKNESS: 0
SORE THROAT: 0
CONSTIPATION: 1
CONSTIPATION: 1
DOUBLE VISION: 0
COUGH: 0
NAUSEA: 0
DIARRHEA: 0
DEPRESSION: 0
PALPITATIONS: 0
DIZZINESS: 0
NERVOUS/ANXIOUS: 0
MYALGIAS: 0
SHORTNESS OF BREATH: 0

## 2021-09-01 ASSESSMENT — LIFESTYLE VARIABLES
TOTAL SCORE: 0
AVERAGE NUMBER OF DAYS PER WEEK YOU HAVE A DRINK CONTAINING ALCOHOL: 3
HAVE YOU EVER FELT YOU SHOULD CUT DOWN ON YOUR DRINKING: NO
ON A TYPICAL DAY WHEN YOU DRINK ALCOHOL HOW MANY DRINKS DO YOU HAVE: 2
HAVE PEOPLE ANNOYED YOU BY CRITICIZING YOUR DRINKING: NO
TOTAL SCORE: 0
DOES PATIENT WANT TO STOP DRINKING: NO
TOTAL SCORE: 0
HOW MANY TIMES IN THE PAST YEAR HAVE YOU HAD 5 OR MORE DRINKS IN A DAY: 0
SUBSTANCE_ABUSE: 0
ALCOHOL_USE: YES
EVER HAD A DRINK FIRST THING IN THE MORNING TO STEADY YOUR NERVES TO GET RID OF A HANGOVER: NO
EVER FELT BAD OR GUILTY ABOUT YOUR DRINKING: NO
CONSUMPTION TOTAL: NEGATIVE

## 2021-09-01 ASSESSMENT — CHA2DS2 SCORE
CHF OR LEFT VENTRICULAR DYSFUNCTION: NO
DIABETES: NO
PRIOR STROKE OR TIA OR THROMBOEMBOLISM: NO
SEX: MALE
AGE 65 TO 74: YES
HYPERTENSION: YES
VASCULAR DISEASE: YES
CHA2DS2 VASC SCORE: 3
AGE 75 OR GREATER: NO

## 2021-09-01 ASSESSMENT — PATIENT HEALTH QUESTIONNAIRE - PHQ9
SUM OF ALL RESPONSES TO PHQ9 QUESTIONS 1 AND 2: 0
2. FEELING DOWN, DEPRESSED, IRRITABLE, OR HOPELESS: NOT AT ALL
1. LITTLE INTEREST OR PLEASURE IN DOING THINGS: NOT AT ALL

## 2021-09-01 ASSESSMENT — FIBROSIS 4 INDEX
FIB4 SCORE: 1.87
FIB4 SCORE: 1.87
FIB4 SCORE: 2.37

## 2021-09-01 ASSESSMENT — PAIN DESCRIPTION - PAIN TYPE
TYPE: ACUTE PAIN
TYPE: ACUTE PAIN

## 2021-09-01 NOTE — PROGRESS NOTES
Subjective:   Anthony Cloud is a 76 y.o. male who presents for Abdominal Pain (x1wk, not able to sleep, x3days no bowel movement, last week flying reached and felt something pull, very tender on right)        Patient presents for evaluation of right-sided upper abdominal pain x1 week.  States that he first noticed symptoms when he directly impacted the area on an airplane armrest.    His pain worsened the next day after eating.  Pain was fairly constant but waxed and waned in severity.  He describes the pain is spasmodic and cramping at times.  Pain does not radiate to his back or shoulder.  Patient was also feeling constipated at the time, but had a full bowel movement on Saturday after taking Ex-Lax.  His symptoms improved with this, but did not completely resolve.  States that he ate a large sandwich on Monday and his symptoms significantly worsened postprandially.  He was able to drink water yesterday, but did not eat much due to his discomfort.  He has not eaten today.  He has taken some laxatives without symptomatic relief.  He has not had a bowel movement since Saturday.  He is not passing much gas.  States that he has history of removal of a portion of the lower right ribs some years ago due to some surgical complications.  He denies nausea, vomiting, fevers, chills, chest pain, lightheadedness, dizziness, syncope, presyncope.    Review of Systems   Constitutional: Negative for chills and fever.   Gastrointestinal: Positive for abdominal pain and constipation. Negative for nausea and vomiting.       PMH:  has a past medical history of Acute nasopharyngitis (01/2020), Anemia, Arrhythmia, Bowel habit changes, Breath shortness, CAD (coronary artery disease) (2003), Cataract, Central sleep apnea, Chronic anticoagulation, COPD (chronic obstructive pulmonary disease) (HCC), Depression, Depressive disorder, not elsewhere classified, Dyslipidemia, Hemorrhagic disorder (HCC), High cholesterol, MRSA  infection (2009, 2014), Hypertension, Hypothyroidism, Mixed hyperlipidemia, Obesity, Old myocardial infarction (January 2003), Peptic ulcer disease (8/4/2016), Personal history of venous thrombosis and embolism, Pulmonary embolism (HCC) (2003/2004), and Sleep apnea.  MEDS:   Current Outpatient Medications:   •  Fluticasone Furoate-Vilanterol (BREO ELLIPTA) 200-25 MCG/INH AEROSOL POWDER, BREATH ACTIVATED, Inhale 1 Puff every day., Disp: 3 Each, Rfl: 4  •  amLODIPine (NORVASC) 2.5 MG Tab, TAKE 1 TABLET BY MOUTH EVERY DAY, Disp: 90 tablet, Rfl: 3  •  sotalol (BETAPACE) 120 MG tablet, TAKE 1 TABLET BY MOUTH TWICE DAILY, Disp: 180 tablet, Rfl: 3  •  traMADol (ULTRAM) 50 MG Tab, Take 50 mg by mouth every day., Disp: , Rfl:   •  aspirin (ASA) 81 MG Chew Tab chewable tablet, Chew 81 mg every day., Disp: , Rfl:   •  atorvastatin (LIPITOR) 40 MG Tab, TAKE ONE TABLET BY MOUTH EVERY DAY, Disp: 90 Tab, Rfl: 1  •  losartan (COZAAR) 50 MG Tab, Take 1 Tab by mouth 2 Times a Day., Disp: 180 Tab, Rfl: 3  •  omeprazole (PRILOSEC) 20 MG delayed-release capsule, Take 1 Cap by mouth every bedtime., Disp: 30 Cap, Rfl: 0  •  Multiple Vitamins-Minerals (MULTIVITAMIN ADULT PO), Take 1 Tab by mouth every morning., Disp: , Rfl:   •  Glucosamine HCl (GLUCOSAMINE PO), Take 1 Tab by mouth every morning., Disp: , Rfl:   ALLERGIES:   Allergies   Allergen Reactions   • Lisinopril      cough     SURGHX:   Past Surgical History:   Procedure Laterality Date   [facility] 4/12/2021    Procedure: ARTHROSCOPY, SHOULDER, WITH ROTATOR CUFF REPAIR;  Surgeon: Eusebia Kinney M.D.;  Location: SURGERY [facility];  Service: Orthopedics   • PB ARTHROSCOPY SHOULDER SURGICAL BICEPS TENODES* Right 4/12/2021    Procedure: ARTHROSCOPY, SHOULDER, WITH BICEPS TENODESIS - FOR TENOTOMY;  Surgeon: Eusebia Kinney M.D.;  Location: SURGERY [facility];  Service: Orthopedics   • PB SHLDR ARTHROSCOP,PART ACROMIOPLAS Right  "4/12/2021    Procedure: DECOMPRESSION, SHOULDER, ARTHROSCOPIC - SUBACROMIAL, LABRAL DEBRIDEMENT;  Surgeon: Eusebia Kinney M.D.;  Location: SURGERY [facility];  Service: Orthopedics   • OTHER CARDIAC SURGERY  07/2020    Cardiac ablasion    • COLONOSCOPY  2020   • WRIST ORIF Left 2/23/2018    Procedure: WRIST ORIF;  Surgeon: Jasper Hammond M.D.;  Location: SURGERY [facility];  Service: Orthopedics   • INCISION AND DRAINAGE ORTHOPEDIC  8/29/2014    Performed by Wolfgang Merrill M.D. at SURGERY [facility]   • CARDIAC CATH, RIGHT/LEFT HEART  2003 01/2003 4.0x23mm Zeta stent to proximal RCA,3.5x22mm distal to first stent, 3.0x15mm at the point of original occlusion.   • OTHER  2001    back sx    • PB ANESTH,LOWER LEG ARTERIES SURG       SOCHX:  reports that he has never smoked. He has never used smokeless tobacco. He reports current alcohol use. He reports that he does not use drugs.  FH: Family history was reviewed, no pertinent findings to report   Objective:   /90 (BP Location: Left arm, Patient Position: Sitting, BP Cuff Size: Adult)   Pulse (!) 55   Temp 36.4 °C (97.6 °F) (Temporal)   Resp 18   Ht 1.93 m (6' 4\")   Wt 119 kg (263 lb)   SpO2 92%   BMI 32.01 kg/m²   Physical Exam  Vitals reviewed.   Constitutional:       Appearance: Normal appearance. He is well-developed. He is not toxic-appearing.      Comments: Mild distress   HENT:      Head: Normocephalic and atraumatic.      Right Ear: External ear normal.      Left Ear: External ear normal.      Nose: Nose normal.   Eyes:      General: Gaze aligned appropriately.   Cardiovascular:      Rate and Rhythm: Normal rate and regular rhythm.   Pulmonary:      Effort: Pulmonary effort is normal. No respiratory distress.      Breath sounds: No stridor.   Abdominal:      General: Bowel sounds are normal.      Comments:  Patient has difficulty lying down on the exam table due to significant discomfort.  Patient frequently cries out " in pain and grabs the right side of his abdomen intermittently throughout exam.    Patient's abdomen is protuberant.  He has a well-healed surgical incision several inches to the right of the umbilicus where a nerve stimulator is present.  Patient states that this is nonfunctioning.  Diastasis recti.  Moderate, generalized abdominal tenderness.  Significant tenderness of the right upper quadrant.  Positive Cornelius's.     Musculoskeletal:      Cervical back: Neck supple.   Skin:     General: Skin is warm and dry.      Capillary Refill: Capillary refill takes less than 2 seconds.   Neurological:      Mental Status: He is alert and oriented to person, place, and time.      Comments: CN2-12 grossly intact   Psychiatric:         Speech: Speech normal.         Behavior: Behavior normal.     Imaging:    Ribs:     COMPARISON: 2/11/2020     FINDINGS:     Postsurgical change in the right chest wall. Old right rib fractures.  No acute displaced fractures or acute bony changes are noted.  No airspace opacity or consolidation.  There is no evidence of a hemo or pneumothorax.  Normal cardiopericardial silhouette.     IMPRESSION:     No acute displaced rib fracture.    Abdomen:   9/1/2021 10:59 AM     HISTORY/REASON FOR EXAM:  Abdominal Pain  Abdominal Pain (x1wk, not able to sleep, x3days no bowel movement,  last week flying reached and felt something pull, very tender on right)     TECHNIQUE/EXAM DESCRIPTION AND NUMBER OF VIEWS:  2 view(s) of the abdomen.     COMPARISON: None     FINDINGS:     Moderate amount of stool throughout the colon.     Mildly dilated gas-filled loop of small bowel in the mid abdomen. There is gas throughout the colon.     No portal venous gas or pneumatosis.     No definite free intraperitoneal air but evaluation is limited on supine radiograph.     IMPRESSION:        Mildly dilated gas-filled loop of small bowel in the mid abdomen. There is gas throughout the colon. This could relate to early small bowel  obstruction or ileus.  Assessment/Plan:   1. Right upper quadrant abdominal pain  - [id number] VIEWS; Future    2. Rib pain on right side  - [id number] (WITH 1-VIEW CXR) RIGHT; Future    Possible developing small bowel obstruction vs. Ileus.  Patient is extremely uncomfortable, not tolerating oral intake and not passing gas.  I do not feel that he is appropriate for additional outpatient work-up at this time.  Patient will go to [facility] ED for further evaluation by POV.  Transfer center contacted to in form of patient disposition and impending arrival.      Differential diagnosis, natural history, supportive care, and indications for immediate follow-up discussed.    My total time spent caring for the patient on the day of the encounter was 40 minutes.   This does not include time spent on separately billable procedures/tests.

## 2021-09-01 NOTE — ED PROVIDER NOTES
ED Provider Note    CHIEF COMPLAINT  Chief Complaint   Patient presents with   • Abdominal Pain     Pt started having right sided abd pain a week ago. Pt states the pain has worsened. Pt went to  this AM and was sent here for further eval. Pt denies vomiting. Pts abd is very distended. Pt had xrays taken at .   • Constipation     Pts last BM was Sat morning. Pt hasn't had one since but has been taking OTC meds with no relief.        HPI  Anthony Cloud is a 76 y.o. male with a history of coronary artery disease status post stent placement, PE x2 on Xarelto, A. fib status post ablation who presents complaining of abdominal pain and constipation.    Patient states he was flying home 1 week ago today on the flight and leaned over to get his bag, the right side of his abdomen hit the armrest and he noted pain at that time.  It has gradually been worsening since then to the point where he could not sleep last night secondary to the pain.  He went to urgent care today and was referred here after they took abdominal films.  He is unsure what these showed.  Patient describes the pain as generalized but increased in the right lower quadrant.  They feel like spasms and are sharp.  There is a waxing and waning nature.  Patient reports some constipation and took laxatives several days ago in order to have a bowel movement on Saturday but has not had one since.  Patient denies flatus.  He denies nausea, vomiting, urinary symptoms, fever, chills, melena,.    Patient has colonoscopies every 5 years due to a family history of colon cancer.  His last colonoscopy was 2 years ago by [facility].    Patient has occasional constipation for which he takes 1 Ex-Lax with relief.  During this last week he has taken a total of 8 Ex-Lax tablets, 4 at a time on 2 occasions without any bowel movement.      ALLERGIES  Allergies   Allergen Reactions   • Lisinopril      cough       CURRENT MEDICATIONS  Home Medications      Reviewed by Amy Lewis R.N. (Registered Nurse) on 09/01/21 at 1243  Med List Status: Not Addressed   Medication Last Dose Status   amLODIPine (NORVASC) 2.5 MG Tab  Active   aspirin (ASA) 81 MG Chew Tab chewable tablet  Active   atorvastatin (LIPITOR) 40 MG Tab  Active   Fluticasone Furoate-Vilanterol (BREO ELLIPTA) 200-25 MCG/INH AEROSOL POWDER, BREATH ACTIVATED  Active   Glucosamine HCl (GLUCOSAMINE PO)  Active   losartan (COZAAR) 50 MG Tab  Active   Multiple Vitamins-Minerals (MULTIVITAMIN ADULT PO)  Active   omeprazole (PRILOSEC) 20 MG delayed-release capsule  Active   sotalol (BETAPACE) 120 MG tablet  Active   traMADol (ULTRAM) 50 MG Tab  Active                PAST MEDICAL HISTORY   has a past medical history of Acute nasopharyngitis (01/2020), Anemia, Arrhythmia, Bowel habit changes, Breath shortness, CAD (coronary artery disease) (2003), Cataract, Central sleep apnea, Chronic anticoagulation, COPD (chronic obstructive pulmonary disease) (HCC), Depression, Depressive disorder, not elsewhere classified, Dyslipidemia, Hemorrhagic disorder (HCC), High cholesterol, MRSA infection (2009, 2014), Hypertension, Hypothyroidism, Mixed hyperlipidemia, Obesity, Old myocardial infarction (January 2003), Peptic ulcer disease (8/4/2016), Personal history of venous thrombosis and embolism, Pulmonary embolism ([facility]) (2003/2004), and Sleep apnea.    SURGICAL HISTORY   has a past surgical history that includes cardiac cath, right/left heart (2003); anesth,lower leg arteries surg; wrist orif (Left, 2/23/2018); incision and drainage orthopedic (8/29/2014); other (2001); other cardiac surgery (07/2020); colonoscopy (2020); shldr arthroscop,surg,w/rotat cuff repr (Right, 4/12/2021); arthroscopy shoulder surgical biceps tenodes* (Right, 4/12/2021); and shldr arthroscop,part acromioplas (Right, 4/12/2021).    SOCIAL HISTORY  Social History     Tobacco Use   • Smoking status: Never Smoker   • Smokeless tobacco: Never Used  "  Vaping Use   • Vaping Use: Never used   Substance and Sexual Activity   • Alcohol use: Yes     Comment: 8 drinks per week   • Drug use: No   • Sexual activity: Not on file       Family Hx:  Father with colon cancer in his 60s      REVIEW OF SYSTEMS  See HPI for further details.  All other systems are negative except as above in HPI.    PHYSICAL EXAM  VITAL SIGNS: /76   Pulse (!) 49   Temp 36.2 °C (97.1 °F) (Temporal)   Resp 16   Ht 1.93 m (6' 4\")   Wt 119 kg (263 lb 3.7 oz)   SpO2 95%   BMI 32.04 kg/m²     General:  WD male with elevated BMI, uncomfortable appearing, intermittently yelling out in pain and holding the right side of his abdomen; A+Ox3; V/S as above; afebrile  Skin: warm and dry; good color; no rash  HEENT: NCAT; EOMs intact; PERRL; no scleral icterus   Neck: FROM; soft  Cardiovascular: Regular heart rate and rhythm.  No murmurs, rubs, or gallops; pulses 2+ bilaterally radially and DP areas  Lungs: Clear to auscultation with good air movement bilaterally.  No wheezes, rhonchi, or rales.   Abdomen: BS present; soft; generally tender, round; no rebound, guarding, or rigidity.  No organomegaly or pulsatile mass; no mottling; no hernia noted; well-healed horizontal incision over the right lower quadrant  Extremities: GURROLA x 4; no e/o trauma; no pedal edema; neg Pierre's  Neurologic: CNs III-XII grossly intact; speech clear; distal sensation intact; strength 5/5 UE/LEs  Psychiatric: Appropriate affect, normal mood    LABS  Results for orders placed or performed during the hospital encounter of 09/01/21   CBC WITH DIFFERENTIAL   Result Value Ref Range    WBC 6.0 4.8 - 10.8 K/uL    RBC 4.15 (L) 4.70 - 6.10 M/uL    Hemoglobin 14.1 14.0 - 18.0 g/dL    Hematocrit 42.0 42.0 - 52.0 %    .2 (H) 81.4 - 97.8 fL    MCH 34.0 (H) 27.0 - 33.0 pg    MCHC 33.6 (L) 33.7 - 35.3 g/dL    RDW 49.2 35.9 - 50.0 fL    Platelet Count 173 164 - 446 K/uL    MPV 9.0 9.0 - 12.9 fL    Neutrophils-Polys 68.10 44.00 - " 72.00 %    Lymphocytes 17.90 (L) 22.00 - 41.00 %    Monocytes 9.60 0.00 - 13.40 %    Eosinophils 3.30 0.00 - 6.90 %    Basophils 0.80 0.00 - 1.80 %    Immature Granulocytes 0.30 0.00 - 0.90 %    Nucleated RBC 0.00 /100 WBC    Neutrophils (Absolute) 4.09 1.82 - 7.42 K/uL    Lymphs (Absolute) 1.08 1.00 - 4.80 K/uL    Monos (Absolute) 0.58 0.00 - 0.85 K/uL    Eos (Absolute) 0.20 0.00 - 0.51 K/uL    Baso (Absolute) 0.05 0.00 - 0.12 K/uL    Immature Granulocytes (abs) 0.02 0.00 - 0.11 K/uL    NRBC (Absolute) 0.00 K/uL   COMP METABOLIC PANEL   Result Value Ref Range    Sodium 136 135 - 145 mmol/L    Potassium 4.6 3.6 - 5.5 mmol/L    Chloride 103 96 - 112 mmol/L    Co2 24 20 - 33 mmol/L    Anion Gap 9.0 7.0 - 16.0    Glucose 107 (H) 65 - 99 mg/dL    Bun 21 8 - 22 mg/dL    Creatinine 1.10 0.50 - 1.40 mg/dL    Calcium 9.3 8.5 - 10.5 mg/dL    AST(SGOT) 26 12 - 45 U/L    ALT(SGPT) 22 2 - 50 U/L    Alkaline Phosphatase 82 30 - 99 U/L    Total Bilirubin 0.6 0.1 - 1.5 mg/dL    Albumin 4.3 3.2 - 4.9 g/dL    Total Protein 7.8 6.0 - 8.2 g/dL    Globulin 3.5 1.9 - 3.5 g/dL    A-G Ratio 1.2 g/dL   LIPASE   Result Value Ref Range    Lipase 26 11 - 82 U/L   URINALYSIS    Specimen: Urine   Result Value Ref Range    Color Yellow     Character Clear     Specific Gravity 1.020 <1.035    Ph 5.5 5.0 - 8.0    Glucose Negative Negative mg/dL    Ketones Negative Negative mg/dL    Protein Negative Negative mg/dL    Bilirubin Negative Negative    Urobilinogen, Urine 0.2 Negative    Nitrite Negative Negative    Leukocyte Esterase Negative Negative    Occult Blood Negative Negative    Micro Urine Req see below    ESTIMATED GFR   Result Value Ref Range    GFR If African American >60 >60 mL/min/1.73 m 2    GFR If Non African American >60 >60 mL/min/1.73 m 2   LACTIC ACID   Result Value Ref Range    Lactic Acid 1.2 0.5 - 2.0 mmol/L         IMAGING  CT-ABDOMEN-PELVIS WITH   Final Result      1.  No acute abnormality of the abdomen or pelvis.   2.  Mild  cardiomegaly.   3.  Small hiatal hernia.   4.  Mild to moderate colonic stool burden.   5.  Atherosclerosis.   6.  Small fat-containing right inguinal hernia.          MEDICAL RECORD  I have reviewed patient's medical record and pertinent results are listed below.      COURSE & MEDICAL DECISION MAKING  I have reviewed any medical record information, laboratory studies and radiographic results as noted.    Anthony Cloud is a 76 y.o. male who presents complaining of generalized/right-sided abdominal pain that has been gradually increasing over the last week.  Patient appears very uncomfortable.  He is afebrile.  Labs and CT imaging were ordered prior to my seeing the patient.    Appropriate PPE was worn at all times while interacting with the patient.    Pt was re-evaluated.  Awaiting CT results.  Morphine and Zofran were ordered.    Patient has been n.p.o. since 8 AM where he drank a half a cup of coffee.    Patient's labs demonstrate a normal white blood cell count of 6, and a glucose of 107 with normal transaminases and lipase.  Urinalysis is negative.  Lactic acid is 1.2.      CT demonstrates no acute intra-abdominal pathology.  Mild to moderate amount of stool.  No SBO.  There is a small fat-containing inguinal hernia.    5:32 PM  Radiology was contacted regarding the positioning of the implantable device that the patient has in the abdominal wall.  It appears to be embedded in the subcutaneous tissue and there is no fluid.  Dr. Klein does not see a mass or evidence of mesenteric ischemia.    7:08 PM  Patient was reevaluated.  He continues to have a significant amount of pain, both with palpation and intermittently/spontaneously.  I advised the patient of his results.  I do not feel that the patient can be discharged with this amount of pain.  He states he is not able to perform his ADLs and was barely mobile today because of it.    I discussed the case with Dr. Jimenez from the hospitalist service who will  evaluate the patient.  Right upper quadrant ultrasound is pending.  I have ordered a Lidoderm patch and an enema.      FINAL IMPRESSION  1. Abdominal pain, unspecified abdominal location     2. Constipation, unspecified constipation type              Electronically signed by: Raiza Epps M.D., 9/1/2021 4:09 PM

## 2021-09-01 NOTE — ED TRIAGE NOTES
"Chief Complaint   Patient presents with   • Abdominal Pain     Pt started having right sided abd pain a week ago. Pt states the pain has worsened. Pt went to  this AM and was sent here for further eval. Pt denies vomiting. Pts abd is very distended. Pt had xrays taken at .   • Constipation     Pts last BM was Sat morning. Pt hasn't had one since but has been taking OTC meds with no relief.      /74   Pulse (!) 56   Temp 36.2 °C (97.1 °F) (Temporal)   Resp 17   Ht 1.93 m (6' 4\")   Wt 119 kg (263 lb 3.7 oz)   SpO2 94%   BMI 32.04 kg/m²     Patient arrived to ED for the above complaint. Triage process explained to patient. Patient placed in lobby and told to notify staff of any changes.   "

## 2021-09-02 ENCOUNTER — APPOINTMENT (OUTPATIENT)
Dept: RADIOLOGY | Facility: MEDICAL CENTER | Age: 76
End: 2021-09-02
Attending: NURSE PRACTITIONER
Payer: MEDICARE

## 2021-09-02 ENCOUNTER — PATIENT OUTREACH (OUTPATIENT)
Dept: HEALTH INFORMATION MANAGEMENT | Facility: OTHER | Age: 76
End: 2021-09-02

## 2021-09-02 ENCOUNTER — PHARMACY VISIT (OUTPATIENT)
Dept: PHARMACY | Facility: MEDICAL CENTER | Age: 76
End: 2021-09-02
Payer: COMMERCIAL

## 2021-09-02 PROBLEM — R10.11 RIGHT UPPER QUADRANT ABDOMINAL PAIN: Status: RESOLVED | Noted: 2021-09-01 | Resolved: 2021-09-02

## 2021-09-02 LAB
ALBUMIN SERPL BCP-MCNC: 4.2 G/DL (ref 3.2–4.9)
ALBUMIN/GLOB SERPL: 1.5 G/DL
ALP SERPL-CCNC: 81 U/L (ref 30–99)
ALT SERPL-CCNC: 21 U/L (ref 2–50)
ANION GAP SERPL CALC-SCNC: 12 MMOL/L (ref 7–16)
AST SERPL-CCNC: 25 U/L (ref 12–45)
BASOPHILS # BLD AUTO: 0.8 % (ref 0–1.8)
BASOPHILS # BLD: 0.04 K/UL (ref 0–0.12)
BILIRUB SERPL-MCNC: 0.6 MG/DL (ref 0.1–1.5)
BUN SERPL-MCNC: 16 MG/DL (ref 8–22)
CALCIUM SERPL-MCNC: 9 MG/DL (ref 8.5–10.5)
CHLORIDE SERPL-SCNC: 104 MMOL/L (ref 96–112)
CO2 SERPL-SCNC: 24 MMOL/L (ref 20–33)
CREAT SERPL-MCNC: 0.91 MG/DL (ref 0.5–1.4)
EOSINOPHIL # BLD AUTO: 0.18 K/UL (ref 0–0.51)
EOSINOPHIL NFR BLD: 3.4 % (ref 0–6.9)
ERYTHROCYTE [DISTWIDTH] IN BLOOD BY AUTOMATED COUNT: 47.6 FL (ref 35.9–50)
GLOBULIN SER CALC-MCNC: 2.8 G/DL (ref 1.9–3.5)
GLUCOSE SERPL-MCNC: 99 MG/DL (ref 65–99)
HCT VFR BLD AUTO: 42.2 % (ref 42–52)
HGB BLD-MCNC: 14.3 G/DL (ref 14–18)
IMM GRANULOCYTES # BLD AUTO: 0.02 K/UL (ref 0–0.11)
IMM GRANULOCYTES NFR BLD AUTO: 0.4 % (ref 0–0.9)
LYMPHOCYTES # BLD AUTO: 1.07 K/UL (ref 1–4.8)
LYMPHOCYTES NFR BLD: 20.3 % (ref 22–41)
MAGNESIUM SERPL-MCNC: 2.3 MG/DL (ref 1.5–2.5)
MCH RBC QN AUTO: 33.8 PG (ref 27–33)
MCHC RBC AUTO-ENTMCNC: 33.9 G/DL (ref 33.7–35.3)
MCV RBC AUTO: 99.8 FL (ref 81.4–97.8)
MONOCYTES # BLD AUTO: 0.61 K/UL (ref 0–0.85)
MONOCYTES NFR BLD AUTO: 11.6 % (ref 0–13.4)
NEUTROPHILS # BLD AUTO: 3.35 K/UL (ref 1.82–7.42)
NEUTROPHILS NFR BLD: 63.5 % (ref 44–72)
NRBC # BLD AUTO: 0 K/UL
NRBC BLD-RTO: 0 /100 WBC
PLATELET # BLD AUTO: 162 K/UL (ref 164–446)
PMV BLD AUTO: 9 FL (ref 9–12.9)
POTASSIUM SERPL-SCNC: 4.4 MMOL/L (ref 3.6–5.5)
PROT SERPL-MCNC: 7 G/DL (ref 6–8.2)
RBC # BLD AUTO: 4.23 M/UL (ref 4.7–6.1)
SODIUM SERPL-SCNC: 140 MMOL/L (ref 135–145)
WBC # BLD AUTO: 5.3 K/UL (ref 4.8–10.8)

## 2021-09-02 PROCEDURE — RXMED WILLOW AMBULATORY MEDICATION CHARGE: Performed by: NURSE PRACTITIONER

## 2021-09-02 PROCEDURE — 700102 HCHG RX REV CODE 250 W/ 637 OVERRIDE(OP): Performed by: STUDENT IN AN ORGANIZED HEALTH CARE EDUCATION/TRAINING PROGRAM

## 2021-09-02 PROCEDURE — 700101 HCHG RX REV CODE 250: Performed by: STUDENT IN AN ORGANIZED HEALTH CARE EDUCATION/TRAINING PROGRAM

## 2021-09-02 PROCEDURE — 700101 HCHG RX REV CODE 250: Performed by: EMERGENCY MEDICINE

## 2021-09-02 PROCEDURE — 74018 RADEX ABDOMEN 1 VIEW: CPT

## 2021-09-02 PROCEDURE — G0378 HOSPITAL OBSERVATION PER HR: HCPCS

## 2021-09-02 PROCEDURE — 80053 COMPREHEN METABOLIC PANEL: CPT

## 2021-09-02 PROCEDURE — 99225 PR SUBSEQUENT OBSERVATION CARE,LEVEL II: CPT | Performed by: NURSE PRACTITIONER

## 2021-09-02 PROCEDURE — 85025 COMPLETE CBC W/AUTO DIFF WBC: CPT

## 2021-09-02 PROCEDURE — 700102 HCHG RX REV CODE 250 W/ 637 OVERRIDE(OP): Performed by: NURSE PRACTITIONER

## 2021-09-02 PROCEDURE — A9270 NON-COVERED ITEM OR SERVICE: HCPCS | Performed by: NURSE PRACTITIONER

## 2021-09-02 PROCEDURE — A9270 NON-COVERED ITEM OR SERVICE: HCPCS | Performed by: STUDENT IN AN ORGANIZED HEALTH CARE EDUCATION/TRAINING PROGRAM

## 2021-09-02 PROCEDURE — 83735 ASSAY OF MAGNESIUM: CPT

## 2021-09-02 RX ORDER — METHOCARBAMOL 500 MG/1
500 TABLET, FILM COATED ORAL 3 TIMES DAILY
Qty: 9 TABLET | Refills: 0 | Status: SHIPPED | OUTPATIENT
Start: 2021-09-02 | End: 2021-09-05

## 2021-09-02 RX ORDER — LACTULOSE 20 G/30ML
30 SOLUTION ORAL ONCE
Status: COMPLETED | OUTPATIENT
Start: 2021-09-02 | End: 2021-09-02

## 2021-09-02 RX ORDER — POLYETHYLENE GLYCOL 3350 17 G/17G
17 POWDER, FOR SOLUTION ORAL DAILY
Qty: 7 EACH | Refills: 0 | Status: SHIPPED | OUTPATIENT
Start: 2021-09-02 | End: 2021-09-09

## 2021-09-02 RX ORDER — LIDOCAINE 50 MG/G
1 PATCH TOPICAL EVERY 24 HOURS
Qty: 5 PATCH | Refills: 0 | Status: SHIPPED | OUTPATIENT
Start: 2021-09-02 | End: 2021-09-07

## 2021-09-02 RX ADMIN — ASPIRIN 81 MG: 81 TABLET, CHEWABLE ORAL at 05:19

## 2021-09-02 RX ADMIN — LOSARTAN POTASSIUM 50 MG: 50 TABLET, FILM COATED ORAL at 05:22

## 2021-09-02 RX ADMIN — DICLOFENAC SODIUM 4 G: 10 GEL TOPICAL at 12:06

## 2021-09-02 RX ADMIN — DICLOFENAC SODIUM 4 G: 10 GEL TOPICAL at 17:39

## 2021-09-02 RX ADMIN — SOTALOL HYDROCHLORIDE 120 MG: 120 TABLET ORAL at 09:28

## 2021-09-02 RX ADMIN — LIDOCAINE 1 PATCH: 50 PATCH TOPICAL at 22:09

## 2021-09-02 RX ADMIN — DICLOFENAC SODIUM 4 G: 10 GEL TOPICAL at 09:28

## 2021-09-02 RX ADMIN — DOCUSATE SODIUM 50 MG AND SENNOSIDES 8.6 MG 2 TABLET: 8.6; 5 TABLET, FILM COATED ORAL at 17:39

## 2021-09-02 RX ADMIN — ACETAMINOPHEN 650 MG: 325 TABLET, FILM COATED ORAL at 09:35

## 2021-09-02 RX ADMIN — LACTULOSE 30 ML: 20 SOLUTION ORAL at 12:06

## 2021-09-02 RX ADMIN — DICLOFENAC SODIUM 4 G: 10 GEL TOPICAL at 22:09

## 2021-09-02 RX ADMIN — ACETAMINOPHEN 650 MG: 325 TABLET, FILM COATED ORAL at 22:06

## 2021-09-02 RX ADMIN — ATORVASTATIN CALCIUM 40 MG: 40 TABLET, FILM COATED ORAL at 22:07

## 2021-09-02 RX ADMIN — ACETAMINOPHEN 650 MG: 325 TABLET, FILM COATED ORAL at 03:48

## 2021-09-02 RX ADMIN — ESCITALOPRAM OXALATE 10 MG: 10 TABLET ORAL at 05:20

## 2021-09-02 RX ADMIN — BUDESONIDE AND FORMOTEROL FUMARATE DIHYDRATE 2 PUFF: 160; 4.5 AEROSOL RESPIRATORY (INHALATION) at 17:40

## 2021-09-02 RX ADMIN — AMLODIPINE BESYLATE 2.5 MG: 5 TABLET ORAL at 05:22

## 2021-09-02 RX ADMIN — LACTULOSE 30 ML: 20 SOLUTION ORAL at 13:50

## 2021-09-02 RX ADMIN — OMEPRAZOLE 20 MG: 20 CAPSULE, DELAYED RELEASE ORAL at 22:06

## 2021-09-02 RX ADMIN — SOTALOL HYDROCHLORIDE 120 MG: 120 TABLET ORAL at 22:08

## 2021-09-02 RX ADMIN — MULTIPLE VITAMINS W/ MINERALS TAB 1 TABLET: TAB at 05:19

## 2021-09-02 RX ADMIN — LOSARTAN POTASSIUM 50 MG: 50 TABLET, FILM COATED ORAL at 17:39

## 2021-09-02 RX ADMIN — RIVAROXABAN 10 MG: 10 TABLET, FILM COATED ORAL at 17:40

## 2021-09-02 RX ADMIN — DOCUSATE SODIUM 50 MG AND SENNOSIDES 8.6 MG 2 TABLET: 8.6; 5 TABLET, FILM COATED ORAL at 05:21

## 2021-09-02 ASSESSMENT — PAIN DESCRIPTION - PAIN TYPE
TYPE: ACUTE PAIN

## 2021-09-02 NOTE — PROGRESS NOTES
4 Eyes Skin Assessment Completed by Odessa RN and Raul RN.    Head WDL  Ears WDL  Nose WDL  Mouth WDL  Neck WDL  Breast/Chest WDL  Shoulder Blades WDL  Spine Incision  (R) Arm/Elbow/Hand WDL  (L) Arm/Elbow/Hand WDL  Abdomen WDL  Groin WDL  Scrotum/Coccyx/Buttocks WDL  (R) Leg WDL  (L) Leg WDL  (R) Heel/Foot/Toe WDL  (L) Heel/Foot/Toe WDL          Devices In Places Blood Pressure Cuff and Pulse Ox      Interventions In Place NC W/Ear Foams and Pillows    Possible Skin Injury No    Pictures Uploaded Into Epic N/A  Wound Consult Placed N/A  RN Wound Prevention Protocol Ordered No

## 2021-09-02 NOTE — H&P
Hospital Medicine History & Physical Note    Date of Service  9/1/2021    Primary Care Physician  Angel Cameron M.D.    Consultants      Code Status  Full Code    Chief Complaint  Chief Complaint   Patient presents with   • Abdominal Pain     Pt started having right sided abd pain a week ago. Pt states the pain has worsened. Pt went to  this AM and was sent here for further eval. Pt denies vomiting. Pts abd is very distended. Pt had xrays taken at .   • Constipation     Pts last BM was Sat morning. Pt hasn't had one since but has been taking OTC meds with no relief.        History of Presenting Illness  Anthony Cloud is a 76 y.o. male who presented 9/1/2021 with worsening right-sided abdominal pain.  This is a pleasant gentleman with a history of atrial fibrillation status post ablation on sotalol, coronary artery disease, sleep apnea, COPD, hypertension on amlodipine, depression on escitalopram, hypothyroidism, obesity BMI 32, previous myocardial infarction, peptic ulcer disease, and history of PE on Xarelto.  He also has a history of spine lumbar fusion surgery with spinal cord stimulator placement which is no longer working.  Patient reports that approximately a week ago, he jabbed the armrest of his seat in an airplane to the right side of his abdomen.  Since then, he has been having progressively worsening right-sided pain he describes as crampy and sharp in nature rating up to 10/10 in intensity.  He reports the pain comes on gradually intermittently at rest and last approximately 10 to 15 seconds.  The pain now radiates to the back at times.  He reports the crampy abdominal pain is worse with oral intake of foods.  He reports he has been constipated and has been taking lots of over-the-counter Ex-Lax which resulted in a bowel movement 3 days ago.  He has not had any further bowel movements since then.  He reports that his right side abdominal pain improved but did not fully go away.  He  denies any flatus.  Denies having previous episodes like this prior.  He also reports he has been trying over-the-counter lidocaine as well as other pain medications with no significant improvement of his right side abdominal pain he describes that he feels like he is having muscle spasms at the site of his injury.  The right side abdominal pain is also worse with movement and also improved with rest.  There is a spot on his abdomen that does hurt with palpation.  He denies any fevers or chills.  Denies any melena or hematochezia.  No nausea or vomiting.  He has been able to take fluids but has had loss of appetite with little solid intake for the past few days.    In the ER, patient received morphine 4 mg IV as well as a lidocaine patch with some improvement of his pain.  CT scan of the abdomen pelvis was performed, which did not show any signs of bowel obstruction.  He had a small right inguinal hernia.      I discussed the plan of care with patient.    Review of Systems  Review of Systems   Constitutional: Negative for chills and fever.   HENT: Positive for ear pain (Right-sided ear fullness). Negative for sore throat.    Eyes: Negative for blurred vision and double vision.   Respiratory: Negative for cough and shortness of breath.    Cardiovascular: Negative for chest pain and palpitations.   Gastrointestinal: Positive for abdominal pain and constipation. Negative for diarrhea, nausea and vomiting.   Genitourinary: Negative for dysuria and frequency.   Musculoskeletal: Negative for joint pain and myalgias.   Skin: Positive for rash (Rash on the chest.  In April has been improving.). Negative for itching.   Neurological: Negative for dizziness, weakness and headaches.   Psychiatric/Behavioral: Negative for depression, memory loss and substance abuse. The patient is not nervous/anxious.        Past Medical History   has a past medical history of Acute nasopharyngitis (01/2020), Anemia, Arrhythmia, Bowel habit  changes, Breath shortness, CAD (coronary artery disease) (2003), Cataract, Central sleep apnea, Chronic anticoagulation, COPD (chronic obstructive pulmonary disease) (HCC), Depression, Depressive disorder, not elsewhere classified, Dyslipidemia, Hemorrhagic disorder (HCC), High cholesterol, MRSA infection (2009, 2014), Hypertension, Hypothyroidism, Mixed hyperlipidemia, Obesity, Old myocardial infarction (January 2003), Peptic ulcer disease (8/4/2016), Personal history of venous thrombosis and embolism, Pulmonary embolism (HCC) (2003/2004), and Sleep apnea.    Surgical History   has a past surgical history that includes cardiac cath, right/left heart (2003); pr anesth,lower leg arteries surg; wrist orif (Left, 2/23/2018); incision and drainage orthopedic (8/29/2014); other (2001); other cardiac surgery (07/2020); colonoscopy (2020); pr shldr arthroscop,surg,w/rotat cuff repr (Right, 4/12/2021); pr arthroscopy shoulder surgical biceps tenodes* (Right, 4/12/2021); and pr shldr arthroscop,part acromioplas (Right, 4/12/2021).     Family History  family history includes Heart Disease (age of onset: 60) in his father; Other in his mother.   Family history reviewed with patient. There is no family history that is pertinent to the chief complaint.     Social History   reports that he has never smoked. He has never used smokeless tobacco. He reports current alcohol use. He reports that he does not use drugs.    Allergies  Allergies   Allergen Reactions   • Lisinopril      cough       Medications  Prior to Admission Medications   Prescriptions Last Dose Informant Patient Reported? Taking?   Fluticasone Furoate-Vilanterol (BREO ELLIPTA) 200-25 MCG/INH AEROSOL POWDER, BREATH ACTIVATED   No No   Sig: Inhale 1 Puff every day.   Glucosamine HCl (GLUCOSAMINE PO)  Patient Yes No   Sig: Take 1 Tab by mouth every morning.   Multiple Vitamins-Minerals (MULTIVITAMIN ADULT PO)  Patient Yes No   Sig: Take 1 Tab by mouth every morning.    amLODIPine (NORVASC) 2.5 MG Tab   No No   Sig: TAKE 1 TABLET BY MOUTH EVERY DAY   aspirin (ASA) 81 MG Chew Tab chewable tablet   Yes No   Sig: Chew 81 mg every day.   atorvastatin (LIPITOR) 40 MG Tab   No No   Sig: TAKE ONE TABLET BY MOUTH EVERY DAY   losartan (COZAAR) 50 MG Tab   No No   Sig: Take 1 Tab by mouth 2 Times a Day.   omeprazole (PRILOSEC) 20 MG delayed-release capsule   No No   Sig: Take 1 Cap by mouth every bedtime.   sotalol (BETAPACE) 120 MG tablet   No No   Sig: TAKE 1 TABLET BY MOUTH TWICE DAILY   traMADol (ULTRAM) 50 MG Tab   Yes No   Sig: Take 50 mg by mouth every day.      Facility-Administered Medications: None       Physical Exam  Temp:  [36.2 °C (97.1 °F)-36.4 °C (97.6 °F)] 36.2 °C (97.1 °F)  Pulse:  [49-56] 52  Resp:  [14-18] 14  BP: (130-161)/(68-90) 161/81  SpO2:  [90 %-97 %] 96 %    Physical Exam  Vitals and nursing note reviewed.   Constitutional:       General: He is in acute distress.      Appearance: He is well-developed. He is not diaphoretic.      Comments: Mild distress   HENT:      Head: Normocephalic and atraumatic.      Right Ear: External ear normal.      Left Ear: External ear normal.      Nose: Nose normal.      Mouth/Throat:      Pharynx: Oropharynx is clear. No oropharyngeal exudate or posterior oropharyngeal erythema.   Eyes:      General: No scleral icterus.        Right eye: No discharge.         Left eye: No discharge.      Conjunctiva/sclera: Conjunctivae normal.      Pupils: Pupils are equal, round, and reactive to light.   Neck:      Thyroid: No thyromegaly.      Vascular: No JVD.      Trachea: No tracheal deviation.   Cardiovascular:      Rate and Rhythm: Normal rate and regular rhythm.      Heart sounds: Normal heart sounds. No murmur heard.   No friction rub. No gallop.    Pulmonary:      Effort: Pulmonary effort is normal. No respiratory distress.      Breath sounds: Normal breath sounds. No stridor. No wheezing or rales.   Abdominal:      General: Bowel sounds  are normal. There is no distension.      Palpations: Abdomen is soft. There is mass (Spinal cord stimulator on right side of abdomen).      Tenderness: There is abdominal tenderness (He has tenderness to palpation on the right side of the upper abdomen, which is reproducible by different in nature of chief complaint). There is no guarding or rebound.   Musculoskeletal:         General: No tenderness or deformity.      Cervical back: Neck supple. No tenderness.      Right lower leg: No edema.      Left lower leg: No edema.   Lymphadenopathy:      Cervical: No cervical adenopathy.   Skin:     General: Skin is warm and dry.      Coloration: Skin is not pale.      Findings: No erythema or rash.   Neurological:      Mental Status: He is alert and oriented to person, place, and time.      Sensory: No sensory deficit.      Motor: No weakness or abnormal muscle tone.   Psychiatric:         Behavior: Behavior normal.         Thought Content: Thought content normal.         Judgment: Judgment normal.         Laboratory:  Recent Labs     09/01/21  1330   WBC 6.0   RBC 4.15*   HEMOGLOBIN 14.1   HEMATOCRIT 42.0   .2*   MCH 34.0*   MCHC 33.6*   RDW 49.2   PLATELETCT 173   MPV 9.0     Recent Labs     09/01/21  1330   SODIUM 136   POTASSIUM 4.6   CHLORIDE 103   CO2 24   GLUCOSE 107*   BUN 21   CREATININE 1.10   CALCIUM 9.3     Recent Labs     09/01/21  1330   ALTSGPT 22   ASTSGOT 26   ALKPHOSPHAT 82   TBILIRUBIN 0.6   LIPASE 26   GLUCOSE 107*         No results for input(s): NTPROBNP in the last 72 hours.      No results for input(s): TROPONINT in the last 72 hours.    Imaging:  CT-ABDOMEN-PELVIS WITH   Final Result      1.  No acute abnormality of the abdomen or pelvis.   2.  Mild cardiomegaly.   3.  Small hiatal hernia.   4.  Mild to moderate colonic stool burden.   5.  Atherosclerosis.   6.  Small fat-containing right inguinal hernia.      US-RUQ    (Results Pending)     I personally reviewed the CT scan of the abdomen  and pelvis.  Per my read patient has moderate stool burden within the ascending and transverse colon's.  No intra-abdominal fluid or ascites.      Assessment/Plan:  I anticipate this patient is appropriate for observation status at this time.    * Right upper quadrant abdominal pain- (present on admission)  Assessment & Plan  Etiology unclear at this time.  Suspect possible muscle trauma versus persistent constipation with impaction of stool.  She received fleets enema in the ER.      Admit to medical floor for observation for intractable pain.  Magnesium citrate once.  Trial of diclofenac gel.  If no improvement trial of methocarbamol.  Full liquid diet.  Consider GI consultation if persistent abdominal pain following bowel movements and trial of diclofenac gel and methocarbamol.    CAD (coronary artery disease)- (present on admission)  Assessment & Plan  As per history status post stenting in 2003 to RCA.    Continue home aspirin and statin.    S/P ablation of atrial fibrillation/Atrial Flutter- (present on admission)  Assessment & Plan  - [id number] score 4 which is moderate-high risk of stroke of 4.8% per year according to the [place] Atrial Fibrillation Cohort Study  - currently on: sotalol 120 mg q12h    Status post ablation on sotalol.    Continue home sotalol and Xarelto.    Stented coronary artery- (present on admission)  Assessment & Plan  As per history status post MI and stenting.    Continue home aspirin.    Essential hypertension- (present on admission)  Assessment & Plan  As per history.  Currently uncontrolled likely due to pain.    Continue home losartan, amlodipine, insulin    Personal history of venous thrombosis and embolism- (present on admission)  Assessment & Plan  As per history.    Continue home Xarelto.    History of myocardial infarction- (present on admission)  Assessment & Plan  As per history status post stenting.    Continue home atorvastatin.      VTE prophylaxis: SCDs/TEDs and  therapeutic anticoagulation with Xarelto

## 2021-09-02 NOTE — HOSPITAL COURSE
Mr. Cloud is a 76 y.o. male who presented 9/1/2021 with worsening right-sided abdominal pain.  This is a pleasant gentleman with a history of atrial fibrillation status post ablation on sotalol, coronary artery disease, sleep apnea, COPD, hypertension on amlodipine, depression on escitalopram, hypothyroidism, obesity BMI 32, previous myocardial infarction, peptic ulcer disease, and history of PE on Xarelto.  He also has a history of spine lumbar fusion surgery with spinal cord stimulator placement which is no longer working.  Patient reports that approximately a week ago, he jabbed the armrest of his seat in an airplane to the right side of his abdomen.  Since then, he has been having progressively worsening right-sided pain he describes as crampy and sharp in nature rating up to 10/10 in intensity.  He reports the pain comes on gradually intermittently at rest and last approximately 10 to 15 seconds.  The pain now radiates to the back at times.  He reports the crampy abdominal pain is worse with oral intake of foods.  He reports he has been constipated and has been taking lots of over-the-counter Ex-Lax which resulted in a bowel movement 3 days ago.  He has not had any further bowel movements since then.  He reports that his right side abdominal pain improved but did not fully go away.  He denies any flatus.  Denies having previous episodes like this prior.  He also reports he has been trying over-the-counter lidocaine as well as other pain medications with no significant improvement of his right side abdominal pain he describes that he feels like he is having muscle spasms at the site of his injury.  The right side abdominal pain is also worse with movement and also improved with rest.  There is a spot on his abdomen that does hurt with palpation.  He denies any fevers or chills.  Denies any melena or hematochezia.  No nausea or vomiting.  He has been able to take fluids but has had loss of appetite with  little solid intake for the past few days.     In the ER, patient received morphine 4 mg IV as well as a lidocaine patch with some improvement of his pain.  CT scan of the abdomen pelvis was performed, which did not show any signs of bowel obstruction.  He had a small right inguinal hernia.     During this course of stay, an updated Xray abdomen noted moderate to large amount of stool. Patient given laxatives to help with BM. Patient given 2x oral lactulose, milk and molasses enema, milk of mag and lactulose enema. Patient was held discharge yesterday 9/2/2021 due to patient not having a BM.  On 9/3/2021, patient and family expressed concerns of going home and not getting relief from the laxatives and enemas were administered to the patient.  Extensive discussion with family and patient regarding abdominal x-ray and results.  Patient was held overnight due to anticipated more relief with utilization of bowel protocol along with more imaging.  CT of the abdomen/pelvis with contrast was obtained which noted no acute pathology.  Patient instructed to utilize miralax daily. Patient also prescribed lidocaine patch and muscle relaxant to help with RUQ pain from hitting the armchair. Patient recommended to follow up with PCP. Family already had set up a GI consultations. All questions and concerns answered prior to being d/c. Patient d/c home.

## 2021-09-02 NOTE — ASSESSMENT & PLAN NOTE
-As per history.  Currently uncontrolled likely due to pain.  -Continue home losartan, amlodipine, insulin

## 2021-09-02 NOTE — DISCHARGE PLANNING
Meds-to-Beds: Discharge prescription orders listed below delivered to patient's bedside. IBETH Omer notified. Patient counseled. Patient elected to have co-payment billed to patient account.      Current Outpatient Medications   Medication Sig Dispense Refill   • lidocaine (LIDODERM) 5 % Patch Place 1 Patch on the skin every 24 hours, leaving it on for 12 hours and removing it for 12 hours for 5 days. 5 Patch 0   • methocarbamol (ROBAXIN) 500 MG Tab Take 1 Tablet by mouth 3 times a day for 3 days. 9 Tablet 0   • polyethylene glycol/lytes (MIRALAX) 17 g Pack Mix 1 packet with liquid and drink once daily for 7 days. 7 Each 0      Yanely Bobby, PharmD

## 2021-09-02 NOTE — ASSESSMENT & PLAN NOTE
Etiology unclear at this time.  Suspect possible muscle trauma versus persistent constipation with impaction of stool.  She received fleets enema in the ER.      Admit to medical floor for observation for intractable pain.  Magnesium citrate once.  Trial of diclofenac gel.  If no improvement trial of methocarbamol.  Full liquid diet.  Consider GI consultation if persistent abdominal pain following bowel movements and trial of diclofenac gel and methocarbamol.

## 2021-09-02 NOTE — PROGRESS NOTES
Report received.  Assumed Care.  Pt in bed. Daughter at bedside. AOx4, responds appropriately. Pain 8/10 in RLQ relieved by rest and repositioning.  Denies SOB, N/V.  Plan of care discussed.  Explained importance of calling before getting OOB and pt verbalizes understanding. Pt oriented to room, call light and belongings within reach, treaded slipper socks on, bed in lowest position.

## 2021-09-02 NOTE — PROGRESS NOTES
Assumed care of PT A&O 4. Pt resting in bed with no signs of labored breathing. On 2L. Tele monitor in place, cardiac rhythm being monitored. Call light within reach, bed in lowest position, upper bed rails up. Pt was updated on plan of care for the day.

## 2021-09-02 NOTE — DISCHARGE SUMMARY
Discharge Summary    CHIEF COMPLAINT ON ADMISSION  Chief Complaint   Patient presents with   • Abdominal Pain     Pt started having right sided abd pain a week ago. Pt states the pain has worsened. Pt went to  this AM and was sent here for further eval. Pt denies vomiting. Pts abd is very distended. Pt had xrays taken at .   • Constipation     Pts last BM was Sat morning. Pt hasn't had one since but has been taking OTC meds with no relief.        Reason for Admission  Sent by MD     Admission Date  9/1/2021    CODE STATUS  Full Code    HPI & HOSPITAL COURSE    Mr. Cloud is a 76 y.o. male who presented 9/1/2021 with worsening right-sided abdominal pain.  This is a pleasant gentleman with a history of atrial fibrillation status post ablation on sotalol, coronary artery disease, sleep apnea, COPD, hypertension on amlodipine, depression on escitalopram, hypothyroidism, obesity BMI 32, previous myocardial infarction, peptic ulcer disease, and history of PE on Xarelto.  He also has a history of spine lumbar fusion surgery with spinal cord stimulator placement which is no longer working.  Patient reports that approximately a week ago, he jabbed the armrest of his seat in an airplane to the right side of his abdomen.  Since then, he has been having progressively worsening right-sided pain he describes as crampy and sharp in nature rating up to 10/10 in intensity.  He reports the pain comes on gradually intermittently at rest and last approximately 10 to 15 seconds.  The pain now radiates to the back at times.  He reports the crampy abdominal pain is worse with oral intake of foods.  He reports he has been constipated and has been taking lots of over-the-counter Ex-Lax which resulted in a bowel movement 3 days ago.  He has not had any further bowel movements since then.  He reports that his right side abdominal pain improved but did not fully go away.  He denies any flatus.  Denies having previous episodes like  this prior.  He also reports he has been trying over-the-counter lidocaine as well as other pain medications with no significant improvement of his right side abdominal pain he describes that he feels like he is having muscle spasms at the site of his injury.  The right side abdominal pain is also worse with movement and also improved with rest.  There is a spot on his abdomen that does hurt with palpation.  He denies any fevers or chills.  Denies any melena or hematochezia.  No nausea or vomiting.  He has been able to take fluids but has had loss of appetite with little solid intake for the past few days.     In the ER, patient received morphine 4 mg IV as well as a lidocaine patch with some improvement of his pain.  CT scan of the abdomen pelvis was performed, which did not show any signs of bowel obstruction.  He had a small right inguinal hernia.     During this course of stay, an updated Xray abdomen noted moderate to large amount of stool. Patient given laxatives for BM. Patient instructed to utilize miralax daily. Patient also prescribed lidocaine patch and muscle relaxant to help with RUQ pain from hitting the armchair. Patient recommended to follow up with PCP. All questions and concerns answered prior to being d/c. Patient d/c home.       Therefore, he is discharged in good and stable condition to home with close outpatient follow-up.    The patient recovered much more quickly than anticipated on admission.    Discharge Date  09/02/21      FOLLOW UP ITEMS POST DISCHARGE  Please call [phone] to schedule PCP appointment for patient.    Required specialty appointments include:       Discharge Instructions per ZULEIKA McclellanPAdoreRAdoreN.  => Follow up with PCP  => Utilize stool softners every day to help with BM daily      DIET: as tolerated    ACTIVITY: as tolerated    DIAGNOSIS: Constipation/abdominal pain    Return to ER if symptoms persists, chest pain, palpitations, shortness of breath,  numbness, tingling, weakness and HIGH fever.       DISCHARGE DIAGNOSES  Principal Problem (Resolved):    Right upper quadrant abdominal pain POA: Yes  Active Problems:    History of myocardial infarction POA: Yes    Personal history of venous thrombosis and embolism POA: Yes    Essential hypertension POA: Yes    CAD (coronary artery disease) POA: Yes      Overview: 2003: PCI/BMS x 3 to the RCA (Zeta 4.0 x 23mm, 3.5 x 23mm, 3.0 x 18mm).    Stented coronary artery POA: Yes    S/P ablation of atrial fibrillation/Atrial Flutter POA: Yes      FOLLOW UP  Future Appointments   Date Time Provider Department Center   10/12/2021 10:00 AM Eusebia Kinney M.D. [facility]   10/28/2021  9:00 AM [facility] 4 [facility] None   11/17/2021 10:00 AM Nilton Ramirez M.D. [facility] None     Angel Cameron M.D.  [address] [facility] [address]  [phone]    Schedule an appointment as soon as possible for a visit in 1 week        MEDICATIONS ON DISCHARGE     Medication List      START taking these medications      Instructions   lidocaine 5 % Ptch  Commonly known as: LIDODERM   Doctor's comments: Over RUQ from hitting the arm of chair  Place 1 Patch on the skin every 24 hours for 5 days.  Dose: 1 Patch     methocarbamol 500 MG Tabs  Commonly known as: ROBAXIN   Take 1 Tablet by mouth 3 times a day for 3 days.  Dose: 500 mg     polyethylene glycol/lytes 17 g Pack  Commonly known as: MIRALAX   Take 1 Packet by mouth every day for 7 days.  Dose: 17 g        CONTINUE taking these medications      Instructions   amLODIPine 2.5 MG Tabs  Commonly known as: NORVASC   Take 2.5 mg by mouth every day.  Dose: 2.5 mg     aspirin 81 MG Chew chewable tablet  Commonly known as: ASA   Chew 81 mg every day.  Dose: 81 mg     atorvastatin 40 MG Tabs  Commonly known as: LIPITOR   Take 40 mg by mouth every evening.  Dose: 40 mg     Breo Ellipta 200-25 MCG/INH Aepb  Generic drug: Fluticasone Furoate-Vilanterol   Doctor's comments: MUST KEEP APPT IN  AUGUST FOR ANY MORE FILLS  Inhale 1 Puff every day.  Dose: 1 Puff     escitalopram 10 MG Tabs  Commonly known as: Lexapro   Take 10 mg by mouth every day.  Dose: 10 mg     GLUCOSAMINE PO   Take 1 Tab by mouth every morning.  Dose: 1 Tablet     losartan 50 MG Tabs  Commonly known as: COZAAR   Take 1 Tab by mouth 2 Times a Day.  Dose: 50 mg     MULTIVITAMIN ADULT PO   Take 1 Tab by mouth every morning.  Dose: 1 Tablet     omeprazole 20 MG delayed-release capsule  Commonly known as: PRILOSEC   Doctor's comments: Take daily x 1 month post procedure  Take 1 Cap by mouth every bedtime.  Dose: 20 mg     sotalol 120 MG tablet  Commonly known as: BETAPACE   Take 120 mg by mouth 2 times a day.  Dose: 120 mg     Xarelto 10 MG Tabs tablet  Generic drug: rivaroxaban   Take 10 mg by mouth with dinner.  Dose: 10 mg            Allergies  Allergies   Allergen Reactions   • Lisinopril      cough       DIET  Orders Placed This Encounter   Procedures   • Diet Order Diet: Full Liquid     Standing Status:   Standing     Number of Occurrences:   1     Order Specific Question:   Diet:     Answer:   Full Liquid [11]       ACTIVITY  As tolerated.  Weight bearing as tolerated    CONSULTATIONS  NONE    PROCEDURES  NONE    IMAGING  VE-EUNIFMR-4 VIEW   Final Result      Nonobstructive small bowel gas pattern. Moderate to large amount of stool throughout the colon.      US-RUQ   Final Result      1.  Hepatic steatosis.   2.  No gallstones or acute cholecystitis.      CT-ABDOMEN-PELVIS WITH   Final Result      1.  No acute abnormality of the abdomen or pelvis.   2.  Mild cardiomegaly.   3.  Small hiatal hernia.   4.  Mild to moderate colonic stool burden.   5.  Atherosclerosis.   6.  Small fat-containing right inguinal hernia.            LABORATORY  Lab Results   Component Value Date    SODIUM 140 09/02/2021    POTASSIUM 4.4 09/02/2021    CHLORIDE 104 09/02/2021    CO2 24 09/02/2021    GLUCOSE 99 09/02/2021    BUN 16 09/02/2021    CREATININE 0.91  09/02/2021    CREATININE 1.06 01/04/2013        Lab Results   Component Value Date    WBC 5.3 09/02/2021    HEMOGLOBIN 14.3 09/02/2021    HEMATOCRIT 42.2 09/02/2021    PLATELETCT 162 (L) 09/02/2021        Total time of the discharge process exceeds 36 minutes.    ==========================================================================================================  Please note that this dictation was created using voice recognition software. I have made every reasonable attempt to correct obvious errors, but there may be errors of grammar and possibly content that I did not discover before finalizing the note.    Electronically signed by:  DOREEN Espinoza, MSN, APRN, FNP-C  Hospitalist Services  [facility]  [phone]  [email or web address]  09/02/21     [id number]

## 2021-09-02 NOTE — CARE PLAN
The patient is Stable - Low risk of patient condition declining or worsening    Shift Goals  Clinical Goals: pain management   Patient Goals: pin management  Family Goals: n/a    Progress made toward(s) clinical / shift goals:  Pt able to reposition self    Patient is not progressing towards the following goals: Pt pain remains uncontrolled

## 2021-09-02 NOTE — RESPIRATORY CARE
"COPD EDUCATION by COPD CLINICAL EDUCATOR  9/2/2021 at 1:53 PM by Jaqui Daniel, RRT     Patient interviewed by COPD education team. Patient refused COPD program at this time. An Action Plan was completed in the EMR to reflect current Respiratory Medication use.                 COPD Assessment  COPD Clinical Specialists ONLY  COPD Education Initiated: Yes--Short Intervention (in the process of discharging)  DME Equipment Type: Oxygen at night  Pulmonologist Name: Magno Schreiber,Pulmonary Sleep declined assist in appt.  Referrals Initiated:  (refused referral)  Is this a COPD exacerbation patient?: No    Meds to Beds  Would the patient like to opt in for Bedside Medication Delivery at Discharge?: Yes, interested     MY COPD ACTION PLAN     It is recommended that patients and physicians /healthcare providers complete this action plan together. This plan should be discussed at each physician visit and updated as needed.    The green, yellow and red zones show groups of symptoms of COPD. This list of symptoms is not comprehensive, and you may experience other symptoms. In the \"Actions\" column, your healthcare provider has recommended actions for you to take based on your symptoms.    Patient Name: Anhtony Cloud   YOB: 1945   Last Updated on:     Green Zone:  I am doing well today Actions   •  Usual activitiy and exercise level •  Take daily medications   •  Usual amounts of cough and phlegm/mucus •  Use oxygen as prescribed   •  Sleep well at night •  Continue regular exercise/diet plan   •  Appetite is good •  At all times avoid cigarette smoke, inhaled irritants     Daily Medications (these medications are taken every day):   Fluticasone Furoate and Vilanterol trifenatate (Breo) 1 Puff Once daily     Additional Information:  Remember to rinse mouth after using    Yellow Zone:  I am having a bad day or a COPD flare Actions   •  More breathless than usual •  Continue daily medications   •  " "I have less energy for my daily activities •  Use quick relief inhaler as ordered   •  Increased or thicker phlegm/mucus •  Use oxygen as prescribed   •  Using quick relief inhaler/nebulizer more often •  Get plenty of rest   •  Swelling of ankles more than usual •  Use pursed lip breathing   •  More coughing than usual •  At all times avoid cigarette smoke, inhaled irritants   •  I feel like I have a \"chest cold\"   •  Poor sleep and my symptoms woke me up   •  My appetite is not good   •  My medicine is not helping    •  Call provider immediately if symptoms don’t improve     Continue daily medications, add rescue medications:               Medications to be used during a flare up, (as Discussed with Provider):              Red Zone:  I need urgent medical care Actions   •  Severe shortness of breath even at rest •  Call 911 or seek medical care immediately   •  Not able to do any activity because of breathing    •  Fever or shaking chills    •  Feeling confused or very drowsy     •  Chest pains    •  Coughing up blood              "

## 2021-09-02 NOTE — ED NOTES
Med Rec completed: per pt at bedside     No ORAL antibiotics in last 30 days    Preferred Pharmacy: [facility]    Pt confirmed following allergies:  Allergies   Allergen Reactions   • Lisinopril      cough        Pt's home medications:   No current facility-administered medications on file prior to encounter.     Medication Sig   • escitalopram (LEXAPRO) 10 MG Tab Take 10 mg by mouth every day.   • amLODIPine (NORVASC) 2.5 MG Tab Take 2.5 mg by mouth every day.   • atorvastatin (LIPITOR) 40 MG Tab Take 40 mg by mouth every evening.   • sotalol (BETAPACE) 120 MG tablet Take 120 mg by mouth 2 times a day.   • rivaroxaban (XARELTO) 10 MG Tab tablet Take 10 mg by mouth with dinner.   • Fluticasone Furoate-Vilanterol (BREO ELLIPTA) 200-25 MCG/INH AEROSOL POWDER, BREATH ACTIVATED Inhale 1 Puff every day.   • aspirin (ASA) 81 MG Chew Tab chewable tablet Chew 81 mg every day.   • losartan (COZAAR) 50 MG Tab Take 1 Tab by mouth 2 Times a Day.   • omeprazole (PRILOSEC) 20 MG delayed-release capsule Take 1 Cap by mouth every bedtime.   • Multiple Vitamins-Minerals (MULTIVITAMIN ADULT PO) Take 1 Tab by mouth every morning.   • Glucosamine HCl (GLUCOSAMINE PO) Take 1 Tab by mouth every morning.       Removed medications:   Medication Removal Reason   • [DISCONTINUED] amLODIPine (NORVASC) 2.5 MG Tab Reconciled dosing info      • [DISCONTINUED] escitalopram (LEXAPRO) 10 MG Tab Reconciled dosing info      • [DISCONTINUED] sotalol (BETAPACE) 120 MG tablet Reconciled dosing info     • [DISCONTINUED] traMADol (ULTRAM) 50 MG Tab Pt reports not taking     • [DISCONTINUED] rivaroxaban (XARELTO) 20 MG Tab tablet Pt reports 10 mg daily      • [DISCONTINUED] atorvastatin (LIPITOR) 40 MG Tab Pt reports not taking     • [DISCONTINUED] magnesium chloride (MAG-64) 64 MG Tablet Delayed Response Pt reports not taking

## 2021-09-02 NOTE — ASSESSMENT & PLAN NOTE
- [id number] score 4 which is moderate-high risk of stroke of 4.8% per year according to the [place] Atrial Fibrillation Cohort Study  - currently on: sotalol 120 mg q12h  -Status post ablation on sotalol.  -Continue home sotalol and Xarelto.

## 2021-09-03 ENCOUNTER — APPOINTMENT (OUTPATIENT)
Dept: RADIOLOGY | Facility: MEDICAL CENTER | Age: 76
End: 2021-09-03
Attending: NURSE PRACTITIONER
Payer: MEDICARE

## 2021-09-03 PROBLEM — R10.9 ABDOMINAL PAIN: Status: ACTIVE | Noted: 2021-09-03

## 2021-09-03 PROBLEM — R10.9 ABDOMINAL PAIN: Status: RESOLVED | Noted: 2021-09-03 | Resolved: 2021-09-03

## 2021-09-03 PROCEDURE — 700105 HCHG RX REV CODE 258: Performed by: NURSE PRACTITIONER

## 2021-09-03 PROCEDURE — G0378 HOSPITAL OBSERVATION PER HR: HCPCS

## 2021-09-03 PROCEDURE — 74018 RADEX ABDOMEN 1 VIEW: CPT

## 2021-09-03 PROCEDURE — 99225 PR SUBSEQUENT OBSERVATION CARE,LEVEL II: CPT | Performed by: NURSE PRACTITIONER

## 2021-09-03 PROCEDURE — A9270 NON-COVERED ITEM OR SERVICE: HCPCS | Performed by: NURSE PRACTITIONER

## 2021-09-03 PROCEDURE — 700101 HCHG RX REV CODE 250: Performed by: EMERGENCY MEDICINE

## 2021-09-03 PROCEDURE — 700102 HCHG RX REV CODE 250 W/ 637 OVERRIDE(OP): Performed by: STUDENT IN AN ORGANIZED HEALTH CARE EDUCATION/TRAINING PROGRAM

## 2021-09-03 PROCEDURE — 700102 HCHG RX REV CODE 250 W/ 637 OVERRIDE(OP): Performed by: NURSE PRACTITIONER

## 2021-09-03 PROCEDURE — A9270 NON-COVERED ITEM OR SERVICE: HCPCS | Performed by: STUDENT IN AN ORGANIZED HEALTH CARE EDUCATION/TRAINING PROGRAM

## 2021-09-03 RX ORDER — LACTULOSE 10 G/15ML
300 SOLUTION ORAL EVERY 4 HOURS PRN
Status: DISCONTINUED | OUTPATIENT
Start: 2021-09-03 | End: 2021-09-04 | Stop reason: HOSPADM

## 2021-09-03 RX ORDER — POLYETHYLENE GLYCOL 3350 17 G/17G
1 POWDER, FOR SOLUTION ORAL ONCE
Status: COMPLETED | OUTPATIENT
Start: 2021-09-03 | End: 2021-09-03

## 2021-09-03 RX ORDER — SODIUM CHLORIDE 9 MG/ML
INJECTION, SOLUTION INTRAVENOUS CONTINUOUS
Status: DISCONTINUED | OUTPATIENT
Start: 2021-09-03 | End: 2021-09-04 | Stop reason: HOSPADM

## 2021-09-03 RX ADMIN — RIVAROXABAN 10 MG: 10 TABLET, FILM COATED ORAL at 17:59

## 2021-09-03 RX ADMIN — AMLODIPINE BESYLATE 2.5 MG: 5 TABLET ORAL at 05:27

## 2021-09-03 RX ADMIN — SODIUM CHLORIDE: 9 INJECTION, SOLUTION INTRAVENOUS at 20:23

## 2021-09-03 RX ADMIN — POLYETHYLENE GLYCOL 3350 1 PACKET: 17 POWDER, FOR SOLUTION ORAL at 18:05

## 2021-09-03 RX ADMIN — LOSARTAN POTASSIUM 50 MG: 50 TABLET, FILM COATED ORAL at 17:58

## 2021-09-03 RX ADMIN — ATORVASTATIN CALCIUM 40 MG: 40 TABLET, FILM COATED ORAL at 20:23

## 2021-09-03 RX ADMIN — ESCITALOPRAM OXALATE 10 MG: 10 TABLET ORAL at 05:28

## 2021-09-03 RX ADMIN — SODIUM CHLORIDE: 9 INJECTION, SOLUTION INTRAVENOUS at 09:37

## 2021-09-03 RX ADMIN — ASPIRIN 81 MG: 81 TABLET, CHEWABLE ORAL at 05:28

## 2021-09-03 RX ADMIN — SOTALOL HYDROCHLORIDE 120 MG: 120 TABLET ORAL at 20:23

## 2021-09-03 RX ADMIN — BUDESONIDE AND FORMOTEROL FUMARATE DIHYDRATE 2 PUFF: 160; 4.5 AEROSOL RESPIRATORY (INHALATION) at 05:29

## 2021-09-03 RX ADMIN — Medication 1 EACH: at 15:12

## 2021-09-03 RX ADMIN — LIDOCAINE 1 PATCH: 50 PATCH TOPICAL at 20:23

## 2021-09-03 RX ADMIN — DICLOFENAC SODIUM 4 G: 10 GEL TOPICAL at 17:59

## 2021-09-03 RX ADMIN — LOSARTAN POTASSIUM 50 MG: 50 TABLET, FILM COATED ORAL at 05:29

## 2021-09-03 RX ADMIN — DOCUSATE SODIUM 50 MG AND SENNOSIDES 8.6 MG 2 TABLET: 8.6; 5 TABLET, FILM COATED ORAL at 17:57

## 2021-09-03 RX ADMIN — MULTIPLE VITAMINS W/ MINERALS TAB 1 TABLET: TAB at 05:29

## 2021-09-03 RX ADMIN — DOCUSATE SODIUM 50 MG AND SENNOSIDES 8.6 MG 2 TABLET: 8.6; 5 TABLET, FILM COATED ORAL at 05:28

## 2021-09-03 RX ADMIN — MAGNESIUM HYDROXIDE 30 ML: 400 SUSPENSION ORAL at 09:05

## 2021-09-03 RX ADMIN — SOTALOL HYDROCHLORIDE 120 MG: 120 TABLET ORAL at 09:41

## 2021-09-03 RX ADMIN — DICLOFENAC SODIUM 4 G: 10 GEL TOPICAL at 20:23

## 2021-09-03 RX ADMIN — POLYETHYLENE GLYCOL 3350 1 PACKET: 17 POWDER, FOR SOLUTION ORAL at 11:44

## 2021-09-03 RX ADMIN — OMEPRAZOLE 20 MG: 20 CAPSULE, DELAYED RELEASE ORAL at 20:23

## 2021-09-03 ASSESSMENT — ENCOUNTER SYMPTOMS
NEUROLOGICAL NEGATIVE: 1
CONSTITUTIONAL NEGATIVE: 1
PSYCHIATRIC NEGATIVE: 1
ABDOMINAL PAIN: 1
MUSCULOSKELETAL NEGATIVE: 1
EYES NEGATIVE: 1
RESPIRATORY NEGATIVE: 1
CONSTIPATION: 1
CHILLS: 0
CARDIOVASCULAR NEGATIVE: 1
FEVER: 0

## 2021-09-03 ASSESSMENT — PAIN DESCRIPTION - PAIN TYPE: TYPE: ACUTE PAIN

## 2021-09-03 NOTE — ASSESSMENT & PLAN NOTE
-c/o of RUQ pain  -Trauma vs fecal impaction  -Patient reports hitting the arm chair in the plane  -Xray of abdomen noted moderate to large amount of stool and gas  -Constipation induced pain  -Aggressive laxatives

## 2021-09-03 NOTE — PROGRESS NOTES
Pt and daughter concerned about discharge regarding BM improvement. APN notified, new orders received

## 2021-09-03 NOTE — CARE PLAN
The patient is Stable - Low risk of patient condition declining or worsening    Shift Goals  Clinical Goals: pain management, bowel movement  Patient Goals: pain management  Family Goals: n/a    Progress made toward(s) clinical / shift goals:  Pt reports decrease in pain compared to yesterday    Patient is not progressing towards the following goals: Unable to have regular bowel movement despite interventions

## 2021-09-03 NOTE — PROGRESS NOTES
[facility] Medicine Daily Progress Note    Date of Service  9/3/2021    Chief Complaint  Anthony Cloud is a 76 y.o. male admitted 9/1/2021 with abdominal pain    Hospital Course  Mr. Cloud is a 76 y.o. male who presented 9/1/2021 with worsening right-sided abdominal pain.  This is a pleasant gentleman with a history of atrial fibrillation status post ablation on sotalol, coronary artery disease, sleep apnea, COPD, hypertension on amlodipine, depression on escitalopram, hypothyroidism, obesity BMI 32, previous myocardial infarction, peptic ulcer disease, and history of PE on Xarelto.  He also has a history of spine lumbar fusion surgery with spinal cord stimulator placement which is no longer working.  Patient reports that approximately a week ago, he jabbed the armrest of his seat in an airplane to the right side of his abdomen.  Since then, he has been having progressively worsening right-sided pain he describes as crampy and sharp in nature rating up to 10/10 in intensity.  He reports the pain comes on gradually intermittently at rest and last approximately 10 to 15 seconds.  The pain now radiates to the back at times.  He reports the crampy abdominal pain is worse with oral intake of foods.  He reports he has been constipated and has been taking lots of over-the-counter Ex-Lax which resulted in a bowel movement 3 days ago.  He has not had any further bowel movements since then.  He reports that his right side abdominal pain improved but did not fully go away.  He denies any flatus.  Denies having previous episodes like this prior.  He also reports he has been trying over-the-counter lidocaine as well as other pain medications with no significant improvement of his right side abdominal pain he describes that he feels like he is having muscle spasms at the site of his injury.  The right side abdominal pain is also worse with movement and also improved with rest.  There is a spot on his abdomen that  does hurt with palpation.  He denies any fevers or chills.  Denies any melena or hematochezia.  No nausea or vomiting.  He has been able to take fluids but has had loss of appetite with little solid intake for the past few days.     In the ER, patient received morphine 4 mg IV as well as a lidocaine patch with some improvement of his pain.  CT scan of the abdomen pelvis was performed, which did not show any signs of bowel obstruction.  He had a small right inguinal hernia.     During this course of stay, an updated Xray abdomen noted moderate to large amount of stool. Patient given laxatives to help with BM. Patient given 2x oral lactulose, milk and molasses enema, milk of mag and lactulose enema. Patient was held discharge yesterday 9/2/2021 due to patient not having a BM. Patient instructed to utilize miralax daily. Patient also prescribed lidocaine patch and muscle relaxant to help with RUQ pain from hitting the armchair. Patient recommended to follow up with PCP. All questions and concerns answered prior to being d/c. Patient d/c home.       Interval Problem Update  -Patient seen and examined. Patient still reports RUQ pain.  Discuss with patient Xray Abd which noted moderate to large amount of stool and gas. Patient to be given laxatives until patient has a BM. Patient and family aware in POC.   -Plan of care: Give patient 2x doses of oral lactulose, prune juice with butter, milk of mag, milk and molasses enema, and lactulose enema. Patient can be d/c once patient has a large BM.   -Lab: reviewed, unremarkable  -VSS at this time    I have personally seen and examined the patient at bedside. I discussed the plan of care with patient, family and bedside RN.    Consultants/Specialty  NONE    Code Status  Full Code    Disposition  Patient is medically cleared pending bowel movement.   Anticipate discharge to to home with close outpatient follow-up.  I have placed the appropriate orders for post-discharge  needs.    Review of Systems  Review of Systems   Constitutional: Negative.  Negative for chills and fever.   HENT: Negative.    Eyes: Negative.    Respiratory: Negative.    Cardiovascular: Negative.    Gastrointestinal: Positive for abdominal pain and constipation.   Genitourinary: Negative.    Musculoskeletal: Negative.    Skin: Negative.    Neurological: Negative.    Endo/Heme/Allergies: Negative.    Psychiatric/Behavioral: Negative.         Physical Exam  Temp:  [36.1 °C (96.9 °F)-36.9 °C (98.4 °F)] 36.9 °C (98.4 °F)  Pulse:  [47-69] 69  Resp:  [17-19] 18  BP: (122-147)/() 122/69  SpO2:  [92 %-95 %] 94 %    Physical Exam  Vitals and nursing note reviewed.   HENT:      Head: Normocephalic.      Nose: Nose normal.      Mouth/Throat:      Pharynx: Oropharynx is clear.   Eyes:      Pupils: Pupils are equal, round, and reactive to light.   Cardiovascular:      Rate and Rhythm: Normal rate and regular rhythm.      Pulses: Normal pulses.      Heart sounds: Normal heart sounds.   Pulmonary:      Effort: Pulmonary effort is normal.   Abdominal:      General: There is distension.      Tenderness: There is abdominal tenderness.   Musculoskeletal:         General: Tenderness present. Normal range of motion.      Cervical back: Normal range of motion and neck supple.   Skin:     General: Skin is dry.      Capillary Refill: Capillary refill takes 2 to 3 seconds.   Neurological:      Mental Status: He is alert. Mental status is at baseline.         Fluids  No intake or output data in the 24 hours ending 09/03/21 1059    Laboratory  Recent Labs     09/01/21  1330 09/02/21  0402   WBC 6.0 5.3   RBC 4.15* 4.23*   HEMOGLOBIN 14.1 14.3   HEMATOCRIT 42.0 42.2   .2* 99.8*   MCH 34.0* 33.8*   MCHC 33.6* 33.9   RDW 49.2 47.6   PLATELETCT 173 162*   MPV 9.0 9.0     Recent Labs     09/01/21  1330 09/02/21  0402   SODIUM 136 140   POTASSIUM 4.6 4.4   CHLORIDE 103 104   CO2 24 24   GLUCOSE 107* 99   BUN 21 16   CREATININE 1.10  0.91   CALCIUM 9.3 9.0                   Imaging  [id number] VIEW   Final Result      Nonobstructive small bowel gas pattern. Moderate to large amount of stool throughout the colon.      US-RUQ   Final Result      1.  Hepatic steatosis.   2.  No gallstones or acute cholecystitis.      CT-ABDOMEN-PELVIS WITH   Final Result      1.  No acute abnormality of the abdomen or pelvis.   2.  Mild cardiomegaly.   3.  Small hiatal hernia.   4.  Mild to moderate colonic stool burden.   5.  Atherosclerosis.   6.  Small fat-containing right inguinal hernia.           Assessment/Plan  Abdominal pain  Assessment & Plan  -c/o of RUQ pain  -Trauma vs fecal impaction  -Patient reports hitting the arm chair in the plane  -Xray of abdomen noted moderate to large amount of stool and gas  -Constipation induced pain  -Aggressive laxatives    S/P ablation of atrial fibrillation/Atrial Flutter- (present on admission)  Assessment & Plan  - [id number] score 4 which is moderate-high risk of stroke of 4.8% per year according to the [place] Atrial Fibrillation Cohort Study  - currently on: sotalol 120 mg q12h  -Status post ablation on sotalol.  -Continue home sotalol and Xarelto.    Stented coronary artery- (present on admission)  Assessment & Plan  -As per history status post MI and stenting.  -Continue home aspirin.    CAD (coronary artery disease)- (present on admission)  Assessment & Plan  -As per history status post stenting in 2003 to RCA.  -Continue home aspirin and statin.    Essential hypertension- (present on admission)  Assessment & Plan  -As per history.  Currently uncontrolled likely due to pain.  -Continue home losartan, amlodipine, insulin    Personal history of venous thrombosis and embolism- (present on admission)  Assessment & Plan  -As per history.  -Continue home Xarelto.    History of myocardial infarction- (present on admission)  Assessment & Plan  -As per history status post stenting.  -Continue home atorvastatin.        VTE prophylaxis: SCDs/TEDs and enoxaparin ppx    I have performed a physical exam and reviewed and updated ROS and Plan today (9/3/2021). In review of yesterday's note (9/2/2021), there are no changes except as documented above.  ==========================================================================================================  Please note that this dictation was created using voice recognition software. I have made every reasonable attempt to correct obvious errors, but there may be errors of grammar and possibly content that I did not discover before finalizing the note.    Electronically signed by:  DOREEN Espinoza, MSN, APRN, FNP-C  Hospitalist Services  [facility]  [phone]  [email or web address]  9/2/2021     1600

## 2021-09-03 NOTE — CARE PLAN
The patient is Stable - Low risk of patient condition declining or worsening    Shift Goals  Clinical Goals: BM  Patient Goals: BM  Family Goals: N/A    Progress made toward(s) clinical / shift goals:    Problem: Knowledge Deficit - Standard  Goal: Patient and family/care givers will demonstrate understanding of plan of care, disease process/condition, diagnostic tests and medications  Outcome: Progressing     Problem: Pain - Standard  Goal: Alleviation of pain or a reduction in pain to the patient’s comfort goal  Outcome: Progressing       Patient is not progressing towards the following goals:

## 2021-09-03 NOTE — DISCHARGE SUMMARY
Discharge Summary    CHIEF COMPLAINT ON ADMISSION  Chief Complaint   Patient presents with   • Abdominal Pain     Pt started having right sided abd pain a week ago. Pt states the pain has worsened. Pt went to  this AM and was sent here for further eval. Pt denies vomiting. Pts abd is very distended. Pt had xrays taken at .   • Constipation     Pts last BM was Sat morning. Pt hasn't had one since but has been taking OTC meds with no relief.        Reason for Admission  Sent by MD     Admission Date  9/1/2021    CODE STATUS  Full Code    HPI & HOSPITAL COURSE    Mr. Cloud is a 76 y.o. male who presented 9/1/2021 with worsening right-sided abdominal pain.  This is a pleasant gentleman with a history of atrial fibrillation status post ablation on sotalol, coronary artery disease, sleep apnea, COPD, hypertension on amlodipine, depression on escitalopram, hypothyroidism, obesity BMI 32, previous myocardial infarction, peptic ulcer disease, and history of PE on Xarelto.  He also has a history of spine lumbar fusion surgery with spinal cord stimulator placement which is no longer working.  Patient reports that approximately a week ago, he jabbed the armrest of his seat in an airplane to the right side of his abdomen.  Since then, he has been having progressively worsening right-sided pain he describes as crampy and sharp in nature rating up to 10/10 in intensity.  He reports the pain comes on gradually intermittently at rest and last approximately 10 to 15 seconds.  The pain now radiates to the back at times.  He reports the crampy abdominal pain is worse with oral intake of foods.  He reports he has been constipated and has been taking lots of over-the-counter Ex-Lax which resulted in a bowel movement 3 days ago.  He has not had any further bowel movements since then.  He reports that his right side abdominal pain improved but did not fully go away.  He denies any flatus.  Denies having previous episodes like  this prior.  He also reports he has been trying over-the-counter lidocaine as well as other pain medications with no significant improvement of his right side abdominal pain he describes that he feels like he is having muscle spasms at the site of his injury.  The right side abdominal pain is also worse with movement and also improved with rest.  There is a spot on his abdomen that does hurt with palpation.  He denies any fevers or chills.  Denies any melena or hematochezia.  No nausea or vomiting.  He has been able to take fluids but has had loss of appetite with little solid intake for the past few days.     In the ER, patient received morphine 4 mg IV as well as a lidocaine patch with some improvement of his pain.  CT scan of the abdomen pelvis was performed, which did not show any signs of bowel obstruction.  He had a small right inguinal hernia.     During this course of stay, an updated Xray abdomen noted moderate to large amount of stool. Patient given laxatives to help with BM. Patient given 2x oral lactulose, milk and molasses enema, milk of mag and lactulose enema. Patient was held discharge yesterday 9/2/2021 due to patient not having a BM. Patient instructed to utilize miralax daily. Patient also prescribed lidocaine patch and muscle relaxant to help with RUQ pain from hitting the armchair. Patient recommended to follow up with PCP. All questions and concerns answered prior to being d/c. Patient d/c home.       Therefore, he is discharged in good and stable condition to home with close outpatient follow-up.    The patient recovered much more quickly than anticipated on admission.    Discharge Date  09/03/21        FOLLOW UP ITEMS POST DISCHARGE  Please call [phone] to schedule PCP appointment for patient.    Required specialty appointments include:       Discharge Instructions per EVELYN McclellanRAdoreN.  => Follow up with PCP  => Utilize stool softners every day to help with BM  daily      DIET: as tolerated    ACTIVITY: as tolerated    DIAGNOSIS: Constipation/abdominal pain    Return to ER if symptoms persists, chest pain, palpitations, shortness of breath, numbness, tingling, weakness and HIGH fever.       DISCHARGE DIAGNOSES  Principal Problem (Resolved):    Right upper quadrant abdominal pain POA: Yes  Active Problems:    History of myocardial infarction POA: Yes    Personal history of venous thrombosis and embolism POA: Yes    Essential hypertension POA: Yes    CAD (coronary artery disease) POA: Yes      Overview: 2003: PCI/BMS x 3 to the RCA (Zeta 4.0 x 23mm, 3.5 x 23mm, 3.0 x 18mm).    Stented coronary artery POA: Yes    S/P ablation of atrial fibrillation/Atrial Flutter POA: Yes      FOLLOW UP  Future Appointments   Date Time Provider Department Center   10/12/2021 10:00 AM Eusebia Kinney M.D. [facility]   10/28/2021  9:00 AM [facility] None   11/17/2021 10:00 AM Nilton Ramirez M.D. [facility] None     Angel Cameron M.D.  [address] [facility] [id number]  [phone]    Call  Please call to schedule a hospital follow up appointment with your primary care provider. Thank you.      MEDICATIONS ON DISCHARGE     Medication List      START taking these medications      Instructions   lidocaine 5 % Ptch  Commonly known as: LIDODERM   Doctor's comments: Over RUQ from hitting the arm of chair  Place 1 Patch on the skin every 24 hours, leaving it on for 12 hours and removing it for 12 hours for 5 days.  Dose: 1 Patch     methocarbamol 500 MG Tabs  Commonly known as: ROBAXIN   Take 1 Tablet by mouth 3 times a day for 3 days.  Dose: 500 mg     polyethylene glycol/lytes 17 g Pack  Commonly known as: MIRALAX   Mix 1 packet with liquid and drink once daily for 7 days.  Dose: 17 g        CONTINUE taking these medications      Instructions   amLODIPine 2.5 MG Tabs  Commonly known as: NORVASC   Take 2.5 mg by mouth every day.  Dose: 2.5 mg     aspirin 81 MG Chew chewable  tablet  Commonly known as: ASA   Chew 81 mg every day.  Dose: 81 mg     atorvastatin 40 MG Tabs  Commonly known as: LIPITOR   Take 40 mg by mouth every evening.  Dose: 40 mg     Breo Ellipta 200-25 MCG/INH Aepb  Generic drug: Fluticasone Furoate-Vilanterol   Doctor's comments: MUST KEEP APPT IN AUGUST FOR ANY MORE FILLS  Inhale 1 Puff every day.  Dose: 1 Puff     escitalopram 10 MG Tabs  Commonly known as: Lexapro   Take 10 mg by mouth every day.  Dose: 10 mg     GLUCOSAMINE PO   Take 1 Tab by mouth every morning.  Dose: 1 Tablet     losartan 50 MG Tabs  Commonly known as: COZAAR   Take 1 Tab by mouth 2 Times a Day.  Dose: 50 mg     MULTIVITAMIN ADULT PO   Take 1 Tab by mouth every morning.  Dose: 1 Tablet     omeprazole 20 MG delayed-release capsule  Commonly known as: PRILOSEC   Doctor's comments: Take daily x 1 month post procedure  Take 1 Cap by mouth every bedtime.  Dose: 20 mg     sotalol 120 MG tablet  Commonly known as: BETAPACE   Take 120 mg by mouth 2 times a day.  Dose: 120 mg     Xarelto 10 MG Tabs tablet  Generic drug: rivaroxaban   Take 10 mg by mouth with dinner.  Dose: 10 mg            Allergies  Allergies   Allergen Reactions   • Lisinopril      cough       DIET  Orders Placed This Encounter   Procedures   • Diet Order Diet: Full Liquid     Standing Status:   Standing     Number of Occurrences:   1     Order Specific Question:   Diet:     Answer:   Full Liquid [11]       ACTIVITY  As tolerated.  Weight bearing as tolerated    CONSULTATIONS  NONE    PROCEDURES  NONE    IMAGING  [id number] VIEW   Final Result      Nonobstructive small bowel gas pattern. Moderate to large amount of stool throughout the colon.      US-RUQ   Final Result      1.  Hepatic steatosis.   2.  No gallstones or acute cholecystitis.      CT-ABDOMEN-PELVIS WITH   Final Result      1.  No acute abnormality of the abdomen or pelvis.   2.  Mild cardiomegaly.   3.  Small hiatal hernia.   4.  Mild to moderate colonic stool burden.    5.  Atherosclerosis.   6.  Small fat-containing right inguinal hernia.            LABORATORY  Lab Results   Component Value Date    SODIUM 140 09/02/2021    POTASSIUM 4.4 09/02/2021    CHLORIDE 104 09/02/2021    CO2 24 09/02/2021    GLUCOSE 99 09/02/2021    BUN 16 09/02/2021    CREATININE 0.91 09/02/2021    CREATININE 1.06 01/04/2013        Lab Results   Component Value Date    WBC 5.3 09/02/2021    HEMOGLOBIN 14.3 09/02/2021    HEMATOCRIT 42.2 09/02/2021    PLATELETCT 162 (L) 09/02/2021        Total time of the discharge process exceeds 36 minutes.    ==========================================================================================================  Please note that this dictation was created using voice recognition software. I have made every reasonable attempt to correct obvious errors, but there may be errors of grammar and possibly content that I did not discover before finalizing the note.    Electronically signed by:  DOREEN Espinoza, MSN, APRN, FNP-C  Hospitalist Services  [facility]  [phone]  [email or web address]  09/03/21    1427

## 2021-09-04 ENCOUNTER — APPOINTMENT (OUTPATIENT)
Dept: RADIOLOGY | Facility: MEDICAL CENTER | Age: 76
End: 2021-09-04
Attending: NURSE PRACTITIONER
Payer: MEDICARE

## 2021-09-04 VITALS
RESPIRATION RATE: 18 BRPM | DIASTOLIC BLOOD PRESSURE: 78 MMHG | WEIGHT: 258.38 LBS | HEART RATE: 50 BPM | BODY MASS INDEX: 31.46 KG/M2 | HEIGHT: 76 IN | OXYGEN SATURATION: 93 % | SYSTOLIC BLOOD PRESSURE: 137 MMHG | TEMPERATURE: 98.1 F

## 2021-09-04 PROBLEM — K59.00 CONSTIPATION: Status: ACTIVE | Noted: 2021-09-04

## 2021-09-04 PROCEDURE — 99217 PR OBSERVATION CARE DISCHARGE: CPT | Performed by: STUDENT IN AN ORGANIZED HEALTH CARE EDUCATION/TRAINING PROGRAM

## 2021-09-04 PROCEDURE — 700102 HCHG RX REV CODE 250 W/ 637 OVERRIDE(OP): Performed by: STUDENT IN AN ORGANIZED HEALTH CARE EDUCATION/TRAINING PROGRAM

## 2021-09-04 PROCEDURE — A9270 NON-COVERED ITEM OR SERVICE: HCPCS | Performed by: NURSE PRACTITIONER

## 2021-09-04 PROCEDURE — A9270 NON-COVERED ITEM OR SERVICE: HCPCS | Performed by: STUDENT IN AN ORGANIZED HEALTH CARE EDUCATION/TRAINING PROGRAM

## 2021-09-04 PROCEDURE — 700105 HCHG RX REV CODE 258: Performed by: NURSE PRACTITIONER

## 2021-09-04 PROCEDURE — 700117 HCHG RX CONTRAST REV CODE 255: Performed by: NURSE PRACTITIONER

## 2021-09-04 PROCEDURE — G0378 HOSPITAL OBSERVATION PER HR: HCPCS

## 2021-09-04 PROCEDURE — 74177 CT ABD & PELVIS W/CONTRAST: CPT | Mod: MG

## 2021-09-04 PROCEDURE — 700102 HCHG RX REV CODE 250 W/ 637 OVERRIDE(OP): Performed by: NURSE PRACTITIONER

## 2021-09-04 RX ADMIN — DOCUSATE SODIUM 50 MG AND SENNOSIDES 8.6 MG 2 TABLET: 8.6; 5 TABLET, FILM COATED ORAL at 06:09

## 2021-09-04 RX ADMIN — LOSARTAN POTASSIUM 50 MG: 50 TABLET, FILM COATED ORAL at 06:09

## 2021-09-04 RX ADMIN — MAGNESIUM HYDROXIDE 30 ML: 400 SUSPENSION ORAL at 06:09

## 2021-09-04 RX ADMIN — DICLOFENAC SODIUM 4 G: 10 GEL TOPICAL at 08:50

## 2021-09-04 RX ADMIN — IOHEXOL 25 ML: 240 INJECTION, SOLUTION INTRATHECAL; INTRAVASCULAR; INTRAVENOUS; ORAL at 12:12

## 2021-09-04 RX ADMIN — SOTALOL HYDROCHLORIDE 120 MG: 120 TABLET ORAL at 08:50

## 2021-09-04 RX ADMIN — ESCITALOPRAM OXALATE 10 MG: 10 TABLET ORAL at 06:09

## 2021-09-04 RX ADMIN — BUDESONIDE AND FORMOTEROL FUMARATE DIHYDRATE 2 PUFF: 160; 4.5 AEROSOL RESPIRATORY (INHALATION) at 06:09

## 2021-09-04 RX ADMIN — IOHEXOL 100 ML: 350 INJECTION, SOLUTION INTRAVENOUS at 12:12

## 2021-09-04 RX ADMIN — MULTIPLE VITAMINS W/ MINERALS TAB 1 TABLET: TAB at 06:09

## 2021-09-04 RX ADMIN — ASPIRIN 81 MG: 81 TABLET, CHEWABLE ORAL at 06:09

## 2021-09-04 RX ADMIN — LACTULOSE 300 ML: 10 SOLUTION ORAL at 06:32

## 2021-09-04 RX ADMIN — SODIUM CHLORIDE: 9 INJECTION, SOLUTION INTRAVENOUS at 06:08

## 2021-09-04 RX ADMIN — METHOCARBAMOL 500 MG: 500 TABLET ORAL at 06:12

## 2021-09-04 RX ADMIN — AMLODIPINE BESYLATE 2.5 MG: 5 TABLET ORAL at 06:09

## 2021-09-04 ASSESSMENT — ENCOUNTER SYMPTOMS
EYES NEGATIVE: 1
NEUROLOGICAL NEGATIVE: 1
ABDOMINAL PAIN: 1
RESPIRATORY NEGATIVE: 1
MUSCULOSKELETAL NEGATIVE: 1
CONSTIPATION: 1
CONSTITUTIONAL NEGATIVE: 1
FEVER: 0
PSYCHIATRIC NEGATIVE: 1
CARDIOVASCULAR NEGATIVE: 1
CHILLS: 0

## 2021-09-04 ASSESSMENT — PATIENT HEALTH QUESTIONNAIRE - PHQ9
2. FEELING DOWN, DEPRESSED, IRRITABLE, OR HOPELESS: NOT AT ALL
1. LITTLE INTEREST OR PLEASURE IN DOING THINGS: NOT AT ALL
SUM OF ALL RESPONSES TO PHQ9 QUESTIONS 1 AND 2: 0

## 2021-09-04 NOTE — DISCHARGE INSTRUCTIONS
FOLLOW UP ITEMS POST DISCHARGE  Please call [phone] to schedule PCP appointment for patient.    Required specialty appointments include:       Discharge Instructions per INDIRA Mcclellan  => Follow up with PCP  => Utilize stool softners every day to help with BM daily  => Stick to high fiber diet to help with constipation      DIET: as tolerated    ACTIVITY: as tolerated    DIAGNOSIS: Constipation/abdominal pain    Return to ER if symptoms persists, chest pain, palpitations, shortness of breath, numbness, tingling, weakness and HIGH fever      Constipation, Adult  Constipation is when a person has fewer bowel movements in a week than normal, has difficulty having a bowel movement, or has stools that are dry, hard, or larger than normal. Constipation may be caused by an underlying condition. It may become worse with age if a person takes certain medicines and does not take in enough fluids.  Follow these instructions at home:  Eating and drinking    · Eat foods that have a lot of fiber, such as fresh fruits and vegetables, whole grains, and beans.  · Limit foods that are high in fat, low in fiber, or overly processed, such as french fries, hamburgers, cookies, candies, and soda.  · Drink enough fluid to keep your urine clear or pale yellow.  General instructions  · Exercise regularly or as told by your health care provider.  · Go to the restroom when you have the urge to go. Do not hold it in.  · Take over-the-counter and prescription medicines only as told by your health care provider. These include any fiber supplements.  · Practice pelvic floor retraining exercises, such as deep breathing while relaxing the lower abdomen and pelvic floor relaxation during bowel movements.  · Watch your condition for any changes.  · Keep all follow-up visits as told by your health care provider. This is important.  Contact a health care provider if:  · You have pain that gets worse.  · You have a  fever.  · You do not have a bowel movement after 4 days.  · You vomit.  · You are not hungry.  · You lose weight.  · You are bleeding from the anus.  · You have thin, pencil-like stools.  Get help right away if:  · You have a fever and your symptoms suddenly get worse.  · You leak stool or have blood in your stool.  · Your abdomen is bloated.  · You have severe pain in your abdomen.  · You feel dizzy or you faint.  This information is not intended to replace advice given to you by your health care provider. Make sure you discuss any questions you have with your health care provider.  Document Released: 09/15/2005 Document Revised: 11/30/2018 Document Reviewed: 06/07/2017  [employer] Patient Education © 2020 [employer]    Discharge Instructions    Discharged to home by car with relative. Discharged via wheelchair, hospital escort: Yes.  Special equipment needed: Not Applicable    Be sure to schedule a follow-up appointment with your primary care doctor or any specialists as instructed.     Discharge Plan:   Diet Plan: Discussed  Activity Level: Discussed  Confirmed Follow up Appointment: Patient to Call and Schedule Appointment  Confirmed Symptoms Management: Discussed  Medication Reconciliation Updated: Yes    I understand that a diet low in cholesterol, fat, and sodium is recommended for good health. Unless I have been given specific instructions below for another diet, I accept this instruction as my diet prescription.   Other diet: heart healthy     Special Instructions: None    · Is patient discharged on Warfarin / Coumadin?   No     Depression / Suicide Risk    As you are discharged from this [facility] facility, it is important to learn how to keep safe from harming yourself.    Recognize the warning signs:  · Abrupt changes in personality, positive or negative- including increase in energy   · Giving away possessions  · Change in eating patterns- significant weight changes-  positive or negative  · Change  in sleeping patterns- unable to sleep or sleeping all the time   · Unwillingness or inability to communicate  · Depression  · Unusual sadness, discouragement and loneliness  · Talk of wanting to die  · Neglect of personal appearance   · Rebelliousness- reckless behavior  · Withdrawal from people/activities they love  · Confusion- inability to concentrate     If you or a loved one observes any of these behaviors or has concerns about self-harm, here's what you can do:  · Talk about it- your feelings and reasons for harming yourself  · Remove any means that you might use to hurt yourself (examples: pills, rope, extension cords, firearm)  · Get professional help from the community (Mental Health, Substance Abuse, psychological counseling)  · Do not be alone:Call your Safe Contact- someone whom you trust who will be there for you.  · Call your local CRISIS HOTLINE 416-6054 or 126-968-8814  · Call your local Children's Mobile Crisis Response Team [place] (216) 919-5825 or www.[employer]  · Call the toll free National Suicide Prevention Hotlines   · National Suicide Prevention Lifeline 299-602-NYND (0427)  · National Hope Line Network 800-SUICIDE (929-8100)

## 2021-09-04 NOTE — DISCHARGE SUMMARY
Discharge Summary    CHIEF COMPLAINT ON ADMISSION  Chief Complaint   Patient presents with   • Abdominal Pain     Pt started having right sided abd pain a week ago. Pt states the pain has worsened. Pt went to  this AM and was sent here for further eval. Pt denies vomiting. Pts abd is very distended. Pt had xrays taken at .   • Constipation     Pts last BM was Sat morning. Pt hasn't had one since but has been taking OTC meds with no relief.        Reason for Admission  Sent by MD     Admission Date  9/1/2021    CODE STATUS  Full Code    HPI & HOSPITAL COURSE    Mr. Cloud is a 76 y.o. male who presented 9/1/2021 with worsening right-sided abdominal pain.  This is a pleasant gentleman with a history of atrial fibrillation status post ablation on sotalol, coronary artery disease, sleep apnea, COPD, hypertension on amlodipine, depression on escitalopram, hypothyroidism, obesity BMI 32, previous myocardial infarction, peptic ulcer disease, and history of PE on Xarelto.  He also has a history of spine lumbar fusion surgery with spinal cord stimulator placement which is no longer working.  Patient reports that approximately a week ago, he jabbed the armrest of his seat in an airplane to the right side of his abdomen.  Since then, he has been having progressively worsening right-sided pain he describes as crampy and sharp in nature rating up to 10/10 in intensity.  He reports the pain comes on gradually intermittently at rest and last approximately 10 to 15 seconds.  The pain now radiates to the back at times.  He reports the crampy abdominal pain is worse with oral intake of foods.  He reports he has been constipated and has been taking lots of over-the-counter Ex-Lax which resulted in a bowel movement 3 days ago.  He has not had any further bowel movements since then.  He reports that his right side abdominal pain improved but did not fully go away.  He denies any flatus.  Denies having previous episodes like  this prior.  He also reports he has been trying over-the-counter lidocaine as well as other pain medications with no significant improvement of his right side abdominal pain he describes that he feels like he is having muscle spasms at the site of his injury.  The right side abdominal pain is also worse with movement and also improved with rest.  There is a spot on his abdomen that does hurt with palpation.  He denies any fevers or chills.  Denies any melena or hematochezia.  No nausea or vomiting.  He has been able to take fluids but has had loss of appetite with little solid intake for the past few days.     In the ER, patient received morphine 4 mg IV as well as a lidocaine patch with some improvement of his pain.  CT scan of the abdomen pelvis was performed, which did not show any signs of bowel obstruction.  He had a small right inguinal hernia.     During this course of stay, an updated Xray abdomen noted moderate to large amount of stool. Patient given laxatives to help with BM. Patient given 2x oral lactulose, milk and molasses enema, milk of mag and lactulose enema. Patient was held discharge yesterday 9/2/2021 due to patient not having a BM.  On 9/3/2021, patient and family expressed concerns of going home and not getting relief from the laxatives and enemas were administered to the patient.  Extensive discussion with family and patient regarding abdominal x-ray and results.  Patient was held overnight due to anticipated more relief with utilization of bowel protocol along with more imaging.  CT of the abdomen/pelvis with contrast was obtained which noted no acute pathology.  Patient instructed to utilize miralax daily. Patient also prescribed lidocaine patch and muscle relaxant to help with RUQ pain from hitting the armchair. Patient recommended to follow up with PCP. Family already had set up a GI consultations. All questions and concerns answered prior to being d/c. Patient d/c home.       Therefore, he  is discharged in good and stable condition to home with close outpatient follow-up.    The patient recovered much more quickly than anticipated on admission.    Discharge Date  09/03/21        FOLLOW UP ITEMS POST DISCHARGE  Please call [phone] to schedule PCP appointment for patient.    Required specialty appointments include:       Discharge Instructions per INDIRA Mcclellan  => Follow up with PCP  => Utilize stool softners every day to help with BM daily  => Stick to high fiber diet to help with constipation      DIET: as tolerated    ACTIVITY: as tolerated    DIAGNOSIS: Constipation/abdominal pain    Return to ER if symptoms persists, chest pain, palpitations, shortness of breath, numbness, tingling, weakness and HIGH fever.       DISCHARGE DIAGNOSES  Principal Problem (Resolved):    Right upper quadrant abdominal pain POA: Yes  Active Problems:    History of myocardial infarction POA: Yes    Personal history of venous thrombosis and embolism POA: Yes    Essential hypertension POA: Yes    CAD (coronary artery disease) POA: Yes      Overview: 2003: PCI/BMS x 3 to the RCA (Zeta 4.0 x 23mm, 3.5 x 23mm, 3.0 x 18mm).    Stented coronary artery POA: Yes    S/P ablation of atrial fibrillation/Atrial Flutter POA: Yes    Constipation POA: Unknown  Resolved Problems:    Abdominal pain POA: Unknown      FOLLOW UP  Future Appointments   Date Time Provider Department Center   10/12/2021 10:00 AM Eusebia Kinney M.D. [facility] Cam   10/28/2021  9:00 AM [facility] EXAM 4 [facility] None   11/17/2021 10:00 AM Nilton Ramirez M.D. [facility] None     Angel Cameron M.D.  [address] [facility] [id number] [phone]    Call  Please call to schedule a hospital follow up appointment with your primary care provider. Thank you.    [facility] - [address] [address] [phone]  Schedule an appointment as soon as possible for a visit in   week        MEDICATIONS ON DISCHARGE     Medication List      START taking these medications      Instructions   lidocaine 5 % Ptch  Commonly known as: LIDODERM   Doctor's comments: Over RUQ from hitting the arm of chair  Place 1 Patch on the skin every 24 hours, leaving it on for 12 hours and removing it for 12 hours for 5 days.  Dose: 1 Patch     methocarbamol 500 MG Tabs  Commonly known as: ROBAXIN   Take 1 Tablet by mouth 3 times a day for 3 days.  Dose: 500 mg     polyethylene glycol/lytes 17 g Pack  Commonly known as: MIRALAX   Mix 1 packet with liquid and drink once daily for 7 days.  Dose: 17 g        CONTINUE taking these medications      Instructions   amLODIPine 2.5 MG Tabs  Commonly known as: NORVASC   Take 2.5 mg by mouth every day.  Dose: 2.5 mg     aspirin 81 MG Chew chewable tablet  Commonly known as: ASA   Chew 81 mg every day.  Dose: 81 mg     atorvastatin 40 MG Tabs  Commonly known as: LIPITOR   Take 40 mg by mouth every evening.  Dose: 40 mg     Breo Ellipta 200-25 MCG/INH Aepb  Generic drug: Fluticasone Furoate-Vilanterol   Doctor's comments: MUST KEEP APPT IN AUGUST FOR ANY MORE FILLS  Inhale 1 Puff every day.  Dose: 1 Puff     escitalopram 10 MG Tabs  Commonly known as: Lexapro   Take 10 mg by mouth every day.  Dose: 10 mg     GLUCOSAMINE PO   Take 1 Tab by mouth every morning.  Dose: 1 Tablet     losartan 50 MG Tabs  Commonly known as: COZAAR   Take 1 Tab by mouth 2 Times a Day.  Dose: 50 mg     MULTIVITAMIN ADULT PO   Take 1 Tab by mouth every morning.  Dose: 1 Tablet     omeprazole 20 MG delayed-release capsule  Commonly known as: PRILOSEC   Doctor's comments: Take daily x 1 month post procedure  Take 1 Cap by mouth every bedtime.  Dose: 20 mg     sotalol 120 MG tablet  Commonly known as: BETAPACE   Take 120 mg by mouth 2 times a day.  Dose: 120 mg     Xarelto 10 MG Tabs tablet  Generic drug: rivaroxaban   Take 10 mg by mouth with dinner.  Dose: 10 mg            Allergies  Allergies    Allergen Reactions   • Lisinopril      cough       DIET  Orders Placed This Encounter   Procedures   • Diet Order Diet: Full Liquid     Standing Status:   Standing     Number of Occurrences:   1     Order Specific Question:   Diet:     Answer:   Full Liquid [11]       ACTIVITY  As tolerated.  Weight bearing as tolerated    CONSULTATIONS  NONE    PROCEDURES  NONE    IMAGING  CT-ABDOMEN-PELVIS WITH   Final Result      1.  No acute abnormalities identified in the abdomen or pelvis.      2.  Small hiatal hernia is again noted.      3.  Small right inguinal hernia again noted.      [id number] VIEW   Final Result      Findings suggestive of ileus.      [id number] VIEW   Final Result      Nonobstructive small bowel gas pattern. Moderate to large amount of stool throughout the colon.      US-RUQ   Final Result      1.  Hepatic steatosis.   2.  No gallstones or acute cholecystitis.      CT-ABDOMEN-PELVIS WITH   Final Result      1.  No acute abnormality of the abdomen or pelvis.   2.  Mild cardiomegaly.   3.  Small hiatal hernia.   4.  Mild to moderate colonic stool burden.   5.  Atherosclerosis.   6.  Small fat-containing right inguinal hernia.            LABORATORY  Lab Results   Component Value Date    SODIUM 140 09/02/2021    POTASSIUM 4.4 09/02/2021    CHLORIDE 104 09/02/2021    CO2 24 09/02/2021    GLUCOSE 99 09/02/2021    BUN 16 09/02/2021    CREATININE 0.91 09/02/2021    CREATININE 1.06 01/04/2013        Lab Results   Component Value Date    WBC 5.3 09/02/2021    HEMOGLOBIN 14.3 09/02/2021    HEMATOCRIT 42.2 09/02/2021    PLATELETCT 162 (L) 09/02/2021        Total time of the discharge process exceeds 36 minutes.    ==========================================================================================================  Please note that this dictation was created using voice recognition software. I have made every reasonable attempt to correct obvious errors, but there may be errors of grammar and possibly  content that I did not discover before finalizing the note.    Electronically signed by:  DOREEN Espinoza, MSN, APRN, FNP-C  Hospitalist Services  [facility]  [phone]  [email or web address]  09/04/21       [id number]

## 2021-09-04 NOTE — PROGRESS NOTES
"Update:      Spoke to patient and family due to concerns of going home and still having some discomfort.  Ordered another KUB after patient being given oral laxatives and multiple enemas. Compared KUB from admission to most recent which noted \"no evidence of obstruction\" and \"evidence of moderate to large amount of stools\". Patient and family expresses concerns due to not getting the relief that he anticipated. Patient will be kept overnight and upper GI series ordered. Pending upper GI series results.     ================================================================  Please note that this dictation was created using voice recognition software. I have made every reasonable attempt to correct obvious errors, but there may be errors of grammar and possibly content that I did not discover before finalizing the note.    Electronically signed by:  DOREEN Espinoza, MSN, APRN, FNP-C  Hospitalist Services  [facility]  [phone]  [email or web address]  09/03/21     1331  "

## 2021-09-04 NOTE — PROGRESS NOTES
X ray resulted. APN spoke to daughter and pt via phone.  Pt to spend the night in the hospital. Questions regarding x ray and care of plan answered. Treatment team to see pt in AM.

## 2021-09-04 NOTE — CARE PLAN
Problem: Pain - Standard  Goal: Alleviation of pain or a reduction in pain to the patient’s comfort goal  Outcome: Progressing     Problem: Knowledge Deficit - Standard  Goal: Patient and family/care givers will demonstrate understanding of plan of care, disease process/condition, diagnostic tests and medications  Outcome: Progressing   The patient is Stable - Low risk of patient condition declining or worsening    Shift Goals  Clinical Goals: solid bowel movement  Patient Goals: BM  Family Goals: N/A    Progress made toward(s) clinical / shift goals:  continue to move bowels    Patient is not progressing towards the following goals: per report, patient still having loose stools and feels bloated

## 2021-09-04 NOTE — CARE PLAN
The patient is Stable - Low risk of patient condition declining or worsening    Shift Goals  Clinical Goals: have bowel movement   Patient Goals: BM  Family Goals: N/A    Progress made toward(s) clinical / shift goals:    Problem: Pain - Standard  Goal: Alleviation of pain or a reduction in pain to the patient’s comfort goal  Outcome: Progressing     Problem: Knowledge Deficit - Standard  Goal: Patient and family/care givers will demonstrate understanding of plan of care, disease process/condition, diagnostic tests and medications  Outcome: Progressing       Patient is not progressing towards the following goals:

## 2021-09-04 NOTE — PROGRESS NOTES
Assumed care of pt. Pt resting in bed with family at bedside. Family brought home CPAP for use tonight. No questions or concerns at this time.

## 2021-09-04 NOTE — PROGRESS NOTES
Pt dc'd in stable and satisfactory condition. IV and monitor removed; monitor room notified. Pt left unit via walking with daughter. Personal belongings with pt when leaving unit. Pt given discharge instructions prior to leaving unit including where to  prescriptions and when to follow-up; verbalizes understanding. Copy of discharge instructions with pt and in the chart.

## 2021-09-04 NOTE — ASSESSMENT & PLAN NOTE
-Admitting KUB noted moderate to large amount of stool  -Utilize laxative, enemas, bowel protocol  -Updated KUB after bowel protocol noted dilated loops, no obstruction, possible ileus  -CT of the abdomen pelvis unremarkable

## 2021-09-04 NOTE — PROGRESS NOTES
Rec'd report from previous nurse regarding prior 12 hours.  POC reviewed with pt.  White board updated.  Pt verbalizes understanding.  Call light within reach.

## 2021-09-04 NOTE — PROGRESS NOTES
[facility] Medicine Daily Progress Note    Date of Service  9/4/2021    Chief Complaint  Anthony Cloud is a 76 y.o. male admitted 9/1/2021 with abdominal pain    Hospital Course  Mr. Cloud is a 76 y.o. male who presented 9/1/2021 with worsening right-sided abdominal pain.  This is a pleasant gentleman with a history of atrial fibrillation status post ablation on sotalol, coronary artery disease, sleep apnea, COPD, hypertension on amlodipine, depression on escitalopram, hypothyroidism, obesity BMI 32, previous myocardial infarction, peptic ulcer disease, and history of PE on Xarelto.  He also has a history of spine lumbar fusion surgery with spinal cord stimulator placement which is no longer working.  Patient reports that approximately a week ago, he jabbed the armrest of his seat in an airplane to the right side of his abdomen.  Since then, he has been having progressively worsening right-sided pain he describes as crampy and sharp in nature rating up to 10/10 in intensity.  He reports the pain comes on gradually intermittently at rest and last approximately 10 to 15 seconds.  The pain now radiates to the back at times.  He reports the crampy abdominal pain is worse with oral intake of foods.  He reports he has been constipated and has been taking lots of over-the-counter Ex-Lax which resulted in a bowel movement 3 days ago.  He has not had any further bowel movements since then.  He reports that his right side abdominal pain improved but did not fully go away.  He denies any flatus.  Denies having previous episodes like this prior.  He also reports he has been trying over-the-counter lidocaine as well as other pain medications with no significant improvement of his right side abdominal pain he describes that he feels like he is having muscle spasms at the site of his injury.  The right side abdominal pain is also worse with movement and also improved with rest.  There is a spot on his abdomen that  "does hurt with palpation.  He denies any fevers or chills.  Denies any melena or hematochezia.  No nausea or vomiting.  He has been able to take fluids but has had loss of appetite with little solid intake for the past few days.     In the ER, patient received morphine 4 mg IV as well as a lidocaine patch with some improvement of his pain.  CT scan of the abdomen pelvis was performed, which did not show any signs of bowel obstruction.  He had a small right inguinal hernia.     During this course of stay, an updated Xray abdomen noted moderate to large amount of stool. Patient given laxatives to help with BM. Patient given 2x oral lactulose, milk and molasses enema, milk of mag and lactulose enema. Patient was held discharge yesterday 9/2/2021 due to patient not having a BM. Patient instructed to utilize miralax daily. Patient also prescribed lidocaine patch and muscle relaxant to help with RUQ pain from hitting the armchair. Patient recommended to follow up with PCP. All questions and concerns answered prior to being d/c. Patient d/c home.       Interval Problem Update  9/2/2021  -Patient seen and examined. Patient still reports RUQ pain.  Discuss with patient Xray Abd which noted moderate to large amount of stool and gas. Patient to be given laxatives until patient has a BM. Patient and family aware in POC.   -Plan of care: Give patient 2x doses of oral lactulose, prune juice with butter, milk of mag, milk and molasses enema, and lactulose enema. Patient can be d/c once patient has a large BM.   -Lab: reviewed, unremarkable  -VSS at this time    9/3/2021  -Patient seen and examined. Patient noted distention which we discussed and attributed to gas. Patient given more laxatives today, a \"pink lady\" enema, and reports having watery stool today.  Patient and family still concerned and REFUSING to be discharged due to the fact that patient \"wants a larger amount of BM\". I had an extensive discussion with the patient " and family about the results to the updated KUB (Xray of abdomen) which noted NO OBSTRUCTIONS, possible ileus, and dilated loops of bowel.  Due to concerns, patient will HELD OVERNIGHT. Will order upper GI series versus CT Abdomen/pelvis with contrast.   -Plan of care: monitor for more BM; encourage utilization of oral stool softners and laxatives. PENDING more imaging.  -Lab work: Reviewed; unremarkable  -VSS at this time  -There are no significant changes from previous ROS/PE, please see my previous notes for further details.    I have personally seen and examined the patient at bedside. I discussed the plan of care with patient, family and bedside RN.    Consultants/Specialty  NONE    Code Status  Full Code    Disposition  Patient is medically cleared pending bowel movement.   Anticipate discharge to to home with close outpatient follow-up.  I have placed the appropriate orders for post-discharge needs.    Review of Systems  Review of Systems   Constitutional: Negative.  Negative for chills and fever.   HENT: Negative.    Eyes: Negative.    Respiratory: Negative.    Cardiovascular: Negative.    Gastrointestinal: Positive for abdominal pain and constipation.   Genitourinary: Negative.    Musculoskeletal: Negative.    Skin: Negative.    Neurological: Negative.    Endo/Heme/Allergies: Negative.    Psychiatric/Behavioral: Negative.         Physical Exam  Temp:  [36.7 °C (98.1 °F)-37.1 °C (98.7 °F)] 36.7 °C (98.1 °F)  Pulse:  [50-81] 50  Resp:  [18-20] 18  BP: (127-147)/(75-84) 137/78  SpO2:  [93 %-98 %] 93 %    Physical Exam  Vitals and nursing note reviewed.   HENT:      Head: Normocephalic.      Nose: Nose normal.      Mouth/Throat:      Pharynx: Oropharynx is clear.   Eyes:      Pupils: Pupils are equal, round, and reactive to light.   Cardiovascular:      Rate and Rhythm: Normal rate and regular rhythm.      Pulses: Normal pulses.      Heart sounds: Normal heart sounds.   Pulmonary:      Effort: Pulmonary effort  is normal.   Abdominal:      General: There is distension.      Tenderness: There is abdominal tenderness.   Musculoskeletal:         General: Tenderness present. Normal range of motion.      Cervical back: Normal range of motion and neck supple.   Skin:     General: Skin is dry.      Capillary Refill: Capillary refill takes 2 to 3 seconds.   Neurological:      Mental Status: He is alert. Mental status is at baseline.         Fluids  No intake or output data in the 24 hours ending 09/04/21 1313    Laboratory  Recent Labs     09/01/21  1330 09/02/21  0402   WBC 6.0 5.3   RBC 4.15* 4.23*   HEMOGLOBIN 14.1 14.3   HEMATOCRIT 42.0 42.2   .2* 99.8*   MCH 34.0* 33.8*   MCHC 33.6* 33.9   RDW 49.2 47.6   PLATELETCT 173 162*   MPV 9.0 9.0     Recent Labs     09/01/21  1330 09/02/21  0402   SODIUM 136 140   POTASSIUM 4.6 4.4   CHLORIDE 103 104   CO2 24 24   GLUCOSE 107* 99   BUN 21 16   CREATININE 1.10 0.91   CALCIUM 9.3 9.0                   Imaging  CT-ABDOMEN-PELVIS WITH   Final Result      1.  No acute abnormalities identified in the abdomen or pelvis.      2.  Small hiatal hernia is again noted.      3.  Small right inguinal hernia again noted.      [id number] VIEW   Final Result      Findings suggestive of ileus.      [id number] VIEW   Final Result      Nonobstructive small bowel gas pattern. Moderate to large amount of stool throughout the colon.      US-RUQ   Final Result      1.  Hepatic steatosis.   2.  No gallstones or acute cholecystitis.      CT-ABDOMEN-PELVIS WITH   Final Result      1.  No acute abnormality of the abdomen or pelvis.   2.  Mild cardiomegaly.   3.  Small hiatal hernia.   4.  Mild to moderate colonic stool burden.   5.  Atherosclerosis.   6.  Small fat-containing right inguinal hernia.           Assessment/Plan  Constipation  Assessment & Plan  -Admitting KUB noted moderate to large amount of stool  -Utilize laxative, enemas, bowel protocol  -Updated KUB after bowel protocol noted  dilated loops, no obstruction, possible ileus  -CT of the abdomen pelvis unremarkable    S/P ablation of atrial fibrillation/Atrial Flutter- (present on admission)  Assessment & Plan  - [id number] score 4 which is moderate-high risk of stroke of 4.8% per year according to the [place] Atrial Fibrillation Cohort Study  - currently on: sotalol 120 mg q12h  -Status post ablation on sotalol.  -Continue home sotalol and Xarelto.    Stented coronary artery- (present on admission)  Assessment & Plan  -As per history status post MI and stenting.  -Continue home aspirin.    CAD (coronary artery disease)- (present on admission)  Assessment & Plan  -As per history status post stenting in 2003 to RCA.  -Continue home aspirin and statin.    Essential hypertension- (present on admission)  Assessment & Plan  -As per history.  Currently uncontrolled likely due to pain.  -Continue home losartan, amlodipine, insulin    Personal history of venous thrombosis and embolism- (present on admission)  Assessment & Plan  -As per history.  -Continue home Xarelto.    History of myocardial infarction- (present on admission)  Assessment & Plan  -As per history status post stenting.  -Continue home atorvastatin.       VTE prophylaxis: SCDs/TEDs and enoxaparin ppx    I have performed a physical exam and reviewed and updated ROS and Plan today (9/4/2021). In review of yesterday's note (9/3/2021), there are no changes except as documented above.  ==========================================================================================================  Please note that this dictation was created using voice recognition software. I have made every reasonable attempt to correct obvious errors, but there may be errors of grammar and possibly content that I did not discover before finalizing the note.    Electronically signed by:  DOREEN Espinoza, MSN, APRN, FNP-C  Hospitalist Services  [facility]  [phone]  [phone]  [email or web address]  9/3/2021     1930

## 2021-09-24 ENCOUNTER — TELEPHONE (OUTPATIENT)
Dept: CARDIOLOGY | Facility: MEDICAL CENTER | Age: 76
End: 2021-09-24

## 2021-09-24 RX ORDER — SOTALOL HYDROCHLORIDE 120 MG/1
120 TABLET ORAL 2 TIMES DAILY
Qty: 180 TABLET | Refills: 2 | Status: SHIPPED | OUTPATIENT
Start: 2021-09-24 | End: 2022-06-21

## 2021-09-24 NOTE — TELEPHONE ENCOUNTER
Pts daughter Lara called stating Pt needs a 90 day prescription of sotalol (BETAPACE) 120 MG tablet sent to [facility] on [address]. The pharmacy told Pt his prescription had been cancelled. If any questions please call Lara at [phone].    Thank you

## 2021-10-04 ENCOUNTER — TELEPHONE (OUTPATIENT)
Dept: INTERNAL MEDICINE | Facility: OTHER | Age: 76
End: 2021-10-04

## 2021-10-05 RX ORDER — ESCITALOPRAM OXALATE 10 MG/1
10 TABLET ORAL DAILY
Qty: 90 TABLET | Refills: 0 | Status: SHIPPED | OUTPATIENT
Start: 2021-10-05 | End: 2022-01-03

## 2021-10-05 RX ORDER — ESCITALOPRAM OXALATE 10 MG/1
10 TABLET ORAL DAILY
Qty: 30 TABLET | Status: CANCELLED | OUTPATIENT
Start: 2021-10-05

## 2021-10-05 NOTE — TELEPHONE ENCOUNTER
VOICEMAIL  1. Caller Name: Dr. Cloud (pts daughter)                      Call Back Number: [phone]    2. Message: pts daughter lvm stating pt needs refill of Lexapro 10mg 1 tab po QD    [facility]     3. Patient approves office to leave a detailed voicemail/MyChart message: N\A

## 2021-10-05 NOTE — TELEPHONE ENCOUNTER
Received request via: Pharmacy    Was the patient seen in the last year in this department? Yes 6/17/21 Dr. Jimenez    Does the patient have an active prescription (recently filled or refills available) for medication(s) requested? No2

## 2021-10-18 DIAGNOSIS — I25.10 CORONARY ARTERY DISEASE DUE TO LIPID RICH PLAQUE: ICD-10-CM

## 2021-10-18 DIAGNOSIS — I10 HTN (HYPERTENSION), MALIGNANT: ICD-10-CM

## 2021-10-18 DIAGNOSIS — I25.83 CORONARY ARTERY DISEASE DUE TO LIPID RICH PLAQUE: ICD-10-CM

## 2021-10-19 RX ORDER — LOSARTAN POTASSIUM 50 MG/1
TABLET ORAL
Qty: 180 TABLET | Refills: 1 | Status: SHIPPED | OUTPATIENT
Start: 2021-10-19 | End: 2022-05-03

## 2021-10-28 ENCOUNTER — APPOINTMENT (OUTPATIENT)
Dept: VASCULAR LAB | Facility: MEDICAL CENTER | Age: 76
End: 2021-10-28
Payer: COMMERCIAL

## 2021-11-10 ENCOUNTER — TELEPHONE (OUTPATIENT)
Dept: SCHEDULING | Facility: IMAGING CENTER | Age: 76
End: 2021-11-10

## 2021-11-10 RX ORDER — AMLODIPINE BESYLATE 2.5 MG/1
2.5 TABLET ORAL DAILY
Qty: 90 TABLET | Refills: 3 | Status: SHIPPED | OUTPATIENT
Start: 2021-11-10 | End: 2023-01-03

## 2021-11-10 NOTE — TELEPHONE ENCOUNTER
MC-    Pt's daughter called about his amLODIPine (NORVASC) 2.5 MG Tab   And she knows that he has his appt coming up next week but wanted to get that refilled before his appt and sent to the pharmacy on file.  Her ph# [phone]      Thank you.    -Maximiliano

## 2021-11-17 ENCOUNTER — ANTICOAGULATION VISIT (OUTPATIENT)
Dept: VASCULAR LAB | Facility: MEDICAL CENTER | Age: 76
End: 2021-11-17
Attending: INTERNAL MEDICINE
Payer: MEDICARE

## 2021-11-17 ENCOUNTER — OFFICE VISIT (OUTPATIENT)
Dept: CARDIOLOGY | Facility: MEDICAL CENTER | Age: 76
End: 2021-11-17
Payer: MEDICARE

## 2021-11-17 VITALS
HEIGHT: 76 IN | OXYGEN SATURATION: 96 % | HEART RATE: 69 BPM | BODY MASS INDEX: 31.17 KG/M2 | WEIGHT: 256 LBS | RESPIRATION RATE: 16 BRPM | SYSTOLIC BLOOD PRESSURE: 124 MMHG | DIASTOLIC BLOOD PRESSURE: 60 MMHG

## 2021-11-17 VITALS — DIASTOLIC BLOOD PRESSURE: 80 MMHG | SYSTOLIC BLOOD PRESSURE: 127 MMHG | HEART RATE: 65 BPM

## 2021-11-17 DIAGNOSIS — I48.91 ATRIAL FIBRILLATION, UNSPECIFIED TYPE (HCC): ICD-10-CM

## 2021-11-17 DIAGNOSIS — I48.19 PERSISTENT ATRIAL FIBRILLATION (HCC): ICD-10-CM

## 2021-11-17 LAB — EKG IMPRESSION: NORMAL

## 2021-11-17 PROCEDURE — 93000 ELECTROCARDIOGRAM COMPLETE: CPT | Performed by: INTERNAL MEDICINE

## 2021-11-17 PROCEDURE — 99212 OFFICE O/P EST SF 10 MIN: CPT

## 2021-11-17 PROCEDURE — 99214 OFFICE O/P EST MOD 30 MIN: CPT | Performed by: INTERNAL MEDICINE

## 2021-11-17 ASSESSMENT — FIBROSIS 4 INDEX: FIB4 SCORE: 1.22

## 2021-11-17 NOTE — PROGRESS NOTES
Target end date: Indefinite  Indication: DVT/PE, AF  Drug: Xarelto 10mg  CHADsVASC = 6    Health Status Since Last Assessment   Patient denies any new relevant medical problems, ED visits or hospitalizations   Patient denies any embolic events (stroke/tia/systemic embolism)    Adherence with DOAC Therapy   Pt has zero missed any doses in the average week    Bleeding Risk Assessment   Epistaxis as completely stopped since decreasing the dose to 10mg once a day.    Pt denies any excessive or unusual bleeding/hematomas.  Pt denies any GI bleeds or hematemesis.  Pt denies any concerning daily headache or sub dural hematoma symptoms.     Pt denies any hematuria Denies   Latest Hemoglobin    ETOH overuse denies     Creatinine Clearance/Renal Function     Latest ClCr 83mL/min    Hepatic function   Latest LFTs WNL   Pt denies any history of liver dysfunction      Drug Interactions   Platelets: 240   ASA/other antiplatelets- none   NSAID- none   Other drug interactions No significant drug interactions.   Verified no anticonvulsant or azole therapy, education provided for future use.     Examination   Blood Pressure 127/80   Symptomatic hypotension denies   Significant gait impairment/imbalance/high risk for falls? No    Final Assessment and Recommendations:   Patient appears stable from the anticoagulation standpoint.     Benefits of continued DOAC therapy outweigh risks for this patient   Recommend pt continue with current DOAC therapy of xarelto 10mg per patient preference. Patient understands the risks of continuing with 10mg dose.   DOAC is affordable     Other Actions: cmp/ cbc hemogram ordered prior to next visit    Follow up:   Will follow up with patient 6 months.     Chan Rocha, PharmD

## 2021-11-17 NOTE — PROGRESS NOTES
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 Doing well now and maintaining NSR ever since. Feels markedly improved.    ROS (+ highlighted in bold):  Constitutional: Fevers/chills/fatigue/weightloss  HEENT: Blurry vision/eye pain/sore throat/hearing loss  Respiratory: Shortness of breath/cough  Cardiovascular: Chest pain/palpitations/edema/orthopnea/syncope  GI: Nausea/vomitting/diarrhea  MSK: Arthralgias/myagias/muscle weakness  Skin: Rash/sores  Neurological: Numbness/tremors/vertigo  Endocrine: Excessive thirst/polyuria/cold intolerance/heat intolerance  Psych: Depression/anxiety    Past Medical History:   Diagnosis Date   • Acute nasopharyngitis 01/2020   • Anemia    • Arrhythmia     a fib   • Bowel habit changes     constipation    • Breath shortness     since 11/2019, hx cardiac arrhythmia    • CAD (coronary artery disease) 2003    PCI/BMS x 3 to the RCA (Zeta 4.0 x 23mm, 3.5 x 23mm, 3.0 x 18mm).   • Cataract     alejandra IOL    • Central sleep apnea      ICD-10 transition   • Chronic anticoagulation    • COPD (chronic obstructive pulmonary disease) (HCC)    • Depression    • Depressive disorder, not elsewhere classified         • Dyslipidemia    • Hemorrhagic disorder (HCC)     takes xarelto    • High cholesterol    • Hx MRSA infection 2009, 2014    right ankle 2014, right thumb 2009   • Hypertension    • Hypothyroidism    • Mixed hyperlipidemia    • Obesity    • Old myocardial infarction January 2003    cardiac stents x3   • Peptic ulcer disease 8/4/2016   • Personal history of venous thrombosis and embolism     On Xarelto   • Pulmonary embolism (HCC) 2003/2004   • Sleep apnea     BiPAP with 3L oxygen       Past Surgical History:   Procedure Laterality  Date   • PB SHLDR ARTHROSCOP,SURG,W/ROTAT CUFF REPB Right 4/12/2021    Procedure: ARTHROSCOPY, SHOULDER, WITH ROTATOR CUFF REPAIR;  Surgeon: Eusebia Kineny M.D.;  Location: SURGERY [facility];  Service: Orthopedics   • PB ARTHROSCOPY SHOULDER SURGICAL BICEPS TENODES* Right 4/12/2021    Procedure: ARTHROSCOPY, SHOULDER, WITH BICEPS TENODESIS - FOR TENOTOMY;  Surgeon: Eusebia Kinney M.D.;  Location: SURGERY [facility];  Service: Orthopedics   • PB SHLDR ARTHROSCOP,PART ACROMIOPLAS Right 4/12/2021    Procedure: DECOMPRESSION, SHOULDER, ARTHROSCOPIC - SUBACROMIAL, LABRAL DEBRIDEMENT;  Surgeon: Esuebia Kinney M.D.;  Location: SURGERY [facility];  Service: Orthopedics   • OTHER CARDIAC SURGERY  07/2020    Cardiac ablasion    • COLONOSCOPY  2020   • WRIST ORIF Left 2/23/2018    Procedure: WRIST ORIF;  Surgeon: Jasper Hammond M.D.;  Location: SURGERY [facility];  Service: Orthopedics   • INCISION AND DRAINAGE ORTHOPEDIC  8/29/2014    Performed by Wolfgang Merrill M.D. at SURGERY [facility]   • CARDIAC CATH, RIGHT/LEFT HEART  2003 01/2003 4.0x23mm Zeta stent to proximal RCA,3.5x22mm distal to first stent, 3.0x15mm at the point of original occlusion.   • OTHER  2001    back sx    • PB ANESTH,LOWER LEG ARTERIES SURG         Social History     Socioeconomic History   • Marital status:      Spouse name: Not on file   • Number of children: Not on file   • Years of education: Not on file   • Highest education level: Not on file   Occupational History   • Not on file   Tobacco Use   • Smoking status: Never Smoker   • Smokeless tobacco: Never Used   Vaping Use   • Vaping Use: Never used   Substance and Sexual Activity   • Alcohol use: Yes     Comment: 8 drinks per week   • Drug use: No   • Sexual activity: Not on file   Other Topics Concern   • Not on file   Social History Narrative   • Not on file     Social Determinants of Health     Financial Resource Strain:    • Difficulty of  Paying Living Expenses: Not on file   Food Insecurity:    • Worried About Running Out of Food in the Last Year: Not on file   • Ran Out of Food in the Last Year: Not on file   Transportation Needs:    • Lack of Transportation (Medical): Not on file   • Lack of Transportation (Non-Medical): Not on file   Physical Activity:    • Days of Exercise per Week: Not on file   • Minutes of Exercise per Session: Not on file   Stress:    • Feeling of Stress : Not on file   Social Connections:    • Frequency of Communication with Friends and Family: Not on file   • Frequency of Social Gatherings with Friends and Family: Not on file   • Attends Christianity Services: Not on file   • Active Member of Clubs or Organizations: Not on file   • Attends Club or Organization Meetings: Not on file   • Marital Status: Not on file   Intimate Partner Violence:    • Fear of Current or Ex-Partner: Not on file   • Emotionally Abused: Not on file   • Physically Abused: Not on file   • Sexually Abused: Not on file   Housing Stability:    • Unable to Pay for Housing in the Last Year: Not on file   • Number of Places Lived in the Last Year: Not on file   • Unstable Housing in the Last Year: Not on file       Family History   Problem Relation Age of Onset   • Other Mother         Passed at 98   • Heart Disease Father 60        CABG       Allergies   Allergen Reactions   • Lisinopril      cough       Current Outpatient Medications   Medication Sig Dispense Refill   • amLODIPine (NORVASC) 2.5 MG Tab Take 1 Tablet by mouth every day. 90 Tablet 3   • sulfamethoxazole-trimethoprim (BACTRIM DS) 800-160 MG tablet Take 1 Tablet by mouth 2 times a day. 20 Tablet 0   • losartan (COZAAR) 50 MG Tab TAKE 1 TABLET BY MOUTH TWICE DAILY 180 Tablet 1   • escitalopram (LEXAPRO) 10 MG Tab Take 1 Tablet by mouth every day. pls schedule visit 90 Tablet 0   • sotalol (BETAPACE) 120 MG tablet Take 1 Tablet by mouth 2 times a day. 180 Tablet 2   • atorvastatin (LIPITOR) 40 MG  "Tab Take 40 mg by mouth every evening.     • rivaroxaban (XARELTO) 10 MG Tab tablet Take 10 mg by mouth with dinner.     • Fluticasone Furoate-Vilanterol (BREO ELLIPTA) 200-25 MCG/INH AEROSOL POWDER, BREATH ACTIVATED Inhale 1 Puff every day. 3 Each 4   • omeprazole (PRILOSEC) 20 MG delayed-release capsule Take 1 Cap by mouth every bedtime. 30 Cap 0   • Multiple Vitamins-Minerals (MULTIVITAMIN ADULT PO) Take 1 Tab by mouth every morning.     • Glucosamine HCl (GLUCOSAMINE PO) Take 1 Tab by mouth every morning.       No current facility-administered medications for this visit.       Physical Exam:  Vitals:    11/17/21 1001   BP: 124/60   BP Location: Left arm   Patient Position: Sitting   BP Cuff Size: Adult   Pulse: 69   Resp: 16   SpO2: 96%   Weight: 116 kg (256 lb)   Height: 1.93 m (6' 4\")     General appearance: NAD, conversant   Eyes: anicteric sclerae, moist conjunctivae; no lid-lag; PERRLA  HENT: Atraumatic; oropharynx clear with moist mucous membranes and no mucosal ulcerations; normal hard and soft palate  Neck: Trachea midline; FROM, supple, no thyromegaly or lymphadenopathy  Lungs: CTA, with normal respiratory effort and no intercostal retractions  CV: RRR, no MRGs, no JVD  Abdomen: Soft, non-tender; no masses or HSM  Extremities: No peripheral edema or extremity lymphadenopathy  Skin: Normal temperature, turgor and texture; no rash, ulcers or subcutaneous nodules  Psych: Appropriate affect, alert and oriented to person, place and time    Data:  Lipids:   Lab Results   Component Value Date/Time    CHOLSTRLTOT 123 11/02/2021 06:09 AM    CHOLSTRLTOT 101 11/07/2019 07:14 AM    TRIGLYCERIDE 75 11/02/2021 06:09 AM    TRIGLYCERIDE 64 11/07/2019 07:14 AM    HDL 32 (L) 11/02/2021 06:09 AM    HDL 40 11/07/2019 07:14 AM    LDL 48 11/07/2019 07:14 AM        BMP:  Lab Results   Component Value Date/Time    SODIUM 142 11/02/2021 0609    POTASSIUM 4.9 11/02/2021 0609    CHLORIDE 107 (H) 11/02/2021 0609    CO2 21 " 11/02/2021 0609    GLUCOSE 106 (H) 11/02/2021 0609    BUN 28 (H) 11/02/2021 0609    CREATININE 1.09 11/02/2021 0609    CALCIUM 8.8 11/02/2021 0609    ANION 12.0 09/02/2021 0402        TSH:   Lab Results   Component Value Date/Time    TSHULTRASEN 0.840 02/12/2020 0525        THYROXINE (T4):   Lab Results   Component Value Date/Time    FREEDIR 1.05 11/02/2021 0609        CBC:   Lab Results   Component Value Date/Time    WBC 5.8 11/02/2021 06:09 AM    WBC 5.3 09/02/2021 04:02 AM    RBC 3.98 (L) 11/02/2021 06:09 AM    RBC 4.23 (L) 09/02/2021 04:02 AM    HEMOGLOBIN 13.1 11/02/2021 06:09 AM    HEMOGLOBIN 14.3 09/02/2021 04:02 AM    HEMATOCRIT 40.0 11/02/2021 06:09 AM    HEMATOCRIT 42.2 09/02/2021 04:02 AM     (H) 11/02/2021 06:09 AM    MCV 99.8 (H) 09/02/2021 04:02 AM    MCH 32.9 11/02/2021 06:09 AM    MCH 33.8 (H) 09/02/2021 04:02 AM    MCHC 32.8 11/02/2021 06:09 AM    MCHC 33.9 09/02/2021 04:02 AM    RDW 13.1 11/02/2021 06:09 AM    RDW 47.6 09/02/2021 04:02 AM    PLATELETCT 240 11/02/2021 06:09 AM    PLATELETCT 162 (L) 09/02/2021 04:02 AM    MPV 9.0 09/02/2021 04:02 AM    NEUTSPOLYS 63 11/02/2021 06:09 AM    NEUTSPOLYS 63.50 09/02/2021 04:02 AM    LYMPHOCYTES 21 11/02/2021 06:09 AM    LYMPHOCYTES 20.30 (L) 09/02/2021 04:02 AM    MONOCYTES 11 11/02/2021 06:09 AM    MONOCYTES 11.60 09/02/2021 04:02 AM    EOSINOPHILS 3 11/02/2021 06:09 AM    EOSINOPHILS 3.40 09/02/2021 04:02 AM    BASOPHILS 1 11/02/2021 06:09 AM    BASOPHILS 0.80 09/02/2021 04:02 AM    IMMGRAN 1 11/02/2021 06:09 AM    NRBC CANCELED 11/02/2021 06:09 AM    NRBC 0.00 09/02/2021 04:02 AM    NEUTS 3.7 11/02/2021 06:09 AM    LYMPHS 1.2 11/02/2021 06:09 AM    MONOS 0.6 11/02/2021 06:09 AM    EOS 0.2 11/02/2021 06:09 AM    BASO 0.1 11/02/2021 06:09 AM    IMMGRANAB 0.1 11/02/2021 06:09 AM    NRBCAB 0.00 09/02/2021 04:02 AM        CBC w/o DIFF  Lab Results   Component Value Date/Time    WBC 5.8 11/02/2021 06:09 AM    WBC 5.3 09/02/2021 04:02 AM    RBC 3.98  (L) 11/02/2021 06:09 AM    RBC 4.23 (L) 09/02/2021 04:02 AM    HEMOGLOBIN 13.1 11/02/2021 06:09 AM    HEMOGLOBIN 14.3 09/02/2021 04:02 AM     (H) 11/02/2021 06:09 AM    MCV 99.8 (H) 09/02/2021 04:02 AM    MCH 32.9 11/02/2021 06:09 AM    MCH 33.8 (H) 09/02/2021 04:02 AM    MCHC 32.8 11/02/2021 06:09 AM    MCHC 33.9 09/02/2021 04:02 AM    RDW 13.1 11/02/2021 06:09 AM    RDW 47.6 09/02/2021 04:02 AM    MPV 9.0 09/02/2021 04:02 AM       Prior echo/stress reviewed: EF 30-45% on CHRIS    EKG interpreted by me: NSR QT 440ms    Impression/Plan:  1. Persistent atrial fibrillation (HCC)  EKG    EC-ECHOCARDIOGRAM COMPLETE W/O CONT     1. Afib s/p ablation  2. NICM EF <40% in setting of Afib  3. OAC monitoring, Xarelto  4. CAD s/p CPI  5. Sotalol use high risk medication monitoring  6. H/o DVT/PE    - No recurrence of Afib  - Indefinite OAC, labs reviewed  - Re-check echo to ensure recovery in EF  - Continue sotalol, ECG reviewed  - Discontinue ASA, PCI 18 years ago, given h/o GIB the risk of ASA in setting of Xarelto use outweighs benefits    FV 6 months      Nilton Ramirez MD  Cardiac Electrophysiology

## 2022-02-01 RX ORDER — ATORVASTATIN CALCIUM 40 MG/1
40 TABLET, FILM COATED ORAL NIGHTLY
Qty: 30 TABLET | Refills: 1 | Status: SHIPPED | OUTPATIENT
Start: 2022-02-01 | End: 2022-02-01

## 2022-02-01 NOTE — TELEPHONE ENCOUNTER
Last seen: 06/17/21 by Dr. Jimenez  Next appt:None     Was the patient seen in the last year in this department? Yes   Does patient have an active prescription for medications requested? No   Received Request Via: Pharmacy

## 2022-03-16 NOTE — TELEPHONE ENCOUNTER
Patient daughter called and they need a refill of his Xarelto. He does a 90 day supply. Please send to pharmacy on file.     Thank you,    Eusebia TELLO

## 2022-04-04 NOTE — TELEPHONE ENCOUNTER
Last seen: 06/17/21 by Dr. Jimenez  Next appt: None      Does patient have an active prescription for medications requested? No    Received Request Via: Pharmacy

## 2022-04-05 RX ORDER — ESCITALOPRAM OXALATE 10 MG/1
10 TABLET ORAL
Qty: 90 TABLET | Refills: 0 | Status: SHIPPED | OUTPATIENT
Start: 2022-04-05 | End: 2022-07-05

## 2022-04-10 ENCOUNTER — OFFICE VISIT (OUTPATIENT)
Dept: URGENT CARE | Facility: CLINIC | Age: 77
End: 2022-04-10
Payer: MEDICARE

## 2022-04-10 ENCOUNTER — HOSPITAL ENCOUNTER (OUTPATIENT)
Facility: MEDICAL CENTER | Age: 77
End: 2022-04-10
Attending: NURSE PRACTITIONER
Payer: MEDICARE

## 2022-04-10 VITALS
HEIGHT: 76 IN | DIASTOLIC BLOOD PRESSURE: 70 MMHG | WEIGHT: 272 LBS | RESPIRATION RATE: 18 BRPM | BODY MASS INDEX: 33.12 KG/M2 | TEMPERATURE: 97.8 F | OXYGEN SATURATION: 95 % | SYSTOLIC BLOOD PRESSURE: 140 MMHG | HEART RATE: 62 BPM

## 2022-04-10 DIAGNOSIS — R22.1 LOCALIZED SWELLING, MASS AND LUMP, NECK: ICD-10-CM

## 2022-04-10 DIAGNOSIS — R50.9 FEVER, UNSPECIFIED FEVER CAUSE: ICD-10-CM

## 2022-04-10 LAB
ALBUMIN SERPL BCP-MCNC: 4.5 G/DL (ref 3.2–4.9)
ALBUMIN/GLOB SERPL: 1.4 G/DL
ALP SERPL-CCNC: 79 U/L (ref 30–99)
ALT SERPL-CCNC: 17 U/L (ref 2–50)
ANION GAP SERPL CALC-SCNC: 11 MMOL/L (ref 7–16)
AST SERPL-CCNC: 18 U/L (ref 12–45)
BASOPHILS # BLD AUTO: 0.4 % (ref 0–1.8)
BASOPHILS # BLD: 0.02 K/UL (ref 0–0.12)
BILIRUB SERPL-MCNC: 0.5 MG/DL (ref 0.1–1.5)
BUN SERPL-MCNC: 23 MG/DL (ref 8–22)
CALCIUM SERPL-MCNC: 8.9 MG/DL (ref 8.5–10.5)
CHLORIDE SERPL-SCNC: 103 MMOL/L (ref 96–112)
CO2 SERPL-SCNC: 24 MMOL/L (ref 20–33)
CREAT SERPL-MCNC: 1.1 MG/DL (ref 0.5–1.4)
EOSINOPHIL # BLD AUTO: 0.08 K/UL (ref 0–0.51)
EOSINOPHIL NFR BLD: 1.7 % (ref 0–6.9)
ERYTHROCYTE [DISTWIDTH] IN BLOOD BY AUTOMATED COUNT: 49.5 FL (ref 35.9–50)
GFR SERPLBLD CREATININE-BSD FMLA CKD-EPI: 70 ML/MIN/1.73 M 2
GLOBULIN SER CALC-MCNC: 3.2 G/DL (ref 1.9–3.5)
GLUCOSE SERPL-MCNC: 101 MG/DL (ref 65–99)
HCT VFR BLD AUTO: 42.4 % (ref 42–52)
HGB BLD-MCNC: 13.7 G/DL (ref 14–18)
IMM GRANULOCYTES # BLD AUTO: 0.02 K/UL (ref 0–0.11)
IMM GRANULOCYTES NFR BLD AUTO: 0.4 % (ref 0–0.9)
LYMPHOCYTES # BLD AUTO: 1.22 K/UL (ref 1–4.8)
LYMPHOCYTES NFR BLD: 25.9 % (ref 22–41)
MCH RBC QN AUTO: 33.1 PG (ref 27–33)
MCHC RBC AUTO-ENTMCNC: 32.3 G/DL (ref 33.7–35.3)
MCV RBC AUTO: 102.4 FL (ref 81.4–97.8)
MONOCYTES # BLD AUTO: 0.52 K/UL (ref 0–0.85)
MONOCYTES NFR BLD AUTO: 11 % (ref 0–13.4)
NEUTROPHILS # BLD AUTO: 2.85 K/UL (ref 1.82–7.42)
NEUTROPHILS NFR BLD: 60.6 % (ref 44–72)
NRBC # BLD AUTO: 0 K/UL
NRBC BLD-RTO: 0 /100 WBC
PLATELET # BLD AUTO: 177 K/UL (ref 164–446)
PMV BLD AUTO: 9.5 FL (ref 9–12.9)
POTASSIUM SERPL-SCNC: 4.6 MMOL/L (ref 3.6–5.5)
PROT SERPL-MCNC: 7.7 G/DL (ref 6–8.2)
RBC # BLD AUTO: 4.14 M/UL (ref 4.7–6.1)
SODIUM SERPL-SCNC: 138 MMOL/L (ref 135–145)
WBC # BLD AUTO: 4.7 K/UL (ref 4.8–10.8)

## 2022-04-10 PROCEDURE — 80053 COMPREHEN METABOLIC PANEL: CPT

## 2022-04-10 PROCEDURE — 85025 COMPLETE CBC W/AUTO DIFF WBC: CPT

## 2022-04-10 PROCEDURE — 99215 OFFICE O/P EST HI 40 MIN: CPT | Performed by: NURSE PRACTITIONER

## 2022-04-10 ASSESSMENT — ENCOUNTER SYMPTOMS
MYALGIAS: 0
SHORTNESS OF BREATH: 1
SORE THROAT: 0
DIZZINESS: 0
NAUSEA: 0
EYE PAIN: 0
COUGH: 0
CHILLS: 1
FEVER: 1
VOMITING: 0

## 2022-04-10 ASSESSMENT — FIBROSIS 4 INDEX: FIB4 SCORE: 1.22

## 2022-04-10 NOTE — PROGRESS NOTES
Subjective:   Anthony Cloud is a 76 y.o. male who presents for Fever (10 days, side of neck is swelling, had Zpack not feeling better/)      Fever   Pertinent negatives include no chest pain, coughing, nausea, rash, sore throat or vomiting.       Review of Systems   Constitutional: Positive for chills, fever and malaise/fatigue.        Sweating     HENT: Negative for sore throat.         Left side neck swelling     Eyes: Negative for pain.   Respiratory: Positive for shortness of breath. Negative for cough.    Cardiovascular: Negative for chest pain.   Gastrointestinal: Negative for nausea and vomiting.   Genitourinary: Negative for hematuria.   Musculoskeletal: Negative for myalgias.   Skin: Negative for rash.   Neurological: Negative for dizziness.       Medications:    • amLODIPine Tabs  • atorvastatin Tabs  • Breo Ellipta Aepb  • escitalopram Tabs  • GLUCOSAMINE PO  • losartan Tabs  • MULTIVITAMIN ADULT PO  • omeprazole  • rivaroxaban Tabs  • sotalol    Allergies: Lisinopril    Problem List: Anthony Cloud does not have any pertinent problems on file.    Surgical History:  Past Surgical History:   Procedure Laterality Date   • PB SHLDR ARTHROSCOP,SURG,W/ROTAT CUFF REPB Right 4/12/2021    Procedure: ARTHROSCOPY, SHOULDER, WITH ROTATOR CUFF REPAIR;  Surgeon: Eusebia Kinney M.D.;  Location: [facility];  Service: Orthopedics   • PB ARTHROSCOPY SHOULDER SURGICAL BICEPS TENODES* Right 4/12/2021    Procedure: ARTHROSCOPY, SHOULDER, WITH BICEPS TENODESIS - FOR TENOTOMY;  Surgeon: Eusebia Kinney M.D.;  Location: SURGERY [facility];  Service: Orthopedics   • MI SHLDR ARTHROSCOP,PART ACROMIOPLAS Right 4/12/2021    Procedure: DECOMPRESSION, SHOULDER, ARTHROSCOPIC - SUBACROMIAL, LABRAL DEBRIDEMENT;  Surgeon: Eusebia Kinney M.D.;  Location: [facility];  Service: Orthopedics   • OTHER CARDIAC SURGERY  07/2020    Cardiac ablasion    • COLONOSCOPY  2020   • ORIF, WRIST  "Left 2/23/2018    Procedure: WRIST ORIF;  Surgeon: Jasper Hammond M.D.;  Location: SURGERY [facility];  Service: Orthopedics   • INCISION AND DRAINAGE ORTHOPEDIC  8/29/2014    Performed by Wolfgang Merrill M.D. at SURGERY [facility]   • CARDIAC CATH, RIGHT/LEFT HEART  2003 01/2003 4.0x23mm Zeta stent to proximal RCA,3.5x22mm distal to first stent, 3.0x15mm at the point of original occlusion.   • OTHER  2001    back sx    • MO ANESTH,LOWER LEG ARTERIES SURG         Past Social Hx: Anthony Cloud  reports that he has never smoked. He has never used smokeless tobacco. He reports current alcohol use. He reports that he does not use drugs.     Past Family Hx:  Anthony Cloud family history includes Heart Disease (age of onset: 60) in his father; Other in his mother.     Problem list, medications, and allergies reviewed by myself today in Epic.     Objective:     /70   Pulse 62   Temp 36.6 °C (97.8 °F) (Temporal)   Resp 18   Ht 1.93 m (6' 4\")   Wt 123 kg (272 lb)   SpO2 95%   BMI 33.11 kg/m²     Physical Exam  Vitals and nursing note reviewed.   Constitutional:       General: He is not in acute distress.     Appearance: He is well-developed.   HENT:      Head: Normocephalic and atraumatic.      Right Ear: External ear normal. Decreased hearing noted.      Left Ear: External ear normal. Decreased hearing noted.      Nose: Nose normal.      Mouth/Throat:      Mouth: Mucous membranes are moist.   Eyes:      Conjunctiva/sclera: Conjunctivae normal.   Neck:      Thyroid: No thyroid mass or thyroid tenderness.      Trachea: Trachea normal.     Cardiovascular:      Rate and Rhythm: Normal rate.   Pulmonary:      Effort: Pulmonary effort is normal. No respiratory distress.      Breath sounds: Normal breath sounds.   Chest:   Breasts:      Left: Supraclavicular adenopathy present.       Abdominal:      General: There is no distension.   Musculoskeletal:         General: Normal " range of motion.      Cervical back: Full passive range of motion without pain and normal range of motion.   Lymphadenopathy:      Cervical: Cervical adenopathy present.      Upper Body:      Left upper body: Supraclavicular adenopathy present.   Skin:     General: Skin is warm and dry.   Neurological:      General: No focal deficit present.      Mental Status: He is alert and oriented to person, place, and time. Mental status is at baseline.      Gait: Gait (gait at baseline) normal.   Psychiatric:         Judgment: Judgment normal.         Assessment/Plan:     Diagnosis and associated orders:   1. Localized swelling, mass and lump, neck  CBC WITH DIFFERENTIAL    Comp Metabolic Panel    US-SOFT TISSUES OF HEAD - NECK    CT-SOFT TISSUE NECK WITH    Referral to Intake Oncology Coordinator   2. Fever, unspecified fever cause          Comments/MDM:     Patient is a 76-year-old male present for stated above, on exam patient does have a palpable lump of the left lateral neck has been experiencing malaise, sweats, fevers will obtain diagnostic ultrasound for further evaluation.  As differentials include but not limited to adenopathy, malignancy. Differential diagnosis, natural history, supportive care, and indications for immediate follow-up discussed.     US resutls 4/11/22  1. Mass in left neck is identified with somewhat heterogeneous echogenicity as described above. Differential diagnosis includes lipoma. Otherwise neoplastic lesion is less likely. CT or MRI could be obtained for further evaluation if clinically   indicated.  2. No suspicious lymph nodes identified in the lateral neck.    Ultrasound concerning for mass CT has been ordered and is scheduled for 4/12 in the AM.  Urgent referral to intake oncology coordinator has been placed.  Telephone encounter was made spoke to patient's daughter who discussed findings as she does coordinate his medical treatment she is understanding of the necessary need of diagnostic  CT to rule out malignancy.   Again Differential diagnosis, natural history, supportive care, and indications for immediate follow-up discussed.                    My total time spent caring for the patient on the day of the encounter was 60 minutes.   This does not include time spent on separately billable procedures/tests.     Please note that this dictation was created using voice recognition software. I have made a reasonable attempt to correct obvious errors, but I expect that there are errors of grammar and possibly content that I did not discover before finalizing the note.    This note was electronically signed by August SLAUGHTER.

## 2022-04-11 ENCOUNTER — HOSPITAL ENCOUNTER (OUTPATIENT)
Dept: RADIOLOGY | Facility: MEDICAL CENTER | Age: 77
End: 2022-04-11
Attending: NURSE PRACTITIONER
Payer: MEDICARE

## 2022-04-11 ENCOUNTER — TELEPHONE (OUTPATIENT)
Dept: URGENT CARE | Facility: CLINIC | Age: 77
End: 2022-04-11

## 2022-04-11 DIAGNOSIS — R22.1 LOCALIZED SWELLING, MASS AND LUMP, NECK: ICD-10-CM

## 2022-04-11 PROCEDURE — 76536 US EXAM OF HEAD AND NECK: CPT

## 2022-04-12 ENCOUNTER — HOSPITAL ENCOUNTER (OUTPATIENT)
Dept: CARDIOLOGY | Facility: MEDICAL CENTER | Age: 77
End: 2022-04-12
Attending: INTERNAL MEDICINE
Payer: MEDICARE

## 2022-04-12 ENCOUNTER — HOSPITAL ENCOUNTER (OUTPATIENT)
Dept: RADIOLOGY | Facility: MEDICAL CENTER | Age: 77
End: 2022-04-12
Attending: NURSE PRACTITIONER
Payer: MEDICARE

## 2022-04-12 DIAGNOSIS — I48.19 PERSISTENT ATRIAL FIBRILLATION (HCC): ICD-10-CM

## 2022-04-12 DIAGNOSIS — R22.1 LOCALIZED SWELLING, MASS AND LUMP, NECK: ICD-10-CM

## 2022-04-12 LAB
LV EJECT FRACT  99904: 60
LV EJECT FRACT MOD 4C 99902: 60.03

## 2022-04-12 PROCEDURE — 700117 HCHG RX CONTRAST REV CODE 255: Performed by: INTERNAL MEDICINE

## 2022-04-12 PROCEDURE — 93306 TTE W/DOPPLER COMPLETE: CPT

## 2022-04-12 PROCEDURE — 700117 HCHG RX CONTRAST REV CODE 255: Performed by: NURSE PRACTITIONER

## 2022-04-12 PROCEDURE — 93306 TTE W/DOPPLER COMPLETE: CPT | Mod: 26 | Performed by: INTERNAL MEDICINE

## 2022-04-12 PROCEDURE — 70491 CT SOFT TISSUE NECK W/DYE: CPT | Mod: ME

## 2022-04-12 RX ADMIN — IOHEXOL 80 ML: 350 INJECTION, SOLUTION INTRAVENOUS at 11:02

## 2022-04-12 RX ADMIN — HUMAN ALBUMIN MICROSPHERES AND PERFLUTREN 3 ML: 10; .22 INJECTION, SOLUTION INTRAVENOUS at 11:11

## 2022-04-15 ENCOUNTER — TELEPHONE (OUTPATIENT)
Dept: URGENT CARE | Facility: PHYSICIAN GROUP | Age: 77
End: 2022-04-15
Payer: MEDICARE

## 2022-04-15 DIAGNOSIS — R53.83 FATIGUE, UNSPECIFIED TYPE: ICD-10-CM

## 2022-05-02 ENCOUNTER — OFFICE VISIT (OUTPATIENT)
Dept: CARDIOLOGY | Facility: MEDICAL CENTER | Age: 77
End: 2022-05-02
Payer: MEDICARE

## 2022-05-02 VITALS
RESPIRATION RATE: 14 BRPM | DIASTOLIC BLOOD PRESSURE: 82 MMHG | HEIGHT: 76 IN | SYSTOLIC BLOOD PRESSURE: 124 MMHG | HEART RATE: 60 BPM | OXYGEN SATURATION: 96 % | WEIGHT: 268 LBS | BODY MASS INDEX: 32.63 KG/M2

## 2022-05-02 DIAGNOSIS — I48.91 ATRIAL FIBRILLATION, UNSPECIFIED TYPE (HCC): ICD-10-CM

## 2022-05-02 DIAGNOSIS — J11.1 INFLUENZA: ICD-10-CM

## 2022-05-02 PROCEDURE — 99214 OFFICE O/P EST MOD 30 MIN: CPT | Mod: 25 | Performed by: INTERNAL MEDICINE

## 2022-05-02 PROCEDURE — 93000 ELECTROCARDIOGRAM COMPLETE: CPT | Performed by: INTERNAL MEDICINE

## 2022-05-02 ASSESSMENT — FIBROSIS 4 INDEX: FIB4 SCORE: 1.83

## 2022-05-02 NOTE — PROGRESS NOTES
Arrhythmia Clinic Note (Established patient)    DOS: 5/2/2022    Chief complaint/Reason for consult: Afib    Interval History: Doing very well. 75 y/o M with persistent Afib s/p ablation and sotalol load. No recurrence.    ROS (+ highlighted in bold):  Constitutional: Fevers/chills/fatigue/weightloss  HEENT: Blurry vision/eye pain/sore throat/hearing loss  Respiratory: Shortness of breath/cough  Cardiovascular: Chest pain/palpitations/edema/orthopnea/syncope  GI: Nausea/vomitting/diarrhea  MSK: Arthralgias/myagias/muscle weakness  Skin: Rash/sores  Neurological: Numbness/tremors/vertigo  Endocrine: Excessive thirst/polyuria/cold intolerance/heat intolerance  Psych: Depression/anxiety    Past Medical History:   Diagnosis Date   • Acute nasopharyngitis 01/2020   • Anemia    • Arrhythmia     a fib   • Bowel habit changes     constipation    • Breath shortness     since 11/2019, hx cardiac arrhythmia    • CAD (coronary artery disease) 2003    PCI/BMS x 3 to the RCA (Zeta 4.0 x 23mm, 3.5 x 23mm, 3.0 x 18mm).   • Cataract     alejandra IOL    • Central sleep apnea      ICD-10 transition   • Chronic anticoagulation    • COPD (chronic obstructive pulmonary disease) (HCC)    • Depression    • Depressive disorder, not elsewhere classified         • Dyslipidemia    • Hemorrhagic disorder (HCC)     takes xarelto    • High cholesterol    • Hx MRSA infection 2009, 2014    right ankle 2014, right thumb 2009   • Hypertension    • Hypothyroidism    • Mixed hyperlipidemia    • Obesity    • Old myocardial infarction January 2003    cardiac stents x3   • Peptic ulcer disease 8/4/2016   • Personal history of venous thrombosis and embolism     On Xarelto   • Pulmonary embolism (HCC) 2003/2004   • Sleep apnea     BiPAP with 3L oxygen       Past Surgical History:   Procedure Laterality Date   • PB SHLDR ARTHROSCOP,SURG,W/ROTAT CUFF REPB Right 4/12/2021    Procedure: ARTHROSCOPY, SHOULDER, WITH ROTATOR CUFF REPAIR;  Surgeon: Eusebia Kinney,  M.D.;  Location: SURGERY [facility];  Service: Orthopedics   • PB ARTHROSCOPY SHOULDER SURGICAL BICEPS TENODES* Right 4/12/2021    Procedure: ARTHROSCOPY, SHOULDER, WITH BICEPS TENODESIS - FOR TENOTOMY;  Surgeon: Eusebia Kinney M.D.;  Location: SURGERY [facility];  Service: Orthopedics   • VA SHLDR ARTHROSCOP,PART ACROMIOPLAS Right 4/12/2021    Procedure: DECOMPRESSION, SHOULDER, ARTHROSCOPIC - SUBACROMIAL, LABRAL DEBRIDEMENT;  Surgeon: Eusebia Kinney M.D.;  Location: SURGERY [facility];  Service: Orthopedics   • OTHER CARDIAC SURGERY  07/2020    Cardiac ablasion    • COLONOSCOPY  2020   • ORIF, WRIST Left 2/23/2018    Procedure: WRIST ORIF;  Surgeon: Jasper Hammond M.D.;  Location: [facility];  Service: Orthopedics   • INCISION AND DRAINAGE ORTHOPEDIC  8/29/2014    Performed by Wolfgang Merrill M.D. at SURGERY [facility]   • CARDIAC CATH, RIGHT/LEFT HEART  2003 01/2003 4.0x23mm Zeta stent to proximal RCA,3.5x22mm distal to first stent, 3.0x15mm at the point of original occlusion.   • OTHER  2001    back sx    • VA ANESTH,LOWER LEG ARTERIES SURG         Social History     Socioeconomic History   • Marital status:      Spouse name: Not on file   • Number of children: Not on file   • Years of education: Not on file   • Highest education level: Not on file   Occupational History   • Not on file   Tobacco Use   • Smoking status: Never Smoker   • Smokeless tobacco: Never Used   Vaping Use   • Vaping Use: Never used   Substance and Sexual Activity   • Alcohol use: Yes     Comment: 8 drinks per week   • Drug use: No   • Sexual activity: Not on file   Other Topics Concern   • Not on file   Social History Narrative   • Not on file     Social Determinants of Health     Financial Resource Strain: Not on file   Food Insecurity: Not on file   Transportation Needs: Not on file   Physical Activity: Not on file   Stress: Not on file   Social Connections: Not on file   Intimate  "Partner Violence: Not on file   Housing Stability: Not on file       Family History   Problem Relation Age of Onset   • Other Mother         Passed at 98   • Heart Disease Father 60        CABG       Allergies   Allergen Reactions   • Lisinopril      cough       Current Outpatient Medications   Medication Sig Dispense Refill   • escitalopram (LEXAPRO) 10 MG Tab Take 1 Tablet by mouth every day. 90 Tablet 0   • rivaroxaban (XARELTO) 20 MG Tab tablet Take 1 Tablet by mouth with dinner. 90 Tablet 2   • atorvastatin (LIPITOR) 40 MG Tab TAKE 1 TABLET BY MOUTH EVERY EVENING 90 Tablet 1   • amLODIPine (NORVASC) 2.5 MG Tab Take 1 Tablet by mouth every day. 90 Tablet 3   • losartan (COZAAR) 50 MG Tab TAKE 1 TABLET BY MOUTH TWICE DAILY 180 Tablet 1   • sotalol (BETAPACE) 120 MG tablet Take 1 Tablet by mouth 2 times a day. 180 Tablet 2   • Fluticasone Furoate-Vilanterol (BREO ELLIPTA) 200-25 MCG/INH AEROSOL POWDER, BREATH ACTIVATED Inhale 1 Puff every day. 3 Each 4   • omeprazole (PRILOSEC) 20 MG delayed-release capsule Take 1 Cap by mouth every bedtime. 30 Cap 0   • Multiple Vitamins-Minerals (MULTIVITAMIN ADULT PO) Take 1 Tab by mouth every morning.     • Glucosamine HCl (GLUCOSAMINE PO) Take 1 Tab by mouth every morning.       No current facility-administered medications for this visit.       Physical Exam:  Vitals:    05/02/22 1242   BP: 124/82   BP Location: Left arm   Patient Position: Sitting   BP Cuff Size: Adult   Pulse: 60   Resp: 14   SpO2: 96%   Weight: 122 kg (268 lb)   Height: 1.93 m (6' 4\")     General appearance: NAD, conversant   Eyes: anicteric sclerae, moist conjunctivae; no lid-lag; PERRLA  HENT: Atraumatic; oropharynx clear with moist mucous membranes and no mucosal ulcerations; normal hard and soft palate  Neck: Trachea midline; FROM, supple, no thyromegaly or lymphadenopathy  Lungs: CTA, with normal respiratory effort and no intercostal retractions  CV: RRR, no MRGs, no JVD  Abdomen: Soft, non-tender; no " masses or HSM  Extremities: No peripheral edema or extremity lymphadenopathy  Skin: Normal temperature, turgor and texture; no rash, ulcers or subcutaneous nodules  Psych: Appropriate affect, alert and oriented to person, place and time    Data:  Lipids:   Lab Results   Component Value Date/Time    CHOLSTRLTOT 123 11/02/2021 06:09 AM    CHOLSTRLTOT 101 11/07/2019 07:14 AM    TRIGLYCERIDE 75 11/02/2021 06:09 AM    TRIGLYCERIDE 64 11/07/2019 07:14 AM    HDL 32 (L) 11/02/2021 06:09 AM    HDL 40 11/07/2019 07:14 AM    LDL 48 11/07/2019 07:14 AM        BMP:  Lab Results   Component Value Date/Time    SODIUM 138 04/10/2022 1318    POTASSIUM 4.6 04/10/2022 1318    CHLORIDE 103 04/10/2022 1318    CO2 24 04/10/2022 1318    GLUCOSE 101 (H) 04/10/2022 1318    BUN 23 (H) 04/10/2022 1318    CREATININE 1.10 04/10/2022 1318    CALCIUM 8.9 04/10/2022 1318    ANION 11.0 04/10/2022 1318        TSH:   Lab Results   Component Value Date/Time    TSHULTRASEN 0.840 02/12/2020 0525        THYROXINE (T4):   Lab Results   Component Value Date/Time    FREEDIR 1.05 11/02/2021 0609        CBC:   Lab Results   Component Value Date/Time    WBC 4.7 (L) 04/10/2022 01:18 PM    RBC 4.14 (L) 04/10/2022 01:18 PM    HEMOGLOBIN 13.7 (L) 04/10/2022 01:18 PM    HEMATOCRIT 42.4 04/10/2022 01:18 PM    .4 (H) 04/10/2022 01:18 PM    MCH 33.1 (H) 04/10/2022 01:18 PM    MCHC 32.3 (L) 04/10/2022 01:18 PM    RDW 49.5 04/10/2022 01:18 PM    PLATELETCT 177 04/10/2022 01:18 PM    MPV 9.5 04/10/2022 01:18 PM    NEUTSPOLYS 60.60 04/10/2022 01:18 PM    LYMPHOCYTES 25.90 04/10/2022 01:18 PM    MONOCYTES 11.00 04/10/2022 01:18 PM    EOSINOPHILS 1.70 04/10/2022 01:18 PM    BASOPHILS 0.40 04/10/2022 01:18 PM    IMMGRAN 0.40 04/10/2022 01:18 PM    IMMGRAN 1 11/02/2021 06:09 AM    NRBC 0.00 04/10/2022 01:18 PM    NEUTS 2.85 04/10/2022 01:18 PM    NEUTS 3.7 11/02/2021 06:09 AM    LYMPHS 1.22 04/10/2022 01:18 PM    LYMPHS 1.2 11/02/2021 06:09 AM    MONOS 0.52 04/10/2022  01:18 PM    MONOS 0.6 11/02/2021 06:09 AM    EOS 0.08 04/10/2022 01:18 PM    EOS 0.2 11/02/2021 06:09 AM    BASO 0.02 04/10/2022 01:18 PM    BASO 0.1 11/02/2021 06:09 AM    IMMGRANAB 0.02 04/10/2022 01:18 PM    IMMGRANAB 0.1 11/02/2021 06:09 AM    NRBCAB 0.00 04/10/2022 01:18 PM        CBC w/o DIFF  Lab Results   Component Value Date/Time    WBC 4.7 (L) 04/10/2022 01:18 PM    RBC 4.14 (L) 04/10/2022 01:18 PM    HEMOGLOBIN 13.7 (L) 04/10/2022 01:18 PM    .4 (H) 04/10/2022 01:18 PM    MCH 33.1 (H) 04/10/2022 01:18 PM    MCHC 32.3 (L) 04/10/2022 01:18 PM    RDW 49.5 04/10/2022 01:18 PM    MPV 9.5 04/10/2022 01:18 PM       Prior echo/stress reviewed: Normal EF    EKG interpreted by me: NSR QTc <500ms    Impression/Plan:  1. Afib s/p ablation  2. High risk medication, sotalol  3. Anticoagulation management    - Continue Sotalol, ECG reviewed  - Continue Xarelto, labs reviewed  - No recurrence of Afib    FV 6 months    Nilton Ramirez MD  Cardiac Electrophysiology

## 2022-05-03 DIAGNOSIS — I25.10 CORONARY ARTERY DISEASE DUE TO LIPID RICH PLAQUE: ICD-10-CM

## 2022-05-03 DIAGNOSIS — I10 HTN (HYPERTENSION), MALIGNANT: ICD-10-CM

## 2022-05-03 DIAGNOSIS — I25.83 CORONARY ARTERY DISEASE DUE TO LIPID RICH PLAQUE: ICD-10-CM

## 2022-05-05 ENCOUNTER — OFFICE VISIT (OUTPATIENT)
Dept: INTERNAL MEDICINE | Facility: OTHER | Age: 77
End: 2022-05-05
Payer: MEDICARE

## 2022-05-05 VITALS
OXYGEN SATURATION: 95 % | HEIGHT: 76 IN | BODY MASS INDEX: 32.47 KG/M2 | TEMPERATURE: 96.4 F | DIASTOLIC BLOOD PRESSURE: 74 MMHG | HEART RATE: 56 BPM | SYSTOLIC BLOOD PRESSURE: 134 MMHG | WEIGHT: 266.6 LBS

## 2022-05-05 DIAGNOSIS — R73.01 IMPAIRED FASTING GLUCOSE: ICD-10-CM

## 2022-05-05 DIAGNOSIS — E66.9 OBESITY (BMI 30-39.9): ICD-10-CM

## 2022-05-05 DIAGNOSIS — E78.5 DYSLIPIDEMIA: ICD-10-CM

## 2022-05-05 DIAGNOSIS — F32.A DEPRESSION, UNSPECIFIED DEPRESSION TYPE: ICD-10-CM

## 2022-05-05 DIAGNOSIS — I10 ESSENTIAL HYPERTENSION: ICD-10-CM

## 2022-05-05 DIAGNOSIS — K76.0 FATTY LIVER: ICD-10-CM

## 2022-05-05 DIAGNOSIS — I48.91 ATRIAL FIBRILLATION, UNSPECIFIED TYPE (HCC): ICD-10-CM

## 2022-05-05 DIAGNOSIS — D75.89 MACROCYTOSIS: ICD-10-CM

## 2022-05-05 LAB
HBA1C MFR BLD: 6.2 % (ref 0–5.6)
INT CON NEG: ABNORMAL
INT CON POS: ABNORMAL

## 2022-05-05 PROCEDURE — 99214 OFFICE O/P EST MOD 30 MIN: CPT | Performed by: INTERNAL MEDICINE

## 2022-05-05 PROCEDURE — 83036 HEMOGLOBIN GLYCOSYLATED A1C: CPT | Performed by: INTERNAL MEDICINE

## 2022-05-05 RX ORDER — LOSARTAN POTASSIUM 50 MG/1
TABLET ORAL
Qty: 180 TABLET | Refills: 3 | Status: SHIPPED | OUTPATIENT
Start: 2022-05-05 | End: 2023-06-13

## 2022-05-05 ASSESSMENT — FIBROSIS 4 INDEX: FIB4 SCORE: 1.83

## 2022-05-05 ASSESSMENT — PATIENT HEALTH QUESTIONNAIRE - PHQ9: CLINICAL INTERPRETATION OF PHQ2 SCORE: 0

## 2022-05-05 NOTE — PATIENT INSTRUCTIONS
Check your blood pressure at home 3-4 times per week.  Record those readings and bring them with you to your next appointment.    When you check it, do it seated with your feet on the floor, your back supported and your arm resting at heart level (such as on the kitchen table).  Check it three times,  by approximately 1 minute between each reading.  Take the average of the three readings and record that as your blood pressure for the day.    Add a B12 vitamin to your regimen.

## 2022-05-06 NOTE — PROGRESS NOTES
Established Patient    Patient Care Team:  Angel Cameron M.D. as PCP - General  FREDO Carr. as Mid Level Provider (Cardiovascular Disease (Cardiology))  Padmini Calhoun M.D. as Consulting Physician (Cardiovascular Disease (Cardiology))  cpap and more as Respiratory Therapist  Austin Schreiber M.D. as Consulting Physician (Pulmonary Medicine)    Anthony Cloud is a 76 y.o. male who presents today with the following Chief Complaint(s): Follow up for Diagnoses of Essential hypertension, Dyslipidemia, Atrial fibrillation, unspecified type (HCC), Fatty liver, Impaired fasting glucose, Macrocytosis, Depression, unspecified depression type, and Obesity (BMI 30-39.9) were pertinent to this visit.    HPI:  1. Essential hypertension  Blood pressures stable, and reasonably well controlled on his current medications.  He is on amlodipine 2.5 mg daily and losartan 50 mg daily.  He is not having blood pressures checked at home regularly.    2. Dyslipidemia  Anthony is tolerating medication well.  No intolerable side-effects noted.  No new symptoms of muscle aches or weakness.  Patient reports good adherence to medication regimen.  No change in medication since the last visit. Anthony is trying to follow a low-cholesterol diet.  Exercise is adequate.     3. Atrial fibrillation, unspecified type (HCC)  He underwent ablation for his atrial fibrillation in the summer 2020, and since then has been doing very well.  He continues to follow with cardiology, and continues on Xarelto 10 mg daily as well as sotalol 120 mg twice daily.    4. Fatty liver  He has been evaluated by gastroenterology recently, and noted to have fatty liver disease.  He has a FibroSure test pending with his next labs as ordered by gastroenterology.  Most recent LFTs look good    5. Impaired fasting glucose  He was admitted to the hospital back in November, and at that time it was noted that he had a hemoglobin A1c of 6.6%.  His daughter who  accompanies him to his visit today says that he drinks many sugary beverages however the patient himself disagrees.  Hemoglobin A1c done in the office today is 6.2%.    6. Macrocytosis  He has had longstanding history of macrocytosis, with intermittent anemia.  He currently drinks on average he reports 2-3 drinks at a time, a few days per week.  He also describes times in which he will go long stretches as long as 2 to 3 weeks without drinking at all.  He has a relatively well-balanced diet.    7. Depression, unspecified depression type  In the past he was thought to have mostly seasonal affective disorder however he found that continuing on the Lexapro year-round has been most effective for him.    8. Obesity (BMI 30-39.9)  Weight is relatively stable overall.    ROS:     Denies any new chest pain or shortness of breath.  No changes to urinary or bowel function.  See HPI.    Past Medical History:   Diagnosis Date   • Acute nasopharyngitis 01/2020   • Anemia    • Arrhythmia     a fib   • Bowel habit changes     constipation    • Breath shortness     since 11/2019, hx cardiac arrhythmia    • CAD (coronary artery disease) 2003    PCI/BMS x 3 to the RCA (Zeta 4.0 x 23mm, 3.5 x 23mm, 3.0 x 18mm).   • Cataract     alejandra IOL    • Central sleep apnea      ICD-10 transition   • Chronic anticoagulation    • COPD (chronic obstructive pulmonary disease) (HCC)    • Depression    • Depressive disorder, not elsewhere classified         • Dyslipidemia    • Hemorrhagic disorder (HCC)     takes xarelto    • High cholesterol    • Hx MRSA infection 2009, 2014    right ankle 2014, right thumb 2009   • Hypertension    • Hypothyroidism    • Mixed hyperlipidemia    • Obesity    • Old myocardial infarction January 2003    cardiac stents x3   • Peptic ulcer disease 8/4/2016   • Personal history of venous thrombosis and embolism     On Xarelto   • Pulmonary embolism (HCC) 2003/2004   • Sleep apnea     BiPAP with 3L oxygen     Social History  "    Tobacco Use   • Smoking status: Never Smoker   • Smokeless tobacco: Never Used   Vaping Use   • Vaping Use: Never used   Substance Use Topics   • Alcohol use: Yes     Comment: 5 drinks per week   • Drug use: No     Current Outpatient Medications   Medication Sig Dispense Refill   • Omega-3 Fatty Acids (OMEGA-3 FISH OIL PO) Take 2,700 mg by mouth.     • rivaroxaban (XARELTO) 10 MG Tab tablet Take 1 Tablet by mouth with dinner.     • escitalopram (LEXAPRO) 10 MG Tab Take 1 Tablet by mouth every day. 90 Tablet 0   • atorvastatin (LIPITOR) 40 MG Tab TAKE 1 TABLET BY MOUTH EVERY EVENING 90 Tablet 1   • amLODIPine (NORVASC) 2.5 MG Tab Take 1 Tablet by mouth every day. 90 Tablet 3   • sotalol (BETAPACE) 120 MG tablet Take 1 Tablet by mouth 2 times a day. 180 Tablet 2   • Fluticasone Furoate-Vilanterol (BREO ELLIPTA) 200-25 MCG/INH AEROSOL POWDER, BREATH ACTIVATED Inhale 1 Puff every day. 3 Each 4   • omeprazole (PRILOSEC) 20 MG delayed-release capsule Take 1 Cap by mouth every bedtime. 30 Cap 0   • Multiple Vitamins-Minerals (MULTIVITAMIN ADULT PO) Take 1 Tab by mouth every morning.     • Glucosamine HCl (GLUCOSAMINE PO) Take 1 Tab by mouth every morning.     • losartan (COZAAR) 50 MG Tab TAKE 1 TABLET BY MOUTH TWICE DAILY 180 Tablet 3     No current facility-administered medications for this visit.     Physical Exam:  /74 (BP Location: Left arm, Patient Position: Sitting, BP Cuff Size: Large adult)   Pulse (!) 56   Temp (!) 35.8 °C (96.4 °F) (Temporal)   Ht 1.93 m (6' 4\")   Wt 121 kg (266 lb 9.6 oz)   SpO2 95%   BMI 32.45 kg/m²   General: Well developed, well nourished male, in no distress.  Eyes: Conjuntiva without any obvious injection or erythema.   Cardiovascular: Heart is regular with no murmur  Lungs: Clear to auscultation bilaterally. No wheezes, rhonci or crackles heard. Respiratory effort is normal.  Abd: Soft, non-tender  Ext: No edema    Assessment and Plan:   1. Essential " hypertension  Currently this is stable and well controlled.  The patient should continue current therapy with no changes at this time.   Recommend regular monitoring of blood pressure at home  - Lipid Profile; Future    2. Dyslipidemia  Currently this is stable and well controlled.  The patient should continue current therapy with no changes at this time.   Continue atorvastatin 40 mg daily  - Lipid Profile; Future    3. Atrial fibrillation, unspecified type (HCC)  Continue management per cardiology.  Continue current medications    4. Fatty liver  Follow-up with gastroenterology.  Fiber sure test pending    5. Impaired fasting glucose  Point-of-care hemoglobin A1c done in the office today is improved at 6.2%.  I counseled him on diet and exercise.  I see no need for medications currently.  - POCT Hemoglobin A1C  - HEMOGLOBIN A1C; Future  - Comp Metabolic Panel; Future    6. Macrocytosis  I counseled him on alcohol intake and its relationship to macrocytosis.  I recommended that he decrease his alcohol intake by at least half for the next 6 months and then we can reevaluate his CBC and B12 folate levels at that time.  - VITAMIN B12; Future  - FOLATE; Future    7. Depression, unspecified depression type  Continue Lexapro 10 mg daily    8. Obesity (BMI 30-39.9)  - Patient identified as having weight management issue.  Appropriate orders and counseling given.    Return in about 6 months (around 11/5/2022).  Patient Instructions   Check your blood pressure at home 3-4 times per week.  Record those readings and bring them with you to your next appointment.    When you check it, do it seated with your feet on the floor, your back supported and your arm resting at heart level (such as on the kitchen table).  Check it three times,  by approximately 1 minute between each reading.  Take the average of the three readings and record that as your blood pressure for the day.    Add a B12 vitamin to your  vanessa.          Angel Cameron M.D.   of Internal Medicine  [facility]    This note was created using voice recognition software.  While every attempt is made to ensure accuracy of transcription, occasionally errors occur.

## 2022-05-18 ENCOUNTER — ANTICOAGULATION VISIT (OUTPATIENT)
Dept: VASCULAR LAB | Facility: MEDICAL CENTER | Age: 77
End: 2022-05-18
Attending: INTERNAL MEDICINE
Payer: MEDICARE

## 2022-05-18 DIAGNOSIS — Z79.01 CHRONIC ANTICOAGULATION: ICD-10-CM

## 2022-05-18 PROCEDURE — 99212 OFFICE O/P EST SF 10 MIN: CPT

## 2022-05-18 NOTE — PROGRESS NOTES
Target end date:Indefinite  Indication: AF  Drug: Xarelto 10 mg daily - unable to tolerate Xarelto 20 mg daily d/t uncontrolled bleeding - [facility] Ramirez is aware and pt understands risk  of stroke on this dose  CHADsVASC = 6 (chf, htn, age, vasc dz)  HAS-BLED = 1 (age)    Health Status Since Last Assessment  Patient denies any new relevant medical problems, ED visits or hospitalizations  Patient denies any embolic events (stroke/tia/systemic embolism)    Adherence with DOAC Therapy  Pt has not missed any doses in the average week    Bleeding Risk Assessment  Denies Epistaxis  Pt denies any excessive or unusual bleeding/hematomas.  Pt denies any GI bleeds or hematemesis.  Pt denies any concerning daily headache or sub dural hematoma symptoms.    Pt denies any hematuria   Latest Hemoglobin wnl  Platelets: wnl  ETOH overuse denies     Creatinine Clearance/Renal Function  Latest CrCl > 100 ml/min    Hepatic function  Latest LFTs wnl  Pt denies any history of liver dysfunction      Drug Interactions  Medications reconciled   ASA/other antiplatelets none  NSAID none  Other drug interactions none  Verified no anticonvulsant or azole therapy, education provided for future use.     Examination  Blood Pressure declined  There were no vitals filed for this visit.  Symptomatic hypotension no  Significant gait impairment/imbalance/high risk for falls? no    Final Assessment and Recommendations:  Patient appears stable from the anticoagulation standpoint.    Patient is able to afford DOAC    Benefits of continued DOAC therapy outweigh risks for this patient  Recommend pt continue with current DOAC therapy Xarelto 10 mg daily - pt understands this is off-label use    Follow up:  PRN - will d/c from anticoagulation clinic and defer management to cardiology     Rozina Hernandez, PharmD

## 2022-05-19 LAB — EKG IMPRESSION: NORMAL

## 2022-06-21 DIAGNOSIS — I48.91 ATRIAL FIBRILLATION, UNSPECIFIED TYPE (HCC): ICD-10-CM

## 2022-06-23 RX ORDER — SOTALOL HYDROCHLORIDE 120 MG/1
TABLET ORAL
Qty: 180 TABLET | Refills: 3 | Status: SHIPPED | OUTPATIENT
Start: 2022-06-23 | End: 2023-04-06

## 2022-06-30 NOTE — TELEPHONE ENCOUNTER
Last seen: 05.05.2022 by Dr. Cameron  Next appt: 10.27.2022 with Dr. Cameron    Was the patient seen in the last year in this department? Yes   Does patient have an active prescription for medications requested? No   Received Request Via: Pharmacy

## 2022-07-05 RX ORDER — ESCITALOPRAM OXALATE 10 MG/1
10 TABLET ORAL
Qty: 90 TABLET | Refills: 0 | OUTPATIENT
Start: 2022-07-05

## 2022-07-05 RX ORDER — ESCITALOPRAM OXALATE 10 MG/1
10 TABLET ORAL
Qty: 90 TABLET | Refills: 3 | Status: SHIPPED | OUTPATIENT
Start: 2022-07-05 | End: 2023-07-05

## 2022-07-05 NOTE — TELEPHONE ENCOUNTER
Escitalopram Refill    Last seen: 5/5/22 by Dr. Cameron  Next appt: 10/27/22 with Dr. Cameron    Was the patient seen in the last year in this department? Yes   Does patient have an active prescription for medications requested? No   Received Request Via: Pharmacy

## 2022-07-14 ENCOUNTER — OFFICE VISIT (OUTPATIENT)
Dept: SLEEP MEDICINE | Facility: MEDICAL CENTER | Age: 77
End: 2022-07-14
Payer: MEDICARE

## 2022-07-14 VITALS
DIASTOLIC BLOOD PRESSURE: 80 MMHG | HEART RATE: 82 BPM | SYSTOLIC BLOOD PRESSURE: 122 MMHG | HEIGHT: 76 IN | RESPIRATION RATE: 16 BRPM | OXYGEN SATURATION: 95 % | WEIGHT: 265 LBS | BODY MASS INDEX: 32.27 KG/M2

## 2022-07-14 DIAGNOSIS — G47.33 OBSTRUCTIVE SLEEP APNEA SYNDROME: ICD-10-CM

## 2022-07-14 DIAGNOSIS — E66.9 OBESITY (BMI 30-39.9): ICD-10-CM

## 2022-07-14 DIAGNOSIS — J98.4 RESTRICTIVE LUNG DISEASE: ICD-10-CM

## 2022-07-14 PROCEDURE — 99213 OFFICE O/P EST LOW 20 MIN: CPT | Performed by: PHYSICIAN ASSISTANT

## 2022-07-14 ASSESSMENT — ENCOUNTER SYMPTOMS
WEIGHT LOSS: 0
SORE THROAT: 0
HEADACHES: 0
FEVER: 0
SHORTNESS OF BREATH: 0
SINUS PAIN: 0
WHEEZING: 0
DIZZINESS: 0
INSOMNIA: 0
TREMORS: 0
PALPITATIONS: 0
SPUTUM PRODUCTION: 1
CHILLS: 0
ORTHOPNEA: 0
COUGH: 0
HEARTBURN: 0

## 2022-07-14 ASSESSMENT — FIBROSIS 4 INDEX: FIB4 SCORE: 1.85

## 2022-07-14 NOTE — PATIENT INSTRUCTIONS
1-reviewed Bipap compliance-excellent    Head gear every 6 months  Tubing every 3 months  Ultra-fine filters 2 times per month  Foam filter every 6 months    2-brief discussion regarding Inspire    3-managing well on Breo, refill provided     4-increase activity as tolerate and monitor food intake     5-follow up in one year    6-request lung function test prior to appoint

## 2022-07-14 NOTE — PROGRESS NOTES
CC: Follow-up    HPI:  Anthony Cloud is a 77 y.o. year old male here today for follow-up on restrictive lung disease and sleep apnea. Last seen in clinic 8/4/2021 by Dr. Arley Harrison.  He is a never smoker.    Pertinent past medical history includes essential hypertension, atrial fibrillation S/P ablation MI, heart failure, chronic anticoagulation, PE/VTE, peptic ulcer disease/GI bleed, obesity, pulmonary hypertension, EtOH use.     Reviewed in clinic vitals including blood pressure 122/80, HR 82, O2 sat of 95% on room air.    Reviewed home medication regimen including Xarelto, omeprazole, Breo 200 mcg, albuterol which patient reports requiring a couple times per year.    Reviewed most recent imaging including echocardiogram obtained 4/12/2022 demonstrating normal left ventricular chamber size, wall thickness, systolic function with LVEF estimated 60%.  Indeterminant diastolic function.  Normal right ventricular size and systolic function.  Mildly dilated left atrium, trace mitral regurgitation, trace eccentric aortic insufficiency, trace tricuspid regurgitation.  Unable to estimate pulmonary artery pressures due to inadequate tricuspid regurgitant jet.    No recent chest imaging, chest x-ray obtained 2/11/2020 demonstrated hyperinflation, prominent right hilar structures likely vascular, stable cardiomegaly.    Pulmonary function testing obtained 11/9/2020 demonstrated FEV1 of 2.57 L or 73% predicted, FVC of 2.98 L or 62% predicted, FEV1/FVC ratio of 86, residual volume 95% predicted, TLC 75% predicted, DLCO 90% predicted.  Per pulmonologist interpretation mild restriction present with total lung capacity of 75%, FEV1 reduced to similar range with FVC slightly lower.  No change after bronchodilators.  Low normal oxygen transfer, flow volume loop confirms mild restrictive process.    Split-night sleep study was obtained 6/13/2016 demonstrating overall AHI of 32.6, low O2 sat of 80% with sats less than 90%  for 93% of recorded time and PLMS index of 72.7 consistent with severe obstructive sleep apnea hypopnea.  All events were obstructive.  Study notes that patient has history of complex sleep apnea and was treated with ASV for years.  He no longer met the criteria for ASV.  For treatment patient did best on bilevel of 18/14 and this was recommended.    Follow-up overnight oximetry demonstrated preserved O2 saturation of 88% or greater on BiPAP therapy per pulmonologist interpretation.    Reviewed compliance dated 6/14/2022-7/13/2022 demonstrating use of a DreamStation BiPAP Pro with IPAP 18 and EPAP 14 cm H2O pressure, DME company CPAP and more.  Usage 30 out of 30 days, 100% of days greater than or equal to 4 hours, average daily usage of 9 hours and 44 minutes and average AHI of 1.1, average time in large leak per day 18 minutes.      Review of Systems   Constitutional: Positive for malaise/fatigue. Negative for chills, fever and weight loss.   HENT: Positive for congestion (spring time ), hearing loss (has hearing aids) and tinnitus. Negative for nosebleeds (resolved with decreased xarelto ), sinus pain and sore throat.    Eyes:        Presc glasses   Respiratory: Positive for sputum production (occasional white). Negative for cough, shortness of breath and wheezing.    Cardiovascular: Negative for chest pain, palpitations, orthopnea and leg swelling.   Gastrointestinal: Negative for heartburn.        No dentures, no missing teeth    Neurological: Negative for dizziness, tremors and headaches.   Psychiatric/Behavioral: The patient does not have insomnia.        Past Medical History:   Diagnosis Date   • Acute nasopharyngitis 01/2020   • Anemia    • Arrhythmia     a fib   • Bowel habit changes     constipation    • Breath shortness     since 11/2019, hx cardiac arrhythmia    • CAD (coronary artery disease) 2003    PCI/BMS x 3 to the RCA (Zeta 4.0 x 23mm, 3.5 x 23mm, 3.0 x 18mm).   • Cataract     alejandra IOL    • Central  sleep apnea      ICD-10 transition   • Chronic anticoagulation    • COPD (chronic obstructive pulmonary disease) ([facility])    • Depression    • Depressive disorder, not elsewhere classified         • Dyslipidemia    • Hemorrhagic disorder (HCC)     takes xarelto    • High cholesterol    • Hx MRSA infection 2009, 2014    right ankle 2014, right thumb 2009   • Hypertension    • Hypothyroidism    • Mixed hyperlipidemia    • Obesity    • Old myocardial infarction January 2003    cardiac stents x3   • Peptic ulcer disease 8/4/2016   • Personal history of venous thrombosis and embolism     On Xarelto   • Pulmonary embolism (HCC) 2003/2004   • Sleep apnea     BiPAP with 3L oxygen       Past Surgical History:   Procedure Laterality Date   • PB SHLDR ARTHROSCOP,SURG,W/ROTAT CUFF REPB Right 4/12/2021    Procedure: ARTHROSCOPY, SHOULDER, WITH ROTATOR CUFF REPAIR;  Surgeon: Eusebia Kinney M.D.;  Location: [facility];  Service: Orthopedics   • PB ARTHROSCOPY SHOULDER SURGICAL BICEPS TENODES* Right 4/12/2021    Procedure: ARTHROSCOPY, SHOULDER, WITH BICEPS TENODESIS - FOR TENOTOMY;  Surgeon: Eusebia Kinney M.D.;  Location: [facility];  Service: Orthopedics   • ID SHLDR ARTHROSCOP,PART ACROMIOPLAS Right 4/12/2021    Procedure: DECOMPRESSION, SHOULDER, ARTHROSCOPIC - SUBACROMIAL, LABRAL DEBRIDEMENT;  Surgeon: Eusebia Kinney M.D.;  Location: [facility];  Service: Orthopedics   • OTHER CARDIAC SURGERY  07/2020    Cardiac ablasion    • COLONOSCOPY  2020   • ORIF, WRIST Left 2/23/2018    Procedure: WRIST ORIF;  Surgeon: Jasper Hammond M.D.;  Location: [facility];  Service: Orthopedics   • INCISION AND DRAINAGE ORTHOPEDIC  8/29/2014    Performed by Wolfgang Merrill M.D. at [facility]   • CARDIAC CATH, RIGHT/LEFT HEART  2003 01/2003 4.0x23mm Zeta stent to proximal RCA,3.5x22mm distal to first stent, 3.0x15mm at the point of original occlusion.   • OTHER   "2001    back sx    • CA ANESTH,LOWER LEG ARTERIES SURG         Family History   Problem Relation Age of Onset   • Other Mother         Passed at 98   • Heart Disease Father 60        CABG       Social History     Socioeconomic History   • Marital status:      Spouse name: Not on file   • Number of children: Not on file   • Years of education: Not on file   • Highest education level: Not on file   Occupational History   • Not on file   Tobacco Use   • Smoking status: Never Smoker   • Smokeless tobacco: Never Used   Vaping Use   • Vaping Use: Never used   Substance and Sexual Activity   • Alcohol use: Yes     Comment: 5 drinks per week   • Drug use: No   • Sexual activity: Not on file   Other Topics Concern   • Not on file   Social History Narrative   • Not on file     Social Determinants of Health     Financial Resource Strain: Not on file   Food Insecurity: Not on file   Transportation Needs: Not on file   Physical Activity: Not on file   Stress: Not on file   Social Connections: Not on file   Intimate Partner Violence: Not on file   Housing Stability: Not on file       Allergies as of 07/14/2022 - Reviewed 07/14/2022   Allergen Reaction Noted   • Lisinopril  06/19/2014        @Vital signs for this encounter:  /80   Pulse 82   Resp 16   Ht 1.93 m (6' 4\")   Wt 120 kg (265 lb)   SpO2 95%     Current medications as of today   Current Outpatient Medications   Medication Sig Dispense Refill   • escitalopram (LEXAPRO) 10 MG Tab TAKE 1 TABLET BY MOUTH EVERY DAY 90 Tablet 3   • sotalol (BETAPACE) 120 MG tablet TAKE 1 TABLET BY MOUTH TWICE DAILY 180 Tablet 3   • losartan (COZAAR) 50 MG Tab TAKE 1 TABLET BY MOUTH TWICE DAILY 180 Tablet 3   • Omega-3 Fatty Acids (OMEGA-3 FISH OIL PO) Take 2,700 mg by mouth.     • rivaroxaban (XARELTO) 10 MG Tab tablet Take 1 Tablet by mouth with dinner.     • atorvastatin (LIPITOR) 40 MG Tab TAKE 1 TABLET BY MOUTH EVERY EVENING 90 Tablet 1   • amLODIPine (NORVASC) 2.5 MG Tab " Take 1 Tablet by mouth every day. 90 Tablet 3   • Fluticasone Furoate-Vilanterol (BREO ELLIPTA) 200-25 MCG/INH AEROSOL POWDER, BREATH ACTIVATED Inhale 1 Puff every day. 3 Each 4   • omeprazole (PRILOSEC) 20 MG delayed-release capsule Take 1 Cap by mouth every bedtime. 30 Cap 0   • Multiple Vitamins-Minerals (MULTIVITAMIN ADULT PO) Take 1 Tab by mouth every morning.     • Glucosamine HCl (GLUCOSAMINE PO) Take 1 Tab by mouth every morning.       No current facility-administered medications for this visit.         Physical Exam:   Gen:           Alert and oriented, No apparent distress. Mood and affect appropriate, normal interaction with provider.  Eyes:          sclere white, conjunctive moist.  Hearing:     Grossly intact.  Dentition:    Good dentition.  Oropharynx:   Tongue normal, posterior pharynx without erythema or exudate.  Neck:        Supple, trachea midline, no masses.  Respiratory Effort: No intercostal retractions or use of accessory muscles.   Lung Auscultation:      Decreased bases otherwise clear; no rales, rhonchi or wheezing.  CV:            Regular rate and rhythm. No edema. No murmurs, rubs or gallops.  Digits, Nails, Ext: No clubbing, cyanosis, petechiae, or nodes.   Skin:        No rashes, lesions or ulcers noted on exposed skin surfaces.                     Assessment:  1. Obstructive sleep apnea syndrome  DME Mask and Supplies   2. Restrictive lung disease  Fluticasone Furoate-Vilanterol (BREO ELLIPTA) 200-25 MCG/INH AEROSOL POWDER, BREATH ACTIVATED   3. Obesity (BMI 30-39.9)         Immunizations:    Flu: 11/26/2021  Pneumovax 23: 7/17/2018  Prevnar 13: 1/19/2017  Moderna SARS-CoV-2 vaccine: 11/16/2021, 2/25/2021, 1/28/2021    Plan:  77-year-old male here to follow-up on restrictive lung disease and sleep apnea.    SHYAM: Reviewed compliance, demonstrates continued use and benefit with BiPAP.  Order placed for mask and supplies.  Brief discussion regarding inspire.    Restrictive lung disease:  Does have elevated BMI, may or may not have some degree of obstructive lung disease in never smoker with mild asthma per history.  Continues to benefit from Breo use, rare need for albuterol.  Update pulmonary function testing prior to next visit.    Elevated BMI: Recommended increasing activity as tolerated and monitoring food intake.    Follow-up in 1 year with PFT.    This dictation was created using voice recognition software. The accuracy of the dictation is limited to the abilities of the software. I expect there may be some errors of grammar and possibly content.

## 2022-08-17 RX ORDER — ATORVASTATIN CALCIUM 40 MG/1
40 TABLET, FILM COATED ORAL NIGHTLY
Qty: 90 TABLET | Refills: 1 | Status: SHIPPED
Start: 2022-08-17 | End: 2023-09-19

## 2022-08-17 NOTE — TELEPHONE ENCOUNTER
Atorvastatin Refill    Last seen: 5/5/22 by Dr. Cameron  Next appt: 10/27/22 with Dr. Cameron    Was the patient seen in the last year in this department? Yes   Does patient have an active prescription for medications requested? No   Received Request Via: Pharmacy

## 2022-10-27 ENCOUNTER — OFFICE VISIT (OUTPATIENT)
Dept: INTERNAL MEDICINE | Facility: OTHER | Age: 77
End: 2022-10-27
Payer: MEDICARE

## 2022-10-27 VITALS
HEART RATE: 64 BPM | HEIGHT: 76 IN | SYSTOLIC BLOOD PRESSURE: 128 MMHG | BODY MASS INDEX: 32.17 KG/M2 | TEMPERATURE: 97.6 F | WEIGHT: 264.2 LBS | DIASTOLIC BLOOD PRESSURE: 68 MMHG | OXYGEN SATURATION: 94 %

## 2022-10-27 DIAGNOSIS — Z23 NEED FOR INFLUENZA VACCINATION: ICD-10-CM

## 2022-10-27 DIAGNOSIS — I10 ESSENTIAL HYPERTENSION: ICD-10-CM

## 2022-10-27 DIAGNOSIS — D53.9 MACROCYTIC ANEMIA: ICD-10-CM

## 2022-10-27 DIAGNOSIS — F32.A DEPRESSION, UNSPECIFIED DEPRESSION TYPE: ICD-10-CM

## 2022-10-27 DIAGNOSIS — R73.01 IMPAIRED FASTING GLUCOSE: ICD-10-CM

## 2022-10-27 PROCEDURE — 99214 OFFICE O/P EST MOD 30 MIN: CPT | Mod: 25 | Performed by: INTERNAL MEDICINE

## 2022-10-27 PROCEDURE — 90662 IIV NO PRSV INCREASED AG IM: CPT | Performed by: INTERNAL MEDICINE

## 2022-10-27 PROCEDURE — G0008 ADMIN INFLUENZA VIRUS VAC: HCPCS | Performed by: INTERNAL MEDICINE

## 2022-10-27 RX ORDER — FLUTICASONE FUROATE AND VILANTEROL 200; 25 UG/1; UG/1
1 POWDER RESPIRATORY (INHALATION) DAILY
Qty: 90 EACH | Refills: 3 | Status: SHIPPED | OUTPATIENT
Start: 2022-10-27 | End: 2023-11-02

## 2022-10-27 ASSESSMENT — FIBROSIS 4 INDEX: FIB4 SCORE: 1.85

## 2022-10-27 NOTE — PATIENT INSTRUCTIONS
Get a COVID booster soon.    Get blood work (CBC and B12) in the next few weeks - nonfasting  Get blood work (CBC, B12, Hgb A1c and CMP) in 6 months - fasting - 1 week before your next visit.

## 2022-10-27 NOTE — PROGRESS NOTES
Established Patient    Patient Care Team:  Angel Cameron M.D. as PCP - General  FREDO Carr. as Mid Level Provider (Cardiovascular Disease (Cardiology))  Padmini Calhoun M.D. (Inactive) as Consulting Physician (Cardiovascular Disease (Cardiology))  cpap and more as Respiratory Therapist  Austin Schreiber M.D. as Consulting Physician (Pulmonary Medicine)    Anthony Cloud is a 77 y.o. male who presents today with the following Chief Complaint(s): Follow up for Diagnoses of Macrocytic anemia, Essential hypertension, Impaired fasting glucose, Depression, unspecified depression type, and Need for influenza vaccination were pertinent to this visit.    HPI:  1. Macrocytic anemia  Generally doing well.  As last visit he started some B12 supplementation and is actually feeling better with more energy.  He does not have an updated B12 level or CBC however and does need to get that done soon.    2. Essential hypertension  Blood pressure is well controlled in the office today at 128/68.  He is overall doing well with no active issues.    3. Impaired fasting glucose  He is right on the borderline of impaired fasting glucose/diabetes.  His most recent hemoglobin A1c is 6.5%.  He does take in a fair amount of carbohydrates, and notes that he does love to eat bread.  He also drinks a decent amount of alcohol, but is interested in trying to get better control over all of this.    4. Depression, unspecified depression type  Overall stable, and doing well on Lexapro 10 mg daily.    5. Need for influenza vaccination    ROS:     Denies any new chest pain or shortness of breath.  No changes to urinary or bowel function.  See HPI.    Past Medical History:   Diagnosis Date    Acute nasopharyngitis 01/2020    Anemia     Arrhythmia     a fib    Bowel habit changes     constipation     Breath shortness     since 11/2019, hx cardiac arrhythmia     CAD (coronary artery disease) 2003    PCI/BMS x 3 to the RCA (Zeta 4.0 x  23mm, 3.5 x 23mm, 3.0 x 18mm).    Cataract     alejandra IOL     Central sleep apnea      ICD-10 transition    Chronic anticoagulation     COPD (chronic obstructive pulmonary disease) (HCC)     Depression     Depressive disorder, not elsewhere classified          Dyslipidemia     Hemorrhagic disorder (HCC)     takes xarelto     High cholesterol     Hx MRSA infection 2009, 2014    right ankle 2014, right thumb 2009    Hypertension     Hypothyroidism     Mixed hyperlipidemia     Obesity     Old myocardial infarction January 2003    cardiac stents x3    Peptic ulcer disease 8/4/2016    Personal history of venous thrombosis and embolism     On Xarelto    Pulmonary embolism (HCC) 2003/2004    Sleep apnea     BiPAP with 3L oxygen     Social History     Tobacco Use    Smoking status: Never    Smokeless tobacco: Never   Vaping Use    Vaping Use: Never used   Substance Use Topics    Alcohol use: Yes     Comment: 5 drinks per week    Drug use: No     Current Outpatient Medications   Medication Sig Dispense Refill    fluticasone furoate-vilanterol (BREO ELLIPTA) 200-25 MCG/ACT AEROSOL POWDER, BREATH ACTIVATED Inhale 1 Puff every day. 90 Each 3    atorvastatin (LIPITOR) 40 MG Tab TAKE 1 TABLET BY MOUTH EVERY EVENING 90 Tablet 1    escitalopram (LEXAPRO) 10 MG Tab TAKE 1 TABLET BY MOUTH EVERY DAY 90 Tablet 3    sotalol (BETAPACE) 120 MG tablet TAKE 1 TABLET BY MOUTH TWICE DAILY 180 Tablet 3    losartan (COZAAR) 50 MG Tab TAKE 1 TABLET BY MOUTH TWICE DAILY 180 Tablet 3    Omega-3 Fatty Acids (OMEGA-3 FISH OIL PO) Take 2,700 mg by mouth.      rivaroxaban (XARELTO) 10 MG Tab tablet Take 1 Tablet by mouth with dinner.      amLODIPine (NORVASC) 2.5 MG Tab Take 1 Tablet by mouth every day. 90 Tablet 3    omeprazole (PRILOSEC) 20 MG delayed-release capsule Take 1 Cap by mouth every bedtime. 30 Cap 0    Multiple Vitamins-Minerals (MULTIVITAMIN ADULT PO) Take 1 Tab by mouth every morning.      Glucosamine HCl (GLUCOSAMINE PO) Take 1 Tab by  "mouth every morning.       No current facility-administered medications for this visit.     Physical Exam:  /68 (BP Location: Left arm, Patient Position: Sitting, BP Cuff Size: Large adult)   Pulse 64   Temp 36.4 °C (97.6 °F) (Temporal)   Ht 1.93 m (6' 4\")   Wt 120 kg (264 lb 3.2 oz)   SpO2 94%   BMI 32.16 kg/m²   General: Well developed, well nourished male, in no distress.  Eyes: Conjuntiva without any obvious injection or erythema.   Cardiovascular: Heart is regular with no murmur  Lungs: Clear to auscultation bilaterally. No wheezes, rhonci or crackles heard. Respiratory effort is normal.  Abd: Soft, non-tender  Ext: No edema    Assessment and Plan:   1. Macrocytic anemia  For now would recommend he stay on B12 supplementation.  We will plan to get updated CBC and B12 level in the next couple weeks to assess the effectiveness of this, and then recheck again before his next visit with me.  - CBC WITHOUT DIFFERENTIAL; Future -2 weeks  - VITAMIN B12; Future 2 weeks  - CBC WITHOUT DIFFERENTIAL; Future -6 months  - VITAMIN B12; Future -6 months    2. Essential hypertension  Currently this is stable and well controlled.  The patient should continue current therapy with no changes at this time.   Continue amlodipine 2.5 mg daily, losartan 50 mg daily.  - Comp Metabolic Panel; Future    3. Impaired fasting glucose  But a good discussion today about carbohydrate intake and its impact on his blood sugar.  He may indeed soon crossed the threshold into formal diagnosis of diabetes however I would like him to continue to work on his diet exercise and weight loss and hopefully he can decrease his blood sugar sufficiently to stay in the prediabetes range  - HEMOGLOBIN A1C; Future  - Comp Metabolic Panel; Future    4. Depression, unspecified depression type  Currently this is stable and well controlled.  The patient should continue current therapy with no changes at this time.       5. Need for influenza " vaccination  - INFLUENZA VACCINE, HIGH DOSE (65+ ONLY)     Return in about 6 months (around 4/27/2023).  Patient Instructions   Get a COVID booster soon.    Get blood work (CBC and B12) in the next few weeks - nonfasting  Get blood work (CBC, B12, Hgb A1c and CMP) in 6 months - fasting - 1 week before your next visit.      Angel Cameron M.D.   of Internal Medicine  [facility]    This note was created using voice recognition software.  While every attempt is made to ensure accuracy of transcription, occasionally errors occur.

## 2022-11-10 ENCOUNTER — PATIENT MESSAGE (OUTPATIENT)
Dept: HEALTH INFORMATION MANAGEMENT | Facility: OTHER | Age: 77
End: 2022-11-10

## 2022-11-15 ENCOUNTER — OFFICE VISIT (OUTPATIENT)
Dept: CARDIOLOGY | Facility: MEDICAL CENTER | Age: 77
End: 2022-11-15
Payer: MEDICARE

## 2022-11-15 VITALS
HEIGHT: 76 IN | DIASTOLIC BLOOD PRESSURE: 70 MMHG | WEIGHT: 264 LBS | SYSTOLIC BLOOD PRESSURE: 124 MMHG | BODY MASS INDEX: 32.15 KG/M2 | RESPIRATION RATE: 14 BRPM | OXYGEN SATURATION: 92 % | HEART RATE: 72 BPM

## 2022-11-15 DIAGNOSIS — I48.19 PERSISTENT ATRIAL FIBRILLATION (HCC): ICD-10-CM

## 2022-11-15 PROCEDURE — 93000 ELECTROCARDIOGRAM COMPLETE: CPT | Performed by: INTERNAL MEDICINE

## 2022-11-15 PROCEDURE — 99214 OFFICE O/P EST MOD 30 MIN: CPT | Performed by: INTERNAL MEDICINE

## 2022-11-15 ASSESSMENT — FIBROSIS 4 INDEX: FIB4 SCORE: 1.85

## 2022-11-15 NOTE — PROGRESS NOTES
Arrhythmia Clinic Note (Established patient)    DOS: 11/15/2022    Chief complaint/Reason for consult: Afib    Interval History: 78 y/o M with persistent Afib s/p ablation on sotalol without recurrence, doing well.    ROS (+ highlighted in bold):  Constitutional: Fevers/chills/fatigue/weightloss  HEENT: Blurry vision/eye pain/sore throat/hearing loss  Respiratory: Shortness of breath/cough  Cardiovascular: Chest pain/palpitations/edema/orthopnea/syncope  GI: Nausea/vomitting/diarrhea  MSK: Arthralgias/myagias/muscle weakness  Skin: Rash/sores  Neurological: Numbness/tremors/vertigo  Endocrine: Excessive thirst/polyuria/cold intolerance/heat intolerance  Psych: Depression/anxiety    Past Medical History:   Diagnosis Date    Acute nasopharyngitis 01/2020    Anemia     Arrhythmia     a fib    Bowel habit changes     constipation     Breath shortness     since 11/2019, hx cardiac arrhythmia     CAD (coronary artery disease) 2003    PCI/BMS x 3 to the RCA (Zeta 4.0 x 23mm, 3.5 x 23mm, 3.0 x 18mm).    Cataract     alejandra IOL     Central sleep apnea      ICD-10 transition    Chronic anticoagulation     COPD (chronic obstructive pulmonary disease) (HCC)     Depression     Depressive disorder, not elsewhere classified          Dyslipidemia     Hemorrhagic disorder (HCC)     takes xarelto     High cholesterol     Hx MRSA infection 2009, 2014    right ankle 2014, right thumb 2009    Hypertension     Hypothyroidism     Mixed hyperlipidemia     Obesity     Old myocardial infarction January 2003    cardiac stents x3    Peptic ulcer disease 8/4/2016    Personal history of venous thrombosis and embolism     On Xarelto    Pulmonary embolism (HCC) 2003/2004    Sleep apnea     BiPAP with 3L oxygen       Past Surgical History:   Procedure Laterality Date    PB SHLDR ARTHROSCOP,SURG,W/ROTAT CUFF REPB Right 4/12/2021    Procedure: ARTHROSCOPY, SHOULDER, WITH ROTATOR CUFF REPAIR;  Surgeon: Eusebia Kinney M.D.;  Location: SURGERY [facility]  [facility];  Service: Orthopedics    PB ARTHROSCOPY SHOULDER SURGICAL BICEPS TENODES* Right 4/12/2021    Procedure: ARTHROSCOPY, SHOULDER, WITH BICEPS TENODESIS - FOR TENOTOMY;  Surgeon: Eusebia Kinney M.D.;  Location: SURGERY [facility];  Service: Orthopedics    CA SHLDR ARTHROSCOP,PART ACROMIOPLAS Right 4/12/2021    Procedure: DECOMPRESSION, SHOULDER, ARTHROSCOPIC - SUBACROMIAL, LABRAL DEBRIDEMENT;  Surgeon: Eusebia Kinney M.D.;  Location: SURGERY [facility];  Service: Orthopedics    OTHER CARDIAC SURGERY  07/2020    Cardiac ablasion     COLONOSCOPY  2020    ORIF, WRIST Left 2/23/2018    Procedure: WRIST ORIF;  Surgeon: Jasper Hammond M.D.;  Location: SURGERY [facility];  Service: Orthopedics    INCISION AND DRAINAGE ORTHOPEDIC  8/29/2014    Performed by Wolfgang Merrill M.D. at SURGERY [facility]    CARDIAC CATH, RIGHT/LEFT HEART  2003 01/2003 4.0x23mm Zeta stent to proximal RCA,3.5x22mm distal to first stent, 3.0x15mm at the point of original occlusion.    OTHER  2001    back sx     CA ANESTH,LOWER LEG ARTERIES SURG         Social History     Socioeconomic History    Marital status:      Spouse name: Not on file    Number of children: Not on file    Years of education: Not on file    Highest education level: Not on file   Occupational History    Not on file   Tobacco Use    Smoking status: Never    Smokeless tobacco: Never   Vaping Use    Vaping Use: Never used   Substance and Sexual Activity    Alcohol use: Yes     Comment: 5 drinks per week    Drug use: No    Sexual activity: Not on file   Other Topics Concern    Not on file   Social History Narrative    Not on file     Social Determinants of Health     Financial Resource Strain: Not on file   Food Insecurity: Not on file   Transportation Needs: Not on file   Physical Activity: Not on file   Stress: Not on file   Social Connections: Not on file   Intimate Partner Violence: Not on file   Housing Stability: Not on file  "      Family History   Problem Relation Age of Onset    Other Mother         Passed at 98    Heart Disease Father 60        CABG       Allergies   Allergen Reactions    Lisinopril      cough       Current Outpatient Medications   Medication Sig Dispense Refill    fluticasone furoate-vilanterol (BREO ELLIPTA) 200-25 MCG/ACT AEROSOL POWDER, BREATH ACTIVATED Inhale 1 Puff every day. 90 Each 3    atorvastatin (LIPITOR) 40 MG Tab TAKE 1 TABLET BY MOUTH EVERY EVENING 90 Tablet 1    escitalopram (LEXAPRO) 10 MG Tab TAKE 1 TABLET BY MOUTH EVERY DAY 90 Tablet 3    sotalol (BETAPACE) 120 MG tablet TAKE 1 TABLET BY MOUTH TWICE DAILY 180 Tablet 3    losartan (COZAAR) 50 MG Tab TAKE 1 TABLET BY MOUTH TWICE DAILY 180 Tablet 3    rivaroxaban (XARELTO) 10 MG Tab tablet Take 1 Tablet by mouth with dinner.      amLODIPine (NORVASC) 2.5 MG Tab Take 1 Tablet by mouth every day. 90 Tablet 3    Multiple Vitamins-Minerals (MULTIVITAMIN ADULT PO) Take 1 Tab by mouth every morning.      Glucosamine HCl (GLUCOSAMINE PO) Take 1 Tab by mouth every morning.       No current facility-administered medications for this visit.       Physical Exam:  Vitals:    11/15/22 1231   BP: 124/70   BP Location: Left arm   Patient Position: Sitting   BP Cuff Size: Adult   Pulse: 72   Resp: 14   SpO2: 92%   Weight: 120 kg (264 lb)   Height: 1.93 m (6' 4\")     General appearance: NAD, conversant   Eyes: anicteric sclerae, moist conjunctivae; no lid-lag; PERRLA  HENT: Atraumatic; oropharynx clear with moist mucous membranes and no mucosal ulcerations; normal hard and soft palate  Neck: Trachea midline; FROM, supple, no thyromegaly or lymphadenopathy  Lungs: CTA, with normal respiratory effort and no intercostal retractions  CV: RRR, no MRGs, no JVD  Abdomen: Soft, non-tender; no masses or HSM  Extremities: No peripheral edema or extremity lymphadenopathy  Skin: Normal temperature, turgor and texture; no rash, ulcers or subcutaneous nodules  Psych: Appropriate " affect, alert and oriented to person, place and time    Data:  Lipids:   Lab Results   Component Value Date/Time    CHOLSTRLTOT 131 10/21/2022 04:56 AM    CHOLSTRLTOT 101 11/07/2019 07:14 AM    TRIGLYCERIDE 88 10/21/2022 04:56 AM    TRIGLYCERIDE 64 11/07/2019 07:14 AM    HDL 39 (L) 10/21/2022 04:56 AM    HDL 40 11/07/2019 07:14 AM    LDL 48 11/07/2019 07:14 AM        BMP:  Lab Results   Component Value Date/Time    SODIUM 140 10/21/2022 0456    POTASSIUM 5.0 10/21/2022 0456    CHLORIDE 104 10/21/2022 0456    CO2 24 10/21/2022 0456    GLUCOSE 141 (H) 10/21/2022 0456    BUN 20 10/21/2022 0456    CREATININE 0.97 10/21/2022 0456    CALCIUM 9.0 10/21/2022 0456    ANION 11.0 04/10/2022 1318        TSH:   Lab Results   Component Value Date/Time    TSHULTRASEN 0.840 02/12/2020 0525        THYROXINE (T4):   Lab Results   Component Value Date/Time    FREEDIR 1.05 11/02/2021 0609        CBC:   Lab Results   Component Value Date/Time    WBC 4.7 (L) 04/10/2022 01:18 PM    RBC 4.14 (L) 04/10/2022 01:18 PM    HEMOGLOBIN 13.7 (L) 04/10/2022 01:18 PM    HEMATOCRIT 42.4 04/10/2022 01:18 PM    .4 (H) 04/10/2022 01:18 PM    MCH 33.1 (H) 04/10/2022 01:18 PM    MCHC 32.3 (L) 04/10/2022 01:18 PM    RDW 49.5 04/10/2022 01:18 PM    PLATELETCT 177 04/10/2022 01:18 PM    MPV 9.5 04/10/2022 01:18 PM    NEUTSPOLYS 60.60 04/10/2022 01:18 PM    LYMPHOCYTES 25.90 04/10/2022 01:18 PM    MONOCYTES 11.00 04/10/2022 01:18 PM    EOSINOPHILS 1.70 04/10/2022 01:18 PM    BASOPHILS 0.40 04/10/2022 01:18 PM    IMMGRAN 0.40 04/10/2022 01:18 PM    IMMGRAN 1 11/02/2021 06:09 AM    NRBC 0.00 04/10/2022 01:18 PM    NEUTS 2.85 04/10/2022 01:18 PM    NEUTS 3.7 11/02/2021 06:09 AM    LYMPHS 1.22 04/10/2022 01:18 PM    LYMPHS 1.2 11/02/2021 06:09 AM    MONOS 0.52 04/10/2022 01:18 PM    MONOS 0.6 11/02/2021 06:09 AM    EOS 0.08 04/10/2022 01:18 PM    EOS 0.2 11/02/2021 06:09 AM    BASO 0.02 04/10/2022 01:18 PM    BASO 0.1 11/02/2021 06:09 AM    IMMGRANAB 0.02  04/10/2022 01:18 PM    IMMGRANAB 0.1 11/02/2021 06:09 AM    NRBCAB 0.00 04/10/2022 01:18 PM        CBC w/o DIFF  Lab Results   Component Value Date/Time    WBC 4.7 (L) 04/10/2022 01:18 PM    RBC 4.14 (L) 04/10/2022 01:18 PM    HEMOGLOBIN 13.7 (L) 04/10/2022 01:18 PM    .4 (H) 04/10/2022 01:18 PM    MCH 33.1 (H) 04/10/2022 01:18 PM    MCHC 32.3 (L) 04/10/2022 01:18 PM    RDW 49.5 04/10/2022 01:18 PM    MPV 9.5 04/10/2022 01:18 PM       Prior echo/stress reviewed: Normal EF    EKG interpreted by me: NSR    Impression/Plan:  1. Persistent atrial fibrillation (HCC)  EKG        Afib s/p ablation  High risk medication management  Hypercoagulable state due to afib    - I recommend continuing sotalol, reviewed ECG and CMP  - I recommend continuing Xarelto. I recommend 20mg dose if he is able to tolerate it, he has been splitting pills due to bloody noses    FV 6 months    Nilton Ramirez MD  Cardiac Electrophysiology

## 2022-12-14 LAB — EKG IMPRESSION: NORMAL

## 2022-12-31 DIAGNOSIS — I10 ESSENTIAL HYPERTENSION: ICD-10-CM

## 2023-01-03 RX ORDER — AMLODIPINE BESYLATE 2.5 MG/1
2.5 TABLET ORAL DAILY
Qty: 90 TABLET | Refills: 3 | Status: SHIPPED | OUTPATIENT
Start: 2023-01-03 | End: 2024-01-29

## 2023-01-04 NOTE — TELEPHONE ENCOUNTER
Is the patient due for a refill? Yes    Was the patient seen the past year? Yes    Date of last office visit: 11/15/2022    Does the patient have an upcoming appointment?  Yes   If yes, When? 5/17/2023    Provider to refill:RUDDY    Does the patients insurance require a 100 day supply?  No

## 2023-03-28 RX ORDER — RIVAROXABAN 20 MG/1
TABLET, FILM COATED ORAL
Qty: 90 TABLET | Refills: 2 | Status: SHIPPED | OUTPATIENT
Start: 2023-03-28 | End: 2024-02-15

## 2023-05-17 ENCOUNTER — OFFICE VISIT (OUTPATIENT)
Dept: CARDIOLOGY | Facility: MEDICAL CENTER | Age: 78
End: 2023-05-17
Attending: INTERNAL MEDICINE
Payer: MEDICARE

## 2023-05-17 VITALS
OXYGEN SATURATION: 92 % | HEART RATE: 52 BPM | HEIGHT: 76 IN | SYSTOLIC BLOOD PRESSURE: 134 MMHG | DIASTOLIC BLOOD PRESSURE: 82 MMHG | BODY MASS INDEX: 33.12 KG/M2 | WEIGHT: 272 LBS | RESPIRATION RATE: 18 BRPM

## 2023-05-17 DIAGNOSIS — I48.19 PERSISTENT ATRIAL FIBRILLATION (HCC): ICD-10-CM

## 2023-05-17 PROCEDURE — 99212 OFFICE O/P EST SF 10 MIN: CPT | Performed by: INTERNAL MEDICINE

## 2023-05-17 PROCEDURE — 99214 OFFICE O/P EST MOD 30 MIN: CPT | Mod: 25 | Performed by: INTERNAL MEDICINE

## 2023-05-17 PROCEDURE — 93005 ELECTROCARDIOGRAM TRACING: CPT | Performed by: INTERNAL MEDICINE

## 2023-05-17 PROCEDURE — 93010 ELECTROCARDIOGRAM REPORT: CPT | Performed by: INTERNAL MEDICINE

## 2023-05-17 PROCEDURE — 3075F SYST BP GE 130 - 139MM HG: CPT | Performed by: INTERNAL MEDICINE

## 2023-05-17 PROCEDURE — 3079F DIAST BP 80-89 MM HG: CPT | Performed by: INTERNAL MEDICINE

## 2023-05-17 ASSESSMENT — FIBROSIS 4 INDEX: FIB4 SCORE: 2.03

## 2023-05-17 NOTE — PROGRESS NOTES
Arrhythmia Clinic Note (Established patient)    DOS: 5/17/2023    Chief complaint/Reason for consult: Afib    Interval History: Doing well without complaints. Still taking Xarelto half dose.    ROS (+ highlighted in bold):  Constitutional: Fevers/chills/fatigue/weightloss  HEENT: Blurry vision/eye pain/sore throat/hearing loss  Respiratory: Shortness of breath/cough  Cardiovascular: Chest pain/palpitations/edema/orthopnea/syncope  GI: Nausea/vomitting/diarrhea  MSK: Arthralgias/myagias/muscle weakness  Skin: Rash/sores  Neurological: Numbness/tremors/vertigo  Endocrine: Excessive thirst/polyuria/cold intolerance/heat intolerance  Psych: Depression/anxiety    Past Medical History:   Diagnosis Date    Acute nasopharyngitis 01/2020    Anemia     Arrhythmia     a fib    Bowel habit changes     constipation     Breath shortness     since 11/2019, hx cardiac arrhythmia     CAD (coronary artery disease) 2003    PCI/BMS x 3 to the RCA (Zeta 4.0 x 23mm, 3.5 x 23mm, 3.0 x 18mm).    Cataract     alejandra IOL     Central sleep apnea      ICD-10 transition    Chronic anticoagulation     COPD (chronic obstructive pulmonary disease) (HCC)     Depression     Depressive disorder, not elsewhere classified          Dyslipidemia     Hemorrhagic disorder (HCC)     takes xarelto     High cholesterol     Hx MRSA infection 2009, 2014    right ankle 2014, right thumb 2009    Hypertension     Hypothyroidism     Mixed hyperlipidemia     Obesity     Old myocardial infarction January 2003    cardiac stents x3    Peptic ulcer disease 8/4/2016    Personal history of venous thrombosis and embolism     On Xarelto    Pulmonary embolism (HCC) 2003/2004    Sleep apnea     BiPAP with 3L oxygen       Past Surgical History:   Procedure Laterality Date    PB SHLDR ARTHROSCOP,SURG,W/ROTAT CUFF REPB Right 4/12/2021    Procedure: ARTHROSCOPY, SHOULDER, WITH ROTATOR CUFF REPAIR;  Surgeon: Eusebia Kinney M.D.;  Location: SURGERY [facility];  Service:  Orthopedics    PB ARTHROSCOPY SHOULDER SURGICAL BICEPS TENODES* Right 4/12/2021    Procedure: ARTHROSCOPY, SHOULDER, WITH BICEPS TENODESIS - FOR TENOTOMY;  Surgeon: Eusebia Kinney M.D.;  Location: SURGERY [facility];  Service: Orthopedics    CA SHLDR ARTHROSCOP,PART ACROMIOPLAS Right 4/12/2021    Procedure: DECOMPRESSION, SHOULDER, ARTHROSCOPIC - SUBACROMIAL, LABRAL DEBRIDEMENT;  Surgeon: Eusebia Kinney M.D.;  Location: SURGERY [facility];  Service: Orthopedics    OTHER CARDIAC SURGERY  07/2020    Cardiac ablasion     COLONOSCOPY  2020    ORIF, WRIST Left 2/23/2018    Procedure: WRIST ORIF;  Surgeon: Jasper Hammond M.D.;  Location: SURGERY [facility];  Service: Orthopedics    INCISION AND DRAINAGE ORTHOPEDIC  8/29/2014    Performed by Wolfgang Merrill M.D. at SURGERY [facility]    CARDIAC CATH, RIGHT/LEFT HEART  2003 01/2003 4.0x23mm Zeta stent to proximal RCA,3.5x22mm distal to first stent, 3.0x15mm at the point of original occlusion.    OTHER  2001    back sx     CA ANESTH,LOWER LEG ARTERIES SURG         Social History     Socioeconomic History    Marital status:      Spouse name: Not on file    Number of children: Not on file    Years of education: Not on file    Highest education level: Not on file   Occupational History    Not on file   Tobacco Use    Smoking status: Never    Smokeless tobacco: Never   Vaping Use    Vaping Use: Never used   Substance and Sexual Activity    Alcohol use: Yes     Comment: 5 drinks per week    Drug use: No    Sexual activity: Not on file   Other Topics Concern    Not on file   Social History Narrative    Not on file     Social Determinants of Health     Financial Resource Strain: Not on file   Food Insecurity: Not on file   Transportation Needs: Not on file   Physical Activity: Not on file   Stress: Not on file   Social Connections: Not on file   Intimate Partner Violence: Not on file   Housing Stability: Not on file       Family History  "  Problem Relation Age of Onset    Other Mother         Passed at 98    Heart Disease Father 60        CABG       Allergies   Allergen Reactions    Lisinopril      cough       Current Outpatient Medications   Medication Sig Dispense Refill    sotalol (BETAPACE) 120 MG tablet TAKE 1 TABLET BY MOUTH TWICE DAILY 180 Tablet 1    XARELTO 20 MG Tab tablet TAKE 1 TABLET BY MOUTH WITH DINNER (Patient taking differently: 10 mg.) 90 Tablet 2    amLODIPine (NORVASC) 2.5 MG Tab TAKE 1 TABLET BY MOUTH EVERY DAY 90 Tablet 3    fluticasone furoate-vilanterol (BREO ELLIPTA) 200-25 MCG/ACT AEROSOL POWDER, BREATH ACTIVATED Inhale 1 Puff every day. 90 Each 3    atorvastatin (LIPITOR) 40 MG Tab TAKE 1 TABLET BY MOUTH EVERY EVENING 90 Tablet 1    escitalopram (LEXAPRO) 10 MG Tab TAKE 1 TABLET BY MOUTH EVERY DAY 90 Tablet 3    losartan (COZAAR) 50 MG Tab TAKE 1 TABLET BY MOUTH TWICE DAILY 180 Tablet 3    Multiple Vitamins-Minerals (MULTIVITAMIN ADULT PO) Take 1 Tab by mouth every morning.      Glucosamine HCl (GLUCOSAMINE PO) Take 1 Tab by mouth every morning.       No current facility-administered medications for this visit.       Physical Exam:  Vitals:    05/17/23 1032   BP: 134/82   BP Location: Left arm   Patient Position: Sitting   BP Cuff Size: Adult   Pulse: (!) 52   Resp: 18   SpO2: 92%   Weight: 123 kg (272 lb)   Height: 1.93 m (6' 4\")     General appearance: NAD, conversant   Eyes: anicteric sclerae, moist conjunctivae; no lid-lag; PERRLA  HENT: Atraumatic; oropharynx clear with moist mucous membranes and no mucosal ulcerations; normal hard and soft palate  Neck: Trachea midline; FROM, supple, no thyromegaly or lymphadenopathy  Lungs: CTA, with normal respiratory effort and no intercostal retractions  CV: RRR, no MRGs, no JVD  Abdomen: Soft, non-tender; no masses or HSM  Extremities: No peripheral edema or extremity lymphadenopathy  Skin: Normal temperature, turgor and texture; no rash, ulcers or subcutaneous nodules  Psych: " Appropriate affect, alert and oriented to person, place and time    Data:  Lipids:   Lab Results   Component Value Date/Time    CHOLSTRLTOT 131 10/21/2022 04:56 AM    CHOLSTRLTOT 101 11/07/2019 07:14 AM    TRIGLYCERIDE 88 10/21/2022 04:56 AM    TRIGLYCERIDE 64 11/07/2019 07:14 AM    HDL 39 (L) 10/21/2022 04:56 AM    HDL 40 11/07/2019 07:14 AM    LDL 48 11/07/2019 07:14 AM        BMP:  Lab Results   Component Value Date/Time    SODIUM 139 04/20/2023 0442    POTASSIUM 5.1 04/20/2023 0442    CHLORIDE 104 04/20/2023 0442    CO2 23 04/20/2023 0442    GLUCOSE 156 (H) 04/20/2023 0442    BUN 24 04/20/2023 0442    CREATININE 1.10 04/20/2023 0442    CALCIUM 9.1 04/20/2023 0442    ANION 11.0 04/10/2022 1318        TSH:   Lab Results   Component Value Date/Time    TSHULTRASEN 0.840 02/12/2020 0525        THYROXINE (T4):   Lab Results   Component Value Date/Time    FREEDIR 1.05 11/02/2021 0609        CBC:   Lab Results   Component Value Date/Time    WBC 4.9 04/20/2023 04:42 AM    WBC 4.7 (L) 04/10/2022 01:18 PM    RBC 4.13 (L) 04/20/2023 04:42 AM    RBC 4.14 (L) 04/10/2022 01:18 PM    HEMOGLOBIN 14.4 04/20/2023 04:42 AM    HEMOGLOBIN 13.7 (L) 04/10/2022 01:18 PM    HEMATOCRIT 42.8 04/20/2023 04:42 AM    HEMATOCRIT 42.4 04/10/2022 01:18 PM     (H) 04/20/2023 04:42 AM    .4 (H) 04/10/2022 01:18 PM    MCH 34.9 (H) 04/20/2023 04:42 AM    MCH 33.1 (H) 04/10/2022 01:18 PM    MCHC 33.6 04/20/2023 04:42 AM    MCHC 32.3 (L) 04/10/2022 01:18 PM    RDW 13.1 04/20/2023 04:42 AM    RDW 49.5 04/10/2022 01:18 PM    PLATELETCT 185 04/20/2023 04:42 AM    PLATELETCT 177 04/10/2022 01:18 PM    MPV 9.5 04/10/2022 01:18 PM    NEUTSPOLYS 60.60 04/10/2022 01:18 PM    LYMPHOCYTES 25.90 04/10/2022 01:18 PM    MONOCYTES 11.00 04/10/2022 01:18 PM    EOSINOPHILS 1.70 04/10/2022 01:18 PM    BASOPHILS 0.40 04/10/2022 01:18 PM    IMMGRAN 0.40 04/10/2022 01:18 PM    IMMGRAN 1 11/02/2021 06:09 AM    NRBC CANCELED 04/20/2023 04:42 AM    NRBC 0.00  04/10/2022 01:18 PM    NEUTS 2.85 04/10/2022 01:18 PM    NEUTS 3.7 11/02/2021 06:09 AM    LYMPHS 1.22 04/10/2022 01:18 PM    LYMPHS 1.2 11/02/2021 06:09 AM    MONOS 0.52 04/10/2022 01:18 PM    MONOS 0.6 11/02/2021 06:09 AM    EOS 0.08 04/10/2022 01:18 PM    EOS 0.2 11/02/2021 06:09 AM    BASO 0.02 04/10/2022 01:18 PM    BASO 0.1 11/02/2021 06:09 AM    IMMGRANAB 0.02 04/10/2022 01:18 PM    IMMGRANAB 0.1 11/02/2021 06:09 AM    NRBCAB 0.00 04/10/2022 01:18 PM        CBC w/o DIFF  Lab Results   Component Value Date/Time    WBC 4.9 04/20/2023 04:42 AM    WBC 4.7 (L) 04/10/2022 01:18 PM    RBC 4.13 (L) 04/20/2023 04:42 AM    RBC 4.14 (L) 04/10/2022 01:18 PM    HEMOGLOBIN 14.4 04/20/2023 04:42 AM    HEMOGLOBIN 13.7 (L) 04/10/2022 01:18 PM     (H) 04/20/2023 04:42 AM    .4 (H) 04/10/2022 01:18 PM    MCH 34.9 (H) 04/20/2023 04:42 AM    MCH 33.1 (H) 04/10/2022 01:18 PM    MCHC 33.6 04/20/2023 04:42 AM    MCHC 32.3 (L) 04/10/2022 01:18 PM    RDW 13.1 04/20/2023 04:42 AM    RDW 49.5 04/10/2022 01:18 PM    MPV 9.5 04/10/2022 01:18 PM         EKG interpreted by me: NSR, QT <500ms    Impression/Plan:  1. Persistent atrial fibrillation (HCC)  EKG        Afib s/p ablaiton  AFL s/p ablation  High risk medication management  Epistaxis    - Resume Xarelto 20mg. If epistaxis, send to ENT. If uncontrollable with intervention can consider MITESH-C  - No recurrent arrhythmias  - Continue sotalol, labs reviewed    FV 6 months      Nilton Ramirez MD  Cardiac Electrophysiology

## 2023-06-01 ENCOUNTER — TELEPHONE (OUTPATIENT)
Dept: INTERNAL MEDICINE | Facility: OTHER | Age: 78
End: 2023-06-01

## 2023-06-01 NOTE — TELEPHONE ENCOUNTER
Patient had to R/S appt for today.  He had labs done and is concern about elevated sugars. Patient was also seen at [facility] for a fracture rib, would like referral to see specialist.

## 2023-06-10 LAB — EKG IMPRESSION: NORMAL

## 2023-06-12 DIAGNOSIS — I25.10 CORONARY ARTERY DISEASE DUE TO LIPID RICH PLAQUE: ICD-10-CM

## 2023-06-12 DIAGNOSIS — I25.83 CORONARY ARTERY DISEASE DUE TO LIPID RICH PLAQUE: ICD-10-CM

## 2023-06-12 DIAGNOSIS — I10 HTN (HYPERTENSION), MALIGNANT: ICD-10-CM

## 2023-06-13 RX ORDER — LOSARTAN POTASSIUM 50 MG/1
TABLET ORAL
Qty: 180 TABLET | Refills: 3 | Status: SHIPPED | OUTPATIENT
Start: 2023-06-13

## 2023-06-13 NOTE — TELEPHONE ENCOUNTER
Is the patient due for a refill? Yes    Was the patient seen the past year? Yes    Date of last office visit: 5/17/2023    Does the patient have an upcoming appointment?  Yes   If yes, When? 11/22/2023    Provider to refill:RUDDY    Does the patients insurance require a 100 day supply?  No

## 2023-06-22 ENCOUNTER — OFFICE VISIT (OUTPATIENT)
Dept: INTERNAL MEDICINE | Facility: OTHER | Age: 78
End: 2023-06-22
Payer: MEDICARE

## 2023-06-22 VITALS
SYSTOLIC BLOOD PRESSURE: 109 MMHG | TEMPERATURE: 97.2 F | DIASTOLIC BLOOD PRESSURE: 63 MMHG | BODY MASS INDEX: 32.88 KG/M2 | OXYGEN SATURATION: 95 % | HEIGHT: 76 IN | HEART RATE: 63 BPM | WEIGHT: 270 LBS

## 2023-06-22 DIAGNOSIS — E11.9 TYPE 2 DIABETES MELLITUS WITHOUT COMPLICATION, WITHOUT LONG-TERM CURRENT USE OF INSULIN (HCC): ICD-10-CM

## 2023-06-22 DIAGNOSIS — I10 ESSENTIAL HYPERTENSION: ICD-10-CM

## 2023-06-22 DIAGNOSIS — F32.A DEPRESSION, UNSPECIFIED DEPRESSION TYPE: ICD-10-CM

## 2023-06-22 DIAGNOSIS — I48.91 ATRIAL FIBRILLATION, UNSPECIFIED TYPE (HCC): ICD-10-CM

## 2023-06-22 DIAGNOSIS — S22.31XS CLOSED FRACTURE OF ONE RIB OF RIGHT SIDE, SEQUELA: ICD-10-CM

## 2023-06-22 DIAGNOSIS — D75.89 MACROCYTOSIS WITHOUT ANEMIA: ICD-10-CM

## 2023-06-22 DIAGNOSIS — F10.20 UNCOMPLICATED ALCOHOL DEPENDENCE (HCC): ICD-10-CM

## 2023-06-22 PROCEDURE — 99214 OFFICE O/P EST MOD 30 MIN: CPT | Performed by: INTERNAL MEDICINE

## 2023-06-22 PROCEDURE — 3078F DIAST BP <80 MM HG: CPT | Performed by: INTERNAL MEDICINE

## 2023-06-22 PROCEDURE — 3074F SYST BP LT 130 MM HG: CPT | Performed by: INTERNAL MEDICINE

## 2023-06-22 ASSESSMENT — FIBROSIS 4 INDEX: FIB4 SCORE: 2.03

## 2023-06-23 NOTE — PROGRESS NOTES
Established Patient    Patient Care Team:  Angel Cameron M.D. as PCP - General  LUCY Carr as Mid Level Provider (Cardiovascular Disease (Cardiology))  Padmini Calhoun M.D. as Consulting Physician (Cardiovascular Disease (Cardiology))  cpap and more as Respiratory Therapist  Austin Schreiber M.D. as Consulting Physician (Pulmonary Medicine)    Anthony Cloud is a 77 y.o. male who presents today with the following Chief Complaint(s): Follow up for Diagnoses of Closed fracture of one rib of right side, sequela, Type 2 diabetes mellitus without complication, without long-term current use of insulin ([facility]), Essential hypertension, Depression, unspecified depression type, Chronic alcohol dependence ([facility]), Atrial fibrillation, unspecified type ([facility]), and Macrocytosis without anemia were pertinent to this visit.    ROS:     Denies any new chest pain or shortness of breath.  No changes to urinary or bowel function.  Rib pain as per HPI see HPI.    Past Medical History:   Diagnosis Date    Acute nasopharyngitis 01/2020    Anemia     Arrhythmia     a fib    Bowel habit changes     constipation     Breath shortness     since 11/2019, hx cardiac arrhythmia     CAD (coronary artery disease) 2003    PCI/BMS x 3 to the RCA (Zeta 4.0 x 23mm, 3.5 x 23mm, 3.0 x 18mm).    Cataract     alejandra IOL     Central sleep apnea      ICD-10 transition    Chronic anticoagulation     COPD (chronic obstructive pulmonary disease) (HCC)     Depression     Depressive disorder, not elsewhere classified          Dyslipidemia     Hemorrhagic disorder ([facility])     takes xarelto     High cholesterol     Hx MRSA infection 2009, 2014    right ankle 2014, right thumb 2009    Hypertension     Hypothyroidism     Mixed hyperlipidemia     Obesity     Old myocardial infarction January 2003    cardiac stents x3    Peptic ulcer disease 8/4/2016    Personal history of venous thrombosis and embolism     On Xarelto    Pulmonary embolism (HCC) 2003/2004  "   Sleep apnea     BiPAP with 3L oxygen     Social History     Tobacco Use    Smoking status: Never    Smokeless tobacco: Never   Vaping Use    Vaping Use: Never used   Substance Use Topics    Alcohol use: Yes     Comment: 5 drinks per week    Drug use: No     Current Outpatient Medications   Medication Sig Dispense Refill    losartan (COZAAR) 50 MG Tab TAKE 1 TABLET BY MOUTH TWICE DAILY 180 Tablet 3    Omega-3 1000 MG Cap Take  by mouth.      sotalol (BETAPACE) 120 MG tablet TAKE 1 TABLET BY MOUTH TWICE DAILY 180 Tablet 1    XARELTO 20 MG Tab tablet TAKE 1 TABLET BY MOUTH WITH DINNER (Patient taking differently: 20 mg.) 90 Tablet 2    amLODIPine (NORVASC) 2.5 MG Tab TAKE 1 TABLET BY MOUTH EVERY DAY 90 Tablet 3    fluticasone furoate-vilanterol (BREO ELLIPTA) 200-25 MCG/ACT AEROSOL POWDER, BREATH ACTIVATED Inhale 1 Puff every day. 90 Each 3    atorvastatin (LIPITOR) 40 MG Tab TAKE 1 TABLET BY MOUTH EVERY EVENING 90 Tablet 1    escitalopram (LEXAPRO) 10 MG Tab TAKE 1 TABLET BY MOUTH EVERY DAY 90 Tablet 3    Multiple Vitamins-Minerals (MULTIVITAMIN ADULT PO) Take 1 Tab by mouth every morning.      Glucosamine HCl (GLUCOSAMINE PO) Take 1 Tab by mouth every morning. (Patient not taking: Reported on 6/1/2023)       No current facility-administered medications for this visit.       Physical Exam:  /63 (BP Location: Right arm, Patient Position: Sitting, BP Cuff Size: Large adult)   Pulse 63   Temp 36.2 °C (97.2 °F) (Temporal)   Ht 1.93 m (6' 4\")   Wt 122 kg (270 lb)   SpO2 95%   BMI 32.87 kg/m²   General: Well developed, well nourished male, in no distress.  Eyes: Conjuntiva without any obvious injection or erythema.   Cardiovascular: Heart is regular with no murmur  Lungs: Clear to auscultation bilaterally. No wheezes, rhonci or crackles heard. Respiratory effort is normal.  Abd: Soft, non-tender  Ext: No edema  MSK: Significant pain over the right lateral fifth rib    HPI / Assessment / Plan:  1. Closed " "fracture of one rib of right side, sequela  He had some significant trauma about 2 months ago, when he fell down a hill, tumbling over.  Thinking initially that he just had some abrasions and bruising, and possibly a rib fracture, he did not seek care immediately.  He ultimately was seen at [facility] approximately 1 month after his injury because of persistent rib pain.  During that visit it was realized through CT scan that he had the right lateral fifth rib fracture.  There was concern at that time for nonunion fracture of the rib.  He continues to have significant pain and tenderness was elicited on exam today.  No significant shortness of breath.  He does find it impacts his daily functioning however he has not really been taking it easy, and not really giving his ribs much of a chance to heal thus far.   Plan:  Unfortunately if he truly does have a nonunion fracture of the rib, this is going to be likely difficult to manage as surgical options may be nonexistent for this.  Given the fact that he did not rest at all after the injury, it is possible he was aggravating the rib fracture and preventing healing such that by the time he had the CAT scan, he was still not showing signs of healing.  Prior to making assumptions about this being a nonunion fracture, I would like to first get a rib series x-ray to evaluate the current healing status.  Once results come then we will make a plan moving forward.   [id number] (W/O CXR) RIGHT; Future    2. Type 2 diabetes mellitus without complication, without long-term current use of insulin (HCC)  Of note, his hemoglobin A1c has crossed the threshold up to 6.7%, putting him into the diabetes category.  Additionally his fasting blood sugar is elevated.  He has been possibly less active since his injury, but he does note that his diet is overall \"not bad\".  Plan:  For now no active treatment other than him trying to get some more cardiovascular " exercise, and try to improve his diet and limit his carbohydrates.  He feels he can get this down without medication, and I think that is reasonable.  I also think, given his age of 77, it would be a reasonable hemoglobin A1c target of approximately 7%.  He is comfortable with that, and we will plan to recheck his hemoglobin A1c as well as urine microalbumin prior to his next visit with me.  - Comp Metabolic Panel; Future  - HEMOGLOBIN A1C; Future  - MICROALBUMIN CREAT RATIO URINE; Future    3. Essential hypertension  Blood pressure is stable and quite well controlled.  He is tolerating his medications without issue.  Plan:  Continue current medications.  Ideally continue home blood pressure monitoring.  - Comp Metabolic Panel; Future    4. Depression, unspecified depression type  Overall doing reasonably well on his Lexapro 10 mg daily.  Given the fact that he has had to be a little bit less active since his rib fracture, he has been a bit more down because he does not thrive significantly with outdoor activity.  Plan:  Overall doing well on the current regimen.  No changes needed today.      5. Chronic alcohol dependence (HCC)  He has history of significant alcohol intake, but does note that he is able to stop drinking without any significant issue.  He notes currently he drinks about 3 times per week.  As noted below, he has some macrocytosis that could be alcohol related.  Plan:  I like him to go completely alcohol free until his next visit with me which he is comfortable with.  We will plan to check his CBC prior to that.  - CBC WITHOUT DIFFERENTIAL; Future    6. Atrial fibrillation, unspecified type (HCC)  Overall stable and doing well on Xarelto 20 mg daily without any significant bleeding side effects.  Plan:  Currently this is stable and well controlled.  The patient should continue current therapy with no changes at this time.        7. Macrocytosis without anemia  As above, this is possibly related to  alcohol intake.  We will plan to recheck his CBC after he has been abstinent for 3 months.  - CBC WITHOUT DIFFERENTIAL; Future       Orders Placed This Encounter    [id number] (W/O CXR) RIGHT    CBC WITHOUT DIFFERENTIAL    Comp Metabolic Panel    HEMOGLOBIN A1C    MICROALBUMIN CREAT RATIO URINE       No follow-ups on file.    Angel Cameron M.D.   of Internal Medicine  [facility]    This note was created using voice recognition software.  While every attempt is made to ensure accuracy of transcription, occasionally errors occur.

## 2023-07-03 NOTE — TELEPHONE ENCOUNTER
Received request via: Pharmacy    Was the patient seen in the last year in this department? Yes    Does the patient have an active prescription (recently filled or refills available) for medication(s) requested? No    Does the patient have [facility] Plus and need 100 day supply (blood pressure, diabetes and cholesterol meds only)? Medication is not for cholesterol, blood pressure or diabetes

## 2023-07-05 RX ORDER — ESCITALOPRAM OXALATE 10 MG/1
10 TABLET ORAL
Qty: 90 TABLET | Refills: 3 | Status: SHIPPED | OUTPATIENT
Start: 2023-07-05

## 2023-09-19 RX ORDER — ATORVASTATIN CALCIUM 40 MG/1
40 TABLET, FILM COATED ORAL NIGHTLY
Qty: 90 TABLET | Refills: 1 | Status: SHIPPED | OUTPATIENT
Start: 2023-09-19

## 2023-09-25 ENCOUNTER — PATIENT MESSAGE (OUTPATIENT)
Dept: INTERNAL MEDICINE | Facility: OTHER | Age: 78
End: 2023-09-25
Payer: MEDICARE

## 2023-09-25 DIAGNOSIS — D53.9 MACROCYTIC ANEMIA: ICD-10-CM

## 2023-09-25 DIAGNOSIS — E11.9 TYPE 2 DIABETES MELLITUS WITHOUT COMPLICATION, WITHOUT LONG-TERM CURRENT USE OF INSULIN (HCC): ICD-10-CM

## 2023-10-26 ENCOUNTER — OFFICE VISIT (OUTPATIENT)
Dept: INTERNAL MEDICINE | Facility: OTHER | Age: 78
End: 2023-10-26
Payer: MEDICARE

## 2023-10-26 VITALS
TEMPERATURE: 96.8 F | HEIGHT: 76 IN | SYSTOLIC BLOOD PRESSURE: 123 MMHG | WEIGHT: 260 LBS | DIASTOLIC BLOOD PRESSURE: 66 MMHG | HEART RATE: 54 BPM | OXYGEN SATURATION: 93 % | BODY MASS INDEX: 31.66 KG/M2

## 2023-10-26 DIAGNOSIS — Z23 NEED FOR INFLUENZA VACCINATION: ICD-10-CM

## 2023-10-26 DIAGNOSIS — E78.5 DYSLIPIDEMIA: ICD-10-CM

## 2023-10-26 DIAGNOSIS — I48.91 ATRIAL FIBRILLATION, UNSPECIFIED TYPE (HCC): ICD-10-CM

## 2023-10-26 DIAGNOSIS — E11.9 TYPE 2 DIABETES MELLITUS WITHOUT COMPLICATION, WITHOUT LONG-TERM CURRENT USE OF INSULIN (HCC): ICD-10-CM

## 2023-10-26 DIAGNOSIS — I10 ESSENTIAL HYPERTENSION: ICD-10-CM

## 2023-10-26 PROCEDURE — 99214 OFFICE O/P EST MOD 30 MIN: CPT | Mod: 25 | Performed by: INTERNAL MEDICINE

## 2023-10-26 PROCEDURE — G0008 ADMIN INFLUENZA VIRUS VAC: HCPCS | Performed by: INTERNAL MEDICINE

## 2023-10-26 PROCEDURE — 90662 IIV NO PRSV INCREASED AG IM: CPT | Performed by: INTERNAL MEDICINE

## 2023-10-26 PROCEDURE — 3074F SYST BP LT 130 MM HG: CPT | Performed by: INTERNAL MEDICINE

## 2023-10-26 PROCEDURE — 3078F DIAST BP <80 MM HG: CPT | Performed by: INTERNAL MEDICINE

## 2023-10-26 ASSESSMENT — FIBROSIS 4 INDEX: FIB4 SCORE: 1.67

## 2023-10-26 NOTE — PROGRESS NOTES
Established Patient    Patient Care Team:  Angel Cameron M.D. as PCP - General  LUCY Carr as Mid Level Provider (Cardiovascular Disease (Cardiology))  Padmini Calhoun M.D. as Consulting Physician (Cardiovascular Disease (Cardiology))  cpap and more as Respiratory Therapist  Austin Schreiber M.D. as Consulting Physician (Pulmonary Medicine)    Anthony Cloud is a 78 y.o. male who presents today with the following Chief Complaint(s): Follow up for Diagnoses of Type 2 diabetes mellitus without complication, without long-term current use of insulin (HCC), Essential hypertension, Dyslipidemia, Need for influenza vaccination, and Atrial fibrillation, unspecified type ([facility]) were pertinent to this visit.    ROS:     Denies any new chest pain or shortness of breath.  No changes to urinary or bowel function.  See HPI.    Past Medical History:   Diagnosis Date    Acute nasopharyngitis 01/2020    Anemia     Arrhythmia     a fib    Bowel habit changes     constipation     Breath shortness     since 11/2019, hx cardiac arrhythmia     CAD (coronary artery disease) 2003    PCI/BMS x 3 to the RCA (Zeta 4.0 x 23mm, 3.5 x 23mm, 3.0 x 18mm).    Cataract     alejandra IOL     Central sleep apnea      ICD-10 transition    Chronic anticoagulation     COPD (chronic obstructive pulmonary disease) (HCC)     Depression     Depressive disorder, not elsewhere classified          Dyslipidemia     Hemorrhagic disorder (HCC)     takes xarelto     High cholesterol     Hx MRSA infection 2009, 2014    right ankle 2014, right thumb 2009    Hypertension     Hypothyroidism     Mixed hyperlipidemia     Obesity     Old myocardial infarction January 2003    cardiac stents x3    Peptic ulcer disease 8/4/2016    Personal history of venous thrombosis and embolism     On Xarelto    Pulmonary embolism (HCC) 2003/2004    Sleep apnea     BiPAP with 3L oxygen     Social History     Tobacco Use    Smoking status: Never    Smokeless tobacco: Never  "  Vaping Use    Vaping Use: Never used   Substance Use Topics    Alcohol use: Yes     Comment: 5 drinks per week    Drug use: No     Current Outpatient Medications   Medication Sig Dispense Refill    atorvastatin (LIPITOR) 40 MG Tab TAKE 1 TABLET BY MOUTH EVERY EVENING 90 Tablet 1    escitalopram (LEXAPRO) 10 MG Tab TAKE 1 TABLET BY MOUTH EVERY DAY 90 Tablet 3    losartan (COZAAR) 50 MG Tab TAKE 1 TABLET BY MOUTH TWICE DAILY 180 Tablet 3    Omega-3 1000 MG Cap Take  by mouth.      sotalol (BETAPACE) 120 MG tablet TAKE 1 TABLET BY MOUTH TWICE DAILY 180 Tablet 1    XARELTO 20 MG Tab tablet TAKE 1 TABLET BY MOUTH WITH DINNER (Patient taking differently: 20 mg.) 90 Tablet 2    amLODIPine (NORVASC) 2.5 MG Tab TAKE 1 TABLET BY MOUTH EVERY DAY 90 Tablet 3    fluticasone furoate-vilanterol (BREO ELLIPTA) 200-25 MCG/ACT AEROSOL POWDER, BREATH ACTIVATED Inhale 1 Puff every day. 90 Each 3    Multiple Vitamins-Minerals (MULTIVITAMIN ADULT PO) Take 1 Tab by mouth every morning.      Glucosamine HCl (GLUCOSAMINE PO) Take 1 Tab by mouth every morning. (Patient not taking: Reported on 6/1/2023)       No current facility-administered medications for this visit.       Physical Exam:  /66 (BP Location: Left arm, Patient Position: Sitting, BP Cuff Size: Adult)   Pulse (!) 54   Temp 36 °C (96.8 °F) (Temporal)   Ht 1.93 m (6' 4\")   Wt 118 kg (260 lb)   SpO2 93%   BMI 31.65 kg/m²   General: Well developed, well nourished male, in no distress.  Eyes: Conjuntiva without any obvious injection or erythema.   Cardiovascular: Heart is regular with no murmur  Lungs: Clear to auscultation bilaterally. No wheezes, rhonci or crackles heard. Respiratory effort is normal.  Abd: Soft, non-tender  Ext: No edema    HPI / Assessment / Plan:  1. Type 2 diabetes mellitus without complication, without long-term current use of insulin (HCC)  He is actually been doing better with regard to his diet.  He has been mindful not to eat significant " sugar or an excessive amount of carbohydrates.  Unfortunately, his blood sugar has worsened over the last 6 months.  He is now up to approximately 7.5% on his hemoglobin A1c.  Plan:  At this time I do not think he needs medication however I would like to stay the course for another 4 months and have him repeat his hemoglobin A1c prior to his next visit with me.  I will also plan to repeat his urine microalbumin and consider increasing his losartan if needed.  - HEMOGLOBIN A1C; Future  - MICROALBUMIN CREAT RATIO URINE; Future  - Comp Metabolic Panel; Future    2. Essential hypertension  Blood pressure is reasonably well controlled today at 123/66.  He is doing well on his current medications.  Plan:  BLECurrently this is stable and well controlled.  The patient should continue current therapy with no changes at this time.     - MICROALBUMIN CREAT RATIO URINE; Future  - Comp Metabolic Panel; Future    3. Dyslipidemia   Anthony is tolerating medication well.  No intolerable side-effects noted.  No new symptoms of muscle aches or weakness.  Patient reports good adherence to medication regimen.  No change in medication since the last visit. Anthony is trying to follow a low-cholesterol diet.  Exercise is adequate.   Plan:  Currently this is stable and well controlled.  The patient should continue current therapy with no changes at this time.   Continue atorvastatin 40 mg daily  - Lipid Profile; Future  - Comp Metabolic Panel; Future    4. Need for influenza vaccination  - INFLUENZA VACCINE, HIGH DOSE (65+ ONLY)    5. Atrial fibrillation, unspecified type (HCC)  Doing well on Xarelto 20 mg daily.  No abnormal bleeding.Currently this is stable and well controlled.  The patient should continue current therapy with no changes at this time.          Orders Placed This Encounter    INFLUENZA VACCINE, HIGH DOSE (65+ ONLY)    Lipid Profile    HEMOGLOBIN A1C    MICROALBUMIN CREAT RATIO URINE    Comp Metabolic Panel       Return in  about 4 months (around 2/26/2024).    Angel Cameron M.D.   of Internal Medicine  [facility]    This note was created using voice recognition software.  While every attempt is made to ensure accuracy of transcription, occasionally errors occur.

## 2023-11-02 RX ORDER — FLUTICASONE FUROATE AND VILANTEROL TRIFENATATE 200; 25 UG/1; UG/1
POWDER RESPIRATORY (INHALATION)
Qty: 540 EACH | Refills: 3 | Status: SHIPPED | OUTPATIENT
Start: 2023-11-02

## 2023-11-22 ENCOUNTER — APPOINTMENT (OUTPATIENT)
Dept: CARDIOLOGY | Facility: MEDICAL CENTER | Age: 78
End: 2023-11-22
Attending: INTERNAL MEDICINE
Payer: MEDICARE

## 2023-11-29 ENCOUNTER — PATIENT MESSAGE (OUTPATIENT)
Dept: HEALTH INFORMATION MANAGEMENT | Facility: OTHER | Age: 78
End: 2023-11-29

## 2023-12-08 ENCOUNTER — OFFICE VISIT (OUTPATIENT)
Dept: CARDIOLOGY | Facility: MEDICAL CENTER | Age: 78
End: 2023-12-08
Attending: INTERNAL MEDICINE
Payer: MEDICARE

## 2023-12-08 ENCOUNTER — TELEPHONE (OUTPATIENT)
Dept: HEALTH INFORMATION MANAGEMENT | Facility: OTHER | Age: 78
End: 2023-12-08

## 2023-12-08 VITALS
OXYGEN SATURATION: 94 % | HEIGHT: 76 IN | RESPIRATION RATE: 16 BRPM | SYSTOLIC BLOOD PRESSURE: 116 MMHG | DIASTOLIC BLOOD PRESSURE: 68 MMHG | HEART RATE: 55 BPM | BODY MASS INDEX: 32.76 KG/M2 | WEIGHT: 269 LBS

## 2023-12-08 DIAGNOSIS — I48.19 PERSISTENT ATRIAL FIBRILLATION (HCC): ICD-10-CM

## 2023-12-08 PROCEDURE — 3078F DIAST BP <80 MM HG: CPT | Performed by: INTERNAL MEDICINE

## 2023-12-08 PROCEDURE — 99214 OFFICE O/P EST MOD 30 MIN: CPT | Performed by: INTERNAL MEDICINE

## 2023-12-08 PROCEDURE — 3074F SYST BP LT 130 MM HG: CPT | Performed by: INTERNAL MEDICINE

## 2023-12-08 PROCEDURE — 99212 OFFICE O/P EST SF 10 MIN: CPT | Performed by: INTERNAL MEDICINE

## 2023-12-08 PROCEDURE — 93005 ELECTROCARDIOGRAM TRACING: CPT | Performed by: INTERNAL MEDICINE

## 2023-12-08 ASSESSMENT — FIBROSIS 4 INDEX: FIB4 SCORE: 1.67

## 2023-12-08 NOTE — PROGRESS NOTES
Arrhythmia Clinic Note (Established patient)    DOS: 12/8/2023    Chief complaint/Reason for consult: Afib    Interval History: 77 y/o M with AF s/p ablation on sotalol doing well without complaints.     ROS (+ highlighted in bold):  Constitutional: Fevers/chills/fatigue/weightloss  HEENT: Blurry vision/eye pain/sore throat/hearing loss  Respiratory: Shortness of breath/cough  Cardiovascular: Chest pain/palpitations/edema/orthopnea/syncope  GI: Nausea/vomitting/diarrhea  MSK: Arthralgias/myagias/muscle weakness  Skin: Rash/sores  Neurological: Numbness/tremors/vertigo  Endocrine: Excessive thirst/polyuria/cold intolerance/heat intolerance  Psych: Depression/anxiety    Past Medical History:   Diagnosis Date    Acute nasopharyngitis 01/2020    Anemia     Arrhythmia     a fib    Bowel habit changes     constipation     Breath shortness     since 11/2019, hx cardiac arrhythmia     CAD (coronary artery disease) 2003    PCI/BMS x 3 to the RCA (Zeta 4.0 x 23mm, 3.5 x 23mm, 3.0 x 18mm).    Cataract     alejandra IOL     Central sleep apnea      ICD-10 transition    Chronic anticoagulation     COPD (chronic obstructive pulmonary disease) (HCC)     Depression     Depressive disorder, not elsewhere classified          Dyslipidemia     Hemorrhagic disorder (HCC)     takes xarelto     High cholesterol     Hx MRSA infection 2009, 2014    right ankle 2014, right thumb 2009    Hypertension     Hypothyroidism     Mixed hyperlipidemia     Obesity     Old myocardial infarction January 2003    cardiac stents x3    Peptic ulcer disease 8/4/2016    Personal history of venous thrombosis and embolism     On Xarelto    Pulmonary embolism (HCC) 2003/2004    Sleep apnea     BiPAP with 3L oxygen       Past Surgical History:   Procedure Laterality Date    PB SHLDR ARTHROSCOP,SURG,W/ROTAT CUFF REPB Right 4/12/2021    Procedure: ARTHROSCOPY, SHOULDER, WITH ROTATOR CUFF REPAIR;  Surgeon: Eusebia Kinney M.D.;  Location: SURGERY [facility];   Service: Orthopedics    PB ARTHROSCOPY SHOULDER SURGICAL BICEPS TENODES* Right 4/12/2021    Procedure: ARTHROSCOPY, SHOULDER, WITH BICEPS TENODESIS - FOR TENOTOMY;  Surgeon: Eusebia Kinney M.D.;  Location: SURGERY [facility];  Service: Orthopedics    NE SHLDR ARTHROSCOP,PART ACROMIOPLAS Right 4/12/2021    Procedure: DECOMPRESSION, SHOULDER, ARTHROSCOPIC - SUBACROMIAL, LABRAL DEBRIDEMENT;  Surgeon: Eusebia Kinney M.D.;  Location: SURGERY [facility];  Service: Orthopedics    OTHER CARDIAC SURGERY  07/2020    Cardiac ablasion     COLONOSCOPY  2020    ORIF, WRIST Left 2/23/2018    Procedure: WRIST ORIF;  Surgeon: Jasper Hammond M.D.;  Location: SURGERY [facility];  Service: Orthopedics    INCISION AND DRAINAGE ORTHOPEDIC  8/29/2014    Performed by Wolfgang Merrill M.D. at SURGERY [facility]    CARDIAC CATH, RIGHT/LEFT HEART  2003 01/2003 4.0x23mm Zeta stent to proximal RCA,3.5x22mm distal to first stent, 3.0x15mm at the point of original occlusion.    OTHER  2001    back sx     NE ANESTH,LOWER LEG ARTERIES SURG         Social History     Socioeconomic History    Marital status:      Spouse name: Not on file    Number of children: Not on file    Years of education: Not on file    Highest education level: Not on file   Occupational History    Not on file   Tobacco Use    Smoking status: Never    Smokeless tobacco: Never   Vaping Use    Vaping Use: Never used   Substance and Sexual Activity    Alcohol use: Yes     Comment: occ    Drug use: No    Sexual activity: Not on file   Other Topics Concern    Not on file   Social History Narrative    Not on file     Social Determinants of Health     Financial Resource Strain: Not on file   Food Insecurity: Not on file   Transportation Needs: Not on file   Physical Activity: Not on file   Stress: Not on file   Social Connections: Not on file   Intimate Partner Violence: Not on file   Housing Stability: Not on file       Family History  "  Problem Relation Age of Onset    Other Mother         Passed at 98    Heart Disease Father 60        CABG       Allergies   Allergen Reactions    Lisinopril      cough       Current Outpatient Medications   Medication Sig Dispense Refill    BREO ELLIPTA 200-25 MCG/ACT AEROSOL POWDER, BREATH ACTIVATED TAKE 1 PUFF BY MOUTH DAILY. 540 Each 3    atorvastatin (LIPITOR) 40 MG Tab TAKE 1 TABLET BY MOUTH EVERY EVENING 90 Tablet 1    escitalopram (LEXAPRO) 10 MG Tab TAKE 1 TABLET BY MOUTH EVERY DAY 90 Tablet 3    losartan (COZAAR) 50 MG Tab TAKE 1 TABLET BY MOUTH TWICE DAILY 180 Tablet 3    sotalol (BETAPACE) 120 MG tablet TAKE 1 TABLET BY MOUTH TWICE DAILY 180 Tablet 1    XARELTO 20 MG Tab tablet TAKE 1 TABLET BY MOUTH WITH DINNER (Patient taking differently: 20 mg.) 90 Tablet 2    amLODIPine (NORVASC) 2.5 MG Tab TAKE 1 TABLET BY MOUTH EVERY DAY 90 Tablet 3    Multiple Vitamins-Minerals (MULTIVITAMIN ADULT PO) Take 1 Tab by mouth every morning.      Glucosamine HCl (GLUCOSAMINE PO) Take 1 Tablet by mouth every morning.      Omega-3 1000 MG Cap Take  by mouth. (Patient not taking: Reported on 12/8/2023)       No current facility-administered medications for this visit.       Physical Exam:  Vitals:    12/08/23 0816   BP: 116/68   BP Location: Left arm   Patient Position: Sitting   BP Cuff Size: Adult   Pulse: (!) 55   Resp: 16   SpO2: 94%   Weight: 122 kg (269 lb)   Height: 1.93 m (6' 4\")     General appearance: NAD, conversant   Eyes: anicteric sclerae, moist conjunctivae; no lid-lag; PERRLA  HENT: Atraumatic; oropharynx clear with moist mucous membranes and no mucosal ulcerations; normal hard and soft palate  Neck: Trachea midline; FROM, supple, no thyromegaly or lymphadenopathy  Lungs: CTA, with normal respiratory effort and no intercostal retractions  CV: RRR, no MRGs, no JVD  Abdomen: Soft, non-tender; no masses or HSM  Extremities: No peripheral edema or extremity lymphadenopathy  Skin: Normal temperature, turgor and " texture; no rash, ulcers or subcutaneous nodules  Psych: Appropriate affect, alert and oriented to person, place and time    Data:  Lipids:   Lab Results   Component Value Date/Time    CHOLSTRLTOT 131 10/21/2022 04:56 AM    CHOLSTRLTOT 101 11/07/2019 07:14 AM    TRIGLYCERIDE 88 10/21/2022 04:56 AM    TRIGLYCERIDE 64 11/07/2019 07:14 AM    HDL 39 (L) 10/21/2022 04:56 AM    HDL 40 11/07/2019 07:14 AM    LDL 48 11/07/2019 07:14 AM        BMP:  Lab Results   Component Value Date/Time    SODIUM 138 09/20/2023 0618    POTASSIUM 4.9 09/20/2023 0618    CHLORIDE 103 09/20/2023 0618    CO2 23 09/20/2023 0618    GLUCOSE 167 (H) 09/20/2023 0618    BUN 24 09/20/2023 0618    CREATININE 1.07 09/20/2023 0618    CALCIUM 8.8 09/20/2023 0618    ANION 11.0 04/10/2022 1318        TSH:   Lab Results   Component Value Date/Time    TSHULTRASEN 0.840 02/12/2020 0525        THYROXINE (T4):   Lab Results   Component Value Date/Time    FREEDIR 1.05 11/02/2021 0609        CBC:   Lab Results   Component Value Date/Time    WBC 4.9 09/20/2023 06:18 AM    WBC 4.7 (L) 04/10/2022 01:18 PM    RBC 4.06 (L) 09/20/2023 06:18 AM    RBC 4.14 (L) 04/10/2022 01:18 PM    HEMOGLOBIN 13.8 09/20/2023 06:18 AM    HEMOGLOBIN 13.7 (L) 04/10/2022 01:18 PM    HEMATOCRIT 41.0 09/20/2023 06:18 AM    HEMATOCRIT 42.4 04/10/2022 01:18 PM     (H) 09/20/2023 06:18 AM    .4 (H) 04/10/2022 01:18 PM    MCH 34.0 (H) 09/20/2023 06:18 AM    MCH 33.1 (H) 04/10/2022 01:18 PM    MCHC 33.7 09/20/2023 06:18 AM    MCHC 32.3 (L) 04/10/2022 01:18 PM    RDW 12.7 09/20/2023 06:18 AM    RDW 49.5 04/10/2022 01:18 PM    PLATELETCT 180 09/20/2023 06:18 AM    PLATELETCT 177 04/10/2022 01:18 PM    MPV 9.5 04/10/2022 01:18 PM    NEUTSPOLYS 60.60 04/10/2022 01:18 PM    LYMPHOCYTES 25.90 04/10/2022 01:18 PM    MONOCYTES 11.00 04/10/2022 01:18 PM    EOSINOPHILS 1.70 04/10/2022 01:18 PM    BASOPHILS 0.40 04/10/2022 01:18 PM    IMMGRAN 0.40 04/10/2022 01:18 PM    IMMGRAN 1 11/02/2021  06:09 AM    NRBC CANCELED 09/20/2023 06:18 AM    NRBC 0.00 04/10/2022 01:18 PM    NEUTS 2.85 04/10/2022 01:18 PM    NEUTS 3.7 11/02/2021 06:09 AM    LYMPHS 1.22 04/10/2022 01:18 PM    LYMPHS 1.2 11/02/2021 06:09 AM    MONOS 0.52 04/10/2022 01:18 PM    MONOS 0.6 11/02/2021 06:09 AM    EOS 0.08 04/10/2022 01:18 PM    EOS 0.2 11/02/2021 06:09 AM    BASO 0.02 04/10/2022 01:18 PM    BASO 0.1 11/02/2021 06:09 AM    IMMGRANAB 0.02 04/10/2022 01:18 PM    IMMGRANAB 0.1 11/02/2021 06:09 AM    NRBCAB 0.00 04/10/2022 01:18 PM        CBC w/o DIFF  Lab Results   Component Value Date/Time    WBC 4.9 09/20/2023 06:18 AM    WBC 4.7 (L) 04/10/2022 01:18 PM    RBC 4.06 (L) 09/20/2023 06:18 AM    RBC 4.14 (L) 04/10/2022 01:18 PM    HEMOGLOBIN 13.8 09/20/2023 06:18 AM    HEMOGLOBIN 13.7 (L) 04/10/2022 01:18 PM     (H) 09/20/2023 06:18 AM    .4 (H) 04/10/2022 01:18 PM    MCH 34.0 (H) 09/20/2023 06:18 AM    MCH 33.1 (H) 04/10/2022 01:18 PM    MCHC 33.7 09/20/2023 06:18 AM    MCHC 32.3 (L) 04/10/2022 01:18 PM    RDW 12.7 09/20/2023 06:18 AM    RDW 49.5 04/10/2022 01:18 PM    MPV 9.5 04/10/2022 01:18 PM           EKG interpreted by me: SR with QT 460ms    Impression/Plan:  1. Persistent atrial fibrillation (HCC)  EKG        Persistent afib s/p ablation  High risk medication use    - Continue sotalol, labs and ECG reviewed    FV 6 months    Nilton Ramirez MD  Cardiac Electrophysiology

## 2023-12-23 LAB — EKG IMPRESSION: NORMAL

## 2023-12-23 PROCEDURE — 93010 ELECTROCARDIOGRAM REPORT: CPT | Performed by: INTERNAL MEDICINE

## 2023-12-24 DIAGNOSIS — I48.91 ATRIAL FIBRILLATION, UNSPECIFIED TYPE (HCC): ICD-10-CM

## 2023-12-26 RX ORDER — SOTALOL HYDROCHLORIDE 120 MG/1
TABLET ORAL
Qty: 180 TABLET | Refills: 1 | Status: SHIPPED | OUTPATIENT
Start: 2023-12-26

## 2024-01-29 DIAGNOSIS — I10 ESSENTIAL HYPERTENSION: ICD-10-CM

## 2024-01-29 RX ORDER — AMLODIPINE BESYLATE 2.5 MG/1
2.5 TABLET ORAL DAILY
Qty: 90 TABLET | Refills: 3 | Status: SHIPPED | OUTPATIENT
Start: 2024-01-29

## 2024-01-30 NOTE — TELEPHONE ENCOUNTER
Is the patient due for a refill? Yes    Was the patient seen the past year? Yes    Date of last office visit: 12/8/23    Does the patient have an upcoming appointment?  Yes   If yes, When? 06/19/24    Provider to refill:RUDDY    Does the patients insurance require a 100 day supply?  No

## 2024-02-15 DIAGNOSIS — I48.19 PERSISTENT ATRIAL FIBRILLATION (HCC): ICD-10-CM

## 2024-02-15 RX ORDER — RIVAROXABAN 20 MG/1
TABLET, FILM COATED ORAL
Qty: 90 TABLET | Refills: 3 | Status: SHIPPED | OUTPATIENT
Start: 2024-02-15

## 2024-02-22 ENCOUNTER — OFFICE VISIT (OUTPATIENT)
Dept: INTERNAL MEDICINE | Facility: OTHER | Age: 79
End: 2024-02-22
Payer: MEDICARE

## 2024-02-22 VITALS
DIASTOLIC BLOOD PRESSURE: 69 MMHG | BODY MASS INDEX: 32.88 KG/M2 | HEIGHT: 76 IN | TEMPERATURE: 96.8 F | WEIGHT: 270 LBS | HEART RATE: 58 BPM | OXYGEN SATURATION: 94 % | SYSTOLIC BLOOD PRESSURE: 111 MMHG

## 2024-02-22 DIAGNOSIS — I48.91 ATRIAL FIBRILLATION, UNSPECIFIED TYPE (HCC): ICD-10-CM

## 2024-02-22 DIAGNOSIS — I10 ESSENTIAL HYPERTENSION: ICD-10-CM

## 2024-02-22 DIAGNOSIS — E78.5 DYSLIPIDEMIA: ICD-10-CM

## 2024-02-22 DIAGNOSIS — Z11.59 NEED FOR HEPATITIS C SCREENING TEST: ICD-10-CM

## 2024-02-22 DIAGNOSIS — F32.A DEPRESSION, UNSPECIFIED DEPRESSION TYPE: ICD-10-CM

## 2024-02-22 DIAGNOSIS — E11.9 TYPE 2 DIABETES MELLITUS WITHOUT COMPLICATION, WITHOUT LONG-TERM CURRENT USE OF INSULIN (HCC): ICD-10-CM

## 2024-02-22 PROCEDURE — 99214 OFFICE O/P EST MOD 30 MIN: CPT | Performed by: INTERNAL MEDICINE

## 2024-02-22 PROCEDURE — 3078F DIAST BP <80 MM HG: CPT | Performed by: INTERNAL MEDICINE

## 2024-02-22 PROCEDURE — 3074F SYST BP LT 130 MM HG: CPT | Performed by: INTERNAL MEDICINE

## 2024-02-22 ASSESSMENT — FIBROSIS 4 INDEX: FIB4 SCORE: 1.79

## 2024-02-22 ASSESSMENT — PATIENT HEALTH QUESTIONNAIRE - PHQ9: CLINICAL INTERPRETATION OF PHQ2 SCORE: 0

## 2024-02-22 NOTE — PROGRESS NOTES
Established Patient    Patient Care Team:  Angel Cameron M.D. as PCP - General  FREDO Carr. as Mid Level Provider (Cardiovascular Disease (Cardiology))  Padmini Calhoun M.D. as Consulting Physician (Cardiovascular Disease (Cardiology))  cpap and more as Respiratory Therapist  Austin Schreiber M.D. as Consulting Physician (Pulmonary Medicine)    Anthony Cloud is a 78 y.o. male who presents today with the following Chief Complaint(s):  Chief Complaint   Patient presents with    Follow-Up    and for follow up for Diagnoses of Type 2 diabetes mellitus without complication, without long-term current use of insulin (HCC), Dyslipidemia, Depression, unspecified depression type, Essential hypertension, Atrial fibrillation, unspecified type (HCC), and Need for hepatitis C screening test were pertinent to this visit.    ROS:     Denies any new chest pain or shortness of breath.  No changes to urinary or bowel function.  See HPI.    Past Medical History:   Diagnosis Date    Acute nasopharyngitis 01/2020    Anemia     Arrhythmia     a fib    Bowel habit changes     constipation     Breath shortness     since 11/2019, hx cardiac arrhythmia     CAD (coronary artery disease) 2003    PCI/BMS x 3 to the RCA (Zeta 4.0 x 23mm, 3.5 x 23mm, 3.0 x 18mm).    Cataract     alejandra IOL     Central sleep apnea      ICD-10 transition    Chronic anticoagulation     COPD (chronic obstructive pulmonary disease) (HCC)     Depression     Depressive disorder, not elsewhere classified          Dyslipidemia     Hemorrhagic disorder (HCC)     takes xarelto     High cholesterol     Hx MRSA infection 2009, 2014    right ankle 2014, right thumb 2009    Hypertension     Hypothyroidism     Mixed hyperlipidemia     Obesity     Old myocardial infarction January 2003    cardiac stents x3    Peptic ulcer disease 8/4/2016    Personal history of venous thrombosis and embolism     On Xarelto    Pulmonary embolism (HCC) 2003/2004    Sleep apnea   "   BiPAP with 3L oxygen     Social History     Tobacco Use    Smoking status: Never    Smokeless tobacco: Never   Vaping Use    Vaping Use: Never used   Substance Use Topics    Alcohol use: Yes     Comment: occ    Drug use: No     Current Outpatient Medications   Medication Sig Dispense Refill    XARELTO 20 MG Tab tablet TAKE 1 TABLET BY MOUTH WITH DINNER 90 Tablet 3    amLODIPine (NORVASC) 2.5 MG Tab TAKE 1 TABLET BY MOUTH EVERY DAY 90 Tablet 3    sotalol (BETAPACE) 120 MG tablet TAKE 1 TABLET BY MOUTH TWICE DAILY 180 Tablet 1    BREO ELLIPTA 200-25 MCG/ACT AEROSOL POWDER, BREATH ACTIVATED TAKE 1 PUFF BY MOUTH DAILY. 540 Each 3    atorvastatin (LIPITOR) 40 MG Tab TAKE 1 TABLET BY MOUTH EVERY EVENING 90 Tablet 1    escitalopram (LEXAPRO) 10 MG Tab TAKE 1 TABLET BY MOUTH EVERY DAY 90 Tablet 3    losartan (COZAAR) 50 MG Tab TAKE 1 TABLET BY MOUTH TWICE DAILY 180 Tablet 3    Multiple Vitamins-Minerals (MULTIVITAMIN ADULT PO) Take 1 Tab by mouth every morning.      Glucosamine HCl (GLUCOSAMINE PO) Take 1 Tablet by mouth every morning.       No current facility-administered medications for this visit.       Physical Exam:  /69 (BP Location: Right arm, Patient Position: Sitting, BP Cuff Size: Adult)   Pulse (!) 58   Temp 36 °C (96.8 °F) (Temporal)   Ht 1.93 m (6' 4\")   Wt 122 kg (270 lb)   SpO2 94%   BMI 32.87 kg/m²   General: Well developed, well nourished male, in no distress.  Eyes: Conjuntiva without any obvious injection or erythema.   Cardiovascular: Heart is regular with no murmur  Lungs: Clear to auscultation bilaterally. No wheezes, rhonci or crackles heard. Respiratory effort is normal.  Abd: Soft, non-tender  Ext: No edema  Monofilament testing with a 10 gram force: sensation intact: intact bilaterally  Visual Inspection: Feet without maceration, ulcers, fissures.  Pedal pulses: intact bilaterally      HPI / Assessment / Plan:  1. Type 2 diabetes mellitus without complication, without long-term " current use of insulin (HCC)  Since his last visit, he has done very well.  He is diet controlled with his diabetes, and he has been working hard on staying active as well as decreasing some carbohydrates in his diet.  His hemoglobin A1c has now decreased down to 6.9%.  Plan:  Currently this is stable and well controlled.  The patient should continue current therapy with no changes at this time.      - Diabetic Monofilament Lower Extremity Exam    2. Dyslipidemia  Anthony is tolerating medication well.  No intolerable side-effects noted.  No new symptoms of muscle aches or weakness.  Patient reports good adherence to medication regimen.  No change in medication since the last visit. Anthony is trying to follow a low-cholesterol diet.  Exercise is adequate.   Plan:  Currently this is stable and well controlled.  The patient should continue current therapy with no changes at this time.       3. Depression, unspecified depression type  Overall doing well on Lexapro 10 mg daily.  Plan:  Currently this is stable and well controlled.  The patient should continue current therapy with no changes at this time.        4. Essential hypertension  Blood pressure in the office today is very well-controlled at 111/69.  He is tolerating his losartan and amlodipine however he has had a few episodes of feeling that his blood pressure is possibly too low.  He has had some intermittent fatigue as well, but he has not completely associated documented low blood pressures with fatigue.  Plan:  I would like him to be more consistent with home blood pressure checks.  Specifically, he should also try to document his blood pressure readings for when he is feeling fatigued.  It is possible we may need to decrease his blood pressure medication slightly, and if so as a neck step I would plan to discontinue his low-dose amlodipine.    5. Atrial fibrillation, unspecified type (HCC)  He is doing well on sotalol and Xarelto.  No recent episodes of  atrial fibrillation, and no abnormal bleeding episodes associated with the Xarelto use.  Plan:  Overall doing well on the current regimen.  No changes needed today.        Orders Placed This Encounter    Diabetic Monofilament Lower Extremity Exam    Comp Metabolic Panel    HEMOGLOBIN A1C    HEP C VIRUS ANTIBODY       Return in about 6 months (around 8/22/2024) for Annual Wellness Visit.    Angel Cameron M.D.   of Internal Medicine  [facility]    This note was created using voice recognition software.  While every attempt is made to ensure accuracy of transcription, occasionally errors occur.    .cc

## 2024-02-22 NOTE — PATIENT INSTRUCTIONS
I strongly recommend that you get an updated COVID booster.  This is available to you at your local pharmacy.    Consider getting the Shingrix shingles vaccine.  This is a two vaccine series, the second vaccine is done approximately 2-6 months after the first.  This is done through your local pharmacy.

## 2024-04-01 ENCOUNTER — OFFICE VISIT (OUTPATIENT)
Dept: CARDIOLOGY | Facility: MEDICAL CENTER | Age: 79
End: 2024-04-01
Attending: INTERNAL MEDICINE
Payer: MEDICARE

## 2024-04-01 VITALS
OXYGEN SATURATION: 94 % | SYSTOLIC BLOOD PRESSURE: 134 MMHG | HEIGHT: 76 IN | RESPIRATION RATE: 16 BRPM | WEIGHT: 262 LBS | HEART RATE: 52 BPM | BODY MASS INDEX: 31.9 KG/M2 | DIASTOLIC BLOOD PRESSURE: 72 MMHG

## 2024-04-01 DIAGNOSIS — I48.19 PERSISTENT ATRIAL FIBRILLATION (HCC): ICD-10-CM

## 2024-04-01 LAB — EKG IMPRESSION: NORMAL

## 2024-04-01 PROCEDURE — 93005 ELECTROCARDIOGRAM TRACING: CPT | Performed by: INTERNAL MEDICINE

## 2024-04-01 PROCEDURE — 99214 OFFICE O/P EST MOD 30 MIN: CPT | Performed by: INTERNAL MEDICINE

## 2024-04-01 PROCEDURE — 93010 ELECTROCARDIOGRAM REPORT: CPT | Performed by: INTERNAL MEDICINE

## 2024-04-01 PROCEDURE — 3075F SYST BP GE 130 - 139MM HG: CPT | Performed by: INTERNAL MEDICINE

## 2024-04-01 PROCEDURE — 99212 OFFICE O/P EST SF 10 MIN: CPT | Performed by: INTERNAL MEDICINE

## 2024-04-01 PROCEDURE — 3078F DIAST BP <80 MM HG: CPT | Performed by: INTERNAL MEDICINE

## 2024-04-01 ASSESSMENT — FIBROSIS 4 INDEX: FIB4 SCORE: 1.79

## 2024-04-01 NOTE — PROGRESS NOTES
Arrhythmia Clinic Note (Established patient)    DOS: 4/1/2024    Chief complaint/Reason for consult: Afib    Interval History: 78-year-old man with a history of persistent atrial fibrillation status post ablation in 2020.  Has been on sotalol since without atrial fibrillation recurrence.  Remains on Xarelto.    ROS (+ highlighted in bold):  Constitutional: Fevers/chills/fatigue/weightloss  HEENT: Blurry vision/eye pain/sore throat/hearing loss  Respiratory: Shortness of breath/cough  Cardiovascular: Chest pain/palpitations/edema/orthopnea/syncope  GI: Nausea/vomitting/diarrhea  MSK: Arthralgias/myagias/muscle weakness  Skin: Rash/sores  Neurological: Numbness/tremors/vertigo  Endocrine: Excessive thirst/polyuria/cold intolerance/heat intolerance  Psych: Depression/anxiety    Past Medical History:   Diagnosis Date    Acute nasopharyngitis 01/2020    Anemia     Arrhythmia     a fib    Bowel habit changes     constipation     Breath shortness     since 11/2019, hx cardiac arrhythmia     CAD (coronary artery disease) 2003    PCI/BMS x 3 to the RCA (Zeta 4.0 x 23mm, 3.5 x 23mm, 3.0 x 18mm).    Cataract     alejandra IOL     Central sleep apnea      ICD-10 transition    Chronic anticoagulation     COPD (chronic obstructive pulmonary disease) (HCC)     Depression     Depressive disorder, not elsewhere classified          Dyslipidemia     Hemorrhagic disorder (HCC)     takes xarelto     High cholesterol     Hx MRSA infection 2009, 2014    right ankle 2014, right thumb 2009    Hypertension     Hypothyroidism     Mixed hyperlipidemia     Obesity     Old myocardial infarction January 2003    cardiac stents x3    Peptic ulcer disease 8/4/2016    Personal history of venous thrombosis and embolism     On Xarelto    Pulmonary embolism (HCC) 2003/2004    Sleep apnea     BiPAP with 3L oxygen       Past Surgical History:   Procedure Laterality Date    PB SHLDR ARTHROSCOP,SURG,W/ROTAT CUFF REPB Right 4/12/2021    Procedure: ARTHROSCOPY,  SHOULDER, WITH ROTATOR CUFF REPAIR;  Surgeon: Eusebia Kinney M.D.;  Location: SURGERY [facility];  Service: Orthopedics    PB ARTHROSCOPY SHOULDER SURGICAL BICEPS TENODES* Right 4/12/2021    Procedure: ARTHROSCOPY, SHOULDER, WITH BICEPS TENODESIS - FOR TENOTOMY;  Surgeon: Eusebia Kinney M.D.;  Location: SURGERY [facility];  Service: Orthopedics    NC SHLDR ARTHROSCOP,PART ACROMIOPLAS Right 4/12/2021    Procedure: DECOMPRESSION, SHOULDER, ARTHROSCOPIC - SUBACROMIAL, LABRAL DEBRIDEMENT;  Surgeon: Eusebia Kinney M.D.;  Location: SURGERY [facility];  Service: Orthopedics    OTHER CARDIAC SURGERY  07/2020    Cardiac ablasion     COLONOSCOPY  2020    ORIF, WRIST Left 2/23/2018    Procedure: WRIST ORIF;  Surgeon: Jasper Hammond M.D.;  Location: SURGERY [facility];  Service: Orthopedics    INCISION AND DRAINAGE ORTHOPEDIC  8/29/2014    Performed by Wolfgang Merrill M.D. at SURGERY [facility]    CARDIAC CATH, RIGHT/LEFT HEART  2003 01/2003 4.0x23mm Zeta stent to proximal RCA,3.5x22mm distal to first stent, 3.0x15mm at the point of original occlusion.    OTHER  2001    back sx     NC ANESTH,LOWER LEG ARTERIES SURG         Social History     Socioeconomic History    Marital status:      Spouse name: Not on file    Number of children: Not on file    Years of education: Not on file    Highest education level: Not on file   Occupational History    Not on file   Tobacco Use    Smoking status: Never    Smokeless tobacco: Never   Vaping Use    Vaping Use: Never used   Substance and Sexual Activity    Alcohol use: Yes     Comment: occ    Drug use: No    Sexual activity: Not on file   Other Topics Concern    Not on file   Social History Narrative    Not on file     Social Determinants of Health     Financial Resource Strain: Not on file   Food Insecurity: Not on file   Transportation Needs: Not on file   Physical Activity: Not on file   Stress: Not on file   Social Connections: Not  "on file   Intimate Partner Violence: Not on file   Housing Stability: Not on file       Family History   Problem Relation Age of Onset    Other Mother         Passed at 98    Heart Disease Father 60        CABG       Allergies   Allergen Reactions    Lisinopril      cough       Current Outpatient Medications   Medication Sig Dispense Refill    XARELTO 20 MG Tab tablet TAKE 1 TABLET BY MOUTH WITH DINNER 90 Tablet 3    amLODIPine (NORVASC) 2.5 MG Tab TAKE 1 TABLET BY MOUTH EVERY DAY 90 Tablet 3    sotalol (BETAPACE) 120 MG tablet TAKE 1 TABLET BY MOUTH TWICE DAILY 180 Tablet 1    BREO ELLIPTA 200-25 MCG/ACT AEROSOL POWDER, BREATH ACTIVATED TAKE 1 PUFF BY MOUTH DAILY. 540 Each 3    atorvastatin (LIPITOR) 40 MG Tab TAKE 1 TABLET BY MOUTH EVERY EVENING 90 Tablet 1    escitalopram (LEXAPRO) 10 MG Tab TAKE 1 TABLET BY MOUTH EVERY DAY 90 Tablet 3    losartan (COZAAR) 50 MG Tab TAKE 1 TABLET BY MOUTH TWICE DAILY 180 Tablet 3    Multiple Vitamins-Minerals (MULTIVITAMIN ADULT PO) Take 1 Tab by mouth every morning.      Glucosamine HCl (GLUCOSAMINE PO) Take 1 Tablet by mouth every morning.       No current facility-administered medications for this visit.       Physical Exam:  Vitals:    04/01/24 0935   BP: 134/72   BP Location: Left arm   Patient Position: Sitting   BP Cuff Size: Adult   Pulse: (!) 52   Resp: 16   SpO2: 94%   Weight: 119 kg (262 lb)   Height: 1.93 m (6' 4\")     General appearance: NAD, conversant   Eyes: anicteric sclerae, moist conjunctivae; no lid-lag; PERRLA  HENT: Atraumatic; oropharynx clear with moist mucous membranes and no mucosal ulcerations; normal hard and soft palate  Neck: Trachea midline; FROM, supple, no thyromegaly or lymphadenopathy  Lungs: CTA, with normal respiratory effort and no intercostal retractions  CV: RRR, no MRGs, no JVD  Abdomen: Soft, non-tender; no masses or HSM  Extremities: No peripheral edema or extremity lymphadenopathy  Skin: Normal temperature, turgor and texture; no rash, " ulcers or subcutaneous nodules  Psych: Appropriate affect, alert and oriented to person, place and time    Data:  Lipids:   Lab Results   Component Value Date/Time    CHOLSTRLTOT 108 02/14/2024 07:10 AM    CHOLSTRLTOT 101 11/07/2019 07:14 AM    TRIGLYCERIDE 83 02/14/2024 07:10 AM    TRIGLYCERIDE 64 11/07/2019 07:14 AM    HDL 43 02/14/2024 07:10 AM    HDL 40 11/07/2019 07:14 AM    LDL 48 11/07/2019 07:14 AM        BMP:  Lab Results   Component Value Date/Time    SODIUM 139 02/14/2024 0710    POTASSIUM 4.8 02/14/2024 0710    CHLORIDE 102 02/14/2024 0710    CO2 21 02/14/2024 0710    GLUCOSE 140 (H) 02/14/2024 0710    BUN 25 02/14/2024 0710    CREATININE 1.18 02/14/2024 0710    CALCIUM 9.0 02/14/2024 0710    ANION 11.0 04/10/2022 1318        TSH:   Lab Results   Component Value Date/Time    TSHULTRASEN 0.840 02/12/2020 0525        THYROXINE (T4):   Lab Results   Component Value Date/Time    FREEDIR 1.05 11/02/2021 0609        CBC:   Lab Results   Component Value Date/Time    WBC 4.9 09/20/2023 06:18 AM    WBC 4.7 (L) 04/10/2022 01:18 PM    RBC 4.06 (L) 09/20/2023 06:18 AM    RBC 4.14 (L) 04/10/2022 01:18 PM    HEMOGLOBIN 13.8 09/20/2023 06:18 AM    HEMOGLOBIN 13.7 (L) 04/10/2022 01:18 PM    HEMATOCRIT 41.0 09/20/2023 06:18 AM    HEMATOCRIT 42.4 04/10/2022 01:18 PM     (H) 09/20/2023 06:18 AM    .4 (H) 04/10/2022 01:18 PM    MCH 34.0 (H) 09/20/2023 06:18 AM    MCH 33.1 (H) 04/10/2022 01:18 PM    MCHC 33.7 09/20/2023 06:18 AM    MCHC 32.3 (L) 04/10/2022 01:18 PM    RDW 12.7 09/20/2023 06:18 AM    RDW 49.5 04/10/2022 01:18 PM    PLATELETCT 180 09/20/2023 06:18 AM    PLATELETCT 177 04/10/2022 01:18 PM    MPV 9.5 04/10/2022 01:18 PM    NEUTSPOLYS 60.60 04/10/2022 01:18 PM    LYMPHOCYTES 25.90 04/10/2022 01:18 PM    MONOCYTES 11.00 04/10/2022 01:18 PM    EOSINOPHILS 1.70 04/10/2022 01:18 PM    BASOPHILS 0.40 04/10/2022 01:18 PM    IMMGRAN 0.40 04/10/2022 01:18 PM    IMMGRAN 1 11/02/2021 06:09 AM    NRBC CANCELED  09/20/2023 06:18 AM    NRBC 0.00 04/10/2022 01:18 PM    NEUTS 2.85 04/10/2022 01:18 PM    NEUTS 3.7 11/02/2021 06:09 AM    LYMPHS 1.22 04/10/2022 01:18 PM    LYMPHS 1.2 11/02/2021 06:09 AM    MONOS 0.52 04/10/2022 01:18 PM    MONOS 0.6 11/02/2021 06:09 AM    EOS 0.08 04/10/2022 01:18 PM    EOS 0.2 11/02/2021 06:09 AM    BASO 0.02 04/10/2022 01:18 PM    BASO 0.1 11/02/2021 06:09 AM    IMMGRANAB 0.02 04/10/2022 01:18 PM    IMMGRANAB 0.1 11/02/2021 06:09 AM    NRBCAB 0.00 04/10/2022 01:18 PM        CBC w/o DIFF  Lab Results   Component Value Date/Time    WBC 4.9 09/20/2023 06:18 AM    WBC 4.7 (L) 04/10/2022 01:18 PM    RBC 4.06 (L) 09/20/2023 06:18 AM    RBC 4.14 (L) 04/10/2022 01:18 PM    HEMOGLOBIN 13.8 09/20/2023 06:18 AM    HEMOGLOBIN 13.7 (L) 04/10/2022 01:18 PM     (H) 09/20/2023 06:18 AM    .4 (H) 04/10/2022 01:18 PM    MCH 34.0 (H) 09/20/2023 06:18 AM    MCH 33.1 (H) 04/10/2022 01:18 PM    MCHC 33.7 09/20/2023 06:18 AM    MCHC 32.3 (L) 04/10/2022 01:18 PM    RDW 12.7 09/20/2023 06:18 AM    RDW 49.5 04/10/2022 01:18 PM    MPV 9.5 04/10/2022 01:18 PM       Prior echo/stress reviewed: EF 60%    EKG interpreted by me: Sinus rhythm with  ms    Impression/Plan:  1. Persistent atrial fibrillation (HCC)  EKG        1.  Sinus bradycardia  2.  Atrial fibrillation status post ablation  3.  High risk medication use    -Continue sotalol, labs reviewed scanned into media tab, and ECG reviewed with acceptable QT  -Continue Xarelto  -Sinus bradycardia asymptomatic at this time, we will continue to monitor    6-month follow-up    Nilton Ramirez MD  Cardiac Electrophysiology

## 2024-04-09 NOTE — TELEPHONE ENCOUNTER
Received request via: Pharmacy    Was the patient seen in the last year in this department? Yes    Does the patient have an active prescription (recently filled or refills available) for medication(s) requested? No    [facility]    Does the patient have alf Plus and need 100 day supply (blood pressure, diabetes and cholesterol meds only)? Patient does not have SCP

## 2024-04-11 RX ORDER — ATORVASTATIN CALCIUM 40 MG/1
40 TABLET, FILM COATED ORAL NIGHTLY
Qty: 90 TABLET | Refills: 3 | Status: SHIPPED | OUTPATIENT
Start: 2024-04-11

## 2024-05-13 NOTE — PROGRESS NOTES
Chief Complaint   Patient presents with   • HTN (Controlled)   • Coronary Artery Disease   • Pulmonary Hypertension       Subjective:   Anthony Cloud is a 73 y.o. male who presents today for cardiac care and management due to CAD s/o PCI to RCA with 3 stents in 2003, HTN, HLP along with prior PE.     In the interim, patient has been doing well without having any symptoms. Patient denies having chest pain, dyspnea, palpitation, presyncope, syncope episodes. Able to climb up at least 2 flights of stairs.    Past Medical History:   Diagnosis Date   • CAD (coronary artery disease) 2003    PCI/BMS x 3 to the RCA (Zeta 4.0 x 23mm, 3.5 x 23mm, 3.0 x 18mm).   • Central sleep apnea      ICD-10 transition   • Chronic anticoagulation    • Depression    • Depressive disorder, not elsewhere classified         • Dyslipidemia    • Hypertension    • Hypothyroidism    • Mixed hyperlipidemia    • Obesity    • Old myocardial infarction January 2003        • Peptic ulcer disease 8/4/2016   • Personal history of venous thrombosis and embolism     On Xarelto   • Pulmonary embolism (HCC)    • Sleep apnea     BiPAP with oxygen     Past Surgical History:   Procedure Laterality Date   • WRIST ORIF Left 2/23/2018    Procedure: WRIST ORIF;  Surgeon: Jasper Hammond M.D.;  Location: SURGERY [facility];  Service: Orthopedics   • INCISION AND DRAINAGE ORTHOPEDIC  8/29/2014    Performed by Wolfgang Merrill M.D. at SURGERY [facility]   • CARDIAC CATH, RIGHT/LEFT HEART  2003 01/2003 4.0x23mm Zeta stent to proximal RCA,3.5x22mm distal to first stent, 3.0x15mm at the point of original occlusion.   • BOWEL RESECTION     • PB ANESTH,LOWER LEG ARTERIES SURG       Family History   Problem Relation Age of Onset   • Other Mother         Passed at 98   • Heart Disease Father 60        CABG     Social History     Social History   • Marital status:      Spouse name: N/A   • Number of children: N/A   • Years of education:  RN phoned pt to address pt concerns. Pt verified name and . Pt reported experiencing battery issues with motorized wheelchair and is requesting [employer] repair motorized wheelchair. RN completed referral for repair and faxed to [employer]. Pt verbalized understanding. All concerns addressed during encounter.    N/A     Occupational History   • Not on file.     Social History Main Topics   • Smoking status: Never Smoker   • Smokeless tobacco: Never Used   • Alcohol use Yes      Comment: 6 drinks a week    • Drug use: No   • Sexual activity: Not on file     Other Topics Concern   • Not on file     Social History Narrative   • No narrative on file     Allergies   Allergen Reactions   • Lisinopril      cough     Outpatient Encounter Prescriptions as of 7/2/2019   Medication Sig Dispense Refill   • atorvastatin (LIPITOR) 40 MG Tab Take 1 Tab by mouth every day. 90 Tab 3   • carvedilol (COREG) 25 MG Tab Take 1 Tab by mouth 2 times a day, with meals. 180 Tab 3   • losartan (COZAAR) 50 MG Tab Take 1 Tab by mouth 2 Times a Day. 180 Tab 3   • BREO ELLIPTA 200-25 MCG/INH AEROSOL POWDER, BREATH ACTIVATED INHALE ONE PUFF BY MOUTH EVERY DAY 1 Each 6   • levalbuterol (XOPENEX HFA) 45 MCG/ACT inhaler INHALE TWO PUFFS BY MOUTH EVERY 4 HOURS AS NEEDED FOR SHORTNESS OF BREATH 1 Inhaler 6   • rivaroxaban (XARELTO) 10 MG Tab tablet Take 1 Tab by mouth with dinner. 90 Tab 1   • omeprazole (PRILOSEC) 20 MG delayed-release capsule Take 1 Cap by mouth every day. 90 Cap 3   • escitalopram (LEXAPRO) 10 MG Tab TAKE ONE (1) TABLET BY MOUTH EVERY DAY 90 Tab 3   • fluticasone (FLONASE) 50 MCG/ACT nasal spray Spray 1 Spray in nose 2 times a day. 1 Bottle 0   • aspirin (ASA) 81 MG Chew Tab chewable tablet Take 81 mg by mouth every evening. 100 Tab 11   • [DISCONTINUED] losartan (COZAAR) 50 MG Tab Take 1 Tab by mouth 2 Times a Day. 180 Tab 3   • [DISCONTINUED] carvedilol (COREG) 25 MG Tab Take 1 Tab by mouth 2 times a day, with meals. 180 Tab 3   • [DISCONTINUED] atorvastatin (LIPITOR) 40 MG Tab Take 1 Tab by mouth every day. 90 Tab 3     No facility-administered encounter medications on file as of 7/2/2019.      Review of Systems   Constitutional: Negative for chills, fever, malaise/fatigue and weight loss.   HENT: Negative for ear discharge, ear pain,  "hearing loss and nosebleeds.    Eyes: Negative for blurred vision, double vision, pain and discharge.   Respiratory: Negative for cough and shortness of breath.    Cardiovascular: Negative for chest pain, palpitations, orthopnea, claudication, leg swelling and PND.   Gastrointestinal: Negative for abdominal pain, blood in stool, melena, nausea and vomiting.   Genitourinary: Negative for dysuria and hematuria.   Musculoskeletal: Negative for falls, joint pain and myalgias.   Skin: Negative for itching and rash.   Neurological: Negative for dizziness, sensory change, speech change, loss of consciousness and headaches.   Endo/Heme/Allergies: Negative for environmental allergies. Does not bruise/bleed easily.   Psychiatric/Behavioral: Negative for depression, hallucinations and suicidal ideas.        Objective:   /80 (BP Location: Left arm, Patient Position: Sitting, BP Cuff Size: Adult)   Pulse 70   Ht 1.905 m (6' 3\")   Wt 118.8 kg (262 lb)   SpO2 93%   BMI 32.75 kg/m²     Physical Exam   Constitutional: He is oriented to person, place, and time. No distress.   HENT:   Head: Normocephalic and atraumatic.   Right Ear: External ear normal.   Left Ear: External ear normal.   Eyes: Right eye exhibits no discharge. Left eye exhibits no discharge.   Neck: No JVD present. No thyromegaly present.   Cardiovascular: Normal rate, regular rhythm, normal heart sounds and intact distal pulses.  Exam reveals no gallop and no friction rub.    No murmur heard.  Pulmonary/Chest: Breath sounds normal. No respiratory distress.   Abdominal: Bowel sounds are normal. He exhibits no distension. There is no tenderness.   Musculoskeletal: He exhibits no edema or tenderness.   Neurological: He is alert and oriented to person, place, and time. No cranial nerve deficit.   Skin: Skin is warm and dry. He is not diaphoretic.   Psychiatric: He has a normal mood and affect. His behavior is normal.   Nursing note and vitals " reviewed.      Assessment:     1. Coronary artery disease due to lipid rich plaque  atorvastatin (LIPITOR) 40 MG Tab    carvedilol (COREG) 25 MG Tab    losartan (COZAAR) 50 MG Tab    Comp Metabolic Panel    LIPID PANEL   2. 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 Chronic anticoagulation     4. Stented coronary artery  atorvastatin (LIPITOR) 40 MG Tab   5. Mixed hyperlipidemia  atorvastatin (LIPITOR) 40 MG Tab   6. HTN (hypertension), malignant  carvedilol (COREG) 25 MG Tab    losartan (COZAAR) 50 MG Tab   7. High risk medication use  Comp Metabolic Panel       Medical Decision Making:  Today's Assessment / Status / Plan:   Coronary arterial disease s/p PCI to RCA with 3 stents in 2003:  Betablocker as Carvedilol 25 mg po 2x daily.  ASA 81 mg po daily.  Statin as Atorvastatin 40 mg po daily  ARB as Losartan 50 mg po 2x daily.     Hypertension:  Optimize control with BB, ARB as permitted above in conjunction with CAD treatment.     Hyperlipidemia:  Optimize statin as within guidelines of CAD treatment as above.    History of PE:  Continue Xarelto 10 mg po daily.    Overall, based on prior history of obstructive coronary arterial disease, patient is at at high risk for recurrent events and will require consistent ongoing guidelines directed medical therapy to reduce his cardiovascular mortality and associated complications.    I will see patient back in clinic with lab tests and studies results in 6 months.    I thank you for referring patient to our Cardiology Clinic today.      Epifanio Felix MD.   [facility].  [phone]  [place], [place].

## 2024-06-25 DIAGNOSIS — I25.83 CORONARY ARTERY DISEASE DUE TO LIPID RICH PLAQUE: ICD-10-CM

## 2024-06-25 DIAGNOSIS — I25.10 CORONARY ARTERY DISEASE DUE TO LIPID RICH PLAQUE: ICD-10-CM

## 2024-06-25 DIAGNOSIS — I10 HTN (HYPERTENSION), MALIGNANT: ICD-10-CM

## 2024-06-25 RX ORDER — LOSARTAN POTASSIUM 50 MG/1
TABLET ORAL
Qty: 180 TABLET | Refills: 0 | Status: SHIPPED | OUTPATIENT
Start: 2024-06-25

## 2024-07-02 DIAGNOSIS — I48.91 ATRIAL FIBRILLATION, UNSPECIFIED TYPE (HCC): ICD-10-CM

## 2024-07-02 RX ORDER — SOTALOL HYDROCHLORIDE 120 MG/1
TABLET ORAL
Qty: 180 TABLET | Refills: 1 | Status: SHIPPED | OUTPATIENT
Start: 2024-07-02

## 2024-07-15 RX ORDER — ESCITALOPRAM OXALATE 10 MG/1
10 TABLET ORAL
Qty: 90 TABLET | Refills: 3 | Status: SHIPPED | OUTPATIENT
Start: 2024-07-15

## 2024-08-29 ENCOUNTER — OFFICE VISIT (OUTPATIENT)
Dept: INTERNAL MEDICINE | Facility: OTHER | Age: 79
End: 2024-08-29
Payer: MEDICARE

## 2024-08-29 VITALS
HEART RATE: 52 BPM | DIASTOLIC BLOOD PRESSURE: 67 MMHG | BODY MASS INDEX: 32.22 KG/M2 | HEIGHT: 76 IN | SYSTOLIC BLOOD PRESSURE: 128 MMHG | TEMPERATURE: 97.2 F | WEIGHT: 264.6 LBS | OXYGEN SATURATION: 93 %

## 2024-08-29 DIAGNOSIS — E11.9 TYPE 2 DIABETES MELLITUS WITHOUT COMPLICATION, WITHOUT LONG-TERM CURRENT USE OF INSULIN (HCC): ICD-10-CM

## 2024-08-29 DIAGNOSIS — Z00.00 ENCOUNTER FOR WELLNESS EXAMINATION IN ADULT: ICD-10-CM

## 2024-08-29 ASSESSMENT — FIBROSIS 4 INDEX: FIB4 SCORE: 1.48

## 2024-08-29 ASSESSMENT — PATIENT HEALTH QUESTIONNAIRE - PHQ9: CLINICAL INTERPRETATION OF PHQ2 SCORE: 0

## 2024-08-29 ASSESSMENT — ACTIVITIES OF DAILY LIVING (ADL): BATHING_REQUIRES_ASSISTANCE: 0

## 2024-08-29 ASSESSMENT — ENCOUNTER SYMPTOMS: GENERAL WELL-BEING: GOOD

## 2024-08-29 NOTE — PROGRESS NOTES
Chief Complaint   Patient presents with    Medicare Annual Wellness     HPI:  Anthony Cloud is a 79 y.o. here for Medicare Annual Wellness Visit     Patient Active Problem List    Diagnosis Date Noted    Macrocytic anemia 10/27/2022    Fatty liver 05/05/2022    Constipation 09/04/2021    Depression     S/P ablation of atrial fibrillation/Atrial Flutter 07/17/2020    Encounter for monitoring sotalol therapy 07/17/2020    Stented coronary artery 02/25/2020    AF (atrial fibrillation) ([facility]) 02/12/2020    Chronic alcohol dependence ([facility]) 02/12/2020    High risk medication use 11/16/2018    Diastolic dysfunction 03/15/2018    Obesity (BMI 30-39.9) 01/16/2018    Peptic ulcer disease 08/04/2016    H/O: GI bleed 08/04/2016    Type 2 diabetes mellitus (HCC) 08/04/2016    Restrictive lung disease 08/04/2016    Sleep apnea 06/14/2016    Essential hypertension 12/07/2015    CAD (coronary artery disease) 12/07/2015    Chronic anticoagulation 09/16/2015    Dyslipidemia     History of myocardial infarction     Personal history of venous thrombosis and embolism      Current Outpatient Medications   Medication Sig Dispense Refill    escitalopram (LEXAPRO) 10 MG Tab TAKE 1 TABLET BY MOUTH EVERY DAY 90 Tablet 3    sotalol (BETAPACE) 120 MG tablet TAKE 1 TABLET BY MOUTH TWICE DAILY 180 Tablet 1    losartan (COZAAR) 50 MG Tab TAKE 1 TABLET BY MOUTH TWICE DAILY 180 Tablet 0    atorvastatin (LIPITOR) 40 MG Tab TAKE 1 TABLET BY MOUTH EVERY EVENING 90 Tablet 3    XARELTO 20 MG Tab tablet TAKE 1 TABLET BY MOUTH WITH DINNER 90 Tablet 3    amLODIPine (NORVASC) 2.5 MG Tab TAKE 1 TABLET BY MOUTH EVERY DAY 90 Tablet 3    BREO ELLIPTA 200-25 MCG/ACT AEROSOL POWDER, BREATH ACTIVATED TAKE 1 PUFF BY MOUTH DAILY. 540 Each 3    Multiple Vitamins-Minerals (MULTIVITAMIN ADULT PO) Take 1 Tab by mouth every morning.      Glucosamine HCl (GLUCOSAMINE PO) Take 1 Tablet by mouth every morning.       No current facility-administered medications for  this visit.          Current supplements as per medication list.     Allergies: Lisinopril    Current social contact/activities: He engages socially with friends and family.     He  reports that he has never smoked. He has never used smokeless tobacco. He reports current alcohol use. He reports that he does not use drugs.  Counseling given: Not Answered      ROS:    Gait: Uses no assistive device  Ostomy: No  Other tubes: No  Amputations: No  Chronic oxygen use: Yes 3 liter a night with bipap  Last eye exam: 6 months ago  Wears hearing aids: Yes   : Reports urinary leakage during the last 6 months that has somewhat interfered with their daily activities or sleep.    Screening:    Depression Screening  Little interest or pleasure in doing things?  0 - not at all  Feeling down, depressed , or hopeless? 0 - not at all  Patient Health Questionnaire Score: 0     If depressive symptoms identified deferred to follow up visit unless specifically addressed in assessment and plan.    Interpretation of PHQ-9 Total Score   Score Severity   1-4 No Depression   5-9 Mild Depression   10-14 Moderate Depression   15-19 Moderately Severe Depression   20-27 Severe Depression    Screening for Cognitive Impairment  Do you or any of your friends or family members have any concern about your memory? No  Three Minute Recall (Leader, Season, Table) 3/3    Fritz clock face with all 12 numbers and set the hands to show 10 minutes after 11.  Yes    Cognitive concerns identified deferred for follow up unless specifically addressed in assessment and plan.    Fall Risk Assessment  Has the patient had two or more falls in the last year or any fall with injury in the last year?  No    Safety Assessment  Do you always wear your seatbelt?  Yes  Any changes to home needed to function safely? No  Difficulty hearing.  Yes  Patient counseled about all safety risks that were identified.    Functional Assessment ADLs  Are there any barriers preventing you  from cooking for yourself or meeting nutritional needs?  No.    Are there any barriers preventing you from driving safely or obtaining transportation?  No.    Are there any barriers preventing you from using a telephone or calling for help?  No    Are there any barriers preventing you from shopping?  No.    Are there any barriers preventing you from taking care of your own finances?  No    Are there any barriers preventing you from managing your medications?  No    Are there any barriers preventing you from showering, bathing or dressing yourself? No    Are there any barriers preventing you from doing housework or laundry? No  Are there any barriers preventing you from using the toilet?No  Are you currently engaging in any exercise or physical activity?  Yes. Walks, hunts, lawn work and life stock feeds.    Self-Assessment of Health  What is your perception of your health? Good    Do you sleep more than six hours a night? Yes    In the past 7 days, how much did pain keep you from doing your normal work? None    Do you spend quality time with family or friends (virtually or in person)? Yes    Do you usually eat a heart healthy diet that constists of a variety of fruits, vegetables, whole grains and fiber? Yes    Do you eat foods high in fat and/or Fast Food more than three times per week? Yes Red meat  How concerned are you that your medical conditions are not being well managed? Not at all    Are you worried that in the next 2 months, you may not have stable housing that you own, rent, or stay in as part of a household? No      Advance Care Planning  Do you have an Advance Directive, Living Will, Durable Power of , or POLST? No                 Health Maintenance Summary            Overdue - Annual Wellness Visit (Yearly) Never done      No completion history exists for this topic.              Overdue - Diabetes: Retinopathy Screening (Yearly) Never done      No completion history exists for this topic.               Overdue - COVID-19 Vaccine (4 - 2023-24 season) Overdue since 9/1/2023 11/16/2021  Imm Admin: MODERNA SARS-COV-2 VACCINE (12+)    02/25/2021  Imm Admin: MODERNA SARS-COV-2 VACCINE (12+)    01/28/2021  Imm Admin: MODERNA SARS-COV-2 VACCINE (12+)              Influenza Vaccine (1) Next due on 9/1/2024      10/26/2023  Imm Admin: Influenza Vaccine Adult HD    10/27/2022  Imm Admin: Influenza Vaccine Adult HD    11/26/2021  Imm Admin: Influenza, Unspecified - HISTORICAL DATA    02/13/2020  Imm Admin: Influenza Vaccine Adult HD    11/20/2018  Imm Admin: Influenza, Unspecified - HISTORICAL DATA    Only the first 5 history entries have been loaded, but more history exists.              Fasting Lipid Profile (Yearly) Next due on 2/14/2025 02/14/2024  LIPID PANEL    02/14/2024  LIPID PROFILE    02/14/2024  LIPID PROFILE    10/21/2022  LIPID PANEL    10/21/2022  LIPID PROFILE    Only the first 5 history entries have been loaded, but more history exists.              Diabetes: Urine Protein Screening (Yearly) Order placed this encounter      02/14/2024  MICROALB/CREAT RATIO RAND. UR    02/14/2024  MICROALBUMIN CREAT RATIO URINE    02/14/2024  MICROALBUMIN CREAT RATIO URINE    09/20/2023  MICROALB/CREAT RATIO RAND. UR    09/20/2023  MICROALBUMIN CREAT RATIO URINE    Only the first 5 history entries have been loaded, but more history exists.              A1c Screening (Every 6 Months) Order placed this encounter      08/20/2024  HEMOGLOBIN A1C    08/20/2024  HEMOGLOBIN A1C    02/14/2024  HEMOGLOBIN A1C    02/14/2024  HEMOGLOBIN A1C    02/14/2024  HEMOGLOBIN A1C    Only the first 5 history entries have been loaded, but more history exists.              Diabetes: Monofilament / LE Exam (Yearly) Next due on 2/22/2025 02/22/2024  Diabetic Monofilament Lower Extremity Exam    02/22/2024  [employer]: WORKFLOW - DIABETES - DIABETIC FOOT EXAM PERFORMED              SERUM CREATININE (Yearly) Order placed this  encounter      08/20/2024  COMP METABOLIC PANEL    08/20/2024  Comp Metabolic Panel    02/14/2024  COMP METABOLIC PANEL    02/14/2024  COMP METABOLIC PANEL    02/14/2024  COMP METABOLIC PANEL    Only the first 5 history entries have been loaded, but more history exists.              IMM DTaP/Tdap/Td Vaccine (2 - Td or Tdap) Next due on 2/21/2028 02/21/2018  Imm Admin: Tdap Vaccine              Pneumococcal Vaccine: 65+ Years (Series Information) Completed      07/17/2018  Imm Admin: Pneumococcal polysaccharide vaccine (PPSV-23)    01/19/2017  Imm Admin: Pneumococcal Conjugate Vaccine (Prevnar/PCV-13)              Zoster (Shingles) Vaccines (Series Information) Completed      07/15/2024  Outside Immunization: Zoster Deonte (Shingrix)    03/12/2024  Imm Admin: Zoster Vaccine Recombinant (RZV) (SHINGRIX)    01/16/2013  Imm Admin: Zoster Vaccine Live (ZVL) (Zostavax) - HISTORICAL DATA              Hepatitis C Screening  Completed      08/20/2024  Hepatitis C Antibody component of HEP C VIRUS ANTIBODY              Hepatitis A Vaccine (Hep A) (Series Information) Aged Out      No completion history exists for this topic.              Hepatitis B Vaccine (Hep B) (Series Information) Aged Out      No completion history exists for this topic.              HPV Vaccines (Series Information) Aged Out      No completion history exists for this topic.              Polio Vaccine (Inactivated Polio) (Series Information) Aged Out      No completion history exists for this topic.              Meningococcal Immunization (Series Information) Aged Out      No completion history exists for this topic.              Discontinued - Colorectal Cancer Screening  Discontinued        Frequency changed to Never automatically (Topic No Longer Applies)    06/20/2018  REFERRAL TO GI FOR COLONOSCOPY    01/24/2013  REFERRAL TO GI FOR COLONOSCOPY                    Patient Care Team:  Angel Cameron M.D. as PCP - LUCY Rome as  Mid Level Provider (Cardiovascular Disease (Cardiology))  Padmini Calhoun M.D. as Consulting Physician (Cardiovascular Disease (Cardiology))  cpap and more as Respiratory Therapist  Austin Schreiber M.D. as Consulting Physician (Pulmonary Medicine)    Social History     Tobacco Use    Smoking status: Never    Smokeless tobacco: Never   Vaping Use    Vaping status: Never Used   Substance Use Topics    Alcohol use: Yes     Comment: occ    Drug use: No     Family History   Problem Relation Age of Onset    Other Mother         Passed at 98    Heart Disease Father 60        CABG     He  has a past medical history of Acute nasopharyngitis (01/2020), Anemia, Arrhythmia, Bowel habit changes, Breath shortness, CAD (coronary artery disease) (2003), Cataract, Central sleep apnea, Chronic anticoagulation, COPD (chronic obstructive pulmonary disease) (HCC), Depression, Depressive disorder, not elsewhere classified, Dyslipidemia, Hemorrhagic disorder ([facility]), High cholesterol, MRSA infection (2009, 2014), Hypertension, Hypothyroidism, Mixed hyperlipidemia, Obesity, Old myocardial infarction (January 2003), Peptic ulcer disease (8/4/2016), Personal history of venous thrombosis and embolism, Pulmonary embolism ([facility]) (2003/2004), and Sleep apnea.   Past Surgical History:   Procedure Laterality Date    PB SHLDR ARTHROSCOP,SURG,W/ROTAT CUFF REPB Right 4/12/2021    Procedure: ARTHROSCOPY, SHOULDER, WITH ROTATOR CUFF REPAIR;  Surgeon: Eusebia Kinney M.D.;  Location: SURGERY [facility];  Service: Orthopedics    PB ARTHROSCOPY SHOULDER SURGICAL BICEPS TENODES* Right 4/12/2021    Procedure: ARTHROSCOPY, SHOULDER, WITH BICEPS TENODESIS - FOR TENOTOMY;  Surgeon: Eusebia Kinney M.D.;  Location: SURGERY [facility];  Service: Orthopedics    HI SHLDR ARTHROSCOP,PART ACROMIOPLAS Right 4/12/2021    Procedure: DECOMPRESSION, SHOULDER, ARTHROSCOPIC - SUBACROMIAL, LABRAL DEBRIDEMENT;  Surgeon: Eusebia Kinney M.D.;  Location: [facility]  [facility];  Service: Orthopedics    OTHER CARDIAC SURGERY  07/2020    Cardiac ablasion     COLONOSCOPY  2020    ORIF, WRIST Left 2/23/2018    Procedure: WRIST ORIF;  Surgeon: Jasper Hammond M.D.;  Location: SURGERY [facility];  Service: Orthopedics    INCISION AND DRAINAGE ORTHOPEDIC  8/29/2014    Performed by Wolfgang Merrill M.D. at SURGERY [facility]    CARDIAC CATH, RIGHT/LEFT HEART  2003 01/2003 4.0x23mm Zeta stent to proximal RCA,3.5x22mm distal to first stent, 3.0x15mm at the point of original occlusion.    OTHER  2001    back sx     RI ANESTH,LOWER LEG ARTERIES SURG       Exam:   /67 (BP Location: Right arm, Patient Position: Sitting, BP Cuff Size: Adult)   Pulse (!) 52   Temp 36.2 °C (97.2 °F) (Temporal)   Ht 1.93 m (6' 4\")   Wt 120 kg (264 lb 9.6 oz)   SpO2 93%  Body mass index is 32.21 kg/m².    Hearing fair.    Dentition good  Alert, oriented in no acute distress.  Eye contact is good, speech goal directed, affect calm    Assessment and Plan. The following treatment and monitoring plan is recommended:    1. Encounter for wellness examination in adult    2. Type 2 diabetes mellitus without complication, without long-term current use of insulin ([facility])    Reviewed his recent labs in detail, and overall he is doing well.  His hemoglobin A1c is higher than it was in the past, it is now up to 7.7%.  He is can work on his diet and exercise, and ideally decrease his carbohydrates again.  In the past he showed good ability to manage his diabetes with dietary approaches.  Will plan for an updated hemoglobin A1c and urine microalbumin before his next visit.    I briefly reviewed his other problems including his blood pressure dyslipidemia and depression.  All are for the most part stable.  He had a couple recent episodes of 1 to 2 days of worsening depression that have concerned him however he has cycled out of it fairly quickly and is currently feeling well.    Services " suggested: No services needed at this time  Health Care Screening: Age-appropriate preventive services recommended by USPTF and ACIP covered by Medicare were discussed today. Services ordered if indicated and agreed upon by the patient.  Referrals offered: Community-based lifestyle interventions to reduce health risks and promote self-management and wellness, fall prevention, nutrition, physical activity, tobacco-use cessation, weight loss, and mental health services as per orders if indicated.    Discussion today about general wellness and lifestyle habits:    Prevent falls and reduce trip hazards; Cautioned about securing or removing rugs.  Have a working fire alarm and carbon monoxide detector;   Engage in regular physical activity and social activities     Follow-up: No follow-ups on file.

## 2024-08-29 NOTE — PATIENT INSTRUCTIONS
I recommend that you get an updated flu vaccine this fall.  You get get that in the office when available, or at your local pharmacy.     I strongly recommend that you get an updated COVID booster.  This is available to you at your local pharmacy.

## 2024-09-28 DIAGNOSIS — I25.10 CORONARY ARTERY DISEASE DUE TO LIPID RICH PLAQUE: ICD-10-CM

## 2024-09-28 DIAGNOSIS — I25.83 CORONARY ARTERY DISEASE DUE TO LIPID RICH PLAQUE: ICD-10-CM

## 2024-09-28 DIAGNOSIS — I10 HTN (HYPERTENSION), MALIGNANT: ICD-10-CM

## 2024-09-30 RX ORDER — LOSARTAN POTASSIUM 50 MG/1
TABLET ORAL
Qty: 180 TABLET | Refills: 0 | Status: SHIPPED | OUTPATIENT
Start: 2024-09-30

## 2024-09-30 RX ORDER — LOSARTAN POTASSIUM 50 MG/1
50 TABLET ORAL 2 TIMES DAILY
Qty: 180 TABLET | Refills: 0 | Status: SHIPPED | OUTPATIENT
Start: 2024-09-30

## 2024-09-30 NOTE — TELEPHONE ENCOUNTER
[facility]  [address]  [address]  [place] [facility] [id number] [phone]      Cardiology Clinic   RN Care Coordinators, Meli Alcantar (Bre) or Rachell Causey  [phone]  Pediatric Call Center/Scheduling  [phone]    After Hours and Emergency Contact Number  [phone]  * Ask for the pediatric cardiologist on call         Prescription Renewals  The pharmacy must fax requests to [phone]  * Please allow 3-4 days for prescriptions to be authorized     Imaging Scheduling for Peds Cardiology  Valdo Faulkner [phone]  SHE WILL REACH OUT TO YOU TO SCHEDULE ANY IMAGING NEEDS THAT WERE ORDERED.    Your feedback is very important to us. If you receive a survey about your visit today, please take the time to fill this out so we can continue to improve.    Is the patient due for a refill? Yes    Was the patient seen the past year? Yes    Date of last office visit: 04/01/2024    Does the patient have an upcoming appointment?  Yes   If yes, When? 10/08/2024    Provider to refill:ADD    Does the patient have [facility] Plus and need 100-day supply? (This applies to ALL medications) Patient does not have SCP

## 2024-09-30 NOTE — TELEPHONE ENCOUNTER
Is the patient due for a refill? Yes    Was the patient seen the past year? Yes    Date of last office visit: 04/01/2024    Does the patient have an upcoming appointment?  Yes   If yes, When? 10/08/2024    Provider to refill:ADD    Does the patient have group home Plus and need 100-day supply? (This applies to ALL medications) Patient does not have SCP

## 2024-10-08 ENCOUNTER — OFFICE VISIT (OUTPATIENT)
Dept: CARDIOLOGY | Facility: MEDICAL CENTER | Age: 79
End: 2024-10-08
Attending: NURSE PRACTITIONER
Payer: MEDICARE

## 2024-10-08 VITALS
WEIGHT: 267 LBS | OXYGEN SATURATION: 94 % | HEIGHT: 76 IN | RESPIRATION RATE: 18 BRPM | DIASTOLIC BLOOD PRESSURE: 70 MMHG | SYSTOLIC BLOOD PRESSURE: 110 MMHG | BODY MASS INDEX: 32.51 KG/M2 | HEART RATE: 59 BPM

## 2024-10-08 DIAGNOSIS — Z86.79 S/P ABLATION OF ATRIAL FIBRILLATION: ICD-10-CM

## 2024-10-08 DIAGNOSIS — G47.33 OBSTRUCTIVE SLEEP APNEA SYNDROME: ICD-10-CM

## 2024-10-08 DIAGNOSIS — D68.318 CIRCULATING ANTICOAGULANTS (HCC): ICD-10-CM

## 2024-10-08 DIAGNOSIS — Z98.890 S/P ABLATION OF ATRIAL FIBRILLATION: ICD-10-CM

## 2024-10-08 DIAGNOSIS — I10 ESSENTIAL HYPERTENSION: ICD-10-CM

## 2024-10-08 DIAGNOSIS — I48.0 PAROXYSMAL ATRIAL FIBRILLATION (HCC): ICD-10-CM

## 2024-10-08 DIAGNOSIS — Z79.899 HIGH RISK MEDICATION USE: ICD-10-CM

## 2024-10-08 DIAGNOSIS — I25.10 CORONARY ARTERY DISEASE WITHOUT ANGINA PECTORIS, UNSPECIFIED VESSEL OR LESION TYPE, UNSPECIFIED WHETHER NATIVE OR TRANSPLANTED HEART: ICD-10-CM

## 2024-10-08 LAB — EKG IMPRESSION: NORMAL

## 2024-10-08 PROCEDURE — 93005 ELECTROCARDIOGRAM TRACING: CPT | Performed by: NURSE PRACTITIONER

## 2024-10-08 PROCEDURE — 3074F SYST BP LT 130 MM HG: CPT | Performed by: NURSE PRACTITIONER

## 2024-10-08 PROCEDURE — 99212 OFFICE O/P EST SF 10 MIN: CPT | Performed by: NURSE PRACTITIONER

## 2024-10-08 PROCEDURE — 99214 OFFICE O/P EST MOD 30 MIN: CPT | Performed by: NURSE PRACTITIONER

## 2024-10-08 PROCEDURE — 93010 ELECTROCARDIOGRAM REPORT: CPT | Performed by: INTERNAL MEDICINE

## 2024-10-08 PROCEDURE — 3078F DIAST BP <80 MM HG: CPT | Performed by: NURSE PRACTITIONER

## 2024-10-08 ASSESSMENT — ENCOUNTER SYMPTOMS
SPUTUM PRODUCTION: 0
SHORTNESS OF BREATH: 0
LOSS OF CONSCIOUSNESS: 0
FEVER: 0
VOMITING: 0
HEADACHES: 0
CHILLS: 0
WEIGHT LOSS: 0
ORTHOPNEA: 0
WHEEZING: 0
SPEECH CHANGE: 0
COUGH: 0
TREMORS: 0
NAUSEA: 0
DIZZINESS: 0
SENSORY CHANGE: 0
HEARTBURN: 0
FOCAL WEAKNESS: 0
HEMOPTYSIS: 0
PND: 0
STRIDOR: 0
PALPITATIONS: 0
TINGLING: 0

## 2024-10-08 ASSESSMENT — FIBROSIS 4 INDEX: FIB4 SCORE: 1.48

## 2024-12-13 NOTE — TELEPHONE ENCOUNTER
Received request via: Pharmacy    Was the patient seen in the last year in this department? Yes    Does the patient have an active prescription (recently filled or refills available) for medication(s) requested? No    Pharm[id number] - [facility] [address] AT [facility]  [address]  [facility] [id number]  Phone: [phone] Fax: [phone]    Does the patient have custodial Plus and need 100-day supply? (This applies to ALL medications) Patient does not have SCP

## 2024-12-19 RX ORDER — FLUTICASONE FUROATE AND VILANTEROL 200; 25 UG/1; UG/1
POWDER RESPIRATORY (INHALATION)
Qty: 540 EACH | Refills: 3 | Status: SHIPPED | OUTPATIENT
Start: 2024-12-19

## 2025-01-05 DIAGNOSIS — I48.91 ATRIAL FIBRILLATION, UNSPECIFIED TYPE (HCC): ICD-10-CM

## 2025-01-06 RX ORDER — SOTALOL HYDROCHLORIDE 120 MG/1
TABLET ORAL
Qty: 180 TABLET | Refills: 0 | Status: SHIPPED | OUTPATIENT
Start: 2025-01-06

## 2025-01-10 ENCOUNTER — PATIENT MESSAGE (OUTPATIENT)
Dept: CARDIOLOGY | Facility: MEDICAL CENTER | Age: 80
End: 2025-01-10
Payer: MEDICARE

## 2025-01-14 ENCOUNTER — TELEPHONE (OUTPATIENT)
Dept: CARDIOLOGY | Facility: MEDICAL CENTER | Age: 80
End: 2025-01-14
Payer: MEDICARE

## 2025-01-14 NOTE — TELEPHONE ENCOUNTER
Called pt in regards to lab work that was ordered at previous OV. Patient states daughter said she going to take him the Friday before appt when pt gets back into town    Pt has follow up appointment scheduled with Anabell cooper  on 1.28.2025.

## 2025-01-27 NOTE — PROGRESS NOTES
Chief Complaint   Patient presents with    Atrial Fibrillation     F/v Dx:Paroxysmal atrial fibrillation (HCC)     Subjective     Anthony Corinne Cloud is a 79 y.o. male who presents today for follow up atrial fibrillation.    He is a prior patient of Dr Ramirez.      Arrhythmia history significant for persistent atrial fibrillation with prior ablation in 2020, ongoing rhythm control with Sotalol.  Anticoagulated with Xarelto.     Additional medical history significant for CAD with prior PCI to RCA, HTN, HLD, COPD and SHYAM treated with BiPAP.     Today in follow-up he reports overall doing well from a cardiovascular status.  Checks his pulse oximeter and cardia once in a while.  States normal readings without known episodes of atrial fibrillation.  Does not report any chest pain or shortness of breath.  No peripheral edema.  Does report fatigue.  Set to see PCP early next month.    Past Medical History:   Diagnosis Date    Acute nasopharyngitis 01/2020    Anemia     Arrhythmia     a fib    Bowel habit changes     constipation     Breath shortness     since 11/2019, hx cardiac arrhythmia     CAD (coronary artery disease) 2003    PCI/BMS x 3 to the RCA (Zeta 4.0 x 23mm, 3.5 x 23mm, 3.0 x 18mm).    Cataract     alejandra IOL     Central sleep apnea      ICD-10 transition    Chronic anticoagulation     COPD (chronic obstructive pulmonary disease) (HCC)     Depression     Depressive disorder, not elsewhere classified          Dyslipidemia     Hemorrhagic disorder (HCC)     takes xarelto     High cholesterol     Hx MRSA infection 2009, 2014    right ankle 2014, right thumb 2009    Hypertension     Hypothyroidism     Mixed hyperlipidemia     Obesity     Old myocardial infarction January 2003    cardiac stents x3    Peptic ulcer disease 8/4/2016    Personal history of venous thrombosis and embolism     On Xarelto    Pulmonary embolism (HCC) 2003/2004    Sleep apnea     BiPAP with 3L oxygen     Past Surgical History:   Procedure  Laterality Date    PB SHLDR ARTHROSCOP,SURG,W/ROTAT CUFF REPB Right 4/12/2021    Procedure: ARTHROSCOPY, SHOULDER, WITH ROTATOR CUFF REPAIR;  Surgeon: Eusebia Kinney M.D.;  Location: SURGERY [facility];  Service: Orthopedics    PB ARTHROSCOPY SHOULDER SURGICAL BICEPS TENODES* Right 4/12/2021    Procedure: ARTHROSCOPY, SHOULDER, WITH BICEPS TENODESIS - FOR TENOTOMY;  Surgeon: Eusebia Kinney M.D.;  Location: SURGERY [facility];  Service: Orthopedics    MO SHLDR ARTHROSCOP,PART ACROMIOPLAS Right 4/12/2021    Procedure: DECOMPRESSION, SHOULDER, ARTHROSCOPIC - SUBACROMIAL, LABRAL DEBRIDEMENT;  Surgeon: Eusebia Kinney M.D.;  Location: SURGERY [facility];  Service: Orthopedics    OTHER CARDIAC SURGERY  07/2020    Cardiac ablasion     COLONOSCOPY  2020    ORIF, WRIST Left 2/23/2018    Procedure: WRIST ORIF;  Surgeon: Jasper Hammond M.D.;  Location: SURGERY [facility];  Service: Orthopedics    INCISION AND DRAINAGE ORTHOPEDIC  8/29/2014    Performed by Wolfgang Merrill M.D. at SURGERY [facility]    CARDIAC CATH, RIGHT/LEFT HEART  2003 01/2003 4.0x23mm Zeta stent to proximal RCA,3.5x22mm distal to first stent, 3.0x15mm at the point of original occlusion.    OTHER  2001    back sx     MO ANESTH,LOWER LEG ARTERIES SURG       Family History   Problem Relation Age of Onset    Other Mother         Passed at 98    Heart Disease Father 60        CABG     Social History     Socioeconomic History    Marital status:      Spouse name: Not on file    Number of children: Not on file    Years of education: Not on file    Highest education level: Not on file   Occupational History    Not on file   Tobacco Use    Smoking status: Never    Smokeless tobacco: Never   Vaping Use    Vaping status: Never Used   Substance and Sexual Activity    Alcohol use: Yes     Comment: occ    Drug use: No    Sexual activity: Not on file   Other Topics Concern    Not on file   Social History Narrative    Not  on file     Social Drivers of Health     Financial Resource Strain: Not on file   Food Insecurity: Not on file   Transportation Needs: Not on file   Physical Activity: Not on file   Stress: Not on file   Social Connections: Not on file   Intimate Partner Violence: Not on file   Housing Stability: Not on file     Allergies   Allergen Reactions    Lisinopril      cough     Outpatient Encounter Medications as of 1/28/2025   Medication Sig Dispense Refill    sotalol (BETAPACE) 120 MG tablet TAKE 1 TABLET BY MOUTH TWICE DAILY 180 Tablet 0    fluticasone furoate-vilanterol (BREO) 200-25 MCG/ACT AEROSOL POWDER, BREATH ACTIVATED USE 1 INHALATION BY MOUTH ONCE DAILY 540 Each 3    losartan (COZAAR) 50 MG Tab TAKE 1 TABLET BY MOUTH TWICE DAILY 180 Tablet 0    losartan (COZAAR) 50 MG Tab Take 1 Tablet by mouth 2 times a day. 180 Tablet 0    escitalopram (LEXAPRO) 10 MG Tab TAKE 1 TABLET BY MOUTH EVERY DAY 90 Tablet 3    atorvastatin (LIPITOR) 40 MG Tab TAKE 1 TABLET BY MOUTH EVERY EVENING 90 Tablet 3    XARELTO 20 MG Tab tablet TAKE 1 TABLET BY MOUTH WITH DINNER 90 Tablet 3    amLODIPine (NORVASC) 2.5 MG Tab TAKE 1 TABLET BY MOUTH EVERY DAY 90 Tablet 3    Multiple Vitamins-Minerals (MULTIVITAMIN ADULT PO) Take 1 Tab by mouth every morning.      Glucosamine HCl (GLUCOSAMINE PO) Take 1 Tablet by mouth every morning.       No facility-administered encounter medications on file as of 1/28/2025.     Review of Systems   Constitutional:  Positive for malaise/fatigue. Negative for chills, fever and weight loss.   HENT:  Negative for nosebleeds and tinnitus.    Respiratory:  Negative for cough, hemoptysis, sputum production, shortness of breath and wheezing.    Cardiovascular:  Negative for chest pain, palpitations, orthopnea, leg swelling and PND.   Gastrointestinal:  Negative for blood in stool, diarrhea, heartburn, nausea and vomiting.   Neurological:  Negative for dizziness, tingling, tremors, sensory change, speech change, focal  "weakness, loss of consciousness and headaches.   All other systems reviewed and are negative.       Objective     /62 (BP Location: Left arm, Patient Position: Sitting, BP Cuff Size: Adult)   Pulse (!) 54   Resp 16   Ht 1.93 m (6' 4\")   Wt 123 kg (271 lb)   SpO2 93%   BMI 32.99 kg/m²     Physical Exam  Vitals reviewed.   Constitutional:       Appearance: Normal appearance. He is well-developed.   HENT:      Head: Normocephalic and atraumatic.      Mouth/Throat:      Mouth: Mucous membranes are moist.      Pharynx: Oropharynx is clear.   Eyes:      Extraocular Movements: Extraocular movements intact.      Conjunctiva/sclera: Conjunctivae normal.      Pupils: Pupils are equal, round, and reactive to light.   Neck:      Vascular: No JVD.   Cardiovascular:      Rate and Rhythm: Regular rhythm. Bradycardia present.      Pulses: Normal pulses.      Heart sounds: Normal heart sounds. No murmur heard.     No friction rub. No gallop.   Pulmonary:      Effort: Pulmonary effort is normal.      Breath sounds: Normal breath sounds. No wheezing or rales.   Chest:      Chest wall: No tenderness.   Musculoskeletal:         General: Normal range of motion.      Cervical back: Normal range of motion and neck supple.      Right lower leg: No edema.      Left lower leg: No edema.   Skin:     General: Skin is warm and dry.      Capillary Refill: Capillary refill takes 2 to 3 seconds.   Neurological:      Mental Status: He is alert and oriented to person, place, and time.   Psychiatric:         Mood and Affect: Mood normal.         Behavior: Behavior normal.         Thought Content: Thought content normal.         Judgment: Judgment normal.          Assessment & Plan     1. Paroxysmal atrial fibrillation (HCC)  EKG    Comp Metabolic Panel    MAGNESIUM      2. S/P ablation of atrial fibrillation/Atrial Flutter        3. High risk medication use  Comp Metabolic Panel    MAGNESIUM      4. Stented coronary artery        5. " Essential hypertension        6. Dyslipidemia        7. Circulating anticoagulants (HCC)        8. Obstructive sleep apnea syndrome          Medical Decision Making: Today's Assessment/Status/Plan:   1.  Paroxysmal atrial fibrillation  - History of ablation in 2024 persistent atrial fibrillation and ongoing rhythm control with sotalol.  Then continues to be stable.-Twelve-lead ECG in office today demonstrates sinus bradycardia rate in the low to mid 50s.  -Discussed he will continue sotalol 120 mg twice daily    2.  High risk medication use (sotalol)  - QTc in office today is appropriate at 454 ms.  Most recent lab work reviewed and demonstrates appropriate electrolytes and renal function for current sotalol dosing.  - Recheck labs in 6 months for this potentially high risk medication with an office ECG for follow-up secondary to risk of QT prolongation and proarrhythmia.    3.  Stented coronary artery  - Stable without evidence of chest pain/angina.  -He is on appropriate therapy with high intensity statin, losartan and Xarelto.  -Last LDL was 48, he will be due for repeat LDL checked this year, I will order.  -Blood pressure is appropriate per ACC/AHA guidelines.  -He is a non-smoker.    4.  Anticoagulation  - Continue Xarelto nightly.    5.  SHYAM  - Ongoing use of BiPAP with good compliance.    Return to clinic in 6 months for review, sooner if clinical condition changes.  PLEASE NOTE: This Note was created using voice recognition Software. I have made every reasonable attempt to correct obvious errors, but I expect that there are errors of grammar and possibly content that I did not discover before finalizing the note        .

## 2025-01-28 ENCOUNTER — OFFICE VISIT (OUTPATIENT)
Dept: CARDIOLOGY | Facility: MEDICAL CENTER | Age: 80
End: 2025-01-28
Attending: NURSE PRACTITIONER
Payer: MEDICARE

## 2025-01-28 VITALS
RESPIRATION RATE: 16 BRPM | HEIGHT: 76 IN | OXYGEN SATURATION: 93 % | DIASTOLIC BLOOD PRESSURE: 62 MMHG | WEIGHT: 271 LBS | BODY MASS INDEX: 33 KG/M2 | SYSTOLIC BLOOD PRESSURE: 124 MMHG | HEART RATE: 54 BPM

## 2025-01-28 DIAGNOSIS — Z79.899 HIGH RISK MEDICATION USE: ICD-10-CM

## 2025-01-28 DIAGNOSIS — E78.5 DYSLIPIDEMIA: ICD-10-CM

## 2025-01-28 DIAGNOSIS — Z98.890 S/P ABLATION OF ATRIAL FIBRILLATION: ICD-10-CM

## 2025-01-28 DIAGNOSIS — I10 ESSENTIAL HYPERTENSION: ICD-10-CM

## 2025-01-28 DIAGNOSIS — G47.33 OBSTRUCTIVE SLEEP APNEA SYNDROME: ICD-10-CM

## 2025-01-28 DIAGNOSIS — D68.318 CIRCULATING ANTICOAGULANTS (HCC): ICD-10-CM

## 2025-01-28 DIAGNOSIS — I48.0 PAROXYSMAL ATRIAL FIBRILLATION (HCC): ICD-10-CM

## 2025-01-28 DIAGNOSIS — Z95.5 STENTED CORONARY ARTERY: ICD-10-CM

## 2025-01-28 DIAGNOSIS — Z86.79 S/P ABLATION OF ATRIAL FIBRILLATION: ICD-10-CM

## 2025-01-28 LAB — EKG IMPRESSION: NORMAL

## 2025-01-28 PROCEDURE — 93010 ELECTROCARDIOGRAM REPORT: CPT | Performed by: INTERNAL MEDICINE

## 2025-01-28 PROCEDURE — 3078F DIAST BP <80 MM HG: CPT | Performed by: NURSE PRACTITIONER

## 2025-01-28 PROCEDURE — 99214 OFFICE O/P EST MOD 30 MIN: CPT | Performed by: NURSE PRACTITIONER

## 2025-01-28 PROCEDURE — 99212 OFFICE O/P EST SF 10 MIN: CPT | Performed by: NURSE PRACTITIONER

## 2025-01-28 PROCEDURE — 93005 ELECTROCARDIOGRAM TRACING: CPT | Mod: TC | Performed by: NURSE PRACTITIONER

## 2025-01-28 PROCEDURE — 3074F SYST BP LT 130 MM HG: CPT | Performed by: NURSE PRACTITIONER

## 2025-01-28 ASSESSMENT — ENCOUNTER SYMPTOMS
HEARTBURN: 0
WEIGHT LOSS: 0
LOSS OF CONSCIOUSNESS: 0
SPEECH CHANGE: 0
TREMORS: 0
HEMOPTYSIS: 0
CHILLS: 0
VOMITING: 0
PND: 0
DIZZINESS: 0
SHORTNESS OF BREATH: 0
DIARRHEA: 0
HEADACHES: 0
NAUSEA: 0
SENSORY CHANGE: 0
BLOOD IN STOOL: 0
FOCAL WEAKNESS: 0
ORTHOPNEA: 0
TINGLING: 0
PALPITATIONS: 0
COUGH: 0
FEVER: 0
WHEEZING: 0
SPUTUM PRODUCTION: 0

## 2025-01-28 ASSESSMENT — FIBROSIS 4 INDEX: FIB4 SCORE: 1.641645515938200344

## 2025-02-19 ENCOUNTER — OFFICE VISIT (OUTPATIENT)
Dept: SLEEP MEDICINE | Facility: MEDICAL CENTER | Age: 80
End: 2025-02-19
Attending: NURSE PRACTITIONER
Payer: MEDICARE

## 2025-02-19 VITALS
OXYGEN SATURATION: 93 % | DIASTOLIC BLOOD PRESSURE: 62 MMHG | HEART RATE: 57 BPM | SYSTOLIC BLOOD PRESSURE: 100 MMHG | WEIGHT: 255 LBS | BODY MASS INDEX: 31.05 KG/M2 | HEIGHT: 76 IN

## 2025-02-19 DIAGNOSIS — Z78.9 NONSMOKER: ICD-10-CM

## 2025-02-19 DIAGNOSIS — G47.33 OBSTRUCTIVE SLEEP APNEA: ICD-10-CM

## 2025-02-19 DIAGNOSIS — R94.2 MIXED OBSTRUCTIVE AND RESTRICTIVE VENTILATORY DEFECT: ICD-10-CM

## 2025-02-19 PROCEDURE — 99213 OFFICE O/P EST LOW 20 MIN: CPT | Performed by: NURSE PRACTITIONER

## 2025-02-19 PROCEDURE — 99214 OFFICE O/P EST MOD 30 MIN: CPT | Performed by: NURSE PRACTITIONER

## 2025-02-19 RX ORDER — LEVALBUTEROL TARTRATE 45 UG/1
2 AEROSOL, METERED ORAL EVERY 4 HOURS PRN
Qty: 45 G | Refills: 3 | Status: SHIPPED | OUTPATIENT
Start: 2025-02-19

## 2025-02-19 RX ORDER — FLUTICASONE FUROATE AND VILANTEROL 200; 25 UG/1; UG/1
1 POWDER RESPIRATORY (INHALATION) DAILY
Qty: 180 EACH | Refills: 3 | Status: SHIPPED | OUTPATIENT
Start: 2025-02-19

## 2025-02-19 RX ORDER — PREDNISONE 10 MG/1
20 TABLET ORAL DAILY
COMMUNITY
End: 2025-02-27

## 2025-02-19 ASSESSMENT — PATIENT HEALTH QUESTIONNAIRE - PHQ9: CLINICAL INTERPRETATION OF PHQ2 SCORE: 0

## 2025-02-19 ASSESSMENT — FIBROSIS 4 INDEX: FIB4 SCORE: 1.641645515938200344

## 2025-02-19 NOTE — Clinical Note
REFERRAL APPROVAL NOTICE         Sent on February 19, 2025                   Anthony Cloud  [address] [place] [address]                   Dear Mr. Cloud,    After a careful review of the medical information and benefit coverage, [facility] has processed your referral. See below for additional details.    If applicable, you must be actively enrolled with your insurance for coverage of the authorized service. If you have any questions regarding your coverage, please contact your insurance directly.    REFERRAL INFORMATION   Referral #:  [id number]  Referred-To Department    Referred-By Provider:  Pulmonary and Sleep Medicine    INDIRA Jacob   Pulmonary/sleep [facility] [place]      [address]  [address]  [place] [address]  [phone] [address], [address]  [place] [id number] [phone]    Referral Start Date:  02/19/2025  Referral End Date:   02/19/2026           SCHEDULING  If you do not already have an appointment, please call [phone] to make an appointment.   MORE INFORMATION  As a reminder, [facility] ownership has changed, meaning this location is now owned and operated by [facility]. As such, we want to clarify that our patients should expect to receive two separate bills for the services received at [facility] - one representing the [facility] facility fees as the owner of the establishment, and the other to represent the physician's services and subsequent fees. You can speak with your insurance carrier for a pricing estimate by calling the customer service number on the back of your card and ask about charges for a hospital outpatient visit.  If you do not already have a [employer] account, sign up at: [email or web address]  You can access your medical information, make appointments, see lab results, billing  information, and more.  If you have questions regarding this referral, please contact  the [facility] department at:             [phone]. Monday - Friday 7:30AM - 5:00PM.      Sincerely,  [facility]

## 2025-02-19 NOTE — PATIENT INSTRUCTIONS
Continue prednisone and antibiotic till gone    Use Xopenex HFA every 4-6hrs as needed     Pursue mask fit at [facility]

## 2025-02-19 NOTE — PROGRESS NOTES
Chief Complaint   Patient presents with    Follow-Up     Dx/Last seen:  Obstructive sleep apnea syndrome 7/14/22 Liliana Ineck    BiPAP Pro with IPAP 18 and EPAP 14 cm H2O pressure    Other     Bronchi spams Patient stated that he did some reach and noticed that all the symptoms that show there he has.     Cough     Coughing really bad that he starts turning blue        HPI:  Anthony Cloud is a 79 y.o. 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  Hx of A. Fib with chronic anticoagulation.  Last OV 7/14/22 with Ineck PAC     MMRC Grade: 1-2  Exacerbations this year: 4 days ago UC given ABX/PREDNISONE    Currently using AutoBIPAP 18/14cm; RESPIRONICS device obtained 6/17/16.  Compliance report 1/6/25-2/4/25 indicates 14 nights of use, average nightly use 9 hours 49 minutes, significant mask leak of over 2 hours per night with overall AHI of 4.7/h.  Reviewed with patient.    Currently prescribed Breo 200mcg 1 puff QD.    Interval Events:  2/19/25: Patient presents today after returning back from [place] to see his daughter.  He was having what he believes bronchospasm with increased congestion.  He was unable to catch his breath and felt his airway was closing down.  He was started on antibiotics by one of his daughters and prescribed prednisone at the urgent care.  He has now been on prednisone for 2 days and has found some improvement with cough.  He also restarted Xopenex HFA inhaler in the last 24 hours and notes it calms the cough.  He denies any chills or significant fever.  His daughter notes him having a frequent coughing fit which she also had a few months back and took quite some time to resolve.  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  He notes occasionally he could eat his food too quickly and noticed a swallow issue but this is rare.  He reports no GERD.  He is currently using  Mucinex 600 mg twice daily.  Has multiple BiPAP machines that is in different places of living.  He reports no issues and did obtain a replacement Moder during the recall.  He is very consistent and will always have one of his machines used at night.  He does not miss nights on therapy.    PULM & SLEEP HX:  Never smoker  PFT 11/9/2020 indicated mild restriction with FEV1 2.57 L or 73%, ratio 86%, TLC 5.88 L or 75% DLCO 90% predicted.  BMI 32.  PFT 2012 noted mild mixed obstructive and restrictive lung disease.  No significant bronchodilator response.  Lung valves were within normal limits.  With preserved DLCO.  FEV1 2.8 L, ratio 106%.  CT chest 5/2023:    ECHO 4/12/22:  Prior study 02/12/2020, EF has now normalized.  Normal left ventricular systolic function.  The left ventricular ejection fraction is visually estimated to be 60%.  The right ventricle is normal in size and systolic function.    PSG 6/14/16:  CPAP was tried from 5cm to [id number]. Bilevel was tried from 16/12cm to 18/14cm H2O.   Impression:  Review of the sleep architecture shows a prolonged sleep latency, increased wake after sleep onset, and REM rebound once positive airway pressure was initiated. The oximetric pattern improved significantly with bilevel treatment.  The diagnostic analysis reveals severe obstructive sleep apnea hypopnea with an RDI of 32.6, and saturation of 80%, and saturations less than 90% for 93.1% of the recording. All of the events were obstructive hypopneas. He has a history of complex sleep apnea and has been treated with ASV for years. However, he had only one central apnea during his CPAP and bilevel titration. He does not meet the criteria for ASV any longer.  Both CPAP and bilevel were utilized. The patient did best on bilevel of 18/14 with primarily REM supine sleep with an apnea hypopnea index 0.0, low saturation 85%, and a mean saturation of 87.7%.  Patient also did well on bilevel 16/12 and bilevel of 17/13.      Recommendation:  Recommend bilevel of 18/14 using mask of choice and heated humidification with a follow-up continuous nocturnal oximetry on bilevel at patient's home altitude followed by a clinical and compliance review in 6-8 weeks. He has been treated for years with ASV for complex sleep apnea which has resolved.    CNOX on BIPAP 18/14cm 8/15/16:  The basal arterial oxygen saturation is 90%.  Saturation is reasonably well maintained throughout the night at 88% or above except for some brief periods of desaturation in the first hour of the study and in the penultimate hour of the study.  The data tabulation does record drops to 81% but these probably represent technical artifacts.  Overall he spends 44% of the study with a saturation below 90% but only 0.1% of the study time with a saturation below 85% and less than 15 minutes with a saturation less than 88%.    Assessment: Mild nocturnal hypoxemia on room air with reasonable preservation of saturation on BiPAP therapy.    ROS: As per HPI and otherwise negative if not stated.    Past Medical History:   Diagnosis Date    Acute nasopharyngitis 01/2020    Anemia     Arrhythmia     a fib    Bowel habit changes     constipation     Breath shortness     since 11/2019, hx cardiac arrhythmia     CAD (coronary artery disease) 2003    PCI/BMS x 3 to the RCA (Zeta 4.0 x 23mm, 3.5 x 23mm, 3.0 x 18mm).    Cataract     alejandra IOL     Central sleep apnea      ICD-10 transition    Chronic anticoagulation     COPD (chronic obstructive pulmonary disease) (HCC)     Depression     Depressive disorder, not elsewhere classified          Dyslipidemia     Hemorrhagic disorder (HCC)     takes xarelto     High cholesterol     Hx MRSA infection 2009, 2014    right ankle 2014, right thumb 2009    Hypertension     Hypothyroidism     Mixed hyperlipidemia     Obesity     Old myocardial infarction January 2003    cardiac stents x3    Peptic ulcer disease 8/4/2016    Personal history of venous  thrombosis and embolism     On Xarelto    Pulmonary embolism (HCC) 2003/2004    Sleep apnea     BiPAP with 3L oxygen       Past Surgical History:   Procedure Laterality Date    PB SHLDR ARTHROSCOP,SURG,W/ROTAT CUFF REPB Right 4/12/2021    Procedure: ARTHROSCOPY, SHOULDER, WITH ROTATOR CUFF REPAIR;  Surgeon: Eusebia Kinney M.D.;  Location: SURGERY [facility];  Service: Orthopedics    PB ARTHROSCOPY SHOULDER SURGICAL BICEPS TENODES* Right 4/12/2021    Procedure: ARTHROSCOPY, SHOULDER, WITH BICEPS TENODESIS - FOR TENOTOMY;  Surgeon: Eusebia Kinney M.D.;  Location: SURGERY [facility];  Service: Orthopedics    WI SHLDR ARTHROSCOP,PART ACROMIOPLAS Right 4/12/2021    Procedure: DECOMPRESSION, SHOULDER, ARTHROSCOPIC - SUBACROMIAL, LABRAL DEBRIDEMENT;  Surgeon: Eusebia Kinney M.D.;  Location: SURGERY [facility];  Service: Orthopedics    OTHER CARDIAC SURGERY  07/2020    Cardiac ablasion     COLONOSCOPY  2020    ORIF, WRIST Left 2/23/2018    Procedure: WRIST ORIF;  Surgeon: Jasper Hammond M.D.;  Location: SURGERY [facility];  Service: Orthopedics    INCISION AND DRAINAGE ORTHOPEDIC  8/29/2014    Performed by Wolfgang Merrill M.D. at SURGERY [facility]    CARDIAC CATH, RIGHT/LEFT HEART  2003 01/2003 4.0x23mm Zeta stent to proximal RCA,3.5x22mm distal to first stent, 3.0x15mm at the point of original occlusion.    OTHER  2001    back sx     WI ANESTH,LOWER LEG ARTERIES SURG         Family History   Problem Relation Age of Onset    Other Mother         Passed at 98    Heart Disease Father 60        CABG       Social History     Socioeconomic History    Marital status:      Spouse name: Not on file    Number of children: Not on file    Years of education: Not on file    Highest education level: Not on file   Occupational History    Not on file   Tobacco Use    Smoking status: Never    Smokeless tobacco: Never   Vaping Use    Vaping status: Never Used   Substance and Sexual  "Activity    Alcohol use: Yes     Comment: occ    Drug use: No    Sexual activity: Not on file   Other Topics Concern    Not on file   Social History Narrative    Not on file     Social Drivers of Health     Financial Resource Strain: Not on file   Food Insecurity: Not on file   Transportation Needs: Not on file   Physical Activity: Not on file   Stress: Not on file   Social Connections: Not on file   Intimate Partner Violence: Not on file   Housing Stability: Not on file       Allergies as of 02/19/2025 - Reviewed 02/19/2025   Allergen Reaction Noted    Lisinopril  06/19/2014        Vitals:  Ht 1.93 m (6' 4\")   Wt 116 kg (255 lb)     Current medications as of today   Current Outpatient Medications   Medication Sig Dispense Refill    predniSONE (DELTASONE) 10 MG Tab Take 20 mg by mouth every day.      sotalol (BETAPACE) 120 MG tablet TAKE 1 TABLET BY MOUTH TWICE DAILY 180 Tablet 0    fluticasone furoate-vilanterol (BREO) 200-25 MCG/ACT AEROSOL POWDER, BREATH ACTIVATED USE 1 INHALATION BY MOUTH ONCE DAILY 540 Each 3    losartan (COZAAR) 50 MG Tab TAKE 1 TABLET BY MOUTH TWICE DAILY 180 Tablet 0    losartan (COZAAR) 50 MG Tab Take 1 Tablet by mouth 2 times a day. 180 Tablet 0    escitalopram (LEXAPRO) 10 MG Tab TAKE 1 TABLET BY MOUTH EVERY DAY 90 Tablet 3    atorvastatin (LIPITOR) 40 MG Tab TAKE 1 TABLET BY MOUTH EVERY EVENING 90 Tablet 3    XARELTO 20 MG Tab tablet TAKE 1 TABLET BY MOUTH WITH DINNER 90 Tablet 3    amLODIPine (NORVASC) 2.5 MG Tab TAKE 1 TABLET BY MOUTH EVERY DAY 90 Tablet 3    Multiple Vitamins-Minerals (MULTIVITAMIN ADULT PO) Take 1 Tab by mouth every morning.      Glucosamine HCl (GLUCOSAMINE PO) Take 1 Tablet by mouth every morning.       No current facility-administered medications for this visit.         Physical Exam:   Gen:           Alert and oriented, No apparent distress. Mood and affect appropriate, normal interaction with examiner.  Eyes:          PERRL, EOM intact, sclere white, " "conjunctive moist.  Ears:          Not examined.   Hearing:     Grossly intact.  Nose:          Normal, no lesions or deformities.  Dentition:    Not examined.   Oropharynx:   Not examined.   Mallampati Classification: Not examined.   Neck:        Supple, trachea midline, no masses.  Respiratory Effort: No intercostal retractions or use of accessory muscles.   Lung Auscultation:      Clear to auscultation bilaterally; no rales, rhonchi or wheezing. Cough with congested/wet sound on exam.  CV:            Regular rate and rhythm. No murmurs, rubs or gallops.  Abd:           Not examined.   Lymphadenopathy: Not examined.   Gait and Station: Normal.  Digits and Nails: No clubbing, cyanosis, petechiae, or nodes.   Cranial Nerves: II-XII grossly intact.  Skin:        No rashes, lesions or ulcers noted.               Ext:           No cyanosis or edema.      Assessment:  1. Obstructive sleep apnea        2. Mixed obstructive and restrictive ventilatory defect        3. BMI 31.0-31.9,adult        4. Nonsmoker                 Immunizations:    Flu:recommend  Pneumovax 23:2017  Prevnar 13:2018  PCV 20: not due  COVID-19: 2021    Plan:  SHYAM is well-controlled.  He will continue to benefit from auto BiPAP use.  Updated orders for supplies.   DME mask/supplies  History of mixed obstructive and restrictive defect.  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  He continues to find benefit from Breo.  Refills provided.  Reviewed proper use of Xopenex HFA.  Dems have improved in the last 48 hours and he will complete prednisone and antibiotic.  Recommend Xopenex HFA use at least 2-3 times daily to help treat symptoms.  Reviewed patient needing to watch for swallow issues or ongoing sudden \"shutting down of his airway\".  Will consider speech evaluation if this continues.   Refills   PFT at next OV  Encourage weight loss healthy diet and " activity.  Follow-up in 2 months with updated PFT and compliance report, sooner if needed.    Please note that this dictation was created using voice recognition software. I have made every reasonable attempt to correct obvious errors, but it is possible there are errors of grammar and possibly content that I did not discover before finalizing the note.

## 2025-02-27 ENCOUNTER — OFFICE VISIT (OUTPATIENT)
Dept: INTERNAL MEDICINE | Facility: OTHER | Age: 80
End: 2025-02-27
Payer: MEDICARE

## 2025-02-27 VITALS
TEMPERATURE: 96.4 F | DIASTOLIC BLOOD PRESSURE: 75 MMHG | SYSTOLIC BLOOD PRESSURE: 137 MMHG | OXYGEN SATURATION: 95 % | WEIGHT: 254 LBS | HEART RATE: 54 BPM | HEIGHT: 76 IN | BODY MASS INDEX: 30.93 KG/M2

## 2025-02-27 DIAGNOSIS — E11.9 TYPE 2 DIABETES MELLITUS WITHOUT COMPLICATION, WITHOUT LONG-TERM CURRENT USE OF INSULIN (HCC): ICD-10-CM

## 2025-02-27 DIAGNOSIS — J45.909 REACTIVE AIRWAY DISEASE WITHOUT COMPLICATION, UNSPECIFIED ASTHMA SEVERITY, UNSPECIFIED WHETHER PERSISTENT: ICD-10-CM

## 2025-02-27 DIAGNOSIS — I10 ESSENTIAL HYPERTENSION: ICD-10-CM

## 2025-02-27 DIAGNOSIS — Z23 NEED FOR VACCINATION: ICD-10-CM

## 2025-02-27 DIAGNOSIS — I48.91 ATRIAL FIBRILLATION, UNSPECIFIED TYPE (HCC): ICD-10-CM

## 2025-02-27 DIAGNOSIS — E78.5 DYSLIPIDEMIA: ICD-10-CM

## 2025-02-27 DIAGNOSIS — J06.9 VIRAL UPPER RESPIRATORY TRACT INFECTION: ICD-10-CM

## 2025-02-27 DIAGNOSIS — F32.A DEPRESSION, UNSPECIFIED DEPRESSION TYPE: ICD-10-CM

## 2025-02-27 PROCEDURE — 3075F SYST BP GE 130 - 139MM HG: CPT | Performed by: INTERNAL MEDICINE

## 2025-02-27 PROCEDURE — 99214 OFFICE O/P EST MOD 30 MIN: CPT | Mod: 25 | Performed by: INTERNAL MEDICINE

## 2025-02-27 PROCEDURE — G0008 ADMIN INFLUENZA VIRUS VAC: HCPCS | Performed by: INTERNAL MEDICINE

## 2025-02-27 PROCEDURE — 3078F DIAST BP <80 MM HG: CPT | Performed by: INTERNAL MEDICINE

## 2025-02-27 PROCEDURE — 90662 IIV NO PRSV INCREASED AG IM: CPT | Performed by: INTERNAL MEDICINE

## 2025-02-27 RX ORDER — ATORVASTATIN CALCIUM 40 MG/1
40 TABLET, FILM COATED ORAL NIGHTLY
Qty: 90 TABLET | Refills: 3 | Status: SHIPPED | OUTPATIENT
Start: 2025-02-27

## 2025-02-27 ASSESSMENT — FIBROSIS 4 INDEX: FIB4 SCORE: 1.641645515938200344

## 2025-02-27 NOTE — PROGRESS NOTES
Established Patient    Patient Care Team:  Angel Cameron M.D. as PCP - General  INDIRA Carr as Mid Level Provider (Cardiovascular Disease (Cardiology))  Padmini Calhoun M.D. as Consulting Physician (Cardiovascular Disease (Cardiology))  cpap and more as Respiratory Therapist  Austin Schreiber M.D. as Consulting Physician (Pulmonary Medicine)    Anthony Cloud is a 79 y.o. male who presents today with the following Chief Complaint(s):  Chief Complaint   Patient presents with    Follow-Up    and for follow up for Diagnoses of Viral upper respiratory tract infection, Reactive airway disease without complication, unspecified asthma severity, unspecified whether persistent, Essential hypertension, Dyslipidemia, Depression, unspecified depression type, Type 2 diabetes mellitus without complication, without long-term current use of insulin (HCC), Atrial fibrillation, unspecified type (HCC), and Need for vaccination were pertinent to this visit.    ROS:     Denies any new chest pain or shortness of breath.  No changes to urinary or bowel function. ***  See HPI.    Past Medical History:   Diagnosis Date    Acute nasopharyngitis 01/2020    Anemia     Arrhythmia     a fib    Bowel habit changes     constipation     Breath shortness     since 11/2019, hx cardiac arrhythmia     CAD (coronary artery disease) 2003    PCI/BMS x 3 to the RCA (Zeta 4.0 x 23mm, 3.5 x 23mm, 3.0 x 18mm).    Cataract     alejandra IOL     Central sleep apnea      ICD-10 transition    Chronic anticoagulation     COPD (chronic obstructive pulmonary disease) (HCC)     Depression     Depressive disorder, not elsewhere classified          Dyslipidemia     Hemorrhagic disorder (HCC)     takes xarelto     High cholesterol     Hx MRSA infection 2009, 2014    right ankle 2014, right thumb 2009    Hypertension     Hypothyroidism     Mixed hyperlipidemia     Obesity     Old myocardial infarction January 2003    cardiac stents x3    Peptic ulcer  "disease 8/4/2016    Personal history of venous thrombosis and embolism     On Xarelto    Pulmonary embolism (HCC) 2003/2004    Sleep apnea     BiPAP with 3L oxygen     Social History     Tobacco Use    Smoking status: Never    Smokeless tobacco: Never   Vaping Use    Vaping status: Never Used   Substance Use Topics    Alcohol use: Yes     Comment: occ    Drug use: No     Current Outpatient Medications   Medication Sig Dispense Refill    atorvastatin (LIPITOR) 40 MG Tab Take 1 Tablet by mouth every evening. 90 Tablet 3    metFORMIN (GLUCOPHAGE) 500 MG Tab Take 1 Tablet by mouth 2 times a day. 200 Tablet 3    levalbuterol (XOPENEX HFA) 45 MCG/ACT inhaler Inhale 2 Puffs every four hours as needed for Shortness of Breath. 45 g 3    fluticasone furoate-vilanterol (BREO) 200-25 MCG/ACT AEROSOL POWDER, BREATH ACTIVATED Inhale 1 Puff every day. 90 days, 3 refills 180 Each 3    sotalol (BETAPACE) 120 MG tablet TAKE 1 TABLET BY MOUTH TWICE DAILY 180 Tablet 0    losartan (COZAAR) 50 MG Tab Take 1 Tablet by mouth 2 times a day. 180 Tablet 0    escitalopram (LEXAPRO) 10 MG Tab TAKE 1 TABLET BY MOUTH EVERY DAY 90 Tablet 3    XARELTO 20 MG Tab tablet TAKE 1 TABLET BY MOUTH WITH DINNER 90 Tablet 3    amLODIPine (NORVASC) 2.5 MG Tab TAKE 1 TABLET BY MOUTH EVERY DAY 90 Tablet 3    Multiple Vitamins-Minerals (MULTIVITAMIN ADULT PO) Take 1 Tab by mouth every morning.      Glucosamine HCl (GLUCOSAMINE PO) Take 1 Tablet by mouth every morning.       No current facility-administered medications for this visit.     Physical Exam:  /75 (BP Location: Right arm, Patient Position: Sitting, BP Cuff Size: Adult)   Pulse (!) 54   Temp (!) 35.8 °C (96.4 °F) (Temporal)   Ht 1.93 m (6' 4\")   Wt 115 kg (254 lb)   SpO2 95%   BMI 30.92 kg/m²   General: Well developed, well nourished male, in no distress.  Eyes: Conjuntiva without any obvious injection or erythema.   Cardiovascular: Heart is regular with {Murmur Yes/No:48439::\"2/6 systolic\"} " murmur  Lungs: Clear to auscultation bilaterally. No wheezes, rhonci or crackles heard. Respiratory effort is normal.  Abd: Soft, non-tender  Ext: No edema  Monofilament testing with a 10 gram force: sensation intact: intact bilaterally  Visual Inspection: Feet without maceration, ulcers, fissures.  Pedal pulses: intact bilaterally    HPI / Assessment / Plan:  1. Viral upper respiratory tract infection  2. Reactive airway disease without complication, unspecified asthma severity, unspecified whether persistent  ***    3. Essential hypertension  ***    4. Dyslipidemia  Anthony is tolerating medication well.  No intolerable side-effects noted.  No new symptoms of muscle aches or weakness.  Patient reports good adherence to medication regimen.  No change in medication since the last visit. Anthony is trying to follow a low-cholesterol diet.  Exercise is adequate.  Plan:  Currently this is stable and well controlled.  The patient should continue current therapy with no changes at this time.     - Lipid Profile; Future    5. Depression, unspecified depression type  Plan:  Currently this is stable and well controlled.  The patient should continue current therapy with no changes at this time.     Continue Lexapro.    6. Type 2 diabetes mellitus without complication, without long-term current use of insulin (HCC)  ***  - Comp Metabolic Panel; Future  - HEMOGLOBIN A1C; Future  - MICROALBUMIN CREAT RATIO URINE; Future    7. Atrial fibrillation, unspecified type (HCC)  ***    8. Need for vaccination  - INFLUENZA VACCINE, HIGH DOSE (65+ ONLY)    Orders Placed This Encounter    INFLUENZA VACCINE, HIGH DOSE (65+ ONLY)    Comp Metabolic Panel    HEMOGLOBIN A1C    MICROALBUMIN CREAT RATIO URINE    Lipid Profile    atorvastatin (LIPITOR) 40 MG Tab    metFORMIN (GLUCOPHAGE) 500 MG Tab       No follow-ups on file.    Angel Cameron M.D.  Professor of Medicine  [facility]    This note was created using  voice recognition software.  While every attempt is made to ensure accuracy of transcription, occasionally errors occur.   atorvastatin (LIPITOR) 40 MG Tab    metFORMIN (GLUCOPHAGE) 500 MG Tab       Return in about 6 months (around 8/27/2025).    Angel Cameron M.D.  Professor of Medicine  [facility]    This note was created using voice recognition software.  While every attempt is made to ensure accuracy of transcription, occasionally errors occur.

## 2025-03-28 ENCOUNTER — TELEPHONE (OUTPATIENT)
Dept: CARDIOLOGY | Facility: MEDICAL CENTER | Age: 80
End: 2025-03-28
Payer: MEDICARE

## 2025-03-28 DIAGNOSIS — I10 HTN (HYPERTENSION), MALIGNANT: ICD-10-CM

## 2025-03-28 DIAGNOSIS — I25.10 CORONARY ARTERY DISEASE DUE TO LIPID RICH PLAQUE: ICD-10-CM

## 2025-03-28 DIAGNOSIS — I48.19 PERSISTENT ATRIAL FIBRILLATION (HCC): ICD-10-CM

## 2025-03-28 DIAGNOSIS — I25.83 CORONARY ARTERY DISEASE DUE TO LIPID RICH PLAQUE: ICD-10-CM

## 2025-03-28 RX ORDER — LOSARTAN POTASSIUM 50 MG/1
50 TABLET ORAL 2 TIMES DAILY
Qty: 180 TABLET | Refills: 3 | Status: SHIPPED | OUTPATIENT
Start: 2025-03-28

## 2025-03-28 RX ORDER — RIVAROXABAN 20 MG/1
20 TABLET, FILM COATED ORAL
Qty: 90 TABLET | Refills: 3 | Status: SHIPPED | OUTPATIENT
Start: 2025-03-28

## 2025-03-28 NOTE — TELEPHONE ENCOUNTER
Received Refill request via [facility]  for XARELTO 20 MG Tab tablet . (Quantity:90, Day Supply:90)     Insurance: [facility]  Member ID:  [id number]  BIN: [id number]  PCN: [id number]  Group: [id number]     Ran Test claim via [employer] [facility]  141/90ds. Refill request, will release to pt [facility] pharmacy [facility] [address]

## 2025-03-28 NOTE — TELEPHONE ENCOUNTER
Is the patient due for a refill? Yes    Was the patient seen the last 15 months? Yes    Date of last office visit: 1/28/25    Does the patient have an upcoming appointment?  Yes   If yes, When? 7/30/25    Provider to refill:EA    Does the patient have group home Plus and need 100-day supply? (This applies to ALL medications) Patient does not have SCP

## 2025-03-31 DIAGNOSIS — I48.91 ATRIAL FIBRILLATION, UNSPECIFIED TYPE (HCC): ICD-10-CM

## 2025-03-31 DIAGNOSIS — I10 ESSENTIAL HYPERTENSION: ICD-10-CM

## 2025-04-01 RX ORDER — SOTALOL HYDROCHLORIDE 120 MG/1
120 TABLET ORAL 2 TIMES DAILY
Qty: 180 TABLET | Refills: 1 | Status: SHIPPED | OUTPATIENT
Start: 2025-04-01

## 2025-04-01 RX ORDER — AMLODIPINE BESYLATE 2.5 MG/1
2.5 TABLET ORAL DAILY
Qty: 90 TABLET | Refills: 3 | Status: SHIPPED | OUTPATIENT
Start: 2025-04-01

## 2025-04-01 NOTE — TELEPHONE ENCOUNTER
Is the patient due for a refill? Yes    Was the patient seen the last 15 months? Yes    Date of last office visit: 01.28.2025    Does the patient have an upcoming appointment?  Yes   If yes, When? 07.30.2025    Provider to refill: MIRZA    Does the patient have alf Plus and need 100-day supply? (This applies to ALL medications) Patient does not have SCP

## 2025-04-21 ENCOUNTER — HOSPITAL ENCOUNTER (OUTPATIENT)
Dept: PULMONOLOGY | Facility: MEDICAL CENTER | Age: 80
End: 2025-04-21
Attending: NURSE PRACTITIONER
Payer: MEDICARE

## 2025-04-21 DIAGNOSIS — R94.2 MIXED OBSTRUCTIVE AND RESTRICTIVE VENTILATORY DEFECT: ICD-10-CM

## 2025-04-21 PROCEDURE — 94060 EVALUATION OF WHEEZING: CPT

## 2025-04-21 PROCEDURE — 94060 EVALUATION OF WHEEZING: CPT | Mod: 26 | Performed by: INTERNAL MEDICINE

## 2025-04-21 PROCEDURE — 94726 PLETHYSMOGRAPHY LUNG VOLUMES: CPT

## 2025-04-21 PROCEDURE — 94729 DIFFUSING CAPACITY: CPT

## 2025-04-21 PROCEDURE — 94726 PLETHYSMOGRAPHY LUNG VOLUMES: CPT | Mod: 26 | Performed by: INTERNAL MEDICINE

## 2025-04-21 PROCEDURE — 94729 DIFFUSING CAPACITY: CPT | Mod: 26 | Performed by: INTERNAL MEDICINE

## 2025-04-21 RX ORDER — LEVALBUTEROL INHALATION SOLUTION 1.25 MG/3ML
1.25 SOLUTION RESPIRATORY (INHALATION)
Status: DISCONTINUED | OUTPATIENT
Start: 2025-04-21 | End: 2025-04-22 | Stop reason: HOSPADM

## 2025-04-21 RX ORDER — ALBUTEROL SULFATE 5 MG/ML
2.5 SOLUTION RESPIRATORY (INHALATION)
Status: DISCONTINUED | OUTPATIENT
Start: 2025-04-21 | End: 2025-04-21

## 2025-04-21 RX ADMIN — LEVALBUTEROL INHALATION SOLUTION 1.25 MG: 1.25 SOLUTION RESPIRATORY (INHALATION) at 11:53

## 2025-04-23 NOTE — PROCEDURES
DATE OF SERVICE:  04/21/2025     PULMONARY FUNCTION TEST INTERPRETATION     INTERPRETATION:  1.  Spirometry is normal.   2.  There is no significant change post-bronchodilator.   3.  The full volume loop is consistent with a restriction.  4.  Lung volumes demonstrate a moderate restriction.   5.  The diffusion capacity is normal.   6.  In comparison to the prior study from 11/09/2020, the FVC is stable.        ______________________________  MD OBNI Cunningham/DEANA    DD:  04/22/2025 17:50  DT:  04/22/2025 17:58    Job#:  [id number]

## 2025-05-21 ENCOUNTER — OFFICE VISIT (OUTPATIENT)
Dept: SLEEP MEDICINE | Facility: MEDICAL CENTER | Age: 80
End: 2025-05-21
Attending: NURSE PRACTITIONER
Payer: MEDICARE

## 2025-05-21 VITALS
BODY MASS INDEX: 30.69 KG/M2 | DIASTOLIC BLOOD PRESSURE: 66 MMHG | OXYGEN SATURATION: 94 % | HEIGHT: 76 IN | WEIGHT: 252 LBS | HEART RATE: 63 BPM | SYSTOLIC BLOOD PRESSURE: 114 MMHG

## 2025-05-21 DIAGNOSIS — R94.2 MIXED OBSTRUCTIVE AND RESTRICTIVE VENTILATORY DEFECT: ICD-10-CM

## 2025-05-21 DIAGNOSIS — R05.9 COUGH, UNSPECIFIED TYPE: ICD-10-CM

## 2025-05-21 DIAGNOSIS — R13.10 DYSPHAGIA, UNSPECIFIED TYPE: ICD-10-CM

## 2025-05-21 DIAGNOSIS — G47.33 OBSTRUCTIVE SLEEP APNEA SYNDROME: Primary | ICD-10-CM

## 2025-05-21 DIAGNOSIS — Z78.9 NONSMOKER: ICD-10-CM

## 2025-05-21 PROCEDURE — 3078F DIAST BP <80 MM HG: CPT | Performed by: NURSE PRACTITIONER

## 2025-05-21 PROCEDURE — 3074F SYST BP LT 130 MM HG: CPT | Performed by: NURSE PRACTITIONER

## 2025-05-21 PROCEDURE — 99213 OFFICE O/P EST LOW 20 MIN: CPT | Performed by: NURSE PRACTITIONER

## 2025-05-21 PROCEDURE — 99214 OFFICE O/P EST MOD 30 MIN: CPT | Performed by: NURSE PRACTITIONER

## 2025-05-21 RX ORDER — DOXYCYCLINE 100 MG/1
100 CAPSULE ORAL 2 TIMES DAILY
Qty: 20 CAPSULE | Refills: 0 | Status: SHIPPED | OUTPATIENT
Start: 2025-05-21

## 2025-05-21 RX ORDER — PREDNISONE 10 MG/1
TABLET ORAL
Qty: 18 TABLET | Refills: 0 | Status: SHIPPED | OUTPATIENT
Start: 2025-05-21

## 2025-05-21 ASSESSMENT — FIBROSIS 4 INDEX: FIB4 SCORE: 1.641645515938200344

## 2025-05-21 NOTE — PROGRESS NOTES
Chief Complaint   Patient presents with    Follow-Up     Dx/Last seen: Obstructive sleep apnea 2/19/25 Marisel Dangelo     Test not completed: PFT    O2 or/and CPAP: BiPAP Pro with IPAP 18 and EPAP 14 cm H2O pressure    Wireless - Care  compliance uploaded  Set up date: 6/17/16       HPI:  Anthony Cloud is a 79 y.o. 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  Hx of A. Fib with chronic anticoagulation.  Last OV 2/19/25.     PFT 4/21/25 normal morena, -BR, consistent with restriction for TLC @68% and DLCO normal - FVC stable compared to prior testing. Reviewed with patient.    MMRC ndGndrndanddndend:nd nd2nd Exacerbations this year: 2/2025 UC visit/bronchospasm/cough    Currently prescribed Breo 200mcg 1 puff QD.    Currently using AutoBIPAP 18/14cm; RESPIRONICS device obtained 6/17/16.   Patient uses multiple devices due [place] r[place].  Compliance report 20 1/25 through 5/20/2025 indicates 33.3% compliance, average nightly use 8 hours 32 minutes, significant mask leak of over 3 hours and overall AHI of 8.1/h.  Reviewed with patient.     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  He has no complaints or issues and reviewed amount of mask leak.  He will update his head straps and make sure cushion is up-to-date.  Discussed possible mask fitting.  He feels his breathing is back to baseline.  Using Xopenex HFA as needed only.  No regular cough, phlegm, chest pain, chest tightness or wheezing.  He does have some shortness of breath with activity.  He does note symptoms of dysphagia with eating which has never been evaluated.  He would like to have this looked at.  2/19/25: Patient presents today after returning back from [place] to see his daughter.  He was having what he believes bronchospasm with increased congestion.  He was unable to catch his breath and  felt his airway was closing down.  He was started on antibiotics by one of his daughters and prescribed prednisone at the urgent care.  He has now been on prednisone for 2 days and has found some improvement with cough.  He also restarted Xopenex HFA inhaler in the last 24 hours and notes it calms the cough.  He denies any chills or significant fever.  His daughter notes him having a frequent coughing fit which she also had a few months back and took quite some time to resolve.  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  He notes occasionally he could eat his food too quickly and noticed a swallow issue but this is rare.  He reports no GERD.  He is currently using Mucinex 600 mg twice daily.  Has multiple BiPAP machines that is in different places of living.  He reports no issues and did obtain a replacement Moder during the recall.  He is very consistent and will always have one of his machines used at night.  He does not miss nights on therapy.     PULM & SLEEP HX:  Never smoker  PFT 11/9/2020 indicated mild restriction with FEV1 2.57 L or 73%, ratio 86%, TLC 5.88 L or 75% DLCO 90% predicted.  BMI 32.  PFT 2012 noted mild mixed obstructive and restrictive lung disease.  No significant bronchodilator response.  Lung valves were within normal limits.  With preserved DLCO.  FEV1 2.8 L, ratio 106%.  CT chest 5/2023:    ECHO 4/12/22:  Prior study 02/12/2020, EF has now normalized.  Normal left ventricular systolic function.  The left ventricular ejection fraction is visually estimated to be 60%.  The right ventricle is normal in size and systolic function.     PSG 6/14/16:  CPAP was tried from 5cm to [id number]. Bilevel was tried from 16/12cm to 18/14cm H2O.   Impression:  Review of the sleep architecture shows a prolonged sleep latency, increased wake after sleep onset, and REM rebound once positive airway pressure was initiated. The oximetric pattern improved  significantly with bilevel treatment.  The diagnostic analysis reveals severe obstructive sleep apnea hypopnea with an RDI of 32.6, and saturation of 80%, and saturations less than 90% for 93.1% of the recording. All of the events were obstructive hypopneas. He has a history of complex sleep apnea and has been treated with ASV for years. However, he had only one central apnea during his CPAP and bilevel titration. He does not meet the criteria for ASV any longer.  Both CPAP and bilevel were utilized. The patient did best on bilevel of 18/14 with primarily REM supine sleep with an apnea hypopnea index 0.0, low saturation 85%, and a mean saturation of 87.7%.  Patient also did well on bilevel 16/12 and bilevel of 17/13.     Recommendation:  Recommend bilevel of 18/14 using mask of choice and heated humidification with a follow-up continuous nocturnal oximetry on bilevel at patient's home altitude followed by a clinical and compliance review in 6-8 weeks. He has been treated for years with ASV for complex sleep apnea which has resolved.     CNOX on BIPAP 18/14cm 8/15/16:  The basal arterial oxygen saturation is 90%.  Saturation is reasonably well maintained throughout the night at 88% or above except for some brief periods of desaturation in the first hour of the study and in the penultimate hour of the study.  The data tabulation does record drops to 81% but these probably represent technical artifacts.  Overall he spends 44% of the study with a saturation below 90% but only 0.1% of the study time with a saturation below 85% and less than 15 minutes with a saturation less than 88%.    Assessment: Mild nocturnal hypoxemia on room air with reasonable preservation of saturation on BiPAP therapy.    ROS: As per HPI and otherwise negative if not stated.    Past Medical History[1]    Past Surgical History[2]    Family History   Problem Relation Age of Onset    Other Mother         Passed at 98    Heart Disease Father 60       "  CABG       Social History     Socioeconomic History    Marital status:      Spouse name: Not on file    Number of children: Not on file    Years of education: Not on file    Highest education level: Not on file   Occupational History    Not on file   Tobacco Use    Smoking status: Never    Smokeless tobacco: Never   Vaping Use    Vaping status: Never Used   Substance and Sexual Activity    Alcohol use: Yes     Comment: 2 drinks a week    Drug use: No    Sexual activity: Not on file   Other Topics Concern    Not on file   Social History Narrative    Not on file     Social Drivers of Health     Financial Resource Strain: Not on file   Food Insecurity: Not on file   Transportation Needs: Not on file   Physical Activity: Not on file   Stress: Not on file   Social Connections: Not on file   Intimate Partner Violence: Not on file   Housing Stability: Not on file       Allergies as of 05/21/2025 - Reviewed 05/21/2025   Allergen Reaction Noted    Lisinopril  06/19/2014        Vitals:  /66   Pulse 63   Ht 1.93 m (6' 4\")   Wt 114 kg (252 lb)   SpO2 94%     Current medications as of today Current Medications[3]      Physical Exam:   Gen:           Alert and oriented, No apparent distress. Mood and affect appropriate, normal interaction with examiner.  Eyes:          PERRL, EOM intact, sclere white, conjunctive moist.  Ears:          Not examined.   Hearing:     Grossly intact.  Nose:          Normal, no lesions or deformities.  Dentition:    Not examined.   Oropharynx:   Not examined.   Mallampati Classification: Not examined.   Neck:        Supple, trachea midline, no masses.  Respiratory Effort: No intercostal retractions or use of accessory muscles.   Lung Auscultation:      Clear to auscultation bilaterally; no rales, rhonchi or wheezing.  CV:            Regular rate and rhythm. No murmurs, rubs or gallops.  Abd:           Not examined.  Lymphadenopathy: Not examined.   Gait and Station: Normal.  Digits " and Nails: No clubbing, cyanosis, petechiae, or nodes.   Cranial Nerves: II-XII grossly intact.  Skin:        No rashes, lesions or ulcers noted.               Ext:           No cyanosis or edema.      Assessment:  1. Obstructive sleep apnea syndrome        2. Mixed obstructive and restrictive ventilatory defect  Referral to Speech Therapy    doxycycline (VIBRAMYCIN) 100 MG Cap    predniSONE (DELTASONE) 10 MG Tab      3. Dysphagia, unspecified type  Referral to Speech Therapy      4. Cough, unspecified type  Referral to Speech Therapy    doxycycline (VIBRAMYCIN) 100 MG Cap    predniSONE (DELTASONE) 10 MG Tab      5. BMI 30.0-30.9,adult  HEIGHT AND WEIGHT      6. Nonsmoker                 Immunizations:    Flu:2/2025  Pneumovax 23:2017  Prevnar 13:2018  PCV 20: ot due  COVID-19: 2025    Plan:  SHYAM is well treated.  He will continue to benefit from nightly auto BiPAP use.  He will check the fit of his mask and replace supplies.  PFT notes slight decline in TLC from prior readings but overall airflows are stable along with DLCO.  He will continue to benefit from current bronchodilators.  Reviewed appropriate use.   RX abx/steroid to have on hand for acute changes only  Referral to [facility] for evaluation of dysphagia.  Patient notes coughing episodes after eating and drinking not on a daily basis but frequent enough it is alarming to him.  See above  Encourage weight loss or healthy diet activity.  Follow-up in 5 months to review respiratory symptoms and compliance, sooner if needed.    Please note that this dictation was created using voice recognition software. I have made every reasonable attempt to correct obvious errors, but it is possible there are errors of grammar and possibly content that I did not discover before finalizing the note.           [1]   Past Medical History:  Diagnosis Date    Acute nasopharyngitis 01/2020    Anemia     Arrhythmia     a fib    Bowel habit changes     constipation      Breath shortness     since 11/2019, hx cardiac arrhythmia     CAD (coronary artery disease) 2003    PCI/BMS x 3 to the RCA (Zeta 4.0 x 23mm, 3.5 x 23mm, 3.0 x 18mm).    Cataract     alejandra IOL     Central sleep apnea      ICD-10 transition    Chronic anticoagulation     COPD (chronic obstructive pulmonary disease) (HCC)     Depression     Depressive disorder, not elsewhere classified          Dyslipidemia     Hemorrhagic disorder (HCC)     takes xarelto     High cholesterol     Hx MRSA infection 2009, 2014    right ankle 2014, right thumb 2009    Hypertension     Hypothyroidism     Mixed hyperlipidemia     Obesity     Old myocardial infarction January 2003    cardiac stents x3    Peptic ulcer disease 8/4/2016    Personal history of venous thrombosis and embolism     On Xarelto    Pulmonary embolism (HCC) 2003/2004    Sleep apnea     BiPAP with 3L oxygen   [2]   Past Surgical History:  Procedure Laterality Date    PB SHLDR ARTHROSCOP,SURG,W/ROTAT CUFF REPB Right 4/12/2021    Procedure: ARTHROSCOPY, SHOULDER, WITH ROTATOR CUFF REPAIR;  Surgeon: Eusebia Kinney M.D.;  Location: SURGERY [facility];  Service: Orthopedics    PB ARTHROSCOPY SHOULDER SURGICAL BICEPS TENODES* Right 4/12/2021    Procedure: ARTHROSCOPY, SHOULDER, WITH BICEPS TENODESIS - FOR TENOTOMY;  Surgeon: Eusebia Kinney M.D.;  Location: SURGERY [facility];  Service: Orthopedics    CO SHLDR ARTHROSCOP,PART ACROMIOPLAS Right 4/12/2021    Procedure: DECOMPRESSION, SHOULDER, ARTHROSCOPIC - SUBACROMIAL, LABRAL DEBRIDEMENT;  Surgeon: Eusebia Kinney M.D.;  Location: SURGERY [facility];  Service: Orthopedics    OTHER CARDIAC SURGERY  07/2020    Cardiac ablasion     COLONOSCOPY  2020    ORIF, WRIST Left 2/23/2018    Procedure: WRIST ORIF;  Surgeon: Jasper Hammond M.D.;  Location: [facility];  Service: Orthopedics    INCISION AND DRAINAGE ORTHOPEDIC  8/29/2014    Performed by Wolfgang Merrill M.D. at SURGERY [facility]  ORS    CARDIAC CATH, RIGHT/LEFT HEART  2003 01/2003 4.0x23mm Zeta stent to proximal RCA,3.5x22mm distal to first stent, 3.0x15mm at the point of original occlusion.    OTHER  2001    back sx     NE ANESTH,LOWER LEG ARTERIES SURG     [3]   Current Outpatient Medications   Medication Sig Dispense Refill    doxycycline (VIBRAMYCIN) 100 MG Cap Take 1 Capsule by mouth 2 times a day. Take until gone. 20 Capsule 0    predniSONE (DELTASONE) 10 MG Tab Take 30mg x 3 days, then take 20mg x 3 days, then take 10mg x 3 days, with food, then discontinue. 18 Tablet 0    sotalol (BETAPACE) 120 MG tablet TAKE 1 TABLET BY MOUTH TWICE DAILY 180 Tablet 1    amLODIPine (NORVASC) 2.5 MG Tab TAKE 1 TABLET BY MOUTH EVERY DAY 90 Tablet 3    XARELTO 20 MG Tab tablet TAKE 1 TABLET BY MOUTH WITH DINNER 90 Tablet 3    losartan (COZAAR) 50 MG Tab TAKE 1 TABLET BY MOUTH TWICE DAILY 180 Tablet 3    atorvastatin (LIPITOR) 40 MG Tab Take 1 Tablet by mouth every evening. 90 Tablet 3    metFORMIN (GLUCOPHAGE) 500 MG Tab Take 1 Tablet by mouth 2 times a day. 200 Tablet 3    levalbuterol (XOPENEX HFA) 45 MCG/ACT inhaler Inhale 2 Puffs every four hours as needed for Shortness of Breath. 45 g 3    fluticasone furoate-vilanterol (BREO) 200-25 MCG/ACT AEROSOL POWDER, BREATH ACTIVATED Inhale 1 Puff every day. 90 days, 3 refills 180 Each 3    escitalopram (LEXAPRO) 10 MG Tab TAKE 1 TABLET BY MOUTH EVERY DAY 90 Tablet 3    Multiple Vitamins-Minerals (MULTIVITAMIN ADULT PO) Take 1 Tab by mouth every morning.      Glucosamine HCl (GLUCOSAMINE PO) Take 1 Tablet by mouth every morning.       No current facility-administered medications for this visit.

## 2025-05-21 NOTE — PATIENT INSTRUCTIONS
Continue Breo once daily, use Xopenex HFA as needed only    Continue BIPAP on nightly basis - recommend new mask or mask fitting due to amount of leak noted on report    Referral to Speech Therapy to evaluate swallowing/cough    Recommendations for dry mouth:  1.  Increase humidity on PAP machine  2.  Biotene or Spry toothpaste/mouthwash/spray  3.  XyliMelt lozenges (lubricating lozenges)  4.  Sleep strips mouth tape available on amazon.com  5.  Chin strap from [employer] or request from medical equipment company (call them for this)  6.  Increase fluid intake    Keep RX for antibiotic and prednisone taper on hand for emergency sickness

## 2025-05-27 NOTE — TELEPHONE ENCOUNTER
Per patient         Patient comment: [place] and [place] them. Pharmacy says I dont have any refills on this Rx available

## 2025-05-28 RX ORDER — ESCITALOPRAM OXALATE 10 MG/1
10 TABLET ORAL
Qty: 90 TABLET | Refills: 3 | OUTPATIENT
Start: 2025-05-28

## 2025-05-28 RX ORDER — ESCITALOPRAM OXALATE 10 MG/1
10 TABLET ORAL
Qty: 90 TABLET | Refills: 3 | Status: SHIPPED | OUTPATIENT
Start: 2025-05-28

## 2025-05-28 NOTE — TELEPHONE ENCOUNTER
Received request via: Pharmacy    Was the patient seen in the last year in this department? Yes    Does the patient have an active prescription (recently filled or refills available) for medication(s) requested? No    [facility] Name[facility]    Does the patient have correction Plus and need 100-day supply? (This applies to ALL medications) Patient does not have SCP

## 2025-05-28 NOTE — Clinical Note
REFERRAL APPROVAL NOTICE         Sent on May 28, 2025                   Gilberto Cloud  [address] [place] [address]                   Dear Mr. Cloud,    After a careful review of the medical information and benefit coverage, [facility] has processed your referral. See below for additional details.    If applicable, you must be actively enrolled with your insurance for coverage of the authorized service. If you have any questions regarding your coverage, please contact your insurance directly.    REFERRAL INFORMATION   Referral #:  [id number]  Referred-To Provider    Referred-By Provider:  Speech Therapy    INDIRA Jacob   [facility]      [address]  [address]  [place] [address]  [phone] [address] [facility]  [id number]  [place] [address]  [phone]    Referral Start Date:  05/21/2025  Referral End Date:   05/21/2026             SCHEDULING  If you do not already have an appointment, please call [phone] to make an appointment.     MORE INFORMATION  If you do not already have a [facility] account, sign up at: [email or web address]  You can access your medical information, make appointments, see lab results, billing information, and more.  If you have questions regarding this referral, please contact  the [facility] Referrals department at:             [phone]. Monday - Friday 8:00AM - 5:00PM.     Sincerely,    [facility]

## 2025-05-29 ENCOUNTER — OFFICE VISIT (OUTPATIENT)
Dept: INTERNAL MEDICINE | Facility: OTHER | Age: 80
End: 2025-05-29
Payer: MEDICARE

## 2025-05-29 VITALS
DIASTOLIC BLOOD PRESSURE: 79 MMHG | HEIGHT: 76 IN | HEART RATE: 54 BPM | SYSTOLIC BLOOD PRESSURE: 147 MMHG | WEIGHT: 268 LBS | BODY MASS INDEX: 32.63 KG/M2 | TEMPERATURE: 98.1 F | OXYGEN SATURATION: 94 %

## 2025-05-29 DIAGNOSIS — E78.5 DYSLIPIDEMIA: ICD-10-CM

## 2025-05-29 DIAGNOSIS — I48.91 ATRIAL FIBRILLATION, UNSPECIFIED TYPE (HCC): ICD-10-CM

## 2025-05-29 DIAGNOSIS — I10 ESSENTIAL HYPERTENSION: ICD-10-CM

## 2025-05-29 DIAGNOSIS — I25.83 CORONARY ARTERY DISEASE DUE TO LIPID RICH PLAQUE: ICD-10-CM

## 2025-05-29 DIAGNOSIS — I25.10 CORONARY ARTERY DISEASE DUE TO LIPID RICH PLAQUE: ICD-10-CM

## 2025-05-29 DIAGNOSIS — E11.9 TYPE 2 DIABETES MELLITUS WITHOUT COMPLICATION, WITHOUT LONG-TERM CURRENT USE OF INSULIN (HCC): Primary | ICD-10-CM

## 2025-05-29 DIAGNOSIS — I10 HTN (HYPERTENSION), MALIGNANT: ICD-10-CM

## 2025-05-29 RX ORDER — LOSARTAN POTASSIUM 50 MG/1
50 TABLET ORAL 2 TIMES DAILY
Qty: 180 TABLET | Refills: 3 | Status: SHIPPED | OUTPATIENT
Start: 2025-05-29

## 2025-05-29 RX ORDER — ATORVASTATIN CALCIUM 40 MG/1
40 TABLET, FILM COATED ORAL NIGHTLY
Qty: 90 TABLET | Refills: 3 | Status: SHIPPED | OUTPATIENT
Start: 2025-05-29

## 2025-05-29 RX ORDER — AMLODIPINE BESYLATE 2.5 MG/1
2.5 TABLET ORAL DAILY
Qty: 90 TABLET | Refills: 3 | Status: SHIPPED | OUTPATIENT
Start: 2025-05-29

## 2025-05-29 ASSESSMENT — FIBROSIS 4 INDEX: FIB4 SCORE: 1.77

## 2025-05-29 NOTE — PROGRESS NOTES
"    Established Patient    Patient Care Team:  Angel Cameron M.D. as PCP - General  INDIRA Carr as Mid Level Provider (Cardiovascular Disease (Cardiology))  Padmini Calhoun M.D. as Consulting Physician (Cardiovascular Disease (Cardiology))  cpap and more as Respiratory Therapist  Austin Schreiber M.D. as Consulting Physician (Pulmonary Medicine)    Anthony Cloud is a 79 y.o. male who presents today with the following Chief Complaint(s):  Chief Complaint   Patient presents with    Follow-Up     3 month follow up    and for follow up for The primary encounter diagnosis was Type 2 diabetes mellitus without complication, without long-term current use of insulin (HCC). Diagnoses of Essential hypertension, Dyslipidemia, Atrial fibrillation, unspecified type (HCC), Coronary artery disease due to lipid rich plaque, and HTN (hypertension), malignant were also pertinent to this visit.    ROS:     Denies any new chest pain or shortness of breath.  No changes to urinary or bowel function.   See HPI.    Past Medical History[1]  Social History[2]  Current Medications[3]    Physical Exam:  BP (!) 147/79 (BP Location: Right arm, Patient Position: Sitting, BP Cuff Size: Adult)   Pulse (!) 54   Temp 36.7 °C (98.1 °F) (Temporal)   Ht 1.93 m (6' 4\")   Wt 122 kg (268 lb)   SpO2 94%   BMI 32.62 kg/m²   General: Well developed, well nourished male, in no distress.  Eyes: Conjuntiva without any obvious injection or erythema.   Cardiovascular: Heart is regular with no murmur  Lungs: Clear to auscultation bilaterally. No wheezes, rhonci or crackles heard. Respiratory effort is normal.  Abd: Soft, non-tender  Ext: No edema    HPI / Assessment / Plan:  1. Type 2 diabetes mellitus without complication, his last visit we started without long-term current use of insulin (HCC) (Primary)  At his last visit I started him on metformin 500 mg twice daily.  He tolerated this quite well, with no adverse effects.  His " hemoglobin A1c is decreased from 7.8% to 6.8% on this new regimen.  He does still have a little bit of elevated urine microalbumin levels, but overall doing well.  Plan:  Currently this is stable and well controlled.  The patient should continue current therapy with no changes at this time.      - HEMOGLOBIN A1C; Future  - Comp Metabolic Panel; Future  - MICROALBUMIN CREAT RATIO URINE; Future    2. Essential hypertension  Blood pressure is elevated in the office today 147/79.  Home blood pressure readings are typically much better than this, and he was recently seen at another doctor's office that was notable for blood pressure of 114 systolic.  He thinks his current elevation is just because over the last 2 days has not been able to take his amlodipine and losartan because he forgot his medications when he came back to [place].  He anticipates getting them tomorrow and will get restarted on them.  Plan:  For now, stay the course.  I refilled all of his medications so that he have access to them soon, but the sooner he gets started on his blood pressure medications the better.  Continue home blood pressure monitoring.  - amLODIPine (NORVASC) 2.5 MG Tab; Take 1 Tablet by mouth every day.  Dispense: 90 Tablet; Refill: 3  - CBC WITHOUT DIFFERENTIAL; Future  - Comp Metabolic Panel; Future  - MICROALBUMIN CREAT RATIO URINE; Future    3. Dyslipidemia  Anthony is tolerating medication well.  No intolerable side-effects noted.  No new symptoms of muscle aches or weakness.  Patient reports good adherence to medication regimen.  No change in medication since the last visit. Anthony is trying to follow a low-cholesterol diet.  Exercise is adequate.   LDL is 68.  He is on atorvastatin 40 mg daily  Plan:  Overall doing well on the current regimen.  No changes needed today.      4. Atrial fibrillation, unspecified type (HCC)  Generally doing well.  No recent issues with palpitations or shortness of breath.  He is on Xarelto and  tolerating it well.  Plan:  Currently this is stable and well controlled.  The patient should continue current therapy with no changes at this time.        5. Coronary artery disease due to lipid rich plaque  No chest pain or shortness of breath.  Overall doing well.  - losartan (COZAAR) 50 MG Tab; Take 1 Tablet by mouth 2 times a day.  Dispense: 180 Tablet; Refill: 3    Orders Placed This Encounter    CBC WITHOUT DIFFERENTIAL    HEMOGLOBIN A1C    Comp Metabolic Panel    MICROALBUMIN CREAT RATIO URINE    metFORMIN (GLUCOPHAGE) 500 MG Tab    losartan (COZAAR) 50 MG Tab    atorvastatin (LIPITOR) 40 MG Tab    amLODIPine (NORVASC) 2.5 MG Tab       Return in about 6 months (around 11/29/2025).    Angel Cameron M.D.  Professor of Medicine  [facility]    This note was created using voice recognition software.  While every attempt is made to ensure accuracy of transcription, occasionally errors occur.       [1]   Past Medical History:  Diagnosis Date    Acute nasopharyngitis 01/2020    Anemia     Arrhythmia     a fib    Bowel habit changes     constipation     Breath shortness     since 11/2019, hx cardiac arrhythmia     CAD (coronary artery disease) 2003    PCI/BMS x 3 to the RCA (Zeta 4.0 x 23mm, 3.5 x 23mm, 3.0 x 18mm).    Cataract     alejandra IOL     Central sleep apnea      ICD-10 transition    Chronic anticoagulation     COPD (chronic obstructive pulmonary disease) (HCC)     Depression     Depressive disorder, not elsewhere classified          Dyslipidemia     Hemorrhagic disorder (HCC)     takes xarelto     High cholesterol     Hx MRSA infection 2009, 2014    right ankle 2014, right thumb 2009    Hypertension     Hypothyroidism     Mixed hyperlipidemia     Obesity     Old myocardial infarction January 2003    cardiac stents x3    Peptic ulcer disease 8/4/2016    Personal history of venous thrombosis and embolism     On Xarelto    Pulmonary embolism (HCC) 2003/2004    Sleep apnea      BiPAP with 3L oxygen   [2]   Social History  Tobacco Use    Smoking status: Never    Smokeless tobacco: Never   Vaping Use    Vaping status: Never Used   Substance Use Topics    Alcohol use: Yes     Comment: 2 drinks a week    Drug use: No   [3]   Current Outpatient Medications   Medication Sig Dispense Refill    metFORMIN (GLUCOPHAGE) 500 MG Tab Take 1 Tablet by mouth 2 times a day. 200 Tablet 3    losartan (COZAAR) 50 MG Tab Take 1 Tablet by mouth 2 times a day. 180 Tablet 3    atorvastatin (LIPITOR) 40 MG Tab Take 1 Tablet by mouth every evening. 90 Tablet 3    amLODIPine (NORVASC) 2.5 MG Tab Take 1 Tablet by mouth every day. 90 Tablet 3    escitalopram (LEXAPRO) 10 MG Tab Take 1 Tablet by mouth every day. 90 Tablet 3    doxycycline (VIBRAMYCIN) 100 MG Cap Take 1 Capsule by mouth 2 times a day. Take until gone. 20 Capsule 0    predniSONE (DELTASONE) 10 MG Tab Take 30mg x 3 days, then take 20mg x 3 days, then take 10mg x 3 days, with food, then discontinue. 18 Tablet 0    sotalol (BETAPACE) 120 MG tablet TAKE 1 TABLET BY MOUTH TWICE DAILY 180 Tablet 1    XARELTO 20 MG Tab tablet TAKE 1 TABLET BY MOUTH WITH DINNER 90 Tablet 3    levalbuterol (XOPENEX HFA) 45 MCG/ACT inhaler Inhale 2 Puffs every four hours as needed for Shortness of Breath. 45 g 3    fluticasone furoate-vilanterol (BREO) 200-25 MCG/ACT AEROSOL POWDER, BREATH ACTIVATED Inhale 1 Puff every day. 90 days, 3 refills 180 Each 3    Multiple Vitamins-Minerals (MULTIVITAMIN ADULT PO) Take 1 Tab by mouth every morning.      Glucosamine HCl (GLUCOSAMINE PO) Take 1 Tablet by mouth every morning.       No current facility-administered medications for this visit.

## 2025-06-26 ENCOUNTER — PATIENT MESSAGE (OUTPATIENT)
Dept: INTERNAL MEDICINE | Facility: OTHER | Age: 80
End: 2025-06-26
Payer: MEDICARE

## 2025-06-26 DIAGNOSIS — R13.10 DYSPHAGIA, UNSPECIFIED TYPE: Primary | ICD-10-CM

## 2025-07-14 DIAGNOSIS — R94.2 MIXED OBSTRUCTIVE AND RESTRICTIVE VENTILATORY DEFECT: ICD-10-CM

## 2025-07-14 DIAGNOSIS — R05.9 COUGH, UNSPECIFIED TYPE: ICD-10-CM

## 2025-07-14 RX ORDER — DOXYCYCLINE 100 MG/1
CAPSULE ORAL
Qty: 20 CAPSULE | Refills: 0 | Status: SHIPPED | OUTPATIENT
Start: 2025-07-14

## 2025-07-14 NOTE — TELEPHONE ENCOUNTER
Have we ever prescribed this med? Yes.  If yes, what date? 05/21/25    Last OV: 05/21/25 emmie SLAUGHTER    Next OV: 07/24/25 emmie Richards. Antolin SLAUGHTER    Associated Diagnoses: Mixed obstructive and restrictive ventilatory defect, Cough, unspecified type     Requested Prescriptions     Pending Prescriptions Disp Refills    doxycycline (VIBRAMYCIN) 100 MG Cap [Pharmacy Med Name: DOXYCYCLINE HYC 100MG CAPS] 20 Capsule 0     Sig: TAKE 1 CAPSULE BY MOUTH TWICE DAILY UNTIL ALL TAKEN

## 2025-07-17 NOTE — Clinical Note
REFERRAL APPROVAL NOTICE         Sent on July 17, 2025                   Gilberto Greshamjocelinkamilla  [address]   [place] [address]                   Dear Mr. Cloud,    After a careful review of the medical information and benefit coverage, [facility] has processed your referral. See below for additional details.    If applicable, you must be actively enrolled with your insurance for coverage of the authorized service. If you have any questions regarding your coverage, please contact your insurance directly.    REFERRAL INFORMATION   Referral #:  [id number]  Referred-To Provider    Referred-By Provider:  Gastroenterology    Angel Cameron M.D.   [facility]      [address]  [place] [address]  [phone] [address] [facility] [address]  [phone]    Referral Start Date:  07/14/2025  Referral End Date:   07/14/2026             SCHEDULING  If you do not already have an appointment, please call [phone] to make an appointment.     MORE INFORMATION  If you do not already have a [employer] account, sign up at: [email or web address]  You can access your medical information, make appointments, see lab results, billing information, and more.  If you have questions regarding this referral, please contact  the [facility] Referrals department at:             [phone]. Monday - Friday 8:00AM - 5:00PM.     Sincerely,    [facility]

## 2025-07-24 ENCOUNTER — OFFICE VISIT (OUTPATIENT)
Dept: SLEEP MEDICINE | Facility: MEDICAL CENTER | Age: 80
End: 2025-07-24
Attending: NURSE PRACTITIONER
Payer: MEDICARE

## 2025-07-24 VITALS
SYSTOLIC BLOOD PRESSURE: 118 MMHG | RESPIRATION RATE: 16 BRPM | HEIGHT: 76 IN | HEART RATE: 79 BPM | DIASTOLIC BLOOD PRESSURE: 60 MMHG | WEIGHT: 259.9 LBS | BODY MASS INDEX: 31.65 KG/M2 | OXYGEN SATURATION: 91 %

## 2025-07-24 DIAGNOSIS — Z78.9 NONSMOKER: ICD-10-CM

## 2025-07-24 DIAGNOSIS — Z86.711 HISTORY OF PULMONARY EMBOLISM: ICD-10-CM

## 2025-07-24 DIAGNOSIS — G47.33 OBSTRUCTIVE SLEEP APNEA SYNDROME: Primary | Chronic | ICD-10-CM

## 2025-07-24 DIAGNOSIS — J98.01 BRONCHOSPASM: ICD-10-CM

## 2025-07-24 DIAGNOSIS — R05.9 COUGH, UNSPECIFIED TYPE: ICD-10-CM

## 2025-07-24 DIAGNOSIS — R94.2 MIXED OBSTRUCTIVE AND RESTRICTIVE VENTILATORY DEFECT: Chronic | ICD-10-CM

## 2025-07-24 DIAGNOSIS — Z91.09 ENVIRONMENTAL ALLERGIES: ICD-10-CM

## 2025-07-24 PROCEDURE — 94761 N-INVAS EAR/PLS OXIMETRY MLT: CPT | Performed by: NURSE PRACTITIONER

## 2025-07-24 PROCEDURE — 3078F DIAST BP <80 MM HG: CPT | Performed by: NURSE PRACTITIONER

## 2025-07-24 PROCEDURE — 99214 OFFICE O/P EST MOD 30 MIN: CPT | Performed by: NURSE PRACTITIONER

## 2025-07-24 PROCEDURE — 3074F SYST BP LT 130 MM HG: CPT | Performed by: NURSE PRACTITIONER

## 2025-07-24 PROCEDURE — 99215 OFFICE O/P EST HI 40 MIN: CPT | Mod: 25 | Performed by: NURSE PRACTITIONER

## 2025-07-24 RX ORDER — ALBUTEROL SULFATE 0.83 MG/ML
2.5 SOLUTION RESPIRATORY (INHALATION) EVERY 6 HOURS PRN
Qty: 360 ML | Refills: 5 | Status: SHIPPED | OUTPATIENT
Start: 2025-07-24

## 2025-07-24 RX ORDER — PREDNISONE 10 MG/1
TABLET ORAL
Qty: 18 TABLET | Refills: 0 | Status: SHIPPED | OUTPATIENT
Start: 2025-07-24

## 2025-07-24 ASSESSMENT — FIBROSIS 4 INDEX: FIB4 SCORE: 1.789442724190381774

## 2025-07-24 NOTE — PROCEDURES
Multi-Ox Readings  Multi Ox #1 Room air   O2 sat % at rest 94   O2 sat % on exertion 91   O2 sat average on exertion     Multi Ox #2     O2 sat % at rest     O2 sat % on exertion     O2 sat average on exertion       Oxygen Use     Oxygen Frequency     Duration of need     Is the patient mobile within the home?     CPAP Use?     BIPAP Use?     Servo Titration

## 2025-07-24 NOTE — PATIENT INSTRUCTIONS
Get mask fit - too much leaking of BIPAP mask    Follow up with Gastroenterology to do testing per Speech request    Repeat PFT in October    COMPLETE NOW:    LAB WORK  CT SCAN OF CHEST

## 2025-07-24 NOTE — PROGRESS NOTES
Chief Complaint   Patient presents with    Follow-Up     CHART PREP: 7/23/25    Dx/Last seen: Obstructive sleep apnea syndrome 5/21/25 Marisel Dangelo     Referrals: Speech Therapy     O2 or/and CPAP: BiPAP Pro with IPAP 18 and EPAP 14 cm H2O pressure  Wireless - care  - Compliance uploaded   Set up date: 6/17/16       HPI:  Anthony Cloud is a 80 y.o. 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  Hx of A. Fib with chronic anticoagulation.  Last OV 5/21/25.     MMRC ndGndrndanddndend:nd nd2nd Exacerbations this year: 2  2/2025 bronchospasm/bronchitis  5/2025 bronchospasm/bronchitis    Currently prescribed Breo 200mcg 1 puff QD.     Currently using AutoBIPAP 18/14cm; RESPIRONICS device obtained 6/17/16.   Compliance report 5/30/2025 through 6/20/2025 indicates 100% compliance, average nightly use 9.5 hours, significant mask leak of over 2 hours with overall AHI of 9.3/h.  Reviewed with patient.    Interval Events:  7/24/25: Since last office visit patient went to [place] [facility] June 2025 for swallow evaluation and noted to have peak flow below normal at 2 and 30 L/min and was unable to eject aspirate.  Nasal endoscopic swallow eval was scheduled and noticed reduced movement of the right hemilarynx.  Recommendation is GI to do esophageal manometry.  He is pending follow-up with them to do this.  He is established with gastroenterology consultants for past colonoscopies.  He notes having another episode of his chest becoming very tight and difficulty inhaling feeling like his chest was twisting.  He did not reach for Xopenex HFA but instead started antibiotics and steroids the next day which seemed to calm down symptoms and that overall improved within 5 days.  He notes at the time it occurred he was outside in the yard doing yard work and cutting limbs off trees.  He does not believe he is having reflux or indigestion.  During that time he is  having significant frequent cough that is making his chest hurt.  He does have a history of PE in 2003 and on Xarelto.  Did go to CPAP more and have mask fitting and found to not be having it in place correctly.  Adjustments were made.  His compliance report notes only 2 hours of largely consisted of 3 and help.  Reviewed with patient advised to go back to CPAP and more to have further adjustment.  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  He has no complaints or issues and reviewed amount of mask leak.  He will update his head straps and make sure cushion is up-to-date.  Discussed possible mask fitting.  He feels his breathing is back to baseline.  Using Xopenex HFA as needed only.  No regular cough, phlegm, chest pain, chest tightness or wheezing.  He does have some shortness of breath with activity.  He does note symptoms of dysphagia with eating which has never been evaluated.  He would like to have this looked at.  2/19/25: Patient presents today after returning back from [place] to see his daughter.  He was having what he believes bronchospasm with increased congestion.  He was unable to catch his breath and felt his airway was closing down.  He was started on antibiotics by one of his daughters and prescribed prednisone at the urgent care.  He has now been on prednisone for 2 days and has found some improvement with cough.  He also restarted Xopenex HFA inhaler in the last 24 hours and notes it calms the cough.  He denies any chills or significant fever.  His daughter notes him having a frequent coughing fit which she also had a few months back and took quite some time to resolve.  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  He notes occasionally he could eat his food too quickly and noticed a swallow issue but this is rare.  He reports no GERD.  He is currently using Mucinex 600 mg twice daily.  Has  multiple BiPAP machines that is in different places of living.  He reports no issues and did obtain a replacement Moder during the recall.  He is very consistent and will always have one of his machines used at night.  He does not miss nights on therapy.     PULM & SLEEP HX:  Never smoker  PFT 11/9/2020 indicated mild restriction with FEV1 2.57 L or 73%, ratio 86%, TLC 5.88 L or 75% DLCO 90% predicted.  BMI 32.  PFT 2012 noted mild mixed obstructive and restrictive lung disease.  No significant bronchodilator response.  Lung valves were within normal limits.  With preserved DLCO.  FEV1 2.8 L, ratio 106%.  CT chest 5/2023:    ECHO 4/12/22:  Prior study 02/12/2020, EF has now normalized.  Normal left ventricular systolic function.  The left ventricular ejection fraction is visually estimated to be 60%.  The right ventricle is normal in size and systolic function.     PSG 6/14/16:  RESPIRATORY: The patient had 0 apneas in total.  Of these, 0 were obstructive apneas, and 0 were central apneas.  This resulted in an apnea index (AI) of 0.0.  The patient had 73 hypopneas in total, which resulted in a hypopnea index of 32.6.  The overall AHI was 32.6, while the AHI during REM was 66.1.  The supine AHI = 54.7.   CPAP was tried from 5cm to 35ypR8I. Bilevel was tried from 16/12cm to 18/14cm H2O.   Impression:  Review of the sleep architecture shows a prolonged sleep latency, increased wake after sleep onset, and REM rebound once positive airway pressure was initiated. The oximetric pattern improved significantly with bilevel treatment.  The diagnostic analysis reveals severe obstructive sleep apnea hypopnea with an RDI of 32.6, and saturation of 80%, and saturations less than 90% for 93.1% of the recording. All of the events were obstructive hypopneas. He has a history of complex sleep apnea and has been treated with ASV for years. However, he had only one central apnea during his CPAP and bilevel titration. He does not meet  the criteria for ASV any longer.  Both CPAP and bilevel were utilized. The patient did best on bilevel of 18/14 with primarily REM supine sleep with an apnea hypopnea index 0.0, low saturation 85%, and a mean saturation of 87.7%.  Patient also did well on bilevel 16/12 and bilevel of 17/13.  Recommendation:  Recommend bilevel of 18/14 using mask of choice and heated humidification with a follow-up continuous nocturnal oximetry on bilevel at patient's home altitude followed by a clinical and compliance review in 6-8 weeks. He has been treated for years with ASV for complex sleep apnea which has resolved.     CNOX on BIPAP 18/14cm 8/15/16:  The basal arterial oxygen saturation is 90%.  Saturation is reasonably well maintained throughout the night at 88% or above except for some brief periods of desaturation in the first hour of the study and in the penultimate hour of the study.  The data tabulation does record drops to 81% but these probably represent technical artifacts.  Overall he spends 44% of the study with a saturation below 90% but only 0.1% of the study time with a saturation below 85% and less than 15 minutes with a saturation less than 88%.    Assessment: Mild nocturnal hypoxemia on room air with reasonable preservation of saturation on BiPAP therapy.    ROS: As per HPI and otherwise negative if not stated.    Past Medical History[1]    Past Surgical History[2]    Family History   Problem Relation Age of Onset    Other Mother         Passed at 98    Heart Disease Father 60        CABG       Social History     Socioeconomic History    Marital status:      Spouse name: Not on file    Number of children: Not on file    Years of education: Not on file    Highest education level: Not on file   Occupational History    Not on file   Tobacco Use    Smoking status: Never    Smokeless tobacco: Never   Vaping Use    Vaping status: Never Used   Substance and Sexual Activity    Alcohol use: Yes     Comment: 2  "drinks a week    Drug use: No    Sexual activity: Not on file   Other Topics Concern    Not on file   Social History Narrative    Not on file     Social Drivers of Health     Financial Resource Strain: Not on file   Food Insecurity: Not on file   Transportation Needs: Not on file   Physical Activity: Not on file   Stress: Not on file   Social Connections: Not on file   Intimate Partner Violence: Not on file   Housing Stability: Not on file       Allergies as of 07/24/2025 - Reviewed 07/24/2025   Allergen Reaction Noted    Lisinopril  06/19/2014        Vitals:  /60 (BP Location: Left arm, Patient Position: Sitting, BP Cuff Size: Adult)   Pulse 79   Resp 16   Ht 1.93 m (6' 4\")   Wt 118 kg (259 lb 14.4 oz)   SpO2 91%     Current medications as of today Current Medications[3]      Physical Exam:   Gen:           Alert and oriented, No apparent distress. Mood and affect appropriate, normal interaction with examiner.  Eyes:          PERRL, EOM intact, sclere white, conjunctive moist.  Ears:          Not examined.  Hearing:     Grossly intact.  Nose:          Normal, no lesions or deformities.  Dentition:    Not examined.  Oropharynx:   Not examined.  Mallampati Classification: Not examined.  Neck:        Supple, trachea midline, no masses.  Respiratory Effort: No intercostal retractions or use of accessory muscles.   Lung Auscultation:      Clear to auscultation bilaterally; no rales, rhonchi or wheezing.  CV:            Regular rate and rhythm. No murmurs, rubs or gallops.  Abd:           Not examined.   Lymphadenopathy: Not examined.  Gait and Station: Normal.  Digits and Nails: No clubbing, cyanosis, petechiae, or nodes.   Cranial Nerves: II-XII grossly intact.  Skin:        No rashes, lesions or ulcers noted.               Ext:           No cyanosis or edema.      Assessment:  1. Obstructive sleep apnea syndrome        2. Mixed obstructive and restrictive ventilatory defect        3. BMI 31.0-31.9,adult  " HEIGHT AND WEIGHT      4. Nonsmoker                 Immunizations:    Flu:recommend fall 2025  Pneumovax 23:2017  Prevnar 13:2018  PCV 20: not due  COVID-19: 2025    Plan:  SHYAM is improved on therapy.  He will continue to benefit from BiPAP with O2 bleed in.  Advise ongoing mask fitting due to significant leakage.  Because he is on higher BiPAP pressures ultimately at the mask leak will still occur but would like to reduce further.  He will continue to benefit from nightly use.  PFT notes mixed obstructive and restrictive dilatory defect.  He continues to benefit from Breo once daily.  Reviewed appropriate use of Xopenex HFA inhaler.  He would like to obtain a nebulizer to have on hand since the recent events that may have been bronchospasm of the lungs or possible esophageal spasm.  Suspicious of possible reflux/aspiration.  Patient has now had 3 episodes of this chest tightening and unsure of etiology.  Recommend CTA of chest now, recommend workup for environmental allergies for possible triggers including CBC, allergen zone 15 and IgE.  RX prednisone to have on hand for emergencies - has doxycycline at home  CTA chest now w/bun/creat  LABS: Allergen zone 15, IgE, CBC  PFT at next OV  Multi ox - normal on RA at rest/exertion  DME nebulizer with meds  Encourage weight loss healthy diet and activity.  Follow-up in October as scheduled with PFT and compliance report, sooner if needed. Requested notes from GI.    Please note that this dictation was created using voice recognition software. I have made every reasonable attempt to correct obvious errors, but it is possible there are errors of grammar and possibly content that I did not discover before finalizing the note.           [1]   Past Medical History:  Diagnosis Date    Acute nasopharyngitis 01/2020    Anemia     Arrhythmia     a fib    Bowel habit changes     constipation     Breath shortness     since 11/2019, hx cardiac arrhythmia     CAD (coronary artery  disease) 2003    PCI/BMS x 3 to the RCA (Zeta 4.0 x 23mm, 3.5 x 23mm, 3.0 x 18mm).    Cataract     alejandra IOL     Central sleep apnea      ICD-10 transition    Chronic anticoagulation     COPD (chronic obstructive pulmonary disease) ([facility])     Depression     Depressive disorder, not elsewhere classified          Dyslipidemia     Hemorrhagic disorder ([facility])     takes xarelto     High cholesterol     Hx MRSA infection 2009, 2014    right ankle 2014, right thumb 2009    Hypertension     Hypothyroidism     Mixed hyperlipidemia     Obesity     Old myocardial infarction January 2003    cardiac stents x3    Peptic ulcer disease 8/4/2016    Personal history of venous thrombosis and embolism     On Xarelto    Pulmonary embolism ([facility]) 2003/2004    Sleep apnea     BiPAP with 3L oxygen   [2]   Past Surgical History:  Procedure Laterality Date    PB SHLDR ARTHROSCOP,SURG,W/ROTAT CUFF REPB Right 4/12/2021    Procedure: ARTHROSCOPY, SHOULDER, WITH ROTATOR CUFF REPAIR;  Surgeon: Eusebia Kinney M.D.;  Location: SURGERY [facility];  Service: Orthopedics    PB ARTHROSCOPY SHOULDER SURGICAL BICEPS TENODES* Right 4/12/2021    Procedure: ARTHROSCOPY, SHOULDER, WITH BICEPS TENODESIS - FOR TENOTOMY;  Surgeon: Eusebia Kinney M.D.;  Location: SURGERY [facility];  Service: Orthopedics    TN SHLDR ARTHROSCOP,PART ACROMIOPLAS Right 4/12/2021    Procedure: DECOMPRESSION, SHOULDER, ARTHROSCOPIC - SUBACROMIAL, LABRAL DEBRIDEMENT;  Surgeon: Eusebia Kinney M.D.;  Location: SURGERY [facility];  Service: Orthopedics    OTHER CARDIAC SURGERY  07/2020    Cardiac ablasion     COLONOSCOPY  2020    ORIF, WRIST Left 2/23/2018    Procedure: WRIST ORIF;  Surgeon: Jasper Hammond M.D.;  Location: [facility];  Service: Orthopedics    INCISION AND DRAINAGE ORTHOPEDIC  8/29/2014    Performed by Wolfgang Merrill M.D. at SURGERY [facility]    CARDIAC CATH, RIGHT/LEFT HEART  2003 01/2003 4.0x23mm Zeta stent to proximal  RCA,3.5x22mm distal to first stent, 3.0x15mm at the point of original occlusion.    OTHER  2001    back sx     LA ANESTH,LOWER LEG ARTERIES SURG     [3]   Current Outpatient Medications   Medication Sig Dispense Refill    doxycycline (VIBRAMYCIN) 100 MG Cap TAKE 1 CAPSULE BY MOUTH TWICE DAILY UNTIL ALL TAKEN 20 Capsule 0    metFORMIN (GLUCOPHAGE) 500 MG Tab Take 1 Tablet by mouth 2 times a day. 200 Tablet 3    losartan (COZAAR) 50 MG Tab Take 1 Tablet by mouth 2 times a day. 180 Tablet 3    atorvastatin (LIPITOR) 40 MG Tab Take 1 Tablet by mouth every evening. 90 Tablet 3    amLODIPine (NORVASC) 2.5 MG Tab Take 1 Tablet by mouth every day. 90 Tablet 3    escitalopram (LEXAPRO) 10 MG Tab Take 1 Tablet by mouth every day. 90 Tablet 3    predniSONE (DELTASONE) 10 MG Tab Take 30mg x 3 days, then take 20mg x 3 days, then take 10mg x 3 days, with food, then discontinue. 18 Tablet 0    sotalol (BETAPACE) 120 MG tablet TAKE 1 TABLET BY MOUTH TWICE DAILY 180 Tablet 1    XARELTO 20 MG Tab tablet TAKE 1 TABLET BY MOUTH WITH DINNER 90 Tablet 3    levalbuterol (XOPENEX HFA) 45 MCG/ACT inhaler Inhale 2 Puffs every four hours as needed for Shortness of Breath. 45 g 3    fluticasone furoate-vilanterol (BREO) 200-25 MCG/ACT AEROSOL POWDER, BREATH ACTIVATED Inhale 1 Puff every day. 90 days, 3 refills 180 Each 3    Multiple Vitamins-Minerals (MULTIVITAMIN ADULT PO) Take 1 Tab by mouth every morning.      Glucosamine HCl (GLUCOSAMINE PO) Take 1 Tablet by mouth every morning.       No current facility-administered medications for this visit.

## 2025-07-31 NOTE — Clinical Note
REFERRAL APPROVAL NOTICE         Sent on July 31, 2025                   Gilberto Cloud  [address] [place] [address]                   Dear Mr. Cloud,    After a careful review of the medical information and benefit coverage, [facility] has processed your referral. See below for additional details.    If applicable, you must be actively enrolled with your insurance for coverage of the authorized service. If you have any questions regarding your coverage, please contact your insurance directly.    REFERRAL INFORMATION   Referral #:  [id number]  Referred-To Department    Referred-By Provider:  Pulmonary and Sleep Medicine    INDIRA Jacob   [facility]      [address]  [address]  [place] [address] [phone] [address], [address]  [place] [id number] [phone]    Referral Start Date:  07/24/2025  Referral End Date:   07/24/2026           SCHEDULING  If you do not already have an appointment, please call [phone] to make an appointment.   MORE INFORMATION  As a reminder, [facility] ownership has changed, meaning this location is now owned and operated by [facility]. As such, we want to clarify that our patients should expect to receive two separate bills for the services received at [facility] - one representing the [facility] facility fees as the owner of the establishment, and the other to represent the physician's services and subsequent fees. You can speak with your insurance carrier for a pricing estimate by calling the customer service number on the back of your card and ask about charges for a hospital outpatient visit.  If you do not already have a [facility] account, sign up at: [email or web address]  You can access your medical  information, make appointments, see lab results, billing information, and more.  If you have questions regarding this referral, please contact  the [facility] department at:             [phone]. Monday - Friday 7:30AM - 5:00PM.      Sincerely,  [facility]

## 2025-08-11 ENCOUNTER — HOSPITAL ENCOUNTER (OUTPATIENT)
Dept: RADIOLOGY | Facility: MEDICAL CENTER | Age: 80
End: 2025-08-11
Attending: NURSE PRACTITIONER
Payer: MEDICARE

## 2025-08-11 DIAGNOSIS — J98.01 BRONCHOSPASM: ICD-10-CM

## 2025-08-11 DIAGNOSIS — Z86.711 HISTORY OF PULMONARY EMBOLISM: ICD-10-CM

## 2025-08-11 PROCEDURE — 700117 HCHG RX CONTRAST REV CODE 255: Performed by: NURSE PRACTITIONER

## 2025-08-11 PROCEDURE — 71275 CT ANGIOGRAPHY CHEST: CPT

## 2025-08-11 RX ADMIN — IOHEXOL 80 ML: 350 INJECTION, SOLUTION INTRAVENOUS at 11:15

## (undated) DEVICE — SUTURE PASSER SELF CAPTURE

## (undated) DEVICE — PADDING CAST 4 IN STERILE - 4 X 4 YDS (24/CA)

## (undated) DEVICE — SUTURE 3-0 MONOCRYL PLUS PS-2 - (12/BX)

## (undated) DEVICE — KIT DISPOSABLE HIP 2.8MM IMPLANT INCLUDES DRILL DRILL GUIDE AND OBTURATOR

## (undated) DEVICE — ABLATOR WAND SERFAS 90-S CRUISE

## (undated) DEVICE — DRAPE IOBAN II INCISE 23X17 - (10EA/BX 4BX/CA)

## (undated) DEVICE — CHLORAPREP 26 ML APPLICATOR - ORANGE TINT(25/CA)

## (undated) DEVICE — LACTATED RINGERS INJ 1000 ML - (14EA/CA 60CA/PF)

## (undated) DEVICE — TUBING PUMP WITH CONNECTOR REDEUCE (1EA)

## (undated) DEVICE — SHAVER4.0 AGGRESSIVE + FORMLA (5EA/BX)

## (undated) DEVICE — PACK LOWER EXTREMITY - (2/CA)

## (undated) DEVICE — SHAVER, 5.5 RESECTOR

## (undated) DEVICE — DRESSING TRANSPARENT FILM TEGADERM 4 X 4.75" (50EA/BX)"

## (undated) DEVICE — SUTURE 2-0 MONOCRYL SH&UR-6 27 - (12/BX)

## (undated) DEVICE — TUBE CONNECTING SUCTION - CLEAR PLASTIC STERILE 72 IN (50EA/CA)

## (undated) DEVICE — DRILL BIT 1.8MMX80MM CALIBRATED (4TX2=8)

## (undated) DEVICE — ELECTRODE DUAL RETURN W/ CORD - (50/PK)

## (undated) DEVICE — SENSOR SPO2 NEO LNCS ADHESIVE (20/BX) SEE USER NOTES

## (undated) DEVICE — GLOVE BIOGEL SZ 7 SURGICAL PF LTX - (50PR/BX 4BX/CA)

## (undated) DEVICE — SPLINT PLASTER 5 IN X 30 IN - (50EA/BX 6BX/CA)

## (undated) DEVICE — SPIDER SHOULDER HOLDER (12EA/BX)

## (undated) DEVICE — GLOVE BIOGEL SZ 7.5 SURGICAL PF LTX - (50PR/BX 4BX/CA)

## (undated) DEVICE — CANISTER SUCTION RIGID RED 1500CC (40EA/CA)

## (undated) DEVICE — CLOSURE SKIN STRIP 1/2 X 4 IN - (STERI STRIP) (50/BX 4BX/CA)

## (undated) DEVICE — SUTURE 3-0 VICRYL PLUS SH - 8X 18 INCH (12/BX)

## (undated) DEVICE — KIT ROOM DECONTAMINATION

## (undated) DEVICE — SLEEVE, VASO, THIGH, MED

## (undated) DEVICE — SET EXTENSION WITH 2 PORTS (48EA/CA) ***PART #2C8610 IS A SUBSTITUTE*****

## (undated) DEVICE — HUMID-VENT HEAT AND MOISTURE EXCHANGE- (50/BX)

## (undated) DEVICE — WATER IRRIGATION STERILE 1000ML (12EA/CA)

## (undated) DEVICE — CANNULA FULLY THREADED 8 X 75 (5EA/BX)

## (undated) DEVICE — SUTURE GENERAL

## (undated) DEVICE — SODIUM CHL IRRIGATION 0.9% 1000ML (12EA/CA)

## (undated) DEVICE — PROTECTOR ULNA NERVE - (36PR/CA)

## (undated) DEVICE — GLOVE BIOGEL SZ 6.5 SURGICAL PF LTX (50PR/BX 4BX/CA)

## (undated) DEVICE — SUTURE 2-0 MONOCRYL CT-1

## (undated) DEVICE — KIT ANESTHESIA W/CIRCUIT & 3/LT BAG W/FILTER (20EA/CA)

## (undated) DEVICE — GLOVE SZ 7 BIOGEL PI MICRO - PF LF (50PR/BX 4BX/CA)

## (undated) DEVICE — GLOVE BIOGEL PI INDICATOR SZ 6.5 SURGICAL PF LF - (50/BX 4BX/CA)

## (undated) DEVICE — SUCTION INSTRUMENT YANKAUER BULBOUS TIP W/O VENT (50EA/CA)

## (undated) DEVICE — GOWN WARMING STANDARD FLEX - (30/CA)

## (undated) DEVICE — DRAPE C-ARM LARGE 41IN X 74 IN - (10/BX 2BX/CA)

## (undated) DEVICE — Device

## (undated) DEVICE — HEAD HOLDER JUNIOR/ADULT

## (undated) DEVICE — CANISTER SUCTION 3000ML MECHANICAL FILTER AUTO SHUTOFF MEDI-VAC NONSTERILE LF DISP  (40EA/CA)

## (undated) DEVICE — GLOVE BIOGEL INDICATOR SZ 7SURGICAL PF LTX - (50/BX 4BX/CA)

## (undated) DEVICE — PADDING CAST 4 IN X 4 YDS - SOF-ROLL (12RL/BG 6BG/CT)

## (undated) DEVICE — SHAVER 5.5 RESECTOR FORMULA (5EA/BX )

## (undated) DEVICE — TUBING CLEARLINK DUO-VENT - C-FLO (48EA/CA)

## (undated) DEVICE — DRILL BIT 2.8X200 PERC CALIB - (6TX2=12)

## (undated) DEVICE — WATER IRRIG. STER. 1500 ML - (9/CA)

## (undated) DEVICE — BAG SPONGE COUNT 10.25 X 32 - BLUE (250/CA)

## (undated) DEVICE — DRAPETIBURON SHOULDER W/POUCH - (5EA/CA)

## (undated) DEVICE — NEPTUNE 4 PORT MANIFOLD - (20/PK)

## (undated) DEVICE — MASK ANESTHESIA ADULT  - (100/CA)

## (undated) DEVICE — GLOVE BIOGEL INDICATOR SZ 8 SURGICAL PF LTX - (50/BX 4BX/CA)

## (undated) DEVICE — DRAPE U SPLIT IMP 54 X 76 - (24/CA)

## (undated) DEVICE — SODIUM CHL. IRRIGATION 0.9% 3000ML (4EA/CA 65CA/PF)

## (undated) DEVICE — GLOVE, LITE (PAIR)

## (undated) DEVICE — GLOVE BIOGEL PI ULTRATOUCH SZ 7.5 SURGICAL PF LF -(50/BX 4BX/CA)

## (undated) DEVICE — BANDAGE ELASTIC LATEX STERILE VELCRO 4 X 5 YDS (25EA/CA)

## (undated) DEVICE — BANDAGE ELASTIC 4 HONEYCOMB - 4"X5YD LF (20/CA)"

## (undated) DEVICE — PACK SHOULDER ARTHROSCOPY SM - (2EA/CA)

## (undated) DEVICE — SCREW 2.5 MM NON-LOCKING TI X 16MM LONG (6TX8=48): Type: IMPLANTABLE DEVICE | Status: NON-FUNCTIONAL

## (undated) DEVICE — CANNULA TWIST IN 8.25MM X 7CM (5/BX)

## (undated) DEVICE — NEEDLE DAVIS TONSIL 1/2 CIR - (2EA/PK20PK/BX)

## (undated) DEVICE — SET LEADWIRE 5 LEAD BEDSIDE DISPOSABLE ECG (1SET OF 5/EA)

## (undated) DEVICE — ELECTRODE 850 FOAM ADHESIVE - HYDROGEL RADIOTRNSPRNT (50/PK)

## (undated) DEVICE — TUBING PATIENT W/CONNECTOR REDEUCE (1EA)

## (undated) DEVICE — SUTURE 3-0 PROLENE PS-1 (12PK/BX)

## (undated) DEVICE — NEEDLE W/FACET TIP DULL VERSION W/STIMULATION CABLE SONOPLEX 22G X 2 (10EA/CA)"